# Patient Record
Sex: FEMALE | Race: WHITE | NOT HISPANIC OR LATINO | Employment: FULL TIME | URBAN - METROPOLITAN AREA
[De-identification: names, ages, dates, MRNs, and addresses within clinical notes are randomized per-mention and may not be internally consistent; named-entity substitution may affect disease eponyms.]

---

## 2017-01-23 ENCOUNTER — PRENATAL OFFICE VISIT (OUTPATIENT)
Dept: OBGYN | Facility: CLINIC | Age: 27
End: 2017-01-23
Payer: COMMERCIAL

## 2017-01-23 DIAGNOSIS — Z34.00 PRENATAL CARE, FIRST PREGNANCY: Primary | ICD-10-CM

## 2017-01-23 PROCEDURE — 99207 ZZC NO CHARGE NURSE ONLY: CPT | Performed by: OBSTETRICS & GYNECOLOGY

## 2017-01-23 RX ORDER — PRENATAL VIT/IRON FUM/FOLIC AC 27MG-0.8MG
1 TABLET ORAL DAILY
COMMUNITY
Start: 2014-01-23 | End: 2020-07-29

## 2017-01-24 ENCOUNTER — OFFICE VISIT (OUTPATIENT)
Dept: FAMILY MEDICINE | Facility: CLINIC | Age: 27
End: 2017-01-24
Payer: COMMERCIAL

## 2017-01-24 VITALS
HEART RATE: 77 BPM | TEMPERATURE: 98.7 F | OXYGEN SATURATION: 99 % | DIASTOLIC BLOOD PRESSURE: 76 MMHG | SYSTOLIC BLOOD PRESSURE: 140 MMHG | WEIGHT: 194.2 LBS | BODY MASS INDEX: 30.88 KG/M2

## 2017-01-24 DIAGNOSIS — N76.0 BV (BACTERIAL VAGINOSIS): Primary | ICD-10-CM

## 2017-01-24 DIAGNOSIS — R21 RASH: ICD-10-CM

## 2017-01-24 DIAGNOSIS — R35.0 URINARY FREQUENCY: ICD-10-CM

## 2017-01-24 DIAGNOSIS — B96.89 BV (BACTERIAL VAGINOSIS): Primary | ICD-10-CM

## 2017-01-24 DIAGNOSIS — N89.8 VAGINAL ITCHING: ICD-10-CM

## 2017-01-24 LAB
ALBUMIN UR-MCNC: NEGATIVE MG/DL
APPEARANCE UR: CLEAR
BILIRUB UR QL STRIP: NEGATIVE
COLOR UR AUTO: YELLOW
GLUCOSE UR STRIP-MCNC: NEGATIVE MG/DL
HGB UR QL STRIP: ABNORMAL
KETONES UR STRIP-MCNC: NEGATIVE MG/DL
LEUKOCYTE ESTERASE UR QL STRIP: NEGATIVE
MICRO REPORT STATUS: ABNORMAL
NITRATE UR QL: NEGATIVE
PH UR STRIP: 6.5 PH (ref 5–7)
RBC #/AREA URNS AUTO: NORMAL /HPF (ref 0–2)
SP GR UR STRIP: 1.01 (ref 1–1.03)
SPECIMEN SOURCE: ABNORMAL
URN SPEC COLLECT METH UR: ABNORMAL
UROBILINOGEN UR STRIP-ACNC: 0.2 EU/DL (ref 0.2–1)
WBC #/AREA URNS AUTO: NORMAL /HPF (ref 0–2)
WET PREP SPEC: ABNORMAL

## 2017-01-24 PROCEDURE — 87086 URINE CULTURE/COLONY COUNT: CPT | Performed by: NURSE PRACTITIONER

## 2017-01-24 PROCEDURE — 87210 SMEAR WET MOUNT SALINE/INK: CPT | Performed by: NURSE PRACTITIONER

## 2017-01-24 PROCEDURE — 99213 OFFICE O/P EST LOW 20 MIN: CPT | Performed by: NURSE PRACTITIONER

## 2017-01-24 PROCEDURE — 81001 URINALYSIS AUTO W/SCOPE: CPT | Performed by: NURSE PRACTITIONER

## 2017-01-24 RX ORDER — CLINDAMYCIN HCL 300 MG
300 CAPSULE ORAL 2 TIMES DAILY
Qty: 14 CAPSULE | Refills: 0 | Status: SHIPPED | OUTPATIENT
Start: 2017-01-24 | End: 2017-01-31

## 2017-01-24 NOTE — MR AVS SNAPSHOT
After Visit Summary   1/24/2017    Chhaya Thurman    MRN: [id number]           Patient Information     Date Of Birth          1990        Visit Information        Provider Department      1/24/2017 4:40 PM FARHAT SAME DAY PROVIDER [facility]        Today's Diagnoses     Vaginal itching    -  1     Urinary frequency         Rash         BV (bacterial vaginosis)           Care Instructions    Your provider has referred you to: FMG: [facility] [phone]    Follow-up with your OBGYN regarding your treatment of your bacterial vaginosis   Antibiotics as directed   Urine culture is pending, will contact you if there is a change in treatment therapy.   Drink plenty of fluids. Prevention and treatment of UTI's discussed.   Signs and symptoms of pyelonephritis mentioned.   RTC if any worsening symptoms or if not improving as expected.   Follow-up with your primary care provider next week and as needed.    Indications for emergent return to emergency department discussed with patient, who verbalized good understanding and agreement.  Patient understands the limitations of today's evaluation.         Bacterial Vaginosis    You have a vaginal infection called bacterial vaginosis (BV). Both good and bad bacteria are present in a healthy vagina. BV occurs when these bacteria get out of balance. The number of bad bacteria increase. And the number of good bacteria decrease.  BV may or may not cause symptoms. If symptoms do occur, they can include:    Thin, gray, milky-white, or sometimes green discharge    Unpleasant odor or  fishy  smell    Itching, burning, or pain in or around the vagina  It is not known what causes BV, but certain factors can make the problem more likely. This can include:    Douching    Having sex with a new partner    Having sex with more than one partner  BV will sometimes go away on its own. But treatment is usually recommended. This  is because untreated BV can increase the risk of more serious health problems such as:    Pelvic inflammatory disease (PID)     delivery (giving birth to a baby early if you re pregnant)    HIV and certain other sexually transmitted diseases (STDs)    Infection after surgery on the reproductive organs  Home care  General care    BV is most often treated with medicines called antibiotics. These may be given as pills or as a vaginal cream. If antibiotics are prescribed, be sure to use them exactly as directed. Also, be sure to complete all of the medicine, even if your symptoms go away.    Avoid douching or having sex during treatment.    If you have sex with a female partner, ask your healthcare provider if she should also be treated.  Prevention    Limit or avoid douching.    Avoid having sex. If you do have sex, then take steps to lower your risk:    Use condoms when having sex.    Limit the number of partners you have sex with.  Follow-up care  Follow up with your healthcare provider, or as advised.  When to seek medical advice  Call your healthcare provider right away if:    You have a fever of 100.4 F (38 C) or higher, or as directed by your provider.    Your symptoms worsen, or they don t go away within a few days of starting treatment.    You have new pain in the lower belly or pelvic region.    You have side effects that bother you or a reaction to the pills or cream you re prescribed.    You or any partners you have sex with have new symptoms, such as a rash, joint pain, or sores.    7898-4673 [employer]. [address] [place] [address]. All rights reserved. This information is not intended as a substitute for professional medical care. Always follow your healthcare professional's instructions.              Follow-ups after your visit        Additional Services     DERMATOLOGY REFERRAL       Your provider has referred you to: FMG: [facility] [phone]   [email or web address]    Please be aware that coverage of these services is subject to the terms and limitations of your health insurance plan.  Call member services at your health plan with any benefit or coverage questions.      Please bring the following with you to your appointment:    (1) Any X-Rays, CTs or MRIs which have been performed.  Contact the facility where they were done to arrange for  prior to your scheduled appointment.    (2) List of current medications  (3) This referral request   (4) Any documents/labs given to you for this referral                  Your next 10 appointments already scheduled     Feb 14, 2017  2:00 PM   New Prenatal with Joy Ellison MD, [facility] RM [facility]   [facility]    [address] [place] [address] [phone]              Who to contact     If you have questions or need follow up information about today's clinic visit or your schedule please contact [facility] directly at [phone].  Normal or non-critical lab and imaging results will be communicated to you by MyChart, letter or phone within 4 business days after the clinic has received the results. If you do not hear from us within 7 days, please contact the clinic through [employer] or phone. If you have a critical or abnormal lab result, we will notify you by phone as soon as possible.  Submit refill requests through [employer] or call your pharmacy and they will forward the refill request to us. Please allow 3 business days for your refill to be completed.          Additional Information About Your Visit        [employer] Information     [employer] gives you secure access to your electronic health record. If you see a primary care provider, you can also send messages to your care team and make appointments. If you have questions, please call your primary care clinic.  If you do not have a primary care provider,  please call [phone] and they will assist you.        Care EveryWhere ID     This is your Care EveryWhere ID. This could be used by other organizations to access your [place] medical records  [id number]        Your Vitals Were     Pulse Temperature Pulse Oximetry Last Period          77 98.7  F (37.1  C) (Tympanic) 99% 12/02/2016         Blood Pressure from Last 3 Encounters:   01/24/17 140/76   09/14/16 127/81   09/12/16 154/93    Weight from Last 3 Encounters:   01/24/17 194 lb 3.2 oz (88.089 kg)   02/12/16 180 lb (81.647 kg)   08/20/15 180 lb (81.647 kg)              We Performed the Following     *UA reflex to Microscopic and Culture ([facility] (except [place] and [place])     DERMATOLOGY REFERRAL     Urine Microscopic     Wet prep          Today's Medication Changes          These changes are accurate as of: 1/24/17  4:42 PM.  If you have any questions, ask your nurse or doctor.               Start taking these medicines.        Dose/Directions    clindamycin 300 MG capsule   Commonly known as:  CLEOCIN   Used for:  BV (bacterial vaginosis)        Dose:  300 mg   Take 1 capsule (300 mg) by mouth 2 times daily for 7 days   Quantity:  14 capsule   Refills:  0            Where to get your medicines      These medications were sent to [id number] [address]  [address] [address]    Hours:  Closed 10-16-08 business to [facility] Phone:  [phone]    - clindamycin 300 MG capsule             Primary Care Provider Office Phone # Fax #    Agnes Stanton PA-C [phone]       [facility] [address] [facility] [address]        Thank you!     Thank you for choosing [facility]  for your care. Our goal is always to provide you with excellent care. Hearing back from our patients is one way we can continue to improve our services. Please take a few minutes to complete  the written survey that you may receive in the mail after your visit with us. Thank you!             Your Updated Medication List - Protect others around you: Learn how to safely use, store and throw away your medicines at www.disposemymeds.org.          This list is accurate as of: 1/24/17  4:42 PM.  Always use your most recent med list.                   Brand Name Dispense Instructions for use    clindamycin 300 MG capsule    CLEOCIN    14 capsule    Take 1 capsule (300 mg) by mouth 2 times daily for 7 days       prenatal multivitamin  plus iron 27-0.8 MG Tabs per tablet      Take 1 tablet by mouth daily

## 2017-01-24 NOTE — PROGRESS NOTES
SUBJECTIVE:                                                    Chhaya Thurman is a 26 year old female who presents to clinic today for the following health issues:    Patient is Pregnant   URINARY TRACT SYMPTOMS      Duration: 3 days     Description  frequency, urgency, nocturia x 1 and retention    Intensity:  mild    Accompanying signs and symptoms:  Fever/chills: YES  Flank pain no   Nausea and vomiting: no   Vaginal symptoms: none and itching  Abdominal/Pelvic Pain: no     History  History of frequent UTI's: YES- one a couple months ago   History of kidney stones: no   Sexually Active: YES  Possibility of pregnancy: Yes    Precipitating or alleviating factors: None    Therapies tried and outcome: increase fluid intake        SUBJECTIVE:   Chhaya Thurman is a 26 year old female who  presents today for a possible UTI. Symptoms of dysuria and frequency have been going on for 2day(s).  Hematuria no.  gradual onsetand mild.  There is no history of fever, chills, nausea or vomiting.  This patient does not have a history of urinary tract infections. Patient denies vaginal symptomsor STD concerns. Patient is  Pregnant     Past Medical History   Diagnosis Date     History of recurrent UTI (urinary tract infection)      Chickenpox      Current Outpatient Prescriptions   Medication Sig Dispense Refill     Prenatal Vit-Fe Fumarate-FA (PRENATAL MULTIVITAMIN  PLUS IRON) 27-0.8 MG TABS per tablet Take 1 tablet by mouth daily           ROS:   CONSTITUTIONAL:NEGATIVE for fever, chills, change in weight  INTEGUMENTARY/SKIN: NEGATIVE for worrisome rashes, moles or lesions  EYES: NEGATIVE for vision changes or irritation  ENT/MOUTH: NEGATIVE for ear, mouth and throat problems  RESP:NEGATIVE for significant cough or SOB  CV: NEGATIVE for chest pain, palpitations or peripheral edema  GI: NEGATIVE for nausea, abdominal pain, heartburn, or change in bowel habits  : See above   MUSCULOSKELETAL: NEGATIVE for significant  arthralgias or myalgia  NEURO: NEGATIVE for weakness, dizziness or paresthesias    OBJECTIVE:  /76 mmHg  Pulse 77  Temp(Src) 98.7  F (37.1  C) (Tympanic)  Wt 194 lb 3.2 oz (88.089 kg)  SpO2 99%  LMP 12/02/2016  GENERAL APPEARANCE: healthy, alert and no distress  RESP: lungs clear to auscultation - no rales, rhonchi or wheezes  CV: regular rates and rhythm, normal S1 S2, no murmur noted  ABDOMEN:  soft, nontender, no HSM or masses and bowel sounds normal  BACK: No CVA tenderness  SKIN:  Examination of the rash to the back  Reveals  well-defined, flat, , pigmented patches to the back.       UA:  Results for orders placed or performed in visit on 01/24/17   *UA reflex to Microscopic and Culture ([facility] (except [place] and [place])   Result Value Ref Range    Color Urine Yellow     Appearance Urine Clear     Glucose Urine Negative NEG mg/dL    Bilirubin Urine Negative NEG    Ketones Urine Negative NEG mg/dL    Specific Gravity Urine 1.010 1.003 - 1.035    Blood Urine Trace (A) NEG    pH Urine 6.5 5.0 - 7.0 pH    Protein Albumin Urine Negative NEG mg/dL    Urobilinogen Urine 0.2 0.2 - 1.0 EU/dL    Nitrite Urine Negative NEG    Leukocyte Esterase Urine Negative NEG    Source Midstream Urine    Urine Microscopic   Result Value Ref Range    WBC Urine O - 2 0 - 2 /HPF    RBC Urine O - 2 0 - 2 /HPF   Wet prep   Result Value Ref Range    Specimen Description Vagina     Wet Prep (A)      No Trichomonas seen  Few Clue cells seen  No yeast seen      Micro Report Status FINAL 01/24/2017        ASSESSMENT:     ICD-10-CM    1. BV (bacterial vaginosis) N76.0 clindamycin (CLEOCIN) 300 MG capsule    B96.89    2. Rash R21 DERMATOLOGY REFERRAL   3. Vaginal itching L29.8 Wet prep     Urine Microscopic   4. Urinary frequency R35.0 *UA reflex to Microscopic and Culture ([facility] (except [place] and [place])     Urine Culture Aerobic Bacterial         PLAN:  Will  have her seen by Dermatology for the rash to the back for further evaluations   Patient Instructions   Your provider has referred you to: FMG: [facility] [phone]    Follow-up with your OBGYN regarding your treatment of your bacterial vaginosis   Antibiotics as directed   Urine culture is pending, will contact you if there is a change in treatment therapy.   Drink plenty of fluids. Prevention and treatment of UTI's discussed.   Signs and symptoms of pyelonephritis mentioned.   RTC if any worsening symptoms or if not improving as expected.   Follow-up with your primary care provider next week and as needed.    Indications for emergent return to emergency department discussed with patient, who verbalized good understanding and agreement.  Patient understands the limitations of today's evaluation.         Bacterial Vaginosis    You have a vaginal infection called bacterial vaginosis (BV). Both good and bad bacteria are present in a healthy vagina. BV occurs when these bacteria get out of balance. The number of bad bacteria increase. And the number of good bacteria decrease.  BV may or may not cause symptoms. If symptoms do occur, they can include:    Thin, gray, milky-white, or sometimes green discharge    Unpleasant odor or  fishy  smell    Itching, burning, or pain in or around the vagina  It is not known what causes BV, but certain factors can make the problem more likely. This can include:    Douching    Having sex with a new partner    Having sex with more than one partner  BV will sometimes go away on its own. But treatment is usually recommended. This is because untreated BV can increase the risk of more serious health problems such as:    Pelvic inflammatory disease (PID)     delivery (giving birth to a baby early if you re pregnant)    HIV and certain other sexually transmitted diseases (STDs)    Infection after surgery on the reproductive organs  [facility]  General  care    BV is most often treated with medicines called antibiotics. These may be given as pills or as a vaginal cream. If antibiotics are prescribed, be sure to use them exactly as directed. Also, be sure to complete all of the medicine, even if your symptoms go away.    Avoid douching or having sex during treatment.    If you have sex with a female partner, ask your healthcare provider if she should also be treated.  Prevention    Limit or avoid douching.    Avoid having sex. If you do have sex, then take steps to lower your risk:    Use condoms when having sex.    Limit the number of partners you have sex with.  Follow-up care  Follow up with your healthcare provider, or as advised.  When to seek medical advice  Call your healthcare provider right away if:    You have a fever of 100.4 F (38 C) or higher, or as directed by your provider.    Your symptoms worsen, or they don t go away within a few days of starting treatment.    You have new pain in the lower belly or pelvic region.    You have side effects that bother you or a reaction to the pills or cream you re prescribed.    You or any partners you have sex with have new symptoms, such as a rash, joint pain, or sores.    7525-6500 [employer]. [address] [place] [address], [place] [address]. All rights reserved. This information is not intended as a substitute for professional medical care. Always follow your healthcare professional's instructions.            SUSSY Olivo CNP

## 2017-01-24 NOTE — NURSING NOTE
"Chief Complaint   Patient presents with     UTI     Derm Problem     spots on back      /76 mmHg  Pulse 77  Temp(Src) 98.7  F (37.1  C) (Tympanic)  Wt 194 lb 3.2 oz (88.089 kg)  SpO2 99%  LMP 12/02/2016 Estimated body mass index is 30.88 kg/(m^2) as calculated from the following:    Height as of 2/12/16: 5' 6.5\" (1.689 m).    Weight as of this encounter: 194 lb 3.2 oz (88.089 kg).  bp completed using cuff size: regular      Health Maintenance that is potentially due pending provider review:  NONE        "

## 2017-01-24 NOTE — PATIENT INSTRUCTIONS
Your provider has referred you to: FMG: [facility] [phone]    Follow-up with your OBGYN regarding your treatment of your bacterial vaginosis   Antibiotics as directed   Urine culture is pending, will contact you if there is a change in treatment therapy.   Drink plenty of fluids. Prevention and treatment of UTI's discussed.   Signs and symptoms of pyelonephritis mentioned.   RTC if any worsening symptoms or if not improving as expected.   Follow-up with your primary care provider next week and as needed.    Indications for emergent return to emergency department discussed with patient, who verbalized good understanding and agreement.  Patient understands the limitations of today's evaluation.         Bacterial Vaginosis    You have a vaginal infection called bacterial vaginosis (BV). Both good and bad bacteria are present in a healthy vagina. BV occurs when these bacteria get out of balance. The number of bad bacteria increase. And the number of good bacteria decrease.  BV may or may not cause symptoms. If symptoms do occur, they can include:    Thin, gray, milky-white, or sometimes green discharge    Unpleasant odor or  fishy  smell    Itching, burning, or pain in or around the vagina  It is not known what causes BV, but certain factors can make the problem more likely. This can include:    Douching    Having sex with a new partner    Having sex with more than one partner  BV will sometimes go away on its own. But treatment is usually recommended. This is because untreated BV can increase the risk of more serious health problems such as:    Pelvic inflammatory disease (PID)     delivery (giving birth to a baby early if you re pregnant)    HIV and certain other sexually transmitted diseases (STDs)    Infection after surgery on the reproductive organs  Home care  General care    BV is most often treated with medicines called antibiotics. These may be given as pills or as a  vaginal cream. If antibiotics are prescribed, be sure to use them exactly as directed. Also, be sure to complete all of the medicine, even if your symptoms go away.    Avoid douching or having sex during treatment.    If you have sex with a female partner, ask your healthcare provider if she should also be treated.  Prevention    Limit or avoid douching.    Avoid having sex. If you do have sex, then take steps to lower your risk:    Use condoms when having sex.    Limit the number of partners you have sex with.  Follow-up care  Follow up with your healthcare provider, or as advised.  When to seek medical advice  Call your healthcare provider right away if:    You have a fever of 100.4 F (38 C) or higher, or as directed by your provider.    Your symptoms worsen, or they don t go away within a few days of starting treatment.    You have new pain in the lower belly or pelvic region.    You have side effects that bother you or a reaction to the pills or cream you re prescribed.    You or any partners you have sex with have new symptoms, such as a rash, joint pain, or sores.    9648-9041 [employer]. [address] [place] [address]. All rights reserved. This information is not intended as a substitute for professional medical care. Always follow your healthcare professional's instructions.

## 2017-01-26 ENCOUNTER — TELEPHONE (OUTPATIENT)
Dept: OBGYN | Facility: CLINIC | Age: 27
End: 2017-01-26

## 2017-01-26 LAB
BACTERIA SPEC CULT: NORMAL
MICRO REPORT STATUS: NORMAL
SPECIMEN SOURCE: NORMAL

## 2017-01-26 NOTE — TELEPHONE ENCOUNTER
Returned call to Patient:  S-(situation): spotting    B-(background): EOB currently 7+6 weeks pregnant, 1st OB scheduled 2/14/17.     A-(assessment): Patient reports yesterday after straining with BM had spotting, happened again today but spotting has continued throughout the day. Patient reports a quarter sized stain on underwear from spotting. Patient reports color was dark brown initially is more reddish brown now. Patient reports having a sideache today, was a dull achy pain, denies sharp.     R-(recommendations): Reassurance provided. Advised to return call to clinic if changes to bleeding. Advised if soaking a pad >1hr, passing large clots, feeling lightheaded or dizzy, or if develops sudden onset of sharp abdominal pain on one side to be seen in ER.    Patient reports she is to disclose to provider that she is currently on Clindamycin for vaginosis.  Thank you,  Tiana Aldridge  OB/GYN, [facility] RN

## 2017-01-26 NOTE — TELEPHONE ENCOUNTER
Pt called stating that she is currently 8 weeks EOB (LMP was 12/02/2016) and is experiencing some spotting. Pt states that yesterday after she had a BM was the first time it happened and then again this morning. Pt states that it was dark brown and now it is brown with some red in it. Pt is not wearing a pad. Pt described these as drops and on toilet paper. Please advise.    Vita Martínez  [facility] [place]

## 2017-01-27 ENCOUNTER — APPOINTMENT (OUTPATIENT)
Dept: ULTRASOUND IMAGING | Facility: CLINIC | Age: 27
End: 2017-01-27
Attending: FAMILY MEDICINE
Payer: COMMERCIAL

## 2017-01-27 ENCOUNTER — HOSPITAL ENCOUNTER (EMERGENCY)
Facility: CLINIC | Age: 27
Discharge: HOME | End: 2017-01-27
Attending: FAMILY MEDICINE | Admitting: FAMILY MEDICINE
Payer: COMMERCIAL

## 2017-01-27 VITALS
HEIGHT: 67 IN | SYSTOLIC BLOOD PRESSURE: 127 MMHG | TEMPERATURE: 98.7 F | DIASTOLIC BLOOD PRESSURE: 76 MMHG | WEIGHT: 185 LBS | OXYGEN SATURATION: 99 % | RESPIRATION RATE: 16 BRPM | BODY MASS INDEX: 29.03 KG/M2

## 2017-01-27 DIAGNOSIS — O20.0 THREATENED ABORTION: ICD-10-CM

## 2017-01-27 LAB
ABO + RH BLD: NORMAL
ABO + RH BLD: NORMAL
ALBUMIN UR-MCNC: NEGATIVE MG/DL
ANION GAP SERPL CALCULATED.3IONS-SCNC: 9 MMOL/L (ref 3–14)
APPEARANCE UR: CLEAR
B-HCG SERPL-ACNC: 246 IU/L
BASOPHILS # BLD AUTO: 0.1 10E9/L (ref 0–0.2)
BASOPHILS NFR BLD AUTO: 1.5 %
BILIRUB UR QL STRIP: NEGATIVE
BLD GP AB SCN SERPL QL: NORMAL
BLOOD BANK CMNT PATIENT-IMP: NORMAL
BUN SERPL-MCNC: 10 MG/DL (ref 7–30)
CALCIUM SERPL-MCNC: 8.5 MG/DL (ref 8.5–10.1)
CHLORIDE SERPL-SCNC: 106 MMOL/L (ref 94–109)
CO2 SERPL-SCNC: 24 MMOL/L (ref 20–32)
COLOR UR AUTO: ABNORMAL
CREAT SERPL-MCNC: 0.78 MG/DL (ref 0.52–1.04)
DIFFERENTIAL METHOD BLD: NORMAL
EOSINOPHIL # BLD AUTO: 0.1 10E9/L (ref 0–0.7)
EOSINOPHIL NFR BLD AUTO: 1.8 %
ERYTHROCYTE [DISTWIDTH] IN BLOOD BY AUTOMATED COUNT: 13.6 % (ref 10–15)
GFR SERPL CREATININE-BSD FRML MDRD: 88 ML/MIN/1.7M2
GLUCOSE SERPL-MCNC: 87 MG/DL (ref 70–99)
GLUCOSE UR STRIP-MCNC: NEGATIVE MG/DL
HCT VFR BLD AUTO: 44.1 % (ref 35–47)
HGB BLD-MCNC: 14.6 G/DL (ref 11.7–15.7)
HGB UR QL STRIP: ABNORMAL
IMM GRANULOCYTES # BLD: 0 10E9/L (ref 0–0.4)
IMM GRANULOCYTES NFR BLD: 0 %
KETONES UR STRIP-MCNC: NEGATIVE MG/DL
LEUKOCYTE ESTERASE UR QL STRIP: NEGATIVE
LYMPHOCYTES # BLD AUTO: 2.1 10E9/L (ref 0.8–5.3)
LYMPHOCYTES NFR BLD AUTO: 35.6 %
MCH RBC QN AUTO: 29.3 PG (ref 26.5–33)
MCHC RBC AUTO-ENTMCNC: 33.1 G/DL (ref 31.5–36.5)
MCV RBC AUTO: 89 FL (ref 78–100)
MONOCYTES # BLD AUTO: 0.4 10E9/L (ref 0–1.3)
MONOCYTES NFR BLD AUTO: 7.2 %
MUCOUS THREADS #/AREA URNS LPF: PRESENT /LPF
NEUTROPHILS # BLD AUTO: 3.2 10E9/L (ref 1.6–8.3)
NEUTROPHILS NFR BLD AUTO: 53.9 %
NITRATE UR QL: NEGATIVE
PH UR STRIP: 6.5 PH (ref 5–7)
PLATELET # BLD AUTO: 239 10E9/L (ref 150–450)
POTASSIUM SERPL-SCNC: 3.8 MMOL/L (ref 3.4–5.3)
RBC # BLD AUTO: 4.98 10E12/L (ref 3.8–5.2)
RBC #/AREA URNS AUTO: <1 /HPF (ref 0–2)
SODIUM SERPL-SCNC: 139 MMOL/L (ref 133–144)
SP GR UR STRIP: 1 (ref 1–1.03)
SPECIMEN EXP DATE BLD: NORMAL
SQUAMOUS #/AREA URNS AUTO: 1 /HPF (ref 0–1)
URN SPEC COLLECT METH UR: ABNORMAL
UROBILINOGEN UR STRIP-MCNC: NORMAL MG/DL (ref 0–2)
WBC # BLD AUTO: 6 10E9/L (ref 4–11)
WBC #/AREA URNS AUTO: <1 /HPF (ref 0–2)

## 2017-01-27 PROCEDURE — 96361 HYDRATE IV INFUSION ADD-ON: CPT

## 2017-01-27 PROCEDURE — 85025 COMPLETE CBC W/AUTO DIFF WBC: CPT | Performed by: FAMILY MEDICINE

## 2017-01-27 PROCEDURE — 86900 BLOOD TYPING SEROLOGIC ABO: CPT | Performed by: FAMILY MEDICINE

## 2017-01-27 PROCEDURE — 96360 HYDRATION IV INFUSION INIT: CPT

## 2017-01-27 PROCEDURE — 86850 RBC ANTIBODY SCREEN: CPT | Performed by: FAMILY MEDICINE

## 2017-01-27 PROCEDURE — 25000128 H RX IP 250 OP 636: Performed by: FAMILY MEDICINE

## 2017-01-27 PROCEDURE — 76801 OB US < 14 WKS SINGLE FETUS: CPT

## 2017-01-27 PROCEDURE — 99284 EMERGENCY DEPT VISIT MOD MDM: CPT | Mod: 25

## 2017-01-27 PROCEDURE — 86901 BLOOD TYPING SEROLOGIC RH(D): CPT | Performed by: FAMILY MEDICINE

## 2017-01-27 PROCEDURE — 80048 BASIC METABOLIC PNL TOTAL CA: CPT | Performed by: FAMILY MEDICINE

## 2017-01-27 PROCEDURE — 81001 URINALYSIS AUTO W/SCOPE: CPT | Performed by: FAMILY MEDICINE

## 2017-01-27 PROCEDURE — 99284 EMERGENCY DEPT VISIT MOD MDM: CPT | Performed by: FAMILY MEDICINE

## 2017-01-27 PROCEDURE — 84702 CHORIONIC GONADOTROPIN TEST: CPT | Performed by: FAMILY MEDICINE

## 2017-01-27 RX ORDER — SODIUM CHLORIDE 9 MG/ML
1000 INJECTION, SOLUTION INTRAVENOUS CONTINUOUS
Status: DISCONTINUED | OUTPATIENT
Start: 2017-01-27 | End: 2017-01-27 | Stop reason: HOSPADM

## 2017-01-27 RX ORDER — CHLORAL HYDRATE 500 MG
2 CAPSULE ORAL DAILY
COMMUNITY
End: 2017-06-14

## 2017-01-27 RX ADMIN — SODIUM CHLORIDE 1000 ML: 9 INJECTION, SOLUTION INTRAVENOUS at 08:41

## 2017-01-27 ASSESSMENT — ENCOUNTER SYMPTOMS
MUSCULOSKELETAL NEGATIVE: 1
EYES NEGATIVE: 1
ACTIVITY CHANGE: 1
ALLERGIC/IMMUNOLOGIC NEGATIVE: 1
RESPIRATORY NEGATIVE: 1
HEMATOLOGIC/LYMPHATIC NEGATIVE: 1
APPETITE CHANGE: 1
NEUROLOGICAL NEGATIVE: 1
GASTROINTESTINAL NEGATIVE: 1
PSYCHIATRIC NEGATIVE: 1
ENDOCRINE NEGATIVE: 1
CARDIOVASCULAR NEGATIVE: 1

## 2017-01-27 NOTE — ED AVS SNAPSHOT
[facility] Emergency Department    [address]    [facility] [address]    Phone:  [phone]    Fax:  [phone]                                       Chhaya Thurman   MRN: [id number]    Department:  [facility] Emergency Department   Date of Visit:  2017           Patient Information     Date Of Birth          1990        Your diagnoses for this visit were:     Threatened         You were seen by Leonides Stephenson MD.      Follow-up Information     Schedule an appointment as soon as possible for a visit with Agnes Stanton PA-C.    Specialty:  Physician Assistant    Why:  As needed, If symptoms worsen    Contact information:    [facility]  [address] [place] [address]  [phone]          Discharge Instructions         Understanding Miscarriage: Emotions  Miscarriage is the unplanned end of a pregnancy that occurs before you reach 20 weeks. When a miscarriage happens, you re likely to have a wide range of feelings. Allow yourself to accept how you feel. Only then can you begin to move on.    No one is to blame  Know that you did not cause this to happen. Miscarriage is very common. There is a 15% chance of miscarriage with each pregnancy (after pregnancy has been diagnosed). Miscarriage usually takes place during the first 10 weeks after conception.  Grief takes many forms  Grief may be the first thing you feel, or it may come upon you later. Perhaps you ll grieve because the future you hoped for is lost. Grief is painful and often lonely. But your miscarriage should become easier to deal with over time.  What you feel is OK  No one can tell you how to respond to your miscarriage. If you have been trying to have a child, this loss may feel overwhelming. Perhaps this was an unplanned pregnancy. That doesn t mean you won t feel loss. You know yourself best. It s OK to feel whatever you feel.  A sense of loss  No matter what you thought about  being pregnant, having a miscarriage may cause a sense of loss. You may feel as if something is missing. It s OK if you can t describe how you feel. At first, it may be enough just to look inside yourself and feel your emotions.  Partner s note  Men grieve too. You may be feeling sad, helpless or frustrated. When you re struggling with your own feelings, knowing how to help your partner may be hard. But do your best to provide support. The following tips may also help:    Be kind to yourself and your partner.    Spend time together.    Fix a meal or bring dinner home for her.    Rent a movie.    If you have children, spend extra time with them.     3851-9045 [employer]. [address] [place] [address], [place] [address]. All rights reserved. This information is not intended as a substitute for professional medical care. Always follow your healthcare professional's instructions.          Understanding Miscarriage: Possible Causes  Miscarriage is common, but finding its cause may not be easy. If a cause can be found, it s likely to be a problem with the baby or the structure of the uterus. Other factors cause miscarriage, but they are less common.  Problems with the baby  Any of the following problems with the baby can cause a miscarriage:    There is a problem with the baby s chromosomes (genes that carry the information needed for life).    The placenta or the umbilical cord may be damaged. (These types of miscarriages often occur later in pregnancy.)  Problems with the uterus or cervix  Any of the following problems with the uterus or cervix can cause a miscarriage:    The uterus may be divided (have a septum), or have fibroids or adhesions.    The lining of the uterus may be too thin for the fertilized egg to implant.    The cervix may be too weak to support the weight of a pregnancy.  Other factors  Any of the following problems can cause a miscarriage:    A serious illness, such as mumps or [place]  measles.    A bad injury, perhaps during a car accident.    Exposure to toxins or radiation.  What does not cause miscarriage  Plenty of myths and  old wives  tales  try to explain the cause of miscarriage. But they are fiction -- not fact. None of the following activities causes miscarriage:    Carrying groceries    Lifting a small child    Wearing high heels    Coloring your hair    Having sex    Vacuuming   Working outside the home    Being a vegetarian    Eating spicy foods    Having a Pap smear    Riding a horse or a bicycle    Wishing away or denying a pregnancy         0145-5308 [employer]. [address] [place] [address]. All rights reserved. This information is not intended as a substitute for professional medical care. Always follow your healthcare professional's instructions.          Possible Miscarriage (Threatened )  You may be having a miscarriage.  Common signs of a miscarriage are pain and bleeding. A small amount of bleeding can be normal during the first 3 months of pregnancy. Often the pain and bleeding stop, and you have a normal pregnancy and baby. But heavy bleeding or severe cramping can be an early sign of miscarriage. A  miscarriage  means an unexpected loss of your pregnancy.  At this time, we don t know whether you will have a miscarriage, or if things will clear up and your pregnancy will continue normally. We understand that this is emotionally difficult. There is little we can say to change the way you feel. But understand that miscarriages are common.  About 1 or 2 out of every 10 pregnancies end this way. Some end even before you know you are pregnant. This happens for a number of reasons, and usually we never figure out why. It s important you know that it is not your fault. It didn t happen because you did anything wrong.  Having sex or exercising does not cause a miscarriage. These activities are usually safe unless you have pain or bleeding or  your doctor tells you to stop. Even minor falls won t cause a miscarriage. Miscarriages happen because things were not developing as they were supposed to. No medicine can prevent a miscarriage.  Again, understand that things are uncertain right now. You may still have some bleeding. This may be light spotting or like a period, and you may pass some tissue. You may have some cramping. This is why follow-up care is important.  Home care  To improve the chance of keeping your pregnancy, you should take these steps:    Rest in bed until the pain and bleeding stop.    Don t have sex until your health care provider says it s OK.    Use sanitary napkins instead of tampons.    Don t douche.    Don t take aspirin, ibuprofen, or naproxen.    Don t have alcoholic or caffeinated beverage or smoke.  Follow-up care  Make an appointment with your doctor within the next week, or as directed by our staff.  Note: If you had an ultrasound, a radiologist will review it. You will be told of any new findings that may affect your care.  Call 911  Call 911 if you have:    Severe pain and very heavy bleeding    Severe lightheadedness, passing out, or fainting    Rapid heart rate    Difficulty breathing    Confusion or difficulty waking up  When to seek medical advice  Call your health care provider right away if any of these occur:    Vaginal bleeding or pain that lasts for more than 3 days    Heavy bleeding. This means soaking 1 new pad an hour over 3 hours.    Fever of 100.4 F (38 C) or higher, or as directed by your health care provider    Pain in your lower belly (abdomen) that gets worse    Weakness or dizziness    Passage of anything that resembles tissue. This would be pink or grayish membrane or solid material. Save the tissue in a clean container and bring it to your provider.       2155-9832 [employer]. [address] [place] [address], [place] [address]. All rights reserved. This information is not intended as a substitute  for professional medical care. Always follow your healthcare professional's instructions.      Return to the emergency department if bleeding exceeds 1 pad per hour ×2 hours.  Or if cramping or pain is too severe to tolerate at home.  Recheck serum hCG in 48 hours with resultant routed to your primary care provider.  You should make a follow-up appointment with your primary care provider for ongoing care.    Future Appointments        Provider Department Dept Phone Center    2/14/2017 2:00 PM Joy Ellison MD; PROC RM 1 [facility] [phone] [facility]      24 Hour Appointment Hotline       To make an appointment at any [facility], call [phone] ([phone]). If you don't have a family doctor or clinic, we will help you find one. [facility] are conveniently located to serve the needs of you and your family.          ED Discharge Orders     HCG quantitative pregnancy                    Review of your medicines      Our records show that you are taking the medicines listed below. If these are incorrect, please call your family doctor or clinic.        Dose / Directions Last dose taken    clindamycin 300 MG capsule   Commonly known as:  CLEOCIN   Dose:  300 mg   Quantity:  14 capsule        Take 1 capsule (300 mg) by mouth 2 times daily for 7 days   Refills:  0        fish oil-omega-3 fatty acids 1000 MG capsule   Dose:  2 g        Take 2 g by mouth daily   Refills:  0        prenatal multivitamin  plus iron 27-0.8 MG Tabs per tablet   Dose:  1 tablet        Take 1 tablet by mouth daily   Refills:  0                Procedures and tests performed during your visit     ABO/Rh type and screen    Basic metabolic panel    CBC with platelets differential    HCG quantitative pregnancy    UA reflex to Microscopic and Culture    US OB <14 Weeks w Transvaginal Single      Orders Needing Specimen Collection     None      Pending Results     Date and Time Order Name Status Description     1/27/2017 0746 US OB <14 Weeks w Transvaginal Single Preliminary             Pending Culture Results     No orders found from 1/26/2017 to 1/28/2017.       Test Results from your hospital stay           1/27/2017  8:47 AM - Interface, Flexilab Results      Component Results     Component Value Ref Range & Units Status    WBC 6.0 4.0 - 11.0 10e9/L Final    RBC Count 4.98 3.8 - 5.2 10e12/L Final    Hemoglobin 14.6 11.7 - 15.7 g/dL Final    Hematocrit 44.1 35.0 - 47.0 % Final    MCV 89 78 - 100 fl Final    MCH 29.3 26.5 - 33.0 pg Final    MCHC 33.1 31.5 - 36.5 g/dL Final    RDW 13.6 10.0 - 15.0 % Final    Platelet Count 239 150 - 450 10e9/L Final    Diff Method Automated Method  Final    % Neutrophils 53.9 % Final    % Lymphocytes 35.6 % Final    % Monocytes 7.2 % Final    % Eosinophils 1.8 % Final    % Basophils 1.5 % Final    % Immature Granulocytes 0.0 % Final    Absolute Neutrophil 3.2 1.6 - 8.3 10e9/L Final    Absolute Lymphocytes 2.1 0.8 - 5.3 10e9/L Final    Absolute Monocytes 0.4 0.0 - 1.3 10e9/L Final    Absolute Eosinophils 0.1 0.0 - 0.7 10e9/L Final    Absolute Basophils 0.1 0.0 - 0.2 10e9/L Final    Abs Immature Granulocytes 0.0 0 - 0.4 10e9/L Final         1/27/2017  9:58 AM - Interface, Flexilab Results      Component Results     Component    ABO    O    RH(D)     Pos    Antibody Screen    Neg    Test Valid Only At    [facility]    Specimen Expires    01/30/2017 1/27/2017  9:05 AM - Interface, Flexilab Results      Component Results     Component Value Ref Range & Units Status    Sodium 139 133 - 144 mmol/L Final    Potassium 3.8 3.4 - 5.3 mmol/L Final    Chloride 106 94 - 109 mmol/L Final    Carbon Dioxide 24 20 - 32 mmol/L Final    Anion Gap 9 3 - 14 mmol/L Final    Glucose 87 70 - 99 mg/dL Final    Urea Nitrogen 10 7 - 30 mg/dL Final    Creatinine 0.78 0.52 - 1.04 mg/dL Final    GFR Estimate 88 >60 mL/min/1.7m2 Final    Non  GFR Calc    GFR Estimate If Black  >90   GFR Calc   >60 mL/min/1.7m2 Final    Calcium 8.5 8.5 - 10.1 mg/dL Final         1/27/2017  8:55 AM - Interface, Flexilab Results      Component Results     Component Value Ref Range & Units Status    Color Urine Straw  Final    Appearance Urine Clear  Final    Glucose Urine Negative NEG mg/dL Final    Bilirubin Urine Negative NEG Final    Ketones Urine Negative NEG mg/dL Final    Specific Gravity Urine 1.005 1.003 - 1.035 Final    Blood Urine Moderate (A) NEG Final    pH Urine 6.5 5.0 - 7.0 pH Final    Protein Albumin Urine Negative NEG mg/dL Final    Urobilinogen mg/dL Normal 0.0 - 2.0 mg/dL Final    Nitrite Urine Negative NEG Final    Leukocyte Esterase Urine Negative NEG Final    Source Midstream Urine  Final    RBC Urine <1 0 - 2 /HPF Final    WBC Urine <1 0 - 2 /HPF Final    Squamous Epithelial /HPF Urine 1 0 - 1 /HPF Final    Mucous Urine Present (A) NEG /LPF Final         1/27/2017  9:10 AM - Interface, Flexilab Results      Component Results     Component Value Ref Range & Units Status    HCG Quantitative Serum 246 IU/L Final               1/27/2017  9:34 AM - Interface, Radiant Ib      Narrative     ULTRASOUND OBSTETRIC LESS THAN FOURTEEN WEEKS WITH TRANSVAGINAL  IMAGING SINGLE  1/27/2017 9:21 AM    HISTORY: Approximately 8-week pregnancy. Rule out ectopic, spontaneous  AB.    TECHNIQUE: Transabdominal and transvaginal imaging were performed.   Transvaginal imaging was performed to better evaluate the uterus and  gestational sac.    COMPARISON:  None.    FINDINGS:  The uterus is normal in size measuring 7.9 x 3.5 x 4.2 cm.  There is no intrauterine gestational sac or fetal pole. Endometrial  stripe measures approximately 1.0 cm.    The left ovary is normal. It measures 3.6 x 1.9 x 2.1 cm. The right  ovary contains a 1.5 cm corpus luteum. No free fluid.        Impression     IMPRESSION:  No evidence of intrauterine pregnancy. Differential  diagnosis includes ectopic pregnancy versus  spontaneous .  Recommend clinical correlation and consideration of correlating with  serial serum beta hCG levels.                Thank you for choosing [facility]       Thank you for choosing [facility] for your care. Our goal is always to provide you with excellent care. Hearing back from our patients is one way we can continue to improve our services. Please take a few minutes to complete the written survey that you may receive in the mail after you visit with us. Thank you!        [employer] Information     [employer] gives you secure access to your electronic health record. If you see a primary care provider, you can also send messages to your care team and make appointments. If you have questions, please call your primary care clinic.  If you do not have a primary care provider, please call [phone] and they will assist you.        Care EveryWhere ID     This is your Care EveryWhere ID. This could be used by other organizations to access your [facility] medical records  [id number]        After Visit Summary       This is your record. Keep this with you and show to your community pharmacist(s) and doctor(s) at your next visit.

## 2017-01-27 NOTE — DISCHARGE INSTRUCTIONS
Understanding Miscarriage: Emotions  Miscarriage is the unplanned end of a pregnancy that occurs before you reach 20 weeks. When a miscarriage happens, you re likely to have a wide range of feelings. Allow yourself to accept how you feel. Only then can you begin to move on.    No one is to blame  Know that you did not cause this to happen. Miscarriage is very common. There is a 15% chance of miscarriage with each pregnancy (after pregnancy has been diagnosed). Miscarriage usually takes place during the first 10 weeks after conception.  Grief takes many forms  Grief may be the first thing you feel, or it may come upon you later. Perhaps you ll grieve because the future you hoped for is lost. Grief is painful and often lonely. But your miscarriage should become easier to deal with over time.  What you feel is OK  No one can tell you how to respond to your miscarriage. If you have been trying to have a child, this loss may feel overwhelming. Perhaps this was an unplanned pregnancy. That doesn t mean you won t feel loss. You know yourself best. It s OK to feel whatever you feel.  A sense of loss  No matter what you thought about being pregnant, having a miscarriage may cause a sense of loss. You may feel as if something is missing. It s OK if you can t describe how you feel. At first, it may be enough just to look inside yourself and feel your emotions.  Partner s note  Men grieve too. You may be feeling sad, helpless or frustrated. When you re struggling with your own feelings, knowing how to help your partner may be hard. But do your best to provide support. The following tips may also help:    Be kind to yourself and your partner.    Spend time together.    Fix a meal or bring dinner home for her.    Rent a movie.    If you have children, spend extra time with them.     [id number] [employer]. [address] [place] [address], [place] [address]. All rights reserved. This information is not intended as a  substitute for professional medical care. Always follow your healthcare professional's instructions.          Understanding Miscarriage: Possible Causes  Miscarriage is common, but finding its cause may not be easy. If a cause can be found, it s likely to be a problem with the baby or the structure of the uterus. Other factors cause miscarriage, but they are less common.  Problems with the baby  Any of the following problems with the baby can cause a miscarriage:    There is a problem with the baby s chromosomes (genes that carry the information needed for life).    The placenta or the umbilical cord may be damaged. (These types of miscarriages often occur later in pregnancy.)  Problems with the uterus or cervix  Any of the following problems with the uterus or cervix can cause a miscarriage:    The uterus may be divided (have a septum), or have fibroids or adhesions.    The lining of the uterus may be too thin for the fertilized egg to implant.    The cervix may be too weak to support the weight of a pregnancy.  Other factors  Any of the following problems can cause a miscarriage:    A serious illness, such as mumps or [place] measles.    A bad injury, perhaps during a car accident.    Exposure to toxins or radiation.  What does not cause miscarriage  Plenty of myths and  old wives  tales  try to explain the cause of miscarriage. But they are fiction -- not fact. None of the following activities causes miscarriage:    Carrying groceries    Lifting a small child    Wearing high heels    Coloring your hair    Having sex    Vacuuming   Working outside the home    Being a vegetarian    Eating spicy foods    Having a Pap smear    Riding a horse or a bicycle    Wishing away or denying a pregnancy         [id number] [employer]. [address] [place] [address], [place] [address]. All rights reserved. This information is not intended as a substitute for professional medical care. Always follow your healthcare  professional's instructions.          Possible Miscarriage (Threatened )  You may be having a miscarriage.  Common signs of a miscarriage are pain and bleeding. A small amount of bleeding can be normal during the first 3 months of pregnancy. Often the pain and bleeding stop, and you have a normal pregnancy and baby. But heavy bleeding or severe cramping can be an early sign of miscarriage. A  miscarriage  means an unexpected loss of your pregnancy.  At this time, we don t know whether you will have a miscarriage, or if things will clear up and your pregnancy will continue normally. We understand that this is emotionally difficult. There is little we can say to change the way you feel. But understand that miscarriages are common.  About 1 or 2 out of every 10 pregnancies end this way. Some end even before you know you are pregnant. This happens for a number of reasons, and usually we never figure out why. It s important you know that it is not your fault. It didn t happen because you did anything wrong.  Having sex or exercising does not cause a miscarriage. These activities are usually safe unless you have pain or bleeding or your doctor tells you to stop. Even minor falls won t cause a miscarriage. Miscarriages happen because things were not developing as they were supposed to. No medicine can prevent a miscarriage.  Again, understand that things are uncertain right now. You may still have some bleeding. This may be light spotting or like a period, and you may pass some tissue. You may have some cramping. This is why follow-up care is important.  Home care  To improve the chance of keeping your pregnancy, you should take these steps:    Rest in bed until the pain and bleeding stop.    Don t have sex until your health care provider says it s OK.    Use sanitary napkins instead of tampons.    Don t douche.    Don t take aspirin, ibuprofen, or naproxen.    Don t have alcoholic or caffeinated beverage or  smoke.  Follow-up care  Make an appointment with your doctor within the next week, or as directed by our staff.  Note: If you had an ultrasound, a radiologist will review it. You will be told of any new findings that may affect your care.  Call 911  Call 911 if you have:    Severe pain and very heavy bleeding    Severe lightheadedness, passing out, or fainting    Rapid heart rate    Difficulty breathing    Confusion or difficulty waking up  When to seek medical advice  Call your health care provider right away if any of these occur:    Vaginal bleeding or pain that lasts for more than 3 days    Heavy bleeding. This means soaking 1 new pad an hour over 3 hours.    Fever of 100.4 F (38 C) or higher, or as directed by your health care provider    Pain in your lower belly (abdomen) that gets worse    Weakness or dizziness    Passage of anything that resembles tissue. This would be pink or grayish membrane or solid material. Save the tissue in a clean container and bring it to your provider.       3417-2264 [employer]. [address] [place] [address], [place] [address]. All rights reserved. This information is not intended as a substitute for professional medical care. Always follow your healthcare professional's instructions.      Return to the emergency department if bleeding exceeds 1 pad per hour ×2 hours.  Or if cramping or pain is too severe to tolerate at home.  Recheck serum hCG in 48 hours with resultant routed to your primary care provider.  You should make a follow-up appointment with your primary care provider for ongoing care.

## 2017-01-27 NOTE — ED PROVIDER NOTES
History     Chief Complaint   Patient presents with     Vaginal Bleeding     8 weeks pregnant, vaginal bleeding and cramping     HPI  Chhaya Thurman is a 26 year old female, past medical history unremarkable presents to the emergency department with concerns of vaginal bleeding in excess of what she normally experiences as a period beginning yesterday a.m. at 9:00.  Initial brownish clumping type discharge and more recently through the course of yesterday and this morning right red blood.  Associated with pelvic cramping, nausea but no vomiting.  Pain was slightly more right-sided yesterday and is now slightly more left-sided with respect to the cramping pain.  Anorexia.  LMP is 12/2/2016 placing her approximately 8+ weeks gestation by dates.  She has regular periods.  She has not yet been seen for prenatal care but plans on seeing Dr. Joy Ellison in the near future.  She denies change in bowel habit or urinary pattern recently.  No constipation or diarrhea.  No urinary frequency urgency or dysuria.     Active Ambulatory Problems     Diagnosis Date Noted     Prenatal care, first pregnancy 01/23/2017     Resolved Ambulatory Problems     Diagnosis Date Noted     No Resolved Ambulatory Problems     Past Medical History   Diagnosis Date     History of recurrent UTI (urinary tract infection)      Chickenpox      Past Surgical History   Procedure Laterality Date     Ent surgery  20 years ago     tubes in three times     Tonsillectomy  2/16     Mouth surgery       wisdom teeth -age 16     Social History     Social History     Marital Status: Single     Spouse Name: N/A     Number of Children: N/A     Years of Education: N/A     Occupational History     Not on file.     Social History Main Topics     Smoking status: Never Smoker      Smokeless tobacco: Never Used     Alcohol Use: 0.0 oz/week     0 Standard drinks or equivalent per week      Comment: 2-3 drinks weekly- quit with pregnancy     Drug Use: No     Sexual  Activity:     Partners: Male     Birth Control/ Protection: Pill     Other Topics Concern     Parent/Sibling W/ Cabg, Mi Or Angioplasty Before 65f 55m? No     Social History Narrative    ** Merged History Encounter **          Family History   Problem Relation Age of Onset     Other Cancer Maternal Grandfather      skin     Hyperlipidemia Mother      DIABETES No family hx of      Unknown/Adopted Father      unknown history     Current Facility-Administered Medications   Medication     0.9% sodium chloride infusion     Current Outpatient Prescriptions   Medication     fish oil-omega-3 fatty acids 1000 MG capsule     clindamycin (CLEOCIN) 300 MG capsule     Prenatal Vit-Fe Fumarate-FA (PRENATAL MULTIVITAMIN  PLUS IRON) 27-0.8 MG TABS per tablet        Allergies   Allergen Reactions     Imitrex [Sumatriptan]        I have reviewed the Medications, Allergies, Past Medical and Surgical History, and Social History in the Epic system.    Review of Systems   Constitutional: Positive for activity change and appetite change.   HENT: Negative.    Eyes: Negative.    Respiratory: Negative.    Cardiovascular: Negative.    Gastrointestinal: Negative.    Endocrine: Negative.    Genitourinary: Positive for vaginal bleeding and pelvic pain.   Musculoskeletal: Negative.    Skin: Negative.    Allergic/Immunologic: Negative.    Neurological: Negative.    Hematological: Negative.    Psychiatric/Behavioral: Negative.        Physical Exam      Physical Exam   Constitutional: She is oriented to person, place, and time. She appears well-developed and well-nourished.   HENT:   Head: Normocephalic and atraumatic.   Eyes: Conjunctivae and EOM are normal. Pupils are equal, round, and reactive to light.   Neck: Normal range of motion. Neck supple.   Cardiovascular: Normal rate, regular rhythm, normal heart sounds and intact distal pulses.    Pulmonary/Chest: Effort normal and breath sounds normal.   Abdominal:   Soft, normal bowel sounds, mild  tenderness suprapubic without rebound or guarding.  No CVA tenderness.   Musculoskeletal: Normal range of motion.   Neurological: She is alert and oriented to person, place, and time.   Skin: Skin is warm and dry.   Psychiatric: She has a normal mood and affect. Her behavior is normal.   Nursing note and vitals reviewed.      ED Course   Procedures             Critical Care time:  none         Results for orders placed or performed during the hospital encounter of 17   US OB <14 Weeks w Transvaginal Single    Narrative    ULTRASOUND OBSTETRIC LESS THAN FOURTEEN WEEKS WITH TRANSVAGINAL  IMAGING SINGLE  2017 9:21 AM    HISTORY: Approximately 8-week pregnancy. Rule out ectopic, spontaneous  AB.    TECHNIQUE: Transabdominal and transvaginal imaging were performed.   Transvaginal imaging was performed to better evaluate the uterus and  gestational sac.    COMPARISON:  None.    FINDINGS:  The uterus is normal in size measuring 7.9 x 3.5 x 4.2 cm.  There is no intrauterine gestational sac or fetal pole. Endometrial  stripe measures approximately 1.0 cm.    The left ovary is normal. It measures 3.6 x 1.9 x 2.1 cm. The right  ovary contains a 1.5 cm corpus luteum. No free fluid.      Impression    IMPRESSION:  No evidence of intrauterine pregnancy. Differential  diagnosis includes ectopic pregnancy versus spontaneous .  Recommend clinical correlation and consideration of correlating with  serial serum beta hCG levels.              Labs Ordered and Resulted from Time of ED Arrival Up to the Time of Departure from the ED   UA MACROSCOPIC WITH REFLEX TO MICRO AND CULTURE - Abnormal; Notable for the following:     Blood Urine Moderate (*)     Mucous Urine Present (*)     All other components within normal limits   CBC WITH PLATELETS DIFFERENTIAL   BASIC METABOLIC PANEL   HCG QUANTITATIVE PREGNANCY   ABO/RH TYPE AND SCREEN       Assessments & Plan (with Medical Decision Making)   26-year-old female  at  estimated gestational age of 8+ week based on LMP, presents to the emergency department with concerns of vaginal bleeding and cramping as well as abdominal pain as discussed in the HPI.  Physical exam finds her alert in no acute distress with stable normal adult range vital signs.  Minimal abdominal tenderness on exam.  Evaluation includes lab diagnostics reviewed as above with hCG still positive quantitatively although low for estimated gestational age.  Ultrasound shows no evidence of intrauterine pregnancy, differential diagnosis obviously includes ectopic pregnancy versus spontaneous .  I discussed results with the patient, I suspect that this likely does represent a threatened  rather than ectopic.  She understands the need for follow-up with a repeat hCG in 48 hours and follow up in clinic with her primary care provider.  All questions were answered and the patient is dispositioned home with her significant other present.      Disclaimer: This note consists of symbols derived from keyboarding, dictation and/or voice recognition software. As a result, there may be errors in the script that have gone undetected. Please consider this when interpreting information found in this chart.    I have reviewed the nursing notes.    I have reviewed the findings, diagnosis, plan and need for follow up with the patient.    New Prescriptions    No medications on file       Final diagnoses:   Threatened        2017   [facility] EMERGENCY DEPARTMENT      Leonides Stephenson MD  17 1130

## 2017-01-27 NOTE — ED AVS SNAPSHOT
[facility] Emergency Department    [address] [address]    Phone:  [phone]    Fax:  [phone]                                       Chhaya Thurman   MRN: [id number]    Department:  [facility] Emergency Department   Date of Visit:  1/27/2017           After Visit Summary Signature Page     I have received my discharge instructions, and my questions have been answered. I have discussed any challenges I see with this plan with the nurse or doctor.    ..........................................................................................................................................  Patient/Patient Representative Signature      ..........................................................................................................................................  Patient Representative Print Name and Relationship to Patient    ..................................................               ................................................  Date                                            Time    ..........................................................................................................................................  Reviewed by Signature/Title    ...................................................              ..............................................  Date                                                            Time

## 2017-01-27 NOTE — ED NOTES
8 weeks pregnant, home test. Cramping and bleeding with clots started yesterday. Grav 1 par 0. Nausea, no vomiting.no urinary sx

## 2017-01-29 DIAGNOSIS — O20.0 THREATENED ABORTION: ICD-10-CM

## 2017-01-29 LAB — B-HCG SERPL-ACNC: 85 IU/L

## 2017-01-29 PROCEDURE — 84702 CHORIONIC GONADOTROPIN TEST: CPT | Performed by: FAMILY MEDICINE

## 2017-01-29 PROCEDURE — 36415 COLL VENOUS BLD VENIPUNCTURE: CPT | Performed by: FAMILY MEDICINE

## 2017-02-16 ENCOUNTER — OFFICE VISIT (OUTPATIENT)
Dept: FAMILY MEDICINE | Facility: CLINIC | Age: 27
End: 2017-02-16
Payer: COMMERCIAL

## 2017-02-16 VITALS
TEMPERATURE: 97.4 F | HEIGHT: 67 IN | SYSTOLIC BLOOD PRESSURE: 125 MMHG | WEIGHT: 184 LBS | BODY MASS INDEX: 28.88 KG/M2 | DIASTOLIC BLOOD PRESSURE: 78 MMHG | HEART RATE: 67 BPM

## 2017-02-16 DIAGNOSIS — Z23 NEED FOR PROPHYLACTIC VACCINATION AND INOCULATION AGAINST INFLUENZA: ICD-10-CM

## 2017-02-16 DIAGNOSIS — O03.9 MISCARRIAGE: ICD-10-CM

## 2017-02-16 DIAGNOSIS — B36.0 TINEA VERSICOLOR: Primary | ICD-10-CM

## 2017-02-16 PROCEDURE — 90686 IIV4 VACC NO PRSV 0.5 ML IM: CPT | Performed by: PHYSICIAN ASSISTANT

## 2017-02-16 PROCEDURE — 90471 IMMUNIZATION ADMIN: CPT | Performed by: PHYSICIAN ASSISTANT

## 2017-02-16 PROCEDURE — 99213 OFFICE O/P EST LOW 20 MIN: CPT | Mod: 25 | Performed by: PHYSICIAN ASSISTANT

## 2017-02-16 NOTE — PATIENT INSTRUCTIONS
Ketoconazole shampoo daily 5 minutes x 3 days  Or zinc oxide shampoor (head n shoulders, selsun blue etc) 10 min daily x 7 days  Call if need pills to help clear it up  Is only cosmetic - does not have to be treated      Continue prenatal vitamin   Flu shot

## 2017-02-16 NOTE — MR AVS SNAPSHOT
After Visit Summary   2/16/2017    Chhaya Thurman    MRN: [id number]           Patient Information     Date Of Birth          1990        Visit Information        Provider Department      2/16/2017 8:20 AM Agnes Stanton PA-C [facility]        Today's Diagnoses     Tinea versicolor    -  1    Need for prophylactic vaccination and inoculation against influenza          Care Instructions    Ketoconazole shampoo daily 5 minutes x 3 days  Or zinc oxide shampoor (head n shoulders, selsun blue etc) 10 min daily x 7 days  Call if need pills to help clear it up  Is only cosmetic - does not have to be treated      Continue prenatal vitamin   Flu shot        Follow-ups after your visit        Who to contact     If you have questions or need follow up information about today's clinic visit or your schedule please contact [facility] directly at [phone].  Normal or non-critical lab and imaging results will be communicated to you by MyChart, letter or phone within 4 business days after the clinic has received the results. If you do not hear from us within 7 days, please contact the clinic through [employer] or phone. If you have a critical or abnormal lab result, we will notify you by phone as soon as possible.  Submit refill requests through [email or web address] or call your pharmacy and they will forward the refill request to us. Please allow 3 business days for your refill to be completed.          Additional Information About Your Visit        MyChart Information     [email or web address] gives you secure access to your electronic health record. If you see a primary care provider, you can also send messages to your care team and make appointments. If you have questions, please call your primary care clinic.  If you do not have a primary care provider, please call [phone] and they will assist you.        Care EveryWhere ID     This is your Care EveryWhere ID. This could be used  "by other organizations to access your [place] medical records  [id number]        Your Vitals Were     Pulse Temperature Height Last Period BMI (Body Mass Index)       67 97.4  F (36.3  C) (Tympanic) 5' 7\" (1.702 m) 12/02/2016 28.82 kg/m2        Blood Pressure from Last 3 Encounters:   02/16/17 125/78   01/27/17 127/76   01/24/17 140/76    Weight from Last 3 Encounters:   02/16/17 184 lb (83.5 kg)   01/27/17 185 lb (83.9 kg)   01/24/17 194 lb 3.2 oz (88.1 kg)              We Performed the Following     FLU VAC, SPLIT VIRUS IM > 3 YO (QUADRIVALENT) [21656]     Vaccine Administration, Initial [97530]        Primary Care Provider Office Phone # Fax #    Agnes Stanton PA-C [phone]       [address] [address]        Thank you!     Thank you for choosing [facility]  for your care. Our goal is always to provide you with excellent care. Hearing back from our patients is one way we can continue to improve our services. Please take a few minutes to complete the written survey that you may receive in the mail after your visit with us. Thank you!             Your Updated Medication List - Protect others around you: Learn how to safely use, store and throw away your medicines at www.disposemymeds.org.          This list is accurate as of: 2/16/17  9:20 AM.  Always use your most recent med list.                   Brand Name Dispense Instructions for use    fish oil-omega-3 fatty acids 1000 MG capsule      Take 2 g by mouth daily       prenatal multivitamin  plus iron 27-0.8 MG Tabs per tablet      Take 1 tablet by mouth daily       VITAMIN D PO            "

## 2017-02-16 NOTE — PROGRESS NOTES
Injectable Influenza Immunization Documentation    1.  Is the person to be vaccinated sick today?  No    2. Does the person to be vaccinated have an allergy to eggs or to a component of the vaccine?  No    3. Has the person to be vaccinated today ever had a serious reaction to influenza vaccine in the past?  No    4. Has the person to be vaccinated ever had Guillain-Lagrange syndrome?  No     Form completed by patient

## 2017-02-16 NOTE — PROGRESS NOTES
"  SUBJECTIVE:                                                    Chhaya Thurman is a 26 year old female who presents to clinic today for the following health issues:      ED/UC Followup:    Facility:  [facility]  Date of visit: 2017  Reason for visit: Threatened   Current Status: No longer having any bleeding     * Derm Problem-  Has a skin infection on her back, was going to be treated but was pregnant.    Hcg quant trended down.  Not trying to get pregnant at this time.    Problem list and histories reviewed & adjusted, as indicated.  Additional history: as documented    Problem list, Medication list, Allergies, and Medical/Social/Surgical histories reviewed in [facility] and updated as appropriate.    ROS:  Constitutional, , skin systems are negative, except as otherwise noted.    OBJECTIVE:                                                    /78 (BP Location: Right arm, Patient Position: Chair, Cuff Size: Adult Regular)  Pulse 67  Temp 97.4  F (36.3  C) (Tympanic)  Ht 5' 7\" (1.702 m)  Wt 184 lb (83.5 kg)  LMP 2016  BMI 28.82 kg/m2  Body mass index is 28.82 kg/(m^2).  GENERAL: healthy, alert and no distress  SKIN: no suspicious lesions or rashes, back with scattered hypopigmented lesions       ASSESSMENT/PLAN:                                                        ICD-10-CM    1. Tinea versicolor B36.0 Cholecalciferol (VITAMIN D PO)     ketoconazole (NIZORAL) 2 % shampoo   2. Miscarriage O03.9    3. Need for prophylactic vaccination and inoculation against influenza Z23 FLU VAC, SPLIT VIRUS IM > 3 YO (QUADRIVALENT) [56942]     Vaccine Administration, Initial [69654]     Patient Instructions   Ketoconazole shampoo daily 5 minutes x 3 days  Or zinc oxide shampoor (head n shoulders, selsun blue etc) 10 min daily x 7 days  Call if need pills to help clear it up  Is only cosmetic - does not have to be treated      Continue prenatal vitamin   Flu shot      Agnes Stanton PA-C  [facility]

## 2017-02-17 RX ORDER — KETOCONAZOLE 20 MG/ML
SHAMPOO TOPICAL DAILY PRN
Qty: 120 ML | Refills: 1 | Status: SHIPPED | OUTPATIENT
Start: 2017-02-17 | End: 2017-06-29

## 2017-06-14 ENCOUNTER — PRENATAL OFFICE VISIT (OUTPATIENT)
Dept: OBGYN | Facility: CLINIC | Age: 27
End: 2017-06-14
Payer: COMMERCIAL

## 2017-06-14 DIAGNOSIS — Z34.00 PRENATAL CARE, FIRST PREGNANCY: Primary | ICD-10-CM

## 2017-06-14 PROCEDURE — 99207 ZZC NO CHARGE NURSE ONLY: CPT | Performed by: OBSTETRICS & GYNECOLOGY

## 2017-06-14 NOTE — MR AVS SNAPSHOT
After Visit Summary   6/14/2017    Chhaya Thurman    MRN: [id number]           Patient Information     Date Of Birth          1990        Visit Information        Provider Department      6/14/2017 5:14 PM Marcos Chow MD [facility]        Today's Diagnoses     Prenatal care, first pregnancy    -  1       Follow-ups after your visit        Your next 10 appointments already scheduled     Jun 29, 2017  1:00 PM CDT   New Prenatal with Marcos Chow MD, [facility]   [facility] ([facility])    [address] [address]   [phone]              Who to contact     If you have questions or need follow up information about today's clinic visit or your schedule please contact [facility] directly at [phone].  Normal or non-critical lab and imaging results will be communicated to you by MyChart, letter or phone within 4 business days after the clinic has received the results. If you do not hear from us within 7 days, please contact the clinic through MyChart or phone. If you have a critical or abnormal lab result, we will notify you by phone as soon as possible.  Submit refill requests through [employer] or call your pharmacy and they will forward the refill request to us. Please allow 3 business days for your refill to be completed.          Additional Information About Your Visit        MyChart Information     [employer] gives you secure access to your electronic health record. If you see a primary care provider, you can also send messages to your care team and make appointments. If you have questions, please call your primary care clinic.  If you do not have a primary care provider, please call [phone] and they will assist you.        Care EveryWhere ID     This is your Care EveryWhere ID. This could be used by other organizations to access your [place] medical records  [id number]        Your  Vitals Were     Last Period Breastfeeding?                04/29/2017 Unknown           Blood Pressure from Last 3 Encounters:   02/16/17 125/78   01/27/17 127/76   01/24/17 140/76    Weight from Last 3 Encounters:   02/16/17 83.5 kg (184 lb)   01/27/17 83.9 kg (185 lb)   01/24/17 88.1 kg (194 lb 3.2 oz)              Today, you had the following     No orders found for display       Primary Care Provider Office Phone # Fax #    Agnes Stanton PA-C [phone]       [facility] [address] [facility] [address] [facility] [address]        Thank you!     Thank you for choosing [facility]  for your care. Our goal is always to provide you with excellent care. Hearing back from our patients is one way we can continue to improve our services. Please take a few minutes to complete the written survey that you may receive in the mail after your visit with us. Thank you!             Your Updated Medication List - Protect others around you: Learn how to safely use, store and throw away your medicines at www.disposemymeds.org.          This list is accurate as of: 6/14/17  5:25 PM.  Always use your most recent med list.                   Brand Name Dispense Instructions for use    ketoconazole 2 % shampoo    NIZORAL    120 mL    Apply topically daily as needed Leave it on for 5 minutes daily x 3 days.       prenatal multivitamin  plus iron 27-0.8 MG Tabs per tablet      Take 1 tablet by mouth daily       PROBIOTIC DAILY PO      Take 1 tablet by mouth daily       Zinc 50 MG Caps      Take 1 tablet by mouth daily

## 2017-06-29 ENCOUNTER — PRENATAL OFFICE VISIT (OUTPATIENT)
Dept: OBGYN | Facility: CLINIC | Age: 27
End: 2017-06-29
Payer: COMMERCIAL

## 2017-06-29 VITALS
HEIGHT: 67 IN | BODY MASS INDEX: 29.22 KG/M2 | DIASTOLIC BLOOD PRESSURE: 79 MMHG | SYSTOLIC BLOOD PRESSURE: 140 MMHG | WEIGHT: 186.2 LBS | HEART RATE: 66 BPM

## 2017-06-29 DIAGNOSIS — O26.21 RECURRENT PREGNANCY LOSS IN PREGNANT PATIENT IN FIRST TRIMESTER, ANTEPARTUM: ICD-10-CM

## 2017-06-29 DIAGNOSIS — Z34.01 PRENATAL CARE, FIRST PREGNANCY, FIRST TRIMESTER: Primary | ICD-10-CM

## 2017-06-29 LAB

## 2017-06-29 PROCEDURE — 76817 TRANSVAGINAL US OBSTETRIC: CPT | Performed by: OBSTETRICS & GYNECOLOGY

## 2017-06-29 PROCEDURE — 81001 URINALYSIS AUTO W/SCOPE: CPT | Performed by: OBSTETRICS & GYNECOLOGY

## 2017-06-29 PROCEDURE — 87086 URINE CULTURE/COLONY COUNT: CPT | Performed by: OBSTETRICS & GYNECOLOGY

## 2017-06-29 PROCEDURE — 99214 OFFICE O/P EST MOD 30 MIN: CPT | Mod: 25 | Performed by: OBSTETRICS & GYNECOLOGY

## 2017-06-29 NOTE — PROGRESS NOTES
"Chhaya Thurman is a 26 year old  co-habitating who presents to the clinic for an new ob visit.    Estimated Date of Delivery: Feb 3, 2018 is calculated from Patient's last menstrual period was 2017.   She has had bleeding since her LMP.  The bleeding  is bright red, in small, on today occasions, and was not accompanied by cramping...   She has had mild nausea. Weigh loss has not occurred.   This was a planned pregnancy.   FOB is involved,  Luke     OTHER CONCERNS: Spotting started early this week  Blood type  O+  INFECTION HISTORY  HIV: no  Hepatitis B: no  Hepatitis C: no  Syphilis:  no  Tuberculosis: no   PPD- no  Herpes self: no  Herpes partner:  no  Chlamydia:  no  Gonorrhea:  no  HPV: no  BV:  no  Trichomonis:  no  Chicken Pox:  YES  ====================================================  PERSONAL/SOCIAL HISTORY  Lives with partner.  Employment: Full time.  Her job involves light activity .  Additional items: None  =====================================================   REVIEW OF SYSTEMS  C: NEGATIVE for fever, chills  E: NEGATIVE for vision changes   R: NEGATIVE for significant cough or SOB  CV: NEGATIVE for chest pain, palpitations   GI: NEGATIVE for nausea, abdominal pain, heartburn, or change in bowel habits  : NEGATIVE for frequency, dysuria, or hematuria  M: NEGATIVE for significant arthralgias or myalgia  N: NEGATIVE for weakness, dizziness or paresthesias or headache  ====================================================  PHYSICAL EXAM:  /79 (BP Location: Right arm, Cuff Size: Adult Regular)  Pulse 66  Ht 1.695 m (5' 6.75\")  Wt 84.5 kg (186 lb 3.2 oz)  LMP 2017  BMI 29.38 kg/m2  BMI- Body mass index is 29.38 kg/(m^2).,     RECOMMENDED WEIGHT GAIN: 15-25 lbs.  GENERAL:  Pleasant pregnant female, alert, well groomed.  SKIN:  Warm and dry, without lesions or rashes  HEAD: Symmetrical features.  EYES:  PERRLA,   ABDOMEN: Soft without masses , tenderness or organomegaly.  No " CVA tenderness. No scars noted.. FHT 0  MUSCULOSKELETAL:  Full range of motion  EXTREMITIES:  No edema. No significant varicosities.   GENITALIA:  BUS WNL, no lesions noted   VAGINA:  Pink, normal rugae and discharge brown and moderate,   CERVIX:  smooth, with small brown DC no  CMT and nulliparous os,   firm/ closed 4 cm long.  UTERUS: Anteverted, nontender 7 weeks in size.  ADNEXA: Without masses or tenderness  PELVIS:  Arch; wide . Sacrum; deep. Spines;blunt.  Side walls; straight. Type; gynecoid  PELVIS:   Adequate, Pelvis proven to -pelvis not tested.  RECTAL:  Normal appearance.  Digital exam deferred.  WET PREP:Not done  Gonorrhea  ULTRASOUND performed - IUP -gestational sac fetal pole  5+5 weeks no cardiac activity  =========================================  ASSESSMENT:Missed Miscarriage  5+5 weeks  Recurrent miscarriage   P) referred to Dr Liam Koenig for evaluation of infertility    Marcos Chow

## 2017-06-29 NOTE — MR AVS SNAPSHOT
After Visit Summary   6/29/2017    Chhaya Thurman    MRN: [id number]           Patient Information     Date Of Birth          1990        Visit Information        Provider Department      6/29/2017 1:00 PM Marcos Chow MD; [facility]        Today's Diagnoses     Prenatal care, first pregnancy, first trimester    -  1    Recurrent pregnancy loss in pregnant patient in first trimester, antepartum           Follow-ups after your visit        Follow-up notes from your care team     Return if symptoms worsen or fail to improve.      Your next 10 appointments already scheduled     Jul 20, 2017  1:00 PM CDT   Office Visit with Mable Hanson MD   [facility] ([facility])    [address] [address] [phone]           Bring a current list of meds and any records pertaining to this visit.  For Physicals, please bring immunization records and any forms needing to be filled out.  Please arrive 10 minutes early to complete paperwork.              Who to contact     If you have questions or need follow up information about today's clinic visit or your schedule please contact [facility] directly at [phone].  Normal or non-critical lab and imaging results will be communicated to you by MyChart, letter or phone within 4 business days after the clinic has received the results. If you do not hear from us within 7 days, please contact the clinic through [employer] or phone. If you have a critical or abnormal lab result, we will notify you by phone as soon as possible.  Submit refill requests through [employer] or call your pharmacy and they will forward the refill request to us. Please allow 3 business days for your refill to be completed.          Additional Information About Your Visit        MyChart Information     [employer] gives you secure access to your electronic health record. If you see a  "primary care provider, you can also send messages to your care team and make appointments. If you have questions, please call your primary care clinic.  If you do not have a primary care provider, please call [phone] and they will assist you.        Care EveryWhere ID     This is your Care EveryWhere ID. This could be used by other organizations to access your [place] medical records  [id number]        Your Vitals Were     Pulse Height Last Period BMI (Body Mass Index)          66 1.695 m (5' 6.75\") 04/29/2017 29.38 kg/m2         Blood Pressure from Last 3 Encounters:   06/29/17 140/79   02/16/17 125/78   01/27/17 127/76    Weight from Last 3 Encounters:   06/29/17 84.5 kg (186 lb 3.2 oz)   02/16/17 83.5 kg (184 lb)   01/27/17 83.9 kg (185 lb)              We Performed the Following     *UA reflex to Microscopic     Urine Culture Aerobic Bacterial     Urine Microscopic     US Pelvic Complete w Transvaginal          Today's Medication Changes          These changes are accurate as of: 6/29/17 11:59 PM.  If you have any questions, ask your nurse or doctor.               Stop taking these medicines if you haven't already. Please contact your care team if you have questions.     ketoconazole 2 % shampoo   Commonly known as:  NIZORAL   Stopped by:  Marcos Chow MD                    Primary Care Provider Office Phone # Fax #    Agnes Stanton PA-C [phone]       [place] [id number]        Equal Access to Services     JOHANA WHITE : Hadii aad ku hadasho Soradhaali, waaxda luqadaha, qaybta kaalmada mica duff. So [facility] [phone].    ATENCIÓN: Si habla español, tiene a garcía disposición servicios gratuitos de asistencia lingüística. Llame al [phone].    We comply with applicable federal civil rights laws and [place] laws. We do not discriminate on the basis of race, color, national origin, age, " disability sex, sexual orientation or gender identity.            Thank you!     Thank you for choosing [facility]  for your care. Our goal is always to provide you with excellent care. Hearing back from our patients is one way we can continue to improve our services. Please take a few minutes to complete the written survey that you may receive in the mail after your visit with us. Thank you!             Your Updated Medication List - Protect others around you: Learn how to safely use, store and throw away your medicines at www.disposemymeds.org.          This list is accurate as of: 6/29/17 11:59 PM.  Always use your most recent med list.                   Brand Name Dispense Instructions for use Diagnosis    prenatal multivitamin  plus iron 27-0.8 MG Tabs per tablet      Take 1 tablet by mouth daily        PROBIOTIC DAILY PO      Take 1 tablet by mouth daily        Zinc 50 MG Caps      Take 1 tablet by mouth daily

## 2017-06-29 NOTE — NURSING NOTE
"Initial /79 (BP Location: Right arm, Cuff Size: Adult Regular)  Pulse 66  Ht 5' 6.75\" (1.695 m)  Wt 186 lb 3.2 oz (84.5 kg)  LMP 04/29/2017  BMI 29.38 kg/m2 Estimated body mass index is 29.38 kg/(m^2) as calculated from the following:    Height as of this encounter: 5' 6.75\" (1.695 m).    Weight as of this encounter: 186 lb 3.2 oz (84.5 kg). .    q  "

## 2017-07-01 LAB
BACTERIA SPEC CULT: NORMAL
MICRO REPORT STATUS: NORMAL
SPECIMEN SOURCE: NORMAL

## 2017-07-20 ENCOUNTER — OFFICE VISIT (OUTPATIENT)
Dept: OBGYN | Facility: CLINIC | Age: 27
End: 2017-07-20
Payer: COMMERCIAL

## 2017-07-20 VITALS
WEIGHT: 187 LBS | SYSTOLIC BLOOD PRESSURE: 124 MMHG | HEART RATE: 64 BPM | BODY MASS INDEX: 29.35 KG/M2 | DIASTOLIC BLOOD PRESSURE: 72 MMHG | HEIGHT: 67 IN

## 2017-07-20 DIAGNOSIS — N96 RECURRENT PREGNANCY LOSS WITHOUT CURRENT PREGNANCY: Primary | ICD-10-CM

## 2017-07-20 LAB
HBA1C MFR BLD: 5 % (ref 4.3–6)
TSH SERPL DL<=0.005 MIU/L-ACNC: 1.41 MU/L (ref 0.4–4)

## 2017-07-20 PROCEDURE — 99000 SPECIMEN HANDLING OFFICE-LAB: CPT | Performed by: OBSTETRICS & GYNECOLOGY

## 2017-07-20 PROCEDURE — 00000401 ZZHCL STATISTIC THROMBIN TIME NC: Performed by: OBSTETRICS & GYNECOLOGY

## 2017-07-20 PROCEDURE — 88230 TISSUE CULTURE LYMPHOCYTE: CPT | Performed by: OBSTETRICS & GYNECOLOGY

## 2017-07-20 PROCEDURE — 88289 CHROMOSOME STUDY ADDITIONAL: CPT | Performed by: OBSTETRICS & GYNECOLOGY

## 2017-07-20 PROCEDURE — 99214 OFFICE O/P EST MOD 30 MIN: CPT | Performed by: OBSTETRICS & GYNECOLOGY

## 2017-07-20 PROCEDURE — 36415 COLL VENOUS BLD VENIPUNCTURE: CPT | Performed by: OBSTETRICS & GYNECOLOGY

## 2017-07-20 PROCEDURE — 83520 IMMUNOASSAY QUANT NOS NONAB: CPT | Mod: 90 | Performed by: OBSTETRICS & GYNECOLOGY

## 2017-07-20 PROCEDURE — 86147 CARDIOLIPIN ANTIBODY EA IG: CPT | Performed by: OBSTETRICS & GYNECOLOGY

## 2017-07-20 PROCEDURE — 85613 RUSSELL VIPER VENOM DILUTED: CPT | Performed by: OBSTETRICS & GYNECOLOGY

## 2017-07-20 PROCEDURE — 88264 CHROMOSOME ANALYSIS 20-25: CPT | Performed by: OBSTETRICS & GYNECOLOGY

## 2017-07-20 PROCEDURE — 00000167 ZZHCL STATISTIC INR NC: Performed by: OBSTETRICS & GYNECOLOGY

## 2017-07-20 PROCEDURE — 86147 CARDIOLIPIN ANTIBODY EA IG: CPT | Mod: 59 | Performed by: OBSTETRICS & GYNECOLOGY

## 2017-07-20 PROCEDURE — 85730 THROMBOPLASTIN TIME PARTIAL: CPT | Performed by: OBSTETRICS & GYNECOLOGY

## 2017-07-20 PROCEDURE — 83036 HEMOGLOBIN GLYCOSYLATED A1C: CPT | Performed by: OBSTETRICS & GYNECOLOGY

## 2017-07-20 PROCEDURE — 84443 ASSAY THYROID STIM HORMONE: CPT | Performed by: OBSTETRICS & GYNECOLOGY

## 2017-07-20 NOTE — NURSING NOTE
"Chief Complaint   Patient presents with     Consult       Initial /72 (BP Location: Right arm, Patient Position: Chair)  Pulse 64  Ht 5' 7\" (1.702 m)  Wt 187 lb (84.8 kg)  LMP 04/29/2017  Breastfeeding? No  BMI 29.29 kg/m2 Estimated body mass index is 29.29 kg/(m^2) as calculated from the following:    Height as of this encounter: 5' 7\" (1.702 m).    Weight as of this encounter: 187 lb (84.8 kg).  Medication Reconciliation: complete   Lynn Julien CMA      "

## 2017-07-20 NOTE — MR AVS SNAPSHOT
After Visit Summary   7/20/2017    Chhaya Thurman    MRN: [id number]           Patient Information     Date Of Birth          1990        Visit Information        Provider Department      7/20/2017 1:00 PM Mable Hanson MD [facility]        Today's Diagnoses     Recurrent pregnancy loss without current pregnancy    -  1       Follow-ups after your visit        Future tests that were ordered for you today     Open Future Orders        Priority Expected Expires Ordered    US Hysterosonography Routine  7/20/2018 7/20/2017            Who to contact     If you have questions or need follow up information about today's clinic visit or your schedule please contact [facility] directly at [phone].  Normal or non-critical lab and imaging results will be communicated to you by MyChart, letter or phone within 4 business days after the clinic has received the results. If you do not hear from us within 7 days, please contact the clinic through [employer] or phone. If you have a critical or abnormal lab result, we will notify you by phone as soon as possible.  Submit refill requests through [employer] or call your pharmacy and they will forward the refill request to us. Please allow 3 business days for your refill to be completed.          Additional Information About Your Visit        MyChart Information     [employer] gives you secure access to your electronic health record. If you see a primary care provider, you can also send messages to your care team and make appointments. If you have questions, please call your primary care clinic.  If you do not have a primary care provider, please call [phone] and they will assist you.        Care EveryWhere ID     This is your Care EveryWhere ID. This could be used by other organizations to access your [place] medical records  [id number]        Your Vitals Were     Pulse Height Last Period Breastfeeding? BMI (Body Mass Index)  "      64 5' 7\" (1.702 m) 04/29/2017 No 29.29 kg/m2        Blood Pressure from Last 3 Encounters:   07/20/17 124/72   06/29/17 140/79   02/16/17 125/78    Weight from Last 3 Encounters:   07/20/17 187 lb (84.8 kg)   06/29/17 186 lb 3.2 oz (84.5 kg)   02/16/17 184 lb (83.5 kg)              We Performed the Following     Anti-Mullerian hormone     Cardiolipin Lenka IgG and IgM     CHROMOSOME BLOOD HIGH RESOLUTION With Professional Interpretation     Hemoglobin A1c     Lupus Anticoagulant Panel     TSH with free T4 reflex        Primary Care Provider Office Phone # Fax #    Agnes Stanton PA-C [phone]       [facility] [address] [address]        Equal Access to Services     JOHANA WHITE : Hadii bayron Nair, waaxda luqadaha, qaybta kaalmada sherin, mica deluca. So [facility] [phone].    ATENCIÓN: Si habla español, tiene a garcía disposición servicios gratuitos de asistencia lingüística. Winifred al [phone].    We comply with applicable federal civil rights laws and [place] laws. We do not discriminate on the basis of race, color, national origin, age, disability sex, sexual orientation or gender identity.            Thank you!     Thank you for choosing [facility]  for your care. Our goal is always to provide you with excellent care. Hearing back from our patients is one way we can continue to improve our services. Please take a few minutes to complete the written survey that you may receive in the mail after your visit with us. Thank you!             Your Updated Medication List - Protect others around you: Learn how to safely use, store and throw away your medicines at www.disposemymeds.org.          This list is accurate as of: 7/20/17  2:38 PM.  Always use your most recent med list.                   Brand Name Dispense Instructions for use Diagnosis    prenatal multivitamin  plus iron 27-0.8 MG Tabs per " tablet      Take 1 tablet by mouth daily        PROBIOTIC DAILY PO      Take 1 tablet by mouth daily        Zinc 50 MG Caps      Take 1 tablet by mouth daily

## 2017-07-20 NOTE — PROGRESS NOTES
"  SUBJECTIVE:                                                   CC:  Patient presents with:  Consult      HPI:  Chhaya Thurman is a 26 year old  who presents for consultation on recurrent pregnancy loss.  Had two losses this year and wondering if there is anything to do.  No family history in either patient of RPL, congenital anomaly, Down's syndrome, other trisomies.  No personal history of uterine anomaly, bleeding/clotting disorder, diabetes, thyroid disease.     :    2017 Spontaneous complete miscarriage around 8 wga  G2: 2017 Missed miscarriage with CRL measuring 5+5 and no FHT, two days after diagnosis had spontaneous complete miscarriage at home    Ovulation factor: Periods are regular and monthly.  Ovulates usually around the 17-18th of the month.  No galactorrhea, acne, acanthosis, spontaneous nipple discharge. Patient's last menstrual period was 2017.   Male factor: partner is Nahid Gama ( 87), age 30.  no prior children but was the partner in her last two miscarriages.  Is healthy.  Neither one of them uses tobacco, rare caffeine, rare EtOH.  Tubal factor: denies h/o PID, gc/chlam, prior ectopic, tubal surgery, endometriosis, ruptured appendix.  Never had HSG.  Uterine factor: denies h/o polyps, fibroids, known congenital anomalies.  No h/o abnormal pap smear.    Prenatal: h/o chicken pox, had all her vaccinations as a child, is on PNV    ROS: 10 point ROS negative other than as listed above in HPI.    Gyn History:  Patient's last menstrual period was 2017.     Recent pap smears: last pap NILM 2015 at OSH    PMH, PSH, Soc Hx, Meds, and allergies reviewed in Epic.    OBJECTIVE:     /72 (BP Location: Right arm, Patient Position: Chair)  Pulse 64  Ht 5' 7\" (1.702 m)  Wt 187 lb (84.8 kg)  LMP 2017  Breastfeeding? No  BMI 29.29 kg/m2    Gen: Healthy appearing well nourished female, no acute distress, comfortable.  Accompanied by FOB Nahid.  HENT: " No scleral injection or icterus  CV: Regular rate  Resp: Normal work of breathing, no cough  Skin: No suspicious lesions or rashes  Psychiatric: mentation appears normal and affect bright    ASSESSMENT/PLAN:                                                      1. Recurrent pregnancy loss without current pregnancy  Extensive discussion of factors leading to pregnancy loss.  Discussed technically to meet criteria for RPL is 3+ losses however it is generally thought to be reasonable to begin a work up after 2 if the couple desires.  Discussed the indications for the following tests -- she desires to do all the testing right away including karyotyping for FOB.  - US Hysterosonography; Future  - TSH with free T4 reflex  - Hemoglobin A1c  - CHROMOSOME BLOOD HIGH RESOLUTION With Professional Interpretation   - Anti-Mullerian hormone  - Lupus Anticoagulant Panel  - Cardiolipin Lenka IgG and IgM  - karyotyping for paternal DNA (Nahid Gama  87)    Mable Hanson MD, MPH  Obstetrics and Gynecology     Total time spent was 30 minutes; greater than 50% of time was spent in counseling and/or coordination of care for the above listed diagnoses.

## 2017-07-21 LAB
CARDIOLIPIN ANTIBODY IGG: NORMAL GPL-U/ML (ref 0–19.9)
CARDIOLIPIN ANTIBODY IGM: 0.2 MPL-U/ML (ref 0–19.9)

## 2017-07-23 LAB — MIS SERPL-MCNC: 7.16 NG/ML

## 2017-07-24 LAB — LA PPP-IMP: NORMAL

## 2017-07-24 NOTE — PROGRESS NOTES
Chhaya  Your results are normal.  If you have any other questions or concerns, please followup in the office or contact us on mychart or evisit.    Marylou Mendez

## 2017-08-03 LAB — COPATH REPORT: NORMAL

## 2017-08-04 ENCOUNTER — HOSPITAL ENCOUNTER (OUTPATIENT)
Dept: ULTRASOUND IMAGING | Facility: CLINIC | Age: 27
Discharge: HOME | End: 2017-08-04
Attending: OBSTETRICS & GYNECOLOGY | Admitting: OBSTETRICS & GYNECOLOGY
Payer: COMMERCIAL

## 2017-08-04 DIAGNOSIS — N96 RECURRENT PREGNANCY LOSS WITHOUT CURRENT PREGNANCY: ICD-10-CM

## 2017-08-04 PROCEDURE — 58340 CATHETER FOR HYSTEROGRAPHY: CPT

## 2017-08-07 PROBLEM — N84.0 ENDOMETRIUM, POLYP: Status: ACTIVE | Noted: 2017-08-07

## 2017-08-07 NOTE — PROGRESS NOTES
Please call the patient with the results. Thanks.    When you have polyps inside the uterus it usually helps to achieve pregnancy when we take them out, so the pregnancy doesn't land in these and then end in a miscarriage.  We usually do this in the operating room using a small camera inside the uterus.  It is a same day surgery.  If you feel comfortable, we can schedule the surgery for you over the phone and also discuss it more on the day of the procedure, otherwise you can come back to clinic first and we can discuss it all in person prior to scheduling it.  Either way is fine.  Just let us know your preference.     Mable Hanson MD

## 2017-08-24 ENCOUNTER — OFFICE VISIT (OUTPATIENT)
Dept: FAMILY MEDICINE | Facility: CLINIC | Age: 27
End: 2017-08-24
Payer: COMMERCIAL

## 2017-08-24 VITALS
TEMPERATURE: 98.6 F | BODY MASS INDEX: 29.19 KG/M2 | HEIGHT: 67 IN | DIASTOLIC BLOOD PRESSURE: 86 MMHG | HEART RATE: 66 BPM | SYSTOLIC BLOOD PRESSURE: 135 MMHG | WEIGHT: 186 LBS

## 2017-08-24 DIAGNOSIS — Z01.818 PREOP GENERAL PHYSICAL EXAM: Primary | ICD-10-CM

## 2017-08-24 DIAGNOSIS — N84.0 ENDOMETRIUM, POLYP: ICD-10-CM

## 2017-08-24 PROCEDURE — 99214 OFFICE O/P EST MOD 30 MIN: CPT | Performed by: PHYSICIAN ASSISTANT

## 2017-08-24 NOTE — PATIENT INSTRUCTIONS
Cleared for procedure  Before Your Surgery      Call your surgeon if there is any change in your health. This includes signs of a cold or flu (such as a sore throat, runny nose, cough, rash or fever).    Do not smoke, drink alcohol or take over the counter medicine (unless your surgeon or primary care doctor tells you to) for the 24 hours before and after surgery.    If you take prescribed drugs: Follow your doctor s orders about which medicines to take and which to stop until after surgery.    Eating and drinking prior to surgery: follow the instructions from your surgeon    Take a shower or bath the night before surgery. Use the soap your surgeon gave you to gently clean your skin. If you do not have soap from your surgeon, use your regular soap. Do not shave or scrub the surgery site.  Wear clean pajamas and have clean sheets on your bed.

## 2017-08-24 NOTE — MR AVS SNAPSHOT
After Visit Summary   8/24/2017    Chhaya Thurman    MRN: [id number]           Patient Information     Date Of Birth          1990        Visit Information        Provider Department      8/24/2017 11:20 AM Agnes Stanton PA-C [facility]        Today's Diagnoses     Preop general physical exam    -  1      Care Instructions    Cleared for procedure  Before Your Surgery      Call your surgeon if there is any change in your health. This includes signs of a cold or flu (such as a sore throat, runny nose, cough, rash or fever).    Do not smoke, drink alcohol or take over the counter medicine (unless your surgeon or primary care doctor tells you to) for the 24 hours before and after surgery.    If you take prescribed drugs: Follow your doctor s orders about which medicines to take and which to stop until after surgery.    Eating and drinking prior to surgery: follow the instructions from your surgeon    Take a shower or bath the night before surgery. Use the soap your surgeon gave you to gently clean your skin. If you do not have soap from your surgeon, use your regular soap. Do not shave or scrub the surgery site.  Wear clean pajamas and have clean sheets on your bed.           Follow-ups after your visit        Your next 10 appointments already scheduled     Aug 30, 2017   Procedure with Mable Hanson MD   [facility] (--)    [address] [address] [phone]           The medical center is located at [address]. (between [address] in [place], [place] north of [place]).              Who to contact     If you have questions or need follow up information about today's clinic visit or your schedule please contact [facility] directly at [phone].  Normal or non-critical lab and imaging results will be communicated to you by MyChart, letter or phone within 4 business days after  "the clinic has received the results. If you do not hear from us within 7 days, please contact the clinic through [employer] or phone. If you have a critical or abnormal lab result, we will notify you by phone as soon as possible.  Submit refill requests through [employer] or call your pharmacy and they will forward the refill request to us. Please allow 3 business days for your refill to be completed.          Additional Information About Your Visit        [employer] Information     [employer] gives you secure access to your electronic health record. If you see a primary care provider, you can also send messages to your care team and make appointments. If you have questions, please call your primary care clinic.  If you do not have a primary care provider, please call [phone] and they will assist you.        Care EveryWhere ID     This is your Care EveryWhere ID. This could be used by other organizations to access your [place] medical records  [id number]        Your Vitals Were     Pulse Temperature Height BMI (Body Mass Index)          66 98.6  F (37  C) (Tympanic) 5' 7\" (1.702 m) 29.13 kg/m2         Blood Pressure from Last 3 Encounters:   08/24/17 135/86   07/20/17 124/72   06/29/17 140/79    Weight from Last 3 Encounters:   08/24/17 186 lb (84.4 kg)   07/20/17 187 lb (84.8 kg)   06/29/17 186 lb 3.2 oz (84.5 kg)              Today, you had the following     No orders found for display       Primary Care Provider Office Phone # Fax #    Agnes Stanton PA-C [phone] [address] [place] [address]        Equal Access to Services     [facility]IGN[facility] AH: Hadii bayron Nair, sánchez michel, qamica coyle. So [facility] [phone].    ATENCIÓN: Si habla español, tiene a garcía disposición servicios gratuitos de asistencia lingüística. Winifred al [phone].    We comply with applicable federal civil rights laws and [place] laws. We " do not discriminate on the basis of race, color, national origin, age, disability sex, sexual orientation or gender identity.            Thank you!     Thank you for choosing [facility]  for your care. Our goal is always to provide you with excellent care. Hearing back from our patients is one way we can continue to improve our services. Please take a few minutes to complete the written survey that you may receive in the mail after your visit with us. Thank you!             Your Updated Medication List - Protect others around you: Learn how to safely use, store and throw away your medicines at www.disposemymeds.org.          This list is accurate as of: 8/24/17 11:45 AM.  Always use your most recent med list.                   Brand Name Dispense Instructions for use Diagnosis    prenatal multivitamin plus iron 27-0.8 MG Tabs per tablet      Take 1 tablet by mouth daily        PROBIOTIC DAILY PO      Take 1 tablet by mouth daily        Zinc 50 MG Caps      Take 1 tablet by mouth daily

## 2017-08-24 NOTE — PROGRESS NOTES
[facility]  [address] [place] [address]  [phone]  Dept: [phone]    PRE-OP EVALUATION:  Today's date: 2017    Chhaya Thurman (: 1990) presents for pre-operative evaluation assessment as requested by Dr. Hanson.  She requires evaluation and anesthesia risk assessment prior to undergoing surgery/procedure for treatment of Recurrent pregnancy loss .  Proposed procedure: COMBINED DILATION AND CURETTAGE, OPERATIVE HYSTEROSCOPY    Date of Surgery/ Procedure: 2017  Time of Surgery/ Procedure: 7:30 AM  Hospital/Surgical Facility: [facility]  Primary Physician: Agnes Stanton  Type of Anesthesia Anticipated: Monitor anesthesia care    Patient has a Health Care Directive or Living Will:  NO    Preop Questions 2017   1.  Do you have a history of heart attack, stroke, stent, bypass or surgery on an artery in the head, neck, heart or legs? No   2.  Do you ever have any pain or discomfort in your chest? No   3.  Do you have a history of  Heart Failure? No   4.   Are you troubled by shortness of breath when:  walking on a level surface, or up a slight hill, or at night? No   5.  Do you currently have a cold, bronchitis or other respiratory infection? No   6.  Do you have a cough, shortness of breath, or wheezing? No   7.  Do you sometimes get pains in the calves of your legs when you walk? No   8. Do you or anyone in your family have previous history of blood clots? No   9.  Do you or does anyone in your family have a serious bleeding problem such as prolonged bleeding following surgeries or cuts? No   10. Have you ever had problems with anemia or been told to take iron pills? No   11. Have you had any abnormal blood loss such as black, tarry or bloody stools, or abnormal vaginal bleeding? No   12. Have you ever had a blood transfusion? No   13. Have you or any of your relatives ever had problems with anesthesia? No   14. Do you have sleep apnea, excessive  snoring or daytime drowsiness? No   15. Do you have any prosthetic heart valves? No   16. Do you have prosthetic joints? No   17. Is there any chance that you may be pregnant? No           HPI:                                                      Brief HPI related to upcoming procedure: endometrial polyps and recurrent loss of pregnancy    See problem list for active medical problems.  Problems all longstanding and stable, except as noted/documented.  See ROS for pertinent symptoms related to these conditions.                                                                                                  .    MEDICAL HISTORY:                                                    Patient Active Problem List    Diagnosis Date Noted     Endometrium, polyp 08/07/2017     Priority: Medium     Recurrent pregnancy loss in pregnant patient in first trimester, antepartum 06/29/2017     Priority: Medium      Past Medical History:   Diagnosis Date     Chickenpox      History of recurrent UTI (urinary tract infection)      Past Surgical History:   Procedure Laterality Date     ENT SURGERY  20 years ago    tubes in three times     MOUTH SURGERY      wisdom teeth -age 16     TONSILLECTOMY  2/16     Current Outpatient Prescriptions   Medication Sig Dispense Refill     Zinc 50 MG CAPS Take 1 tablet by mouth daily       Probiotic Product (PROBIOTIC DAILY PO) Take 1 tablet by mouth daily       Prenatal Vit-Fe Fumarate-FA (PRENATAL MULTIVITAMIN  PLUS IRON) 27-0.8 MG TABS per tablet Take 1 tablet by mouth daily       OTC products: None, except as noted above    Allergies   Allergen Reactions     Imitrex [Sumatriptan]       Latex Allergy: NO    Social History   Substance Use Topics     Smoking status: Never Smoker     Smokeless tobacco: Never Used     Alcohol use 0.0 oz/week      Comment: 3-5 every 2 weeks     History   Drug Use No       REVIEW OF SYSTEMS:                                                    Constitutional, neuro, ENT,  "endocrine, pulmonary, cardiac, gastrointestinal, genitourinary, musculoskeletal, integument and psychiatric systems are negative, except as otherwise noted.    EXAM:                                                    /86 (BP Location: Right arm, Patient Position: Chair, Cuff Size: Adult Regular)  Pulse 66  Temp 98.6  F (37  C) (Tympanic)  Ht 5' 7\" (1.702 m)  Wt 186 lb (84.4 kg)  BMI 29.13 kg/m2    GENERAL APPEARANCE: healthy, alert and no distress     EYES: PERRL     HENT: ear canals and TM's normal and nose and mouth without ulcers or lesions     NECK: no adenopathy, no asymmetry, masses, or scars and thyroid normal to palpation     RESP: lungs clear to auscultation - no rales, rhonchi or wheezes     CV: regular rates and rhythm, normal S1 S2, no S3 or S4 and no murmur, click or rub     ABDOMEN:  soft, nontender, no HSM or masses and bowel sounds normal     MS: extremities normal- no gross deformities noted, no evidence of inflammation in joints, FROM in all extremities.     SKIN: no suspicious lesions or rashes     NEURO: Normal strength and tone, sensory exam grossly normal, mentation intact and speech normal     PSYCH: mentation appears normal. and affect normal/bright    DIAGNOSTICS:                                                    No labs or EKG required for low risk surgery (cataract, skin procedure, breast biopsy, etc)    Recent Labs   Lab Test  07/20/17   1401  01/27/17   0835  09/14/16   1745   HGB   --   14.6  12.7   PLT   --   239  320   NA   --   139  139   POTASSIUM   --   3.8  4.1   CR   --   0.78  0.77   A1C  5.0   --    --      IMPRESSION:                                                    Reason for surgery/procedure: endometrial polyps    The proposed surgical procedure is considered LOW risk.    REVISED CARDIAC RISK INDEX  The patient has the following serious cardiovascular risks for perioperative complications such as (MI, PE, VFib and 3  AV Block):  No serious cardiac " risks  INTERPRETATION: 0 risks: Class I (very low risk - 0.4% complication rate)    The patient has the following additional risks for perioperative complications:  No recent contraceptive use      ICD-10-CM    1. Preop general physical exam Z01.818    2. Endometrium, polyp N84.0        RECOMMENDATIONS:                                                      --Consult hospital rounder / IM to assist post-op medical management    --Patient is to take all scheduled medications on the day of surgery EXCEPT for modifications listed below.    - Patient is cleared for procedure, however she is not using contraceptives.  Therefore she will need pregnancy test day of procedure.    Signed Electronically by: Agnes Stanton PA-C    Copy of this evaluation report is provided to requesting physician.    [facility] Preop Guidelines    Patient Instructions   Cleared for procedure  Before Your Surgery      Call your surgeon if there is any change in your health. This includes signs of a cold or flu (such as a sore throat, runny nose, cough, rash or fever).    Do not smoke, drink alcohol or take over the counter medicine (unless your surgeon or primary care doctor tells you to) for the 24 hours before and after surgery.    If you take prescribed drugs: Follow your doctor s orders about which medicines to take and which to stop until after surgery.    Eating and drinking prior to surgery: follow the instructions from your surgeon    Take a shower or bath the night before surgery. Use the soap your surgeon gave you to gently clean your skin. If you do not have soap from your surgeon, use your regular soap. Do not shave or scrub the surgery site.  Wear clean pajamas and have clean sheets on your bed.

## 2017-08-24 NOTE — NURSING NOTE
"Chief Complaint   Patient presents with     Pre-Op Exam       Initial /86 (BP Location: Right arm, Patient Position: Chair, Cuff Size: Adult Regular)  Pulse 66  Temp 98.6  F (37  C) (Tympanic)  Ht 5' 7\" (1.702 m)  Wt 186 lb (84.4 kg)  BMI 29.13 kg/m2 Estimated body mass index is 29.13 kg/(m^2) as calculated from the following:    Height as of this encounter: 5' 7\" (1.702 m).    Weight as of this encounter: 186 lb (84.4 kg).  Medication Reconciliation: complete    Health Maintenance that is potentially due pending provider review:  NONE        Is there anyone who you would like to be able to receive your results? No  If yes have patient fill out ABE      "

## 2017-08-29 ENCOUNTER — ANESTHESIA EVENT (OUTPATIENT)
Dept: SURGERY | Facility: CLINIC | Age: 27
End: 2017-08-29
Payer: COMMERCIAL

## 2017-08-30 ENCOUNTER — HOSPITAL ENCOUNTER (OUTPATIENT)
Facility: CLINIC | Age: 27
Discharge: HOME | End: 2017-08-30
Attending: OBSTETRICS & GYNECOLOGY | Admitting: OBSTETRICS & GYNECOLOGY
Payer: COMMERCIAL

## 2017-08-30 ENCOUNTER — SURGERY (OUTPATIENT)
Age: 27
End: 2017-08-30

## 2017-08-30 ENCOUNTER — ANESTHESIA (OUTPATIENT)
Dept: SURGERY | Facility: CLINIC | Age: 27
End: 2017-08-30
Payer: COMMERCIAL

## 2017-08-30 VITALS
HEIGHT: 66 IN | DIASTOLIC BLOOD PRESSURE: 68 MMHG | HEART RATE: 69 BPM | TEMPERATURE: 98.1 F | BODY MASS INDEX: 29.89 KG/M2 | OXYGEN SATURATION: 100 % | RESPIRATION RATE: 16 BRPM | WEIGHT: 186 LBS | SYSTOLIC BLOOD PRESSURE: 120 MMHG

## 2017-08-30 LAB — HCG UR QL: NEGATIVE

## 2017-08-30 PROCEDURE — 27110028 ZZH OR GENERAL SUPPLY NON-STERILE: Performed by: OBSTETRICS & GYNECOLOGY

## 2017-08-30 PROCEDURE — S0020 INJECTION, BUPIVICAINE HYDRO: HCPCS | Performed by: OBSTETRICS & GYNECOLOGY

## 2017-08-30 PROCEDURE — 71000027 ZZH RECOVERY PHASE 2 EACH 15 MINS: Performed by: OBSTETRICS & GYNECOLOGY

## 2017-08-30 PROCEDURE — 88305 TISSUE EXAM BY PATHOLOGIST: CPT | Mod: 26 | Performed by: OBSTETRICS & GYNECOLOGY

## 2017-08-30 PROCEDURE — 36000058 ZZH SURGERY LEVEL 3 EA 15 ADDTL MIN: Performed by: OBSTETRICS & GYNECOLOGY

## 2017-08-30 PROCEDURE — 81025 URINE PREGNANCY TEST: CPT | Performed by: OBSTETRICS & GYNECOLOGY

## 2017-08-30 PROCEDURE — 25000125 ZZHC RX 250: Performed by: NURSE ANESTHETIST, CERTIFIED REGISTERED

## 2017-08-30 PROCEDURE — 58558 HYSTEROSCOPY BIOPSY: CPT | Performed by: OBSTETRICS & GYNECOLOGY

## 2017-08-30 PROCEDURE — 36000056 ZZH SURGERY LEVEL 3 1ST 30 MIN: Performed by: OBSTETRICS & GYNECOLOGY

## 2017-08-30 PROCEDURE — 25000128 H RX IP 250 OP 636: Performed by: NURSE ANESTHETIST, CERTIFIED REGISTERED

## 2017-08-30 PROCEDURE — 37000009 ZZH ANESTHESIA TECHNICAL FEE, EACH ADDTL 15 MIN: Performed by: OBSTETRICS & GYNECOLOGY

## 2017-08-30 PROCEDURE — 25000125 ZZHC RX 250: Performed by: OBSTETRICS & GYNECOLOGY

## 2017-08-30 PROCEDURE — 27210794 ZZH OR GENERAL SUPPLY STERILE: Performed by: OBSTETRICS & GYNECOLOGY

## 2017-08-30 PROCEDURE — 88305 TISSUE EXAM BY PATHOLOGIST: CPT | Performed by: OBSTETRICS & GYNECOLOGY

## 2017-08-30 PROCEDURE — 37000008 ZZH ANESTHESIA TECHNICAL FEE, 1ST 30 MIN: Performed by: OBSTETRICS & GYNECOLOGY

## 2017-08-30 PROCEDURE — 40000305 ZZH STATISTIC PRE PROC ASSESS I: Performed by: OBSTETRICS & GYNECOLOGY

## 2017-08-30 RX ORDER — LIDOCAINE HYDROCHLORIDE 10 MG/ML
INJECTION, SOLUTION INFILTRATION; PERINEURAL PRN
Status: DISCONTINUED | OUTPATIENT
Start: 2017-08-30 | End: 2017-08-30

## 2017-08-30 RX ORDER — FENTANYL CITRATE 50 UG/ML
25-50 INJECTION, SOLUTION INTRAMUSCULAR; INTRAVENOUS
Status: DISCONTINUED | OUTPATIENT
Start: 2017-08-30 | End: 2017-08-30 | Stop reason: HOSPADM

## 2017-08-30 RX ORDER — NALOXONE HYDROCHLORIDE 0.4 MG/ML
.1-.4 INJECTION, SOLUTION INTRAMUSCULAR; INTRAVENOUS; SUBCUTANEOUS
Status: DISCONTINUED | OUTPATIENT
Start: 2017-08-30 | End: 2017-08-30 | Stop reason: HOSPADM

## 2017-08-30 RX ORDER — HYDROMORPHONE HYDROCHLORIDE 1 MG/ML
.3-.5 INJECTION, SOLUTION INTRAMUSCULAR; INTRAVENOUS; SUBCUTANEOUS EVERY 10 MIN PRN
Status: DISCONTINUED | OUTPATIENT
Start: 2017-08-30 | End: 2017-08-30 | Stop reason: HOSPADM

## 2017-08-30 RX ORDER — IBUPROFEN 600 MG/1
600 TABLET, FILM COATED ORAL
Status: DISCONTINUED | OUTPATIENT
Start: 2017-08-30 | End: 2017-08-30 | Stop reason: HOSPADM

## 2017-08-30 RX ORDER — MEPERIDINE HYDROCHLORIDE 25 MG/ML
12.5 INJECTION INTRAMUSCULAR; INTRAVENOUS; SUBCUTANEOUS
Status: DISCONTINUED | OUTPATIENT
Start: 2017-08-30 | End: 2017-08-30 | Stop reason: HOSPADM

## 2017-08-30 RX ORDER — PROMETHAZINE HYDROCHLORIDE 25 MG/ML
12.5 INJECTION, SOLUTION INTRAMUSCULAR; INTRAVENOUS
Status: DISCONTINUED | OUTPATIENT
Start: 2017-08-30 | End: 2017-08-30 | Stop reason: HOSPADM

## 2017-08-30 RX ORDER — SODIUM CHLORIDE, SODIUM LACTATE, POTASSIUM CHLORIDE, CALCIUM CHLORIDE 600; 310; 30; 20 MG/100ML; MG/100ML; MG/100ML; MG/100ML
INJECTION, SOLUTION INTRAVENOUS CONTINUOUS
Status: DISCONTINUED | OUTPATIENT
Start: 2017-08-30 | End: 2017-08-30 | Stop reason: HOSPADM

## 2017-08-30 RX ORDER — ONDANSETRON 2 MG/ML
4 INJECTION INTRAMUSCULAR; INTRAVENOUS EVERY 30 MIN PRN
Status: DISCONTINUED | OUTPATIENT
Start: 2017-08-30 | End: 2017-08-30 | Stop reason: HOSPADM

## 2017-08-30 RX ORDER — DEXAMETHASONE SODIUM PHOSPHATE 4 MG/ML
INJECTION, SOLUTION INTRA-ARTICULAR; INTRALESIONAL; INTRAMUSCULAR; INTRAVENOUS; SOFT TISSUE PRN
Status: DISCONTINUED | OUTPATIENT
Start: 2017-08-30 | End: 2017-08-30

## 2017-08-30 RX ORDER — ONDANSETRON 2 MG/ML
INJECTION INTRAMUSCULAR; INTRAVENOUS PRN
Status: DISCONTINUED | OUTPATIENT
Start: 2017-08-30 | End: 2017-08-30

## 2017-08-30 RX ORDER — KETOROLAC TROMETHAMINE 30 MG/ML
INJECTION, SOLUTION INTRAMUSCULAR; INTRAVENOUS PRN
Status: DISCONTINUED | OUTPATIENT
Start: 2017-08-30 | End: 2017-08-30

## 2017-08-30 RX ORDER — BUPIVACAINE HYDROCHLORIDE 2.5 MG/ML
INJECTION, SOLUTION INFILTRATION; PERINEURAL PRN
Status: DISCONTINUED | OUTPATIENT
Start: 2017-08-30 | End: 2017-08-30 | Stop reason: HOSPADM

## 2017-08-30 RX ORDER — LIDOCAINE 40 MG/G
CREAM TOPICAL
Status: DISCONTINUED | OUTPATIENT
Start: 2017-08-30 | End: 2017-08-30 | Stop reason: HOSPADM

## 2017-08-30 RX ORDER — HYDROCODONE BITARTRATE AND ACETAMINOPHEN 5; 325 MG/1; MG/1
1-2 TABLET ORAL
Status: DISCONTINUED | OUTPATIENT
Start: 2017-08-30 | End: 2017-08-30 | Stop reason: HOSPADM

## 2017-08-30 RX ORDER — PROPOFOL 10 MG/ML
INJECTION, EMULSION INTRAVENOUS PRN
Status: DISCONTINUED | OUTPATIENT
Start: 2017-08-30 | End: 2017-08-30

## 2017-08-30 RX ORDER — ONDANSETRON 4 MG/1
4 TABLET, ORALLY DISINTEGRATING ORAL EVERY 30 MIN PRN
Status: DISCONTINUED | OUTPATIENT
Start: 2017-08-30 | End: 2017-08-30 | Stop reason: HOSPADM

## 2017-08-30 RX ORDER — FENTANYL CITRATE 50 UG/ML
INJECTION, SOLUTION INTRAMUSCULAR; INTRAVENOUS PRN
Status: DISCONTINUED | OUTPATIENT
Start: 2017-08-30 | End: 2017-08-30

## 2017-08-30 RX ORDER — PROPOFOL 10 MG/ML
INJECTION, EMULSION INTRAVENOUS CONTINUOUS PRN
Status: DISCONTINUED | OUTPATIENT
Start: 2017-08-30 | End: 2017-08-30

## 2017-08-30 RX ADMIN — ONDANSETRON 4 MG: 2 INJECTION INTRAMUSCULAR; INTRAVENOUS at 07:39

## 2017-08-30 RX ADMIN — FENTANYL CITRATE 100 MCG: 50 INJECTION, SOLUTION INTRAMUSCULAR; INTRAVENOUS at 07:39

## 2017-08-30 RX ADMIN — ONDANSETRON 4 MG: 4 TABLET, ORALLY DISINTEGRATING ORAL at 09:22

## 2017-08-30 RX ADMIN — LIDOCAINE HYDROCHLORIDE 50 MG: 10 INJECTION, SOLUTION INFILTRATION; PERINEURAL at 07:39

## 2017-08-30 RX ADMIN — PROPOFOL 40 MG: 10 INJECTION, EMULSION INTRAVENOUS at 07:38

## 2017-08-30 RX ADMIN — MIDAZOLAM HYDROCHLORIDE 3 MG: 1 INJECTION, SOLUTION INTRAMUSCULAR; INTRAVENOUS at 07:33

## 2017-08-30 RX ADMIN — KETOROLAC TROMETHAMINE 30 MG: 30 INJECTION, SOLUTION INTRAMUSCULAR at 08:17

## 2017-08-30 RX ADMIN — LIDOCAINE HYDROCHLORIDE 1 ML: 10 INJECTION, SOLUTION EPIDURAL; INFILTRATION; INTRACAUDAL; PERINEURAL at 06:51

## 2017-08-30 RX ADMIN — MIDAZOLAM HYDROCHLORIDE 2 MG: 1 INJECTION, SOLUTION INTRAMUSCULAR; INTRAVENOUS at 07:39

## 2017-08-30 RX ADMIN — SODIUM CHLORIDE, POTASSIUM CHLORIDE, SODIUM LACTATE AND CALCIUM CHLORIDE: 600; 310; 30; 20 INJECTION, SOLUTION INTRAVENOUS at 06:52

## 2017-08-30 RX ADMIN — BUPIVACAINE HYDROCHLORIDE 20 ML: 2.5 INJECTION, SOLUTION EPIDURAL; INFILTRATION; INTRACAUDAL; PERINEURAL at 08:27

## 2017-08-30 RX ADMIN — DEXAMETHASONE SODIUM PHOSPHATE 8 MG: 4 INJECTION, SOLUTION INTRA-ARTICULAR; INTRALESIONAL; INTRAMUSCULAR; INTRAVENOUS; SOFT TISSUE at 07:39

## 2017-08-30 RX ADMIN — PROPOFOL 150 MCG/KG/MIN: 10 INJECTION, EMULSION INTRAVENOUS at 07:39

## 2017-08-30 NOTE — ANESTHESIA PREPROCEDURE EVALUATION
Anesthesia Evaluation     . Pt has had prior anesthetic. Type: General           ROS/MED HX    ENT/Pulmonary:  - neg pulmonary ROS     Neurologic:     (+)migraines,     Cardiovascular:  - neg cardiovascular ROS   (+) ----. : . . . :. . No previous cardiac testing       METS/Exercise Tolerance:     Hematologic:  - neg hematologic  ROS       Musculoskeletal:  - neg musculoskeletal ROS       GI/Hepatic:  - neg GI/hepatic ROS       Renal/Genitourinary:  - ROS Renal section negative       Endo:  - neg endo ROS       Psychiatric:  - neg psychiatric ROS       Infectious Disease:  - neg infectious disease ROS       Malignancy:      - no malignancy   Other:    - neg other ROS                 Physical Exam  Normal systems: cardiovascular, pulmonary and dental    Airway   Mallampati: I  TM distance: >3 FB  Neck ROM: full    Dental     Cardiovascular       Pulmonary                     Anesthesia Plan      History & Physical Review  History and physical reviewed and following examination; no interval change.    ASA Status:  1 .    NPO Status:  > 8 hours    Plan for MAC with Propofol and Intravenous induction. Reason for MAC:  Deep or markedly invasive procedure (G8)  PONV prophylaxis:  Ondansetron (or other 5HT-3) and Dexamethasone or Solumedrol       Postoperative Care  Postoperative pain management:  IV analgesics, Oral pain medications and Multi-modal analgesia.      Consents  Anesthetic plan, risks, benefits and alternatives discussed with:  Patient..                          .

## 2017-08-30 NOTE — IP AVS SNAPSHOT
[facility] PreOP/Phase II    [address] [address]    Phone:  [phone]    Fax:  [phone]                                       After Visit Summary   8/30/2017    Chhaya Thurman    MRN: [id number]           After Visit Summary Signature Page     I have received my discharge instructions, and my questions have been answered. I have discussed any challenges I see with this plan with the nurse or doctor.    ..........................................................................................................................................  Patient/Patient Representative Signature      ..........................................................................................................................................  Patient Representative Print Name and Relationship to Patient    ..................................................               ................................................  Date                                            Time    ..........................................................................................................................................  Reviewed by Signature/Title    ...................................................              ..............................................  Date                                                            Time

## 2017-08-30 NOTE — OP NOTE
Operative Note    Patient: Chhaya Thurman  : 1990  MRN: [id number]    Date of Service: 2017    Pre-operative diagnosis:  Recurrent pregnancy loss  Endometrial polyp    Post-operative diagnosis:  Same     Procedure:   Hysteroscopy   Dilation and curettage with polypectomy    Surgeon: Mable Hanson MD    Anesthesia: Local with MAC    EBL: 3cc  Urine: 100cc  Fluids: 800cc    Specimens: endometrial curettings  Complications: none apparent    Findings: fluffy endometrium, small area on the posterior lower uterine segment with polypoid appearance, retroflexed uterus    Indications: Chhaya Thurman is a 26 year old female who had a history of recurrent pregnancy loss x2.  She underwent a work up including hysterosonogram which showed a possible endometrial polyp.  She desired removal, and a hysteroscopy with dilation and curettage and polypectomy using Myosure was recommended.  Discussed risks, benefits, and alternatives to the procedure including risk of infection, bleeding, damage to local organs, blood clots, uterine perforation. The patient's questions were answered, understanding confirmed, and the patient signed written informed consent.    Procedure: The patient was taken to the operating room where she underwent anesthesia and was prepped and draped in the usual sterile fashion in dorsal lithotomy position.  A time-out was performed.  A speculum was inserted into the vagina and the cervix visualized. A single-toothed tenaculum was placed at 12 o'clock on the cervix. A paracervical block with 0.25% marcaine plain was performed at 4 and 8 o'clock. The cervix was dilated using sequential dilators up to 7 mm.  The hysteroscope was assembled and inserted through the cervix.  The cavity was visualized with findings as above.  The morcellator was then inserted through the operating port on the hysteroscope and the polyps were morcellated.  The tissue was sent for pathology.  Before and after  photos were taken.  The hysteroscope was removed. The tenaculum was removed from the cervix and good hemostasis was noted.  The speculum was removed from the vagina.  The patient tolerated the procedure well and was taken to the recovery room in stable condition.  Instrument, needle, and sponge counts were correct x2.     Mable Hanson MD, MPH  [facility]

## 2017-08-30 NOTE — DISCHARGE INSTRUCTIONS
Same Day Surgery Discharge Instructions  Special Precautions After Surgery - Adult    1. It is not unusual to feel lightheaded or faint, up to 24 hours after surgery or while taking pain medication.  If you have these symptoms; sit for a few minutes before standing and have someone assist you when getting up.  2. You should rest and relax for the next 24 hours and must have someone stay with you for at least 24 hours after your discharge.  3. DO NOT DRIVE any vehicle or operate mechanical equipment for 24 hours following the end of your surgery.  DO NOT DRIVE while taking narcotic pain medications that have been prescribed by your physician.  If you had a limb operated on, you must be able to use it fully to drive.  4. DO NOT drink alcoholic beverages for 24 hours following surgery or while taking prescription pain medication.  5. Drink clear liquids (apple juice, ginger ale, broth, 7-Up, etc.).  Progress to your regular diet as you feel able.  6. Any questions call your physician and do not make important decisions for 24 hours.    ACTIVITY  ? As tolerated     INCISIONAL CARE  Pelvic rest 3 days     Call for an appointment to return to the clinic in one to two weeks    Medications:  ? Ibuprofen or Advil for pain take when needed     Additional discharge instructions:   __________________________________________________________________________________________________________________________________  IMPORTANT NUMBERS:    [facility] Main Number:  [phone], [phone]  Pharmacy:  [phone]  Same Day Surgery:  [phone], Monday - Friday until 8:30 p.m.  Urgent Care:  [phone]  Emergency Room:  [phone]      [facility]:  [phone]                                                                             [facility]:  [phone] option 1  [facility]:  [phone]     OB Clinic:  [phone]   [facility]:  [phone]    Home Medical Equipment: [phone]  [facility]:  [phone]    Nausea and Vomiting  What are nausea and vomiting?   Nausea is the queasy feeling you usually have before you vomit. Vomiting is the forceful emptying (throwing up) of the stomach's contents through the mouth.   What causes nausea and vomiting?   Nausea and vomiting are symptoms that may occur with many conditions, such as:   Anesthesia medications   side effect of narcotic medicines  exposure to unpleasant odors or sights   stress and anxiety     How is it treated?   At first you should rest your stomach for a few hours by eating nothing solid and sipping only clear liquids. A little later you can eat soft bland foods that are easy to digest.   It is important to drink small amounts (1 to 4 ounces) often so that you do not become dehydrated. Gradually drink larger amounts of the clear fluids. If you vomit, wait an hour, then start over with a smaller amount of fluid.   Eat slowly and avoid foods that are acidic, spicy, fatty, or fibrous (such as meats, coarse grains, and raw vegetables). Also avoid extremely hot or cold food. In addition, avoid dairy products if you have diarrhea. You may start eating your normal diet again in 3 days or so, when all signs of illness have passed.   Rest as much as possible. Sit or lie down with your head propped up. Do not lie flat for at least 2 hours after eating. Nausea and vomiting usually last only a short period of time. If you have cramping or pain in your belly you can try putting a heating pad set at low or a covered hot water bottle on your belly. Never set a heating pad on high because you could get burned.   If you have been vomiting for more than a day or have had diarrhea for over 3 days, you may need to have an exam by your provider, including a check for dehydration. If you are very dehydrated, you may need to be given fluids intravenously (IV). In children and older adults  dehydration can quickly become life threatening.   When should I call my healthcare provider?   Talk with your provider if you are unable to keep fluids down for more than 12 hours or if you have any of the following symptoms with nausea and vomiting:   severe headache or neck ache, or stiff neck   severe abdominal pain   diarrhea and vomiting that last more than 24 hours   blood in the vomited material that may look red, brown, or black, or like coffee grounds   bloody diarrhea   very forceful vomiting   signs of dehydration such as dry mouth, excessive thirst, little or no urination, severe weakness, dizziness, or lightheadedness.   If you have nausea and pain in the jaw, arm, shoulder, chest, or back; sweating; shortness of breath; or lightheadedness; call 911 for emergency care.                       Same Day Surgery Discharge Instructions  Special Precautions After Surgery - Adult    7. It is not unusual to feel lightheaded or faint, up to 24 hours after surgery or while taking pain medication.  If you have these symptoms; sit for a few minutes before standing and have someone assist you when getting up.  8. You should rest and relax for the next 24 hours and must have someone stay with you for at least 24 hours after your discharge.  9. DO NOT DRIVE any vehicle or operate mechanical equipment for 24 hours following the end of your surgery.  DO NOT DRIVE while taking narcotic pain medications that have been prescribed by your physician.  If you had a limb operated on, you must be able to use it fully to drive.  10. DO NOT drink alcoholic beverages for 24 hours following surgery or while taking prescription pain medication.  11. Drink clear liquids (apple juice, ginger ale, broth, 7-Up, etc.).  Progress to your regular diet as you feel able.  12. Any questions call your physician and do not make important decisions for 24 hours.    ACTIVITY  ? As tolerated     INCISIONAL CARE  ? Pelvic rest 3 days     Call for  an appointment to return to the clinic in one to two weeks    Medications:  ? Tylenol or Ibuprofen for pain     Additional discharge instructions:   __________________________________________________________________________________________________________________________________  IMPORTANT NUMBERS:    [facility] [place] Main Number:  [phone], [phone]  Pharmacy:  [phone]  Same Day Surgery:  [phone], Monday - Friday until 8:30 p.m.  Urgent Care:  [phone]  Emergency Room:  [phone]      [facility]:  [phone]                                                                             [facility]:  [phone] option 1  [facility]:  [phone]     OB Clinic:  [phone]   [facility]:  [phone]   Home Medical Equipment: [phone]  [facility]:  [phone]

## 2017-08-30 NOTE — IP AVS SNAPSHOT
MRN:[id number]                      After Visit Summary   8/30/2017    Chhaya Thurman    MRN: [id number]           Thank you!     Thank you for choosing [facility] for your care. Our goal is always to provide you with excellent care. Hearing back from our patients is one way we can continue to improve our services. Please take a few minutes to complete the written survey that you may receive in the mail after you visit with us. Thank you!        Patient Information     Date Of Birth          1990        About your hospital stay     You were admitted on:  August 30, 2017 You last received care in the:  [facility] PreOP/Phase II    You were discharged on:  August 30, 2017       Who to Call     For medical emergencies, please call 911.  For non-urgent questions about your medical care, please call your primary care provider or clinic, [phone]  For questions related to your surgery, please call your surgery clinic        Attending Provider     Provider Mable Ryan MD OB/Gyn       Primary Care Provider Office Phone # Fax #    Agnes Stanton PA-C [phone]      After Care Instructions     Discharge Instructions       Resume pre procedure diet            Discharge Instructions       Pelvic Rest. No tampons, douching or intercourse for 3 days.            No alcohol       NO ALCOHOL for 24 hours post procedure            No driving or operating machinery       No driving or operating machinery until day after procedure                  Further instructions from your care team                           Same Day Surgery Discharge Instructions  Special Precautions After Surgery - Adult    1. It is not unusual to feel lightheaded or faint, up to 24 hours after surgery or while taking pain medication.  If you have these symptoms; sit for a few minutes before standing and have someone assist you when getting up.  2. You should rest and relax for the  next 24 hours and must have someone stay with you for at least 24 hours after your discharge.  3. DO NOT DRIVE any vehicle or operate mechanical equipment for 24 hours following the end of your surgery.  DO NOT DRIVE while taking narcotic pain medications that have been prescribed by your physician.  If you had a limb operated on, you must be able to use it fully to drive.  4. DO NOT drink alcoholic beverages for 24 hours following surgery or while taking prescription pain medication.  5. Drink clear liquids (apple juice, ginger ale, broth, 7-Up, etc.).  Progress to your regular diet as you feel able.  6. Any questions call your physician and do not make important decisions for 24 hours.    ACTIVITY  ? As tolerated     INCISIONAL CARE  Pelvic rest 3 days     Call for an appointment to return to the clinic in one to two weeks    Medications:  ? Ibuprofen or Advil for pain take when needed     Additional discharge instructions:   __________________________________________________________________________________________________________________________________  IMPORTANT NUMBERS:    [facility] Main Number:  [phone], [phone]  Pharmacy:  [phone]  Same Day Surgery:  [phone], Monday - Friday until 8:30 p.m.  Urgent Care:  [phone]  Emergency Room:  [phone]      [facility]:  [phone]                                                                             [facility]:  [phone] option 1  [facility]:  [phone]     OB Clinic:  [phone]   Surgery Specialty Clinic:  [phone]   Home Medical Equipment: [phone]  [facility]:  [phone]    Nausea and Vomiting  What are nausea and vomiting?   Nausea is the queasy feeling you usually have before you vomit. Vomiting is the forceful emptying (throwing up) of the stomach's contents through the mouth.   What causes nausea and vomiting?   Nausea and vomiting are symptoms that may  occur with many conditions, such as:   Anesthesia medications   side effect of narcotic medicines  exposure to unpleasant odors or sights   stress and anxiety     How is it treated?   At first you should rest your stomach for a few hours by eating nothing solid and sipping only clear liquids. A little later you can eat soft bland foods that are easy to digest.   It is important to drink small amounts (1 to 4 ounces) often so that you do not become dehydrated. Gradually drink larger amounts of the clear fluids. If you vomit, wait an hour, then start over with a smaller amount of fluid.   Eat slowly and avoid foods that are acidic, spicy, fatty, or fibrous (such as meats, coarse grains, and raw vegetables). Also avoid extremely hot or cold food. In addition, avoid dairy products if you have diarrhea. You may start eating your normal diet again in 3 days or so, when all signs of illness have passed.   Rest as much as possible. Sit or lie down with your head propped up. Do not lie flat for at least 2 hours after eating. Nausea and vomiting usually last only a short period of time. If you have cramping or pain in your belly you can try putting a heating pad set at low or a covered hot water bottle on your belly. Never set a heating pad on high because you could get burned.   If you have been vomiting for more than a day or have had diarrhea for over 3 days, you may need to have an exam by your provider, including a check for dehydration. If you are very dehydrated, you may need to be given fluids intravenously (IV). In children and older adults dehydration can quickly become life threatening.   When should I call my healthcare provider?   Talk with your provider if you are unable to keep fluids down for more than 12 hours or if you have any of the following symptoms with nausea and vomiting:   severe headache or neck ache, or stiff neck   severe abdominal pain   diarrhea and vomiting that last more than 24 hours   blood  in the vomited material that may look red, brown, or black, or like coffee grounds   bloody diarrhea   very forceful vomiting   signs of dehydration such as dry mouth, excessive thirst, little or no urination, severe weakness, dizziness, or lightheadedness.   If you have nausea and pain in the jaw, arm, shoulder, chest, or back; sweating; shortness of breath; or lightheadedness; call 911 for emergency care.                       Same Day Surgery Discharge Instructions  Special Precautions After Surgery - Adult    7. It is not unusual to feel lightheaded or faint, up to 24 hours after surgery or while taking pain medication.  If you have these symptoms; sit for a few minutes before standing and have someone assist you when getting up.  8. You should rest and relax for the next 24 hours and must have someone stay with you for at least 24 hours after your discharge.  9. DO NOT DRIVE any vehicle or operate mechanical equipment for 24 hours following the end of your surgery.  DO NOT DRIVE while taking narcotic pain medications that have been prescribed by your physician.  If you had a limb operated on, you must be able to use it fully to drive.  10. DO NOT drink alcoholic beverages for 24 hours following surgery or while taking prescription pain medication.  11. Drink clear liquids (apple juice, ginger ale, broth, 7-Up, etc.).  Progress to your regular diet as you feel able.  12. Any questions call your physician and do not make important decisions for 24 hours.    ACTIVITY  ? As tolerated     INCISIONAL CARE  ? Pelvic rest 3 days     Call for an appointment to return to the clinic in one to two weeks    Medications:  ? Tylenol or Ibuprofen for pain     Additional discharge instructions:   __________________________________________________________________________________________________________________________________  IMPORTANT NUMBERS:    [facility] Main Number:  [phone], [phone]  Pharmacy:  [phone]  Same  "Day Surgery:  [phone], Monday - Friday until 8:30 p.m.  Urgent Care:  [phone]  Emergency Room:  [phone]      [facility]:  [phone]                                                                             [facility]:  [phone] option 1  [facility]:  [phone]     OB Clinic:  [phone]   Surgery Specialty Clinic:  [phone]   Home Medical Equipment: [phone]  [facility]:  [phone]        Pending Results     No orders found from 8/28/2017 to 8/31/2017.            Admission Information     Date & Time Provider Department Dept. Phone    8/30/2017 Mable Hanson MD [facility] PreOP/Phase [phone]      Your Vitals Were     Blood Pressure Pulse Temperature Respirations Height Weight    120/68 69 98.1  F (36.7  C) (Oral) 16 1.676 m (5' 6\") 84.4 kg (186 lb)    Last Period Pulse Oximetry BMI (Body Mass Index)             07/24/2017 100% 30.02 kg/m2         [employer] Information     [employer] gives you secure access to your electronic health record. If you see a primary care provider, you can also send messages to your care team and make appointments. If you have questions, please call your primary care clinic.  If you do not have a primary care provider, please call [phone] and they will assist you.        Care EveryWhere ID     This is your Care EveryWhere ID. This could be used by other organizations to access your [facility] medical records  [id number]        Equal Access to Services     JOHANA WHITE AH: Hadgeovany Nair, sánchez michel, mica barry. So [facility] [phone].    ATENCIÓN: Si habla español, tiene a garcía disposición servicios gratuitos de asistencia lingüística. Llame al [phone].    We comply with applicable federal civil rights laws and [place] laws. We do not discriminate on the basis of race, color, national origin, age, " disability sex, sexual orientation or gender identity.               Review of your medicines      CONTINUE these medicines which have NOT CHANGED        Dose / Directions    prenatal multivitamin plus iron 27-0.8 MG Tabs per tablet        Dose:  1 tablet   Take 1 tablet by mouth daily   Refills:  0       PROBIOTIC DAILY PO        Dose:  1 tablet   Take 1 tablet by mouth daily   Refills:  0       Zinc 50 MG Caps        Dose:  1 tablet   Take 1 tablet by mouth daily   Refills:  0                Protect others around you: Learn how to safely use, store and throw away your medicines at www.disposemymeds.org.             Medication List: This is a list of all your medications and when to take them. Check marks below indicate your daily home schedule. Keep this list as a reference.      Medications           Morning Afternoon Evening Bedtime As Needed    prenatal multivitamin plus iron 27-0.8 MG Tabs per tablet   Take 1 tablet by mouth daily                                PROBIOTIC DAILY PO   Take 1 tablet by mouth daily                                Zinc 50 MG Caps   Take 1 tablet by mouth daily

## 2017-08-30 NOTE — H&P (VIEW-ONLY)
[facility]  [address] [place] [id number]  [phone]  Dept: [phone]    PRE-OP EVALUATION:  Today's date: 2017    Chhaya Thurman (: 1990) presents for pre-operative evaluation assessment as requested by Dr. Hanson.  She requires evaluation and anesthesia risk assessment prior to undergoing surgery/procedure for treatment of Recurrent pregnancy loss .  Proposed procedure: COMBINED DILATION AND CURETTAGE, OPERATIVE HYSTEROSCOPY    Date of Surgery/ Procedure: 2017  Time of Surgery/ Procedure: 7:30 AM  Hospital/Surgical Facility: [facility]  Primary Physician: Agnes Stanton  Type of Anesthesia Anticipated: Monitor anesthesia care    Patient has a Health Care Directive or Living Will:  NO    Preop Questions 2017   1.  Do you have a history of heart attack, stroke, stent, bypass or surgery on an artery in the head, neck, heart or legs? No   2.  Do you ever have any pain or discomfort in your chest? No   3.  Do you have a history of  Heart Failure? No   4.   Are you troubled by shortness of breath when:  walking on a level surface, or up a slight hill, or at night? No   5.  Do you currently have a cold, bronchitis or other respiratory infection? No   6.  Do you have a cough, shortness of breath, or wheezing? No   7.  Do you sometimes get pains in the calves of your legs when you walk? No   8. Do you or anyone in your family have previous history of blood clots? No   9.  Do you or does anyone in your family have a serious bleeding problem such as prolonged bleeding following surgeries or cuts? No   10. Have you ever had problems with anemia or been told to take iron pills? No   11. Have you had any abnormal blood loss such as black, tarry or bloody stools, or abnormal vaginal bleeding? No   12. Have you ever had a blood transfusion? No   13. Have you or any of your relatives ever had problems with anesthesia? No   14. Do you have sleep apnea, excessive  snoring or daytime drowsiness? No   15. Do you have any prosthetic heart valves? No   16. Do you have prosthetic joints? No   17. Is there any chance that you may be pregnant? No           HPI:                                                      Brief HPI related to upcoming procedure: endometrial polyps and recurrent loss of pregnancy    See problem list for active medical problems.  Problems all longstanding and stable, except as noted/documented.  See ROS for pertinent symptoms related to these conditions.                                                                                                  .    MEDICAL HISTORY:                                                    Patient Active Problem List    Diagnosis Date Noted     Endometrium, polyp 08/07/2017     Priority: Medium     Recurrent pregnancy loss in pregnant patient in first trimester, antepartum 06/29/2017     Priority: Medium      Past Medical History:   Diagnosis Date     Chickenpox      History of recurrent UTI (urinary tract infection)      Past Surgical History:   Procedure Laterality Date     ENT SURGERY  20 years ago    tubes in three times     MOUTH SURGERY      wisdom teeth -age 16     TONSILLECTOMY  2/16     Current Outpatient Prescriptions   Medication Sig Dispense Refill     Zinc 50 MG CAPS Take 1 tablet by mouth daily       Probiotic Product (PROBIOTIC DAILY PO) Take 1 tablet by mouth daily       Prenatal Vit-Fe Fumarate-FA (PRENATAL MULTIVITAMIN  PLUS IRON) 27-0.8 MG TABS per tablet Take 1 tablet by mouth daily       OTC products: None, except as noted above    Allergies   Allergen Reactions     Imitrex [Sumatriptan]       Latex Allergy: NO    Social History   Substance Use Topics     Smoking status: Never Smoker     Smokeless tobacco: Never Used     Alcohol use 0.0 oz/week      Comment: 3-5 every 2 weeks     History   Drug Use No       REVIEW OF SYSTEMS:                                                    Constitutional, neuro, ENT,  "endocrine, pulmonary, cardiac, gastrointestinal, genitourinary, musculoskeletal, integument and psychiatric systems are negative, except as otherwise noted.    EXAM:                                                    /86 (BP Location: Right arm, Patient Position: Chair, Cuff Size: Adult Regular)  Pulse 66  Temp 98.6  F (37  C) (Tympanic)  Ht 5' 7\" (1.702 m)  Wt 186 lb (84.4 kg)  BMI 29.13 kg/m2    GENERAL APPEARANCE: healthy, alert and no distress     EYES: PERRL     HENT: ear canals and TM's normal and nose and mouth without ulcers or lesions     NECK: no adenopathy, no asymmetry, masses, or scars and thyroid normal to palpation     RESP: lungs clear to auscultation - no rales, rhonchi or wheezes     CV: regular rates and rhythm, normal S1 S2, no S3 or S4 and no murmur, click or rub     ABDOMEN:  soft, nontender, no HSM or masses and bowel sounds normal     MS: extremities normal- no gross deformities noted, no evidence of inflammation in joints, FROM in all extremities.     SKIN: no suspicious lesions or rashes     NEURO: Normal strength and tone, sensory exam grossly normal, mentation intact and speech normal     PSYCH: mentation appears normal. and affect normal/bright    DIAGNOSTICS:                                                    No labs or EKG required for low risk surgery (cataract, skin procedure, breast biopsy, etc)    Recent Labs   Lab Test  07/20/17   1401  01/27/17   0835  09/14/16   1745   HGB   --   14.6  12.7   PLT   --   239  320   NA   --   139  139   POTASSIUM   --   3.8  4.1   CR   --   0.78  0.77   A1C  5.0   --    --      IMPRESSION:                                                    Reason for surgery/procedure: endometrial polyps    The proposed surgical procedure is considered LOW risk.    REVISED CARDIAC RISK INDEX  The patient has the following serious cardiovascular risks for perioperative complications such as (MI, PE, VFib and 3  AV Block):  No serious cardiac " risks  INTERPRETATION: 0 risks: Class I (very low risk - 0.4% complication rate)    The patient has the following additional risks for perioperative complications:  No recent contraceptive use      ICD-10-CM    1. Preop general physical exam Z01.818    2. Endometrium, polyp N84.0        RECOMMENDATIONS:                                                      --Consult hospital rounder / IM to assist post-op medical management    --Patient is to take all scheduled medications on the day of surgery EXCEPT for modifications listed below.    - Patient is cleared for procedure, however she is not using contraceptives.  Therefore she will need pregnancy test day of procedure.    Signed Electronically by: Agnes Stanton PA-C    Copy of this evaluation report is provided to requesting physician.    [place] Preop Guidelines    Patient Instructions   Cleared for procedure  Before Your Surgery      Call your surgeon if there is any change in your health. This includes signs of a cold or flu (such as a sore throat, runny nose, cough, rash or fever).    Do not smoke, drink alcohol or take over the counter medicine (unless your surgeon or primary care doctor tells you to) for the 24 hours before and after surgery.    If you take prescribed drugs: Follow your doctor s orders about which medicines to take and which to stop until after surgery.    Eating and drinking prior to surgery: follow the instructions from your surgeon    Take a shower or bath the night before surgery. Use the soap your surgeon gave you to gently clean your skin. If you do not have soap from your surgeon, use your regular soap. Do not shave or scrub the surgery site.  Wear clean pajamas and have clean sheets on your bed.

## 2017-08-30 NOTE — ANESTHESIA POSTPROCEDURE EVALUATION
Patient: Chhaya Thurman    Procedure(s):  Hysteroscopy with dilation and curettage, polypectomy - Wound Class: II-Clean Contaminated    Diagnosis:endometrial polyps  Diagnosis Additional Information: No value filed.    Anesthesia Type:  MAC    Note:  Anesthesia Post Evaluation    Patient location during evaluation: Phase 2 and Bedside  Patient participation: Able to fully participate in evaluation  Level of consciousness: awake  Pain management: adequate  Airway patency: patent  Cardiovascular status: acceptable and hemodynamically stable  Respiratory status: acceptable and room air  Hydration status: acceptable  PONV: none     Anesthetic complications: None          Last vitals:  Vitals:    08/30/17 0629 08/30/17 0832   BP: 124/80 112/62   Pulse: 69    Resp: 16 16   Temp: 36.7  C (98.1  F)    SpO2: 99% 98%         Electronically Signed By: SUSSY Luong CRNA  August 30, 2017  8:33 AM

## 2017-08-30 NOTE — ANESTHESIA CARE TRANSFER NOTE
Patient: Chhaya Thurman    Procedure(s):  Hysteroscopy with dilation and curettage, polypectomy - Wound Class: II-Clean Contaminated    Diagnosis: endometrial polyps  Diagnosis Additional Information: No value filed.    Anesthesia Type:   MAC     Note:  Airway :Room Air  Patient transferred to:Phase II        Vitals: (Last set prior to Anesthesia Care Transfer)    CRNA VITALS  8/30/2017 0800 - 8/30/2017 0830      8/30/2017             NIBP: 110/61    Pulse: 65    NIBP Mean: 74    SpO2: 98 %                Electronically Signed By: SUSSY Luong CRNA  August 30, 2017  8:30 AM

## 2017-08-31 LAB — COPATH REPORT: NORMAL

## 2017-08-31 NOTE — PROGRESS NOTES
Please call with results and let her know they are normal.  Thanks!    Pathology normal, no evidence of cancer or precancerous cells.  Likely polyps, possibly also fibroid tissue.      Mable Hanson MD, MPH

## 2017-10-16 ENCOUNTER — TELEPHONE (OUTPATIENT)
Dept: OBGYN | Facility: CLINIC | Age: 27
End: 2017-10-16

## 2017-10-16 DIAGNOSIS — Z32.01 PREGNANCY TEST POSITIVE: Primary | ICD-10-CM

## 2017-10-16 DIAGNOSIS — Z32.01 PREGNANCY TEST POSITIVE: ICD-10-CM

## 2017-10-16 PROCEDURE — 84702 CHORIONIC GONADOTROPIN TEST: CPT | Performed by: FAMILY MEDICINE

## 2017-10-16 PROCEDURE — 36415 COLL VENOUS BLD VENIPUNCTURE: CPT | Performed by: FAMILY MEDICINE

## 2017-10-16 NOTE — TELEPHONE ENCOUNTER
Return call to pt.  Unable to reach.  Left message for pt to return call to clinic.    Joellen Mora   Ob/Gyn Clinic  RN

## 2017-10-16 NOTE — TELEPHONE ENCOUNTER
Reason for call:  Patient reporting a symptom    Symptom or request: Ovulated around 9-30-17.  Now pregnant.  H/O two miscarriages - just wondering if she needs to do anything different.  1st ob appt scheduled for 11-10-17    Duration (how long have symptoms been present):     Have you been treated for this before? Yes    Additional comments:     Phone Number patient can be reached at:  Home number on file [phone] (home)    Best Time:  any    Can we leave a detailed message on this number:  YES    Call taken on 10/16/2017 at 1:22 PM by Taylor Pisano

## 2017-10-17 ENCOUNTER — PRENATAL OFFICE VISIT (OUTPATIENT)
Dept: OBGYN | Facility: CLINIC | Age: 27
End: 2017-10-17
Payer: COMMERCIAL

## 2017-10-17 DIAGNOSIS — Z34.00 PRENATAL CARE, FIRST PREGNANCY: Primary | ICD-10-CM

## 2017-10-17 LAB — B-HCG SERPL-ACNC: 256 IU/L (ref 0–5)

## 2017-10-17 PROCEDURE — 99207 ZZC NO CHARGE NURSE ONLY: CPT | Performed by: OBSTETRICS & GYNECOLOGY

## 2017-10-17 NOTE — PROGRESS NOTES
Pt notified of below.  Pt reports understanding.  Pt does not have further questions or concerns.    Joellen Mora   Ob/Gyn Clinic  RN

## 2017-10-17 NOTE — MR AVS SNAPSHOT
After Visit Summary   10/17/2017    Chhaya Thurman    MRN: [id number]           Patient Information     Date Of Birth          1990        Visit Information        Provider Department      10/17/2017 4:04 PM Mable Hanson MD [facility]        Today's Diagnoses     Prenatal care, first pregnancy    -  1       Follow-ups after your visit        Your next 10 appointments already scheduled     Nov 10, 2017  1:00 PM CST   New Prenatal with Mable Hanson MD, [facility] RM 1   [facility] ([facility])    [address] [address]   [phone]              Future tests that were ordered for you today     Open Standing Orders        Priority Remaining Interval Expires Ordered    HCG quantitative pregnancy Routine 7/8  10/16/2018 10/16/2017            Who to contact     If you have questions or need follow up information about today's clinic visit or your schedule please contact [facility] directly at [phone].  Normal or non-critical lab and imaging results will be communicated to you by [email or web address], letter or phone within 4 business days after the clinic has received the results. If you do not hear from us within 7 days, please contact the clinic through RxRevut or phone. If you have a critical or abnormal lab result, we will notify you by phone as soon as possible.  Submit refill requests through Jackrabbit or call your pharmacy and they will forward the refill request to us. Please allow 3 business days for your refill to be completed.          Additional Information About Your Visit        [email or web address] Information     Jackrabbit gives you secure access to your electronic health record. If you see a primary care provider, you can also send messages to your care team and make appointments. If you have questions, please call your primary care clinic.  If you do not have a primary care provider, please call [phone]  and they will assist you.        Care EveryWhere ID     This is your Care EveryWhere ID. This could be used by other organizations to access your [place] medical records  [id number]        Your Vitals Were     Last Period                   (LMP Unknown)            Blood Pressure from Last 3 Encounters:   08/30/17 120/68   08/24/17 135/86   07/20/17 124/72    Weight from Last 3 Encounters:   08/30/17 84.4 kg (186 lb)   08/24/17 84.4 kg (186 lb)   07/20/17 84.8 kg (187 lb)              Today, you had the following     No orders found for display       Primary Care Provider Office Phone # Fax #    Agnes Stanton PA-C [phone] [address] [place] [address]        Equal Access to Services     JOHANA WHITE : Hadgeovany Nair, waaxda luqadaha, qaybta kaalmada sherin, mica jeter . So [facility] [phone].    ATENCIÓN: Si habla español, tiene a garcía disposición servicios gratuitos de asistencia lingüística. Winifred al [phone].    We comply with applicable federal civil rights laws and [place] laws. We do not discriminate on the basis of race, color, national origin, age, disability, sex, sexual orientation, or gender identity.            Thank you!     Thank you for choosing [facility]  for your care. Our goal is always to provide you with excellent care. Hearing back from our patients is one way we can continue to improve our services. Please take a few minutes to complete the written survey that you may receive in the mail after your visit with us. Thank you!             Your Updated Medication List - Protect others around you: Learn how to safely use, store and throw away your medicines at www.disposemymeds.org.          This list is accurate as of: 10/17/17  4:17 PM.  Always use your most recent med list.                   Brand Name Dispense Instructions for use Diagnosis    prenatal multivitamin plus iron 27-0.8 MG Tabs  per tablet      Take 1 tablet by mouth daily

## 2017-10-17 NOTE — PROGRESS NOTES
Please call the patient with the results. Thanks!    The level of pregnancy hormone should double in 24 hours if it is a normal pregnancy.  It's too early to do an ultrasound now because we won't see anything prior to the level being at least 2000.  Fingers crossed!    Mable Hanson MD

## 2017-10-18 DIAGNOSIS — Z32.01 PREGNANCY TEST POSITIVE: ICD-10-CM

## 2017-10-18 LAB — B-HCG SERPL-ACNC: 603 IU/L (ref 0–5)

## 2017-10-18 PROCEDURE — 36415 COLL VENOUS BLD VENIPUNCTURE: CPT | Performed by: FAMILY MEDICINE

## 2017-10-18 PROCEDURE — 84702 CHORIONIC GONADOTROPIN TEST: CPT | Performed by: FAMILY MEDICINE

## 2017-10-20 NOTE — PROGRESS NOTES
The pregnancy hormone doubled which is a good sign of an early normal pregnancy!    Mable Hanson MD, MPH  [facility]

## 2017-10-23 NOTE — TELEPHONE ENCOUNTER
Patient scheduled for early OB on Friday 10/27/17 at 1330 with Dr. Pascual.  Patient reports understanding.    Joellen Mora   Ob/Gyn Clinic  RN

## 2017-10-23 NOTE — TELEPHONE ENCOUNTER
Pt called back asking if she can get a early US done this week due to insurance issues and her history of miscarriage.    Pt will be 6 weeks pregnant this Thursday.    Please call-     Vijaya Scott  [facility]

## 2017-10-27 ENCOUNTER — OFFICE VISIT (OUTPATIENT)
Dept: OBGYN | Facility: CLINIC | Age: 27
End: 2017-10-27
Payer: COMMERCIAL

## 2017-10-27 VITALS
SYSTOLIC BLOOD PRESSURE: 130 MMHG | WEIGHT: 192 LBS | BODY MASS INDEX: 30.99 KG/M2 | DIASTOLIC BLOOD PRESSURE: 80 MMHG | HEART RATE: 69 BPM

## 2017-10-27 DIAGNOSIS — Z34.01 ENCOUNTER FOR PRENATAL CARE IN FIRST TRIMESTER OF FIRST PREGNANCY: Primary | ICD-10-CM

## 2017-10-27 LAB
ABO + RH BLD: NORMAL
ABO + RH BLD: NORMAL
ALBUMIN UR-MCNC: NEGATIVE MG/DL
APPEARANCE UR: CLEAR
BILIRUB UR QL STRIP: NEGATIVE
BLD GP AB SCN SERPL QL: NORMAL
BLOOD BANK CMNT PATIENT-IMP: NORMAL
COLOR UR AUTO: YELLOW
ERYTHROCYTE [DISTWIDTH] IN BLOOD BY AUTOMATED COUNT: 12.3 % (ref 10–15)
GLUCOSE UR STRIP-MCNC: NEGATIVE MG/DL
HCT VFR BLD AUTO: 40.3 % (ref 35–47)
HGB BLD-MCNC: 13.7 G/DL (ref 11.7–15.7)
HGB UR QL STRIP: NEGATIVE
KETONES UR STRIP-MCNC: NEGATIVE MG/DL
LEUKOCYTE ESTERASE UR QL STRIP: NEGATIVE
MCH RBC QN AUTO: 31.3 PG (ref 26.5–33)
MCHC RBC AUTO-ENTMCNC: 34 G/DL (ref 31.5–36.5)
MCV RBC AUTO: 92 FL (ref 78–100)
NITRATE UR QL: NEGATIVE
PH UR STRIP: 6.5 PH (ref 5–7)
PLATELET # BLD AUTO: 250 10E9/L (ref 150–450)
RBC # BLD AUTO: 4.38 10E12/L (ref 3.8–5.2)
SOURCE: NORMAL
SP GR UR STRIP: <=1.005 (ref 1–1.03)
SPECIMEN EXP DATE BLD: NORMAL
UROBILINOGEN UR STRIP-ACNC: 0.2 EU/DL (ref 0.2–1)
WBC # BLD AUTO: 7.3 10E9/L (ref 4–11)

## 2017-10-27 PROCEDURE — 99207 ZZC FIRST OB VISIT: CPT | Performed by: OBSTETRICS & GYNECOLOGY

## 2017-10-27 PROCEDURE — 81003 URINALYSIS AUTO W/O SCOPE: CPT | Performed by: OBSTETRICS & GYNECOLOGY

## 2017-10-27 PROCEDURE — 86850 RBC ANTIBODY SCREEN: CPT | Performed by: OBSTETRICS & GYNECOLOGY

## 2017-10-27 PROCEDURE — 87340 HEPATITIS B SURFACE AG IA: CPT | Performed by: OBSTETRICS & GYNECOLOGY

## 2017-10-27 PROCEDURE — 76817 TRANSVAGINAL US OBSTETRIC: CPT | Performed by: OBSTETRICS & GYNECOLOGY

## 2017-10-27 PROCEDURE — 86780 TREPONEMA PALLIDUM: CPT | Performed by: OBSTETRICS & GYNECOLOGY

## 2017-10-27 PROCEDURE — 85027 COMPLETE CBC AUTOMATED: CPT | Performed by: OBSTETRICS & GYNECOLOGY

## 2017-10-27 PROCEDURE — 87491 CHLMYD TRACH DNA AMP PROBE: CPT | Performed by: OBSTETRICS & GYNECOLOGY

## 2017-10-27 PROCEDURE — 87591 N.GONORRHOEAE DNA AMP PROB: CPT | Performed by: OBSTETRICS & GYNECOLOGY

## 2017-10-27 PROCEDURE — 36415 COLL VENOUS BLD VENIPUNCTURE: CPT | Performed by: OBSTETRICS & GYNECOLOGY

## 2017-10-27 PROCEDURE — 87389 HIV-1 AG W/HIV-1&-2 AB AG IA: CPT | Performed by: OBSTETRICS & GYNECOLOGY

## 2017-10-27 PROCEDURE — 86901 BLOOD TYPING SEROLOGIC RH(D): CPT | Performed by: OBSTETRICS & GYNECOLOGY

## 2017-10-27 PROCEDURE — 86762 RUBELLA ANTIBODY: CPT | Performed by: OBSTETRICS & GYNECOLOGY

## 2017-10-27 PROCEDURE — 86900 BLOOD TYPING SEROLOGIC ABO: CPT | Performed by: OBSTETRICS & GYNECOLOGY

## 2017-10-27 PROCEDURE — 87086 URINE CULTURE/COLONY COUNT: CPT | Performed by: OBSTETRICS & GYNECOLOGY

## 2017-10-27 NOTE — MR AVS SNAPSHOT
After Visit Summary   10/27/2017    Chhaya hTurman    MRN: [id number]           Patient Information     Date Of Birth          1990        Visit Information        Provider Department      10/27/2017 1:30 PM Angela Pascual MD; [facility] RM 2 [facility]        Today's Diagnoses     Encounter for prenatal care in first trimester of first pregnancy    -  1       Follow-ups after your visit        Your next 10 appointments already scheduled     Nov 10, 2017  1:00 PM CST   New Prenatal with Mable Hanson MD, [facility] RM 1   [facility] ([facility])    [address] [facility] [address]   [phone]              Who to contact     If you have questions or need follow up information about today's clinic visit or your schedule please contact [facility] directly at [phone].  Normal or non-critical lab and imaging results will be communicated to you by MyChart, letter or phone within 4 business days after the clinic has received the results. If you do not hear from us within 7 days, please contact the clinic through Watson Pharmaceuticalshart or phone. If you have a critical or abnormal lab result, we will notify you by phone as soon as possible.  Submit refill requests through iSoccer or call your pharmacy and they will forward the refill request to us. Please allow 3 business days for your refill to be completed.          Additional Information About Your Visit        MyChart Information     iSoccer gives you secure access to your electronic health record. If you see a primary care provider, you can also send messages to your care team and make appointments. If you have questions, please call your primary care clinic.  If you do not have a primary care provider, please call [phone] and they will assist you.        Care EveryWhere ID     This is your Care EveryWhere ID. This could be used by other organizations to access  your [place] medical records  [id number]        Your Vitals Were     Pulse Last Period Breastfeeding? BMI (Body Mass Index)          69 09/16/2017 No 30.99 kg/m2         Blood Pressure from Last 3 Encounters:   10/27/17 130/80   08/30/17 120/68   08/24/17 135/86    Weight from Last 3 Encounters:   10/27/17 192 lb (87.1 kg)   08/30/17 186 lb (84.4 kg)   08/24/17 186 lb (84.4 kg)              We Performed the Following     ABO/Rh type and screen     Anti Treponema     CBC with platelets     Chlamydia trachomatis PCR     Hepatitis B surface antigen     HIV Antigen Antibody Combo     Neisseria gonorrhoeae PCR     Rubella Antibody IgG Quantitative     US OB < 14 Weeks w Transvaginal     US OB <14 Weeks w Transvaginal Single        Primary Care Provider Office Phone # Fax #    Agnes Stanton PA-C [phone] [address] [place] [address]        Equal Access to Services     JOHANA WHITE : Hadii aad ku hadasho Soomaali, waaxda luqadaha, qaybta kaalmada adeegyada, waxay idiin hayrosalindan alem jeter . So [facility] [phone].    ATENCIÓN: Si habla español, tiene a garcía disposición servicios gratuitos de asistencia lingüística. Llame al [phone].    We comply with applicable federal civil rights laws and [place] laws. We do not discriminate on the basis of race, color, national origin, age, disability, sex, sexual orientation, or gender identity.            Thank you!     Thank you for choosing [facility]  for your care. Our goal is always to provide you with excellent care. Hearing back from our patients is one way we can continue to improve our services. Please take a few minutes to complete the written survey that you may receive in the mail after your visit with us. Thank you!             Your Updated Medication List - Protect others around you: Learn how to safely use, store and throw away your medicines at www.disposemymeds.org.          This list is accurate as  of: 10/27/17  1:57 PM.  Always use your most recent med list.                   Brand Name Dispense Instructions for use Diagnosis    prenatal multivitamin plus iron 27-0.8 MG Tabs per tablet      Take 1 tablet by mouth daily

## 2017-10-27 NOTE — NURSING NOTE
"Initial /80 (BP Location: Left arm, Patient Position: Chair, Cuff Size: Adult Large)  Pulse 69  Wt 192 lb (87.1 kg)  LMP 09/16/2017  Breastfeeding? No  BMI 30.99 kg/m2 Estimated body mass index is 30.99 kg/(m^2) as calculated from the following:    Height as of 8/30/17: 5' 6\" (1.676 m).    Weight as of this encounter: 192 lb (87.1 kg). .    Alicja Guerin    "

## 2017-10-27 NOTE — PROGRESS NOTES
Early OB visit    Ms.Gabrielle BEE Thurman 26 year old  (SABx2) 5w6d by LMP  presents for early OB visit.     Serial beta HCG quant levels have risen appropriately.   They were 256 on 10/16 and 603 on 10/18.    Hx of SAB x 2; fertility work-up has been unremarkable other than two endometrial polyps seen on an HSG in 2017 which she had removed by Dr. Liam Hanson via hysteroscopy.     Today, she does not have any complaints.   Denies vaginal bleeding. Only mild cramping.   Mild nausea, denies vomiting.   Denies hematuria, dysuria.   Denies constipation, diarrhea, blood in stools.       Past Medical History:   Diagnosis Date     Chickenpox      History of recurrent UTI (urinary tract infection)      Past Surgical History:   Procedure Laterality Date     DILATION AND CURETTAGE, OPERATIVE HYSTEROSCOPY, COMBINED N/A 2017    Procedure: COMBINED DILATION AND CURETTAGE, OPERATIVE HYSTEROSCOPY;  Hysteroscopy with dilation and curettage, polypectomy;  Surgeon: Mable Hanson MD;  Location: [facility] OR     ENT SURGERY  20 years ago    tubes in three times     MOUTH SURGERY      wisdom teeth -age 16     TONSILLECTOMY       Current Outpatient Prescriptions   Medication     Prenatal Vit-Fe Fumarate-FA (PRENATAL MULTIVITAMIN  PLUS IRON) 27-0.8 MG TABS per tablet     No current facility-administered medications for this visit.      Social History   Substance Use Topics     Smoking status: Never Smoker     Smokeless tobacco: Never Used     Alcohol use 0.0 oz/week      Comment: occas-quit with pregnancy     Family History   Problem Relation Age of Onset     Other Cancer Maternal Grandfather      skin     Hyperlipidemia Mother      Unknown/Adopted Father      unknown history     Stomach Cancer Maternal Grandmother      DIABETES No family hx of      ROS: 10 point review of systems negative except for pertinent positives stated in the HPI    Exam:   /80 (BP Location: Left arm, Patient Position: Chair, Cuff Size:  Adult Large)  Pulse 69  Wt 192 lb (87.1 kg)  LMP 2017  Breastfeeding? No  BMI 30.99 kg/m2  General Appearance: Well nourished, well developed female, NAD, AOx3  Gait: Normal  Skin: Normal skin turgor  HEENT: Atraumatic, normocephalic, EOMI  Lungs: Good respiratory effort  Abdomen: Soft, NT, ND, no masses  Pelvic: Normal external female genitalia.  No external lesions, normal hair distribution, no adenopathy. Speculum exam reveals vaginal epithelium well rugated with no abnormal discharge. Cervix appears smooth, pink, with no visible lesions. Bimanual exam reveals normal size uterus, anteverted, non-tender, and mobile. No adnexal masses or tenderness. No cervical motion tenderness.   Extremities: No clubbing, no cyanosis and no edema    A transvaginal US was performed today. A single live intrauterine pregnancy was seen measuring 0.406 cm without a corresponding gestational age. Fetal cardiac activity seen at 106 bpm.     A/P: 26 year old  at 5w6d by LMP with viable pregnancy   -- recommended returning in 2 weeks for repeat TVUS to establish dating  -- prenatal OB labs ordered  -- offered continued serial beta HCG quants; patient declines at this time  -- SAB precautions reviewed; encouraged to continue taking prenatal vitamins    Angela Pascual MD  [facility]

## 2017-10-28 LAB
BACTERIA SPEC CULT: NO GROWTH
Lab: NORMAL
SPECIMEN SOURCE: NORMAL
T PALLIDUM IGG+IGM SER QL: NEGATIVE

## 2017-10-29 LAB
C TRACH DNA SPEC QL NAA+PROBE: NEGATIVE
N GONORRHOEA DNA SPEC QL NAA+PROBE: NEGATIVE
RUBV IGG SERPL IA-ACNC: 24 IU/ML
SPECIMEN SOURCE: NORMAL
SPECIMEN SOURCE: NORMAL

## 2017-10-30 LAB
HBV SURFACE AG SERPL QL IA: NONREACTIVE
HIV 1+2 AB+HIV1 P24 AG SERPL QL IA: NONREACTIVE

## 2017-10-31 NOTE — PROGRESS NOTES
Normal new OB prenatal labs.   Will review at next follow-up visit.     Angela Pascual MD  [facility]

## 2017-11-10 ENCOUNTER — PRENATAL OFFICE VISIT (OUTPATIENT)
Dept: OBGYN | Facility: CLINIC | Age: 27
End: 2017-11-10
Payer: COMMERCIAL

## 2017-11-10 VITALS
WEIGHT: 190 LBS | HEART RATE: 69 BPM | DIASTOLIC BLOOD PRESSURE: 73 MMHG | BODY MASS INDEX: 30.53 KG/M2 | SYSTOLIC BLOOD PRESSURE: 137 MMHG | HEIGHT: 66 IN

## 2017-11-10 DIAGNOSIS — Z34.01 ENCOUNTER FOR PRENATAL CARE IN FIRST TRIMESTER OF FIRST PREGNANCY: Primary | ICD-10-CM

## 2017-11-10 PROCEDURE — 99207 ZZC FIRST OB VISIT: CPT | Performed by: OBSTETRICS & GYNECOLOGY

## 2017-11-10 NOTE — PROGRESS NOTES
Chhaya is a 26 year old  at unknown gestation here for new ob visit.  She has a history of RPL, diagnosed with polyps s/p D&C and polypectomy around the time of her LMP in September.  She had a normally rising hcg and an ultrasound 2 weeks ago at approx 5w6d unable to measure a fetal pole but with FHT of 106 bpm.  Since that time, she has felt nauseated and some mild cramping, but no bleeding, no vomiting.  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.  KATYYOVANI Skeltonginny accompanies her today.     Obstetric History       T0      L0     SAB0   TAB0   Ectopic0   Multiple0   Live Births0       # Outcome Date GA Lbr Gerald/2nd Weight Sex Delivery Anes PTL Lv   3 Current            2 SAB 17 9w1d    AB, MISSED      1 AB 17 8w0d    SAB             ROS: Ten point review of systems was reviewed and negative except the above.    Past Medical History:   Diagnosis Date     Chickenpox      History of recurrent UTI (urinary tract infection)      Past Surgical History:   Procedure Laterality Date     DILATION AND CURETTAGE, OPERATIVE HYSTEROSCOPY, COMBINED N/A 2017    Procedure: COMBINED DILATION AND CURETTAGE, OPERATIVE HYSTEROSCOPY;  Hysteroscopy with dilation and curettage, polypectomy;  Surgeon: Mable Hanson MD;  Location: [facility] OR     ENT SURGERY  20 years ago    tubes in three times     MOUTH SURGERY      wisdom teeth -age 16     TONSILLECTOMY       Patient Active Problem List    Diagnosis Date Noted     Prenatal care, first pregnancy 10/17/2017     Priority: Medium     SANTA- Nahid Gama       Endometrium, polyp 2017     Priority: Medium     Recurrent pregnancy loss in pregnant patient in first trimester, antepartum 2017     Priority: Medium        Allergies   Allergen Reactions     Imitrex [Sumatriptan]        Current Outpatient Prescriptions on File Prior to Visit:  Prenatal Vit-Fe Fumarate-FA (PRENATAL MULTIVITAMIN  PLUS IRON) 27-0.8 MG TABS  "per tablet Take 1 tablet by mouth daily     No current facility-administered medications on file prior to visit.     Physical Exam:   /73 (BP Location: Right arm, Patient Position: Sitting, Cuff Size: Adult Regular)  Pulse 69  Ht 5' 6\" (1.676 m)  Wt 190 lb (86.2 kg)  LMP 2017  Breastfeeding? No  BMI 30.67 kg/m2    Gen:  no acute distress, comfortable, smiling  HENT: No scleral injection or icterus  CV: Regular rate   Resp: Normal work of breathing, no cough  GI: Abdomen soft, non-tender. No masses, organomegaly  Skin: No suspicious lesions or rashes  Psychiatric: mentation appears normal and affect bright    Dating ultrasound 11/10/2017:  Gutierrez intrauterine pregnancy at 8w0d with ALVINO 8w0d   Consistent with measurement 2 weeks ago appearing to be 5w6d  Unknown LMP date due to recent surgery (hysteroscopy D&C and polypectomy)  CRL 1.56cm   bpm    A/P 26 year old  at 8w0d dated by 8w0d US here for NOB visit.  - Discussed physician coverage, tertiary support, diet, exercise, weight gain, schedule of visits, routine and indicated ultrasounds, and childbirth education.   - Options for  testing for chromosomal and birth defects were discussed with the patient including nuchal lucency/blood marker testing in the first trimester and quad screening and/or Level 2 ultrasound in the second trimester. We discussed that these are screening tests and not diagnostic tests and that false positives and negatives are a distinct possibility. We discussed that follow up diagnostic testing would include chorionic villus sampling or amniocentesis depending on gestational age.  - NOB labs already done  - recommend PNV  - discussed palliative measures for nausea/vomiting  - miscarriage precautions    RTC 4 weeks    Mable Hanson MD, MPH  [facility]      "

## 2017-11-10 NOTE — PATIENT INSTRUCTIONS
Nausea and vomiting in pregnancy:  -It can help to take prenatal vitamins for 3 months prior to conception.  -Try to eat small frequent meals, as often as every 1-2 hours.  Try keeping some crackers on your nightstand and eat before getting up in the morning.  -Stick to bland foods like toast, rice, bananas, crackers, and other things that sound good to you.  Avoid spicy and fatty foods, and foods with a strong odor.  -Keep hydrated.  Sometimes carbonated water or ginger ale helps.  -Try ginger chews or ginger hard candy.    If you need medication:  -Initially take one tablet of doxylamine (Unisom) and pyridoxine (vitamin B6) at bedtime.    -The dose may be increased to four tablets per day as needed for more severe nausea (one tablet in the morning, one tablet in the afternoon, two tablets at bedtime).    If you are continuing to vomit, are unable to keep hydrated, or are losing significant weight, call clinic for other medication options.

## 2017-11-10 NOTE — NURSING NOTE
"Chief Complaint   Patient presents with     Prenatal Care       Initial /73 (BP Location: Right arm, Patient Position: Sitting, Cuff Size: Adult Regular)  Pulse 69  Ht 5' 6\" (1.676 m)  Wt 190 lb (86.2 kg)  LMP 09/16/2017  Breastfeeding? No  BMI 30.67 kg/m2 Estimated body mass index is 30.67 kg/(m^2) as calculated from the following:    Height as of this encounter: 5' 6\" (1.676 m).    Weight as of this encounter: 190 lb (86.2 kg).  Medication Reconciliation: complete   Lynn Julien, GABBIE      "

## 2017-11-10 NOTE — MR AVS SNAPSHOT
After Visit Summary   11/10/2017    Chhaya Thurman    MRN: [id number]           Patient Information     Date Of Birth          1990        Visit Information        Provider Department      11/10/2017 1:00 PM Mable Hanson MD; [facility]        Today's Diagnoses     Encounter for prenatal care in first trimester of first pregnancy    -  1      Care Instructions    Nausea and vomiting in pregnancy:  -It can help to take prenatal vitamins for 3 months prior to conception.  -Try to eat small frequent meals, as often as every 1-2 hours.  Try keeping some crackers on your nightstand and eat before getting up in the morning.  -Stick to bland foods like toast, rice, bananas, crackers, and other things that sound good to you.  Avoid spicy and fatty foods, and foods with a strong odor.  -Keep hydrated.  Sometimes carbonated water or ginger ale helps.  -Try ginger chews or ginger hard candy.    If you need medication:  -Initially take one tablet of doxylamine (Unisom) and pyridoxine (vitamin B6) at bedtime.    -The dose may be increased to four tablets per day as needed for more severe nausea (one tablet in the morning, one tablet in the afternoon, two tablets at bedtime).    If you are continuing to vomit, are unable to keep hydrated, or are losing significant weight, call clinic for other medication options.            Follow-ups after your visit        Follow-up notes from your care team     Return in about 4 weeks (around 12/8/2017) for OB Follow Up.      Your next 10 appointments already scheduled     Dec 08, 2017  3:00 PM CST   ESTABLISHED PRENATAL with Mable Hanson MD   [facility] ([facility])    [address] [address]   [phone]              Who to contact     If you have questions or need follow up information about today's clinic visit or your schedule please contact [facility]  "directly at [phone].  Normal or non-critical lab and imaging results will be communicated to you by MyChart, letter or phone within 4 business days after the clinic has received the results. If you do not hear from us within 7 days, please contact the clinic through AcesoBeehart or phone. If you have a critical or abnormal lab result, we will notify you by phone as soon as possible.  Submit refill requests through PeopleAdmin or call your pharmacy and they will forward the refill request to us. Please allow 3 business days for your refill to be completed.          Additional Information About Your Visit        AcesoBeehart Information     PeopleAdmin gives you secure access to your electronic health record. If you see a primary care provider, you can also send messages to your care team and make appointments. If you have questions, please call your primary care clinic.  If you do not have a primary care provider, please call [phone] and they will assist you.        Care EveryWhere ID     This is your Care EveryWhere ID. This could be used by other organizations to access your [place] medical records  [id number]        Your Vitals Were     Pulse Height Last Period Breastfeeding? BMI (Body Mass Index)       69 5' 6\" (1.676 m) 09/16/2017 No 30.67 kg/m2        Blood Pressure from Last 3 Encounters:   11/10/17 137/73   10/27/17 130/80   08/30/17 120/68    Weight from Last 3 Encounters:   11/10/17 190 lb (86.2 kg)   10/27/17 192 lb (87.1 kg)   08/30/17 186 lb (84.4 kg)              Today, you had the following     No orders found for display       Primary Care Provider Office Phone # Fax #    Agnes Stanton PA-C [phone] [address] [place] [address]        Equal Access to Services     JOHANA WHITE : Cam Nair, sánchez michel, qaybta kalizette duff, mica deluca. So [facility] [phone].    ATENCIÓN: Si habla español, tiene a garcía disposición " servicios gratuitos de asistencia lingüística. Winifred richards [phone].    We comply with applicable federal civil rights laws and [place] laws. We do not discriminate on the basis of race, color, national origin, age, disability, sex, sexual orientation, or gender identity.            Thank you!     Thank you for choosing [facility]  for your care. Our goal is always to provide you with excellent care. Hearing back from our patients is one way we can continue to improve our services. Please take a few minutes to complete the written survey that you may receive in the mail after your visit with us. Thank you!             Your Updated Medication List - Protect others around you: Learn how to safely use, store and throw away your medicines at www.disposemymeds.org.          This list is accurate as of: 11/10/17  1:46 PM.  Always use your most recent med list.                   Brand Name Dispense Instructions for use Diagnosis    prenatal multivitamin plus iron 27-0.8 MG Tabs per tablet      Take 1 tablet by mouth daily

## 2017-12-07 ENCOUNTER — PRENATAL OFFICE VISIT (OUTPATIENT)
Dept: OBGYN | Facility: CLINIC | Age: 27
End: 2017-12-07
Payer: COMMERCIAL

## 2017-12-07 VITALS
WEIGHT: 195 LBS | SYSTOLIC BLOOD PRESSURE: 120 MMHG | BODY MASS INDEX: 31.47 KG/M2 | HEART RATE: 70 BPM | DIASTOLIC BLOOD PRESSURE: 75 MMHG

## 2017-12-07 DIAGNOSIS — Z34.01 ENCOUNTER FOR PRENATAL CARE IN FIRST TRIMESTER OF FIRST PREGNANCY: Primary | ICD-10-CM

## 2017-12-07 PROCEDURE — 99207 ZZC PRENATAL VISIT: CPT | Performed by: OBSTETRICS & GYNECOLOGY

## 2017-12-07 NOTE — LETTER
December 7, 2017          Chhaya Thurman  [address] [address]        To Whom It May Concern,      Chhaya is cleared to get deep tissue massages during pregnancy          Sincerely,        Angela Pascual MD

## 2017-12-07 NOTE — MR AVS SNAPSHOT
After Visit Summary   12/7/2017    Chhaya Thurman    MRN: [id number]           Patient Information     Date Of Birth          1990        Visit Information        Provider Department      12/7/2017 3:00 PM Angela Pascual MD [facility]        Today's Diagnoses     Encounter for prenatal care in first trimester of first pregnancy    -  1       Follow-ups after your visit        Your next 10 appointments already scheduled     Jan 04, 2018  4:00 PM CST   ESTABLISHED PRENATAL with Angela Pascual MD   [facility]    [address] [address]   [phone]              Who to contact     If you have questions or need follow up information about today's clinic visit or your schedule please contact [facility] directly at [phone].  Normal or non-critical lab and imaging results will be communicated to you by MyChart, letter or phone within 4 business days after the clinic has received the results. If you do not hear from us within 7 days, please contact the clinic through [employer]hart or phone. If you have a critical or abnormal lab result, we will notify you by phone as soon as possible.  Submit refill requests through [employer] or call your pharmacy and they will forward the refill request to us. Please allow 3 business days for your refill to be completed.          Additional Information About Your Visit        MyChart Information     [employer] gives you secure access to your electronic health record. If you see a primary care provider, you can also send messages to your care team and make appointments. If you have questions, please call your primary care clinic.  If you do not have a primary care provider, please call [phone] and they will assist you.        Care EveryWhere ID     This is your Care EveryWhere ID. This could be used by other organizations to access your [facility]  records  [id number]        Your Vitals Were     Pulse Last Period Breastfeeding? BMI (Body Mass Index)          70 09/16/2017 No 31.47 kg/m2         Blood Pressure from Last 3 Encounters:   12/07/17 120/75   11/10/17 137/73   10/27/17 130/80    Weight from Last 3 Encounters:   12/07/17 195 lb (88.5 kg)   11/10/17 190 lb (86.2 kg)   10/27/17 192 lb (87.1 kg)              Today, you had the following     No orders found for display       Primary Care Provider Office Phone # Fax #    Agnes Stanton PA-C [phone] [address] [place], [place] [id number]        Equal Access to Services     JOHANA WHITE : Cam summerso Sotimmy, waaxda luqadaha, qaybta kaalmada adetungyaelizabeth, mica deluca. So [facility] [phone].    ATENCIÓN: Si habla español, tiene a garcía disposición servicios gratuitos de asistencia lingüística. Llame al [phone].    We comply with applicable federal civil rights laws and [place] laws. We do not discriminate on the basis of race, color, national origin, age, disability, sex, sexual orientation, or gender identity.            Thank you!     Thank you for choosing [facility]  for your care. Our goal is always to provide you with excellent care. Hearing back from our patients is one way we can continue to improve our services. Please take a few minutes to complete the written survey that you may receive in the mail after your visit with us. Thank you!             Your Updated Medication List - Protect others around you: Learn how to safely use, store and throw away your medicines at www.disposemymeds.org.          This list is accurate as of: 12/7/17  3:45 PM.  Always use your most recent med list.                   Brand Name Dispense Instructions for use Diagnosis    prenatal multivitamin plus iron 27-0.8 MG Tabs per tablet      Take 1 tablet by mouth daily

## 2017-12-07 NOTE — NURSING NOTE
"Initial /75 (BP Location: Right arm, Patient Position: Chair, Cuff Size: Adult Large)  Pulse 70  Wt 195 lb (88.5 kg)  LMP 09/16/2017  Breastfeeding? No  BMI 31.47 kg/m2 Estimated body mass index is 31.47 kg/(m^2) as calculated from the following:    Height as of 11/10/17: 5' 6\" (1.676 m).    Weight as of this encounter: 195 lb (88.5 kg). .    Alicja Guerin    "

## 2018-01-02 ENCOUNTER — MYC MEDICAL ADVICE (OUTPATIENT)
Dept: OBGYN | Facility: CLINIC | Age: 28
End: 2018-01-02

## 2018-01-02 NOTE — TELEPHONE ENCOUNTER
Please call her, however, as long as she is hydrating and urinating she should be fine   Marylou Mendez MD (Routing comment)     S-(situation): Pt reports that she is slowly getting over the stomach virus.  She was experiencing vomiting and diarrhea over the weekend but this has now stopped.  She did have a fever but believes this has broke although felt warm a little while ago.    B-(background): , 15w4d    A-(assessment): Pt reports that the fever has broke but she feels so run down.  She is urinating about every couple of hours.  She felt a little warm earlier and took Tylenol and this has helped.  Doesn't have much of an appetite.    R-(recommendations): Advised if appetite is down, try crackers and push the fluids and if she continues to run a fever with decreased fluid intake she will need to be seen in urgent care for fluid hydration.  She has an ob visit on Thur.  Pt agrees with this plan.    Donna Foote  [facility] RN

## 2018-01-04 ENCOUNTER — PRENATAL OFFICE VISIT (OUTPATIENT)
Dept: OBGYN | Facility: CLINIC | Age: 28
End: 2018-01-04
Payer: COMMERCIAL

## 2018-01-04 VITALS
WEIGHT: 197 LBS | DIASTOLIC BLOOD PRESSURE: 78 MMHG | HEIGHT: 66 IN | HEART RATE: 74 BPM | BODY MASS INDEX: 31.66 KG/M2 | SYSTOLIC BLOOD PRESSURE: 132 MMHG

## 2018-01-04 DIAGNOSIS — Z34.02 ENCOUNTER FOR PRENATAL CARE IN SECOND TRIMESTER OF FIRST PREGNANCY: Primary | ICD-10-CM

## 2018-01-04 PROCEDURE — 99207 ZZC PRENATAL VISIT: CPT | Performed by: OBSTETRICS & GYNECOLOGY

## 2018-01-04 NOTE — PROGRESS NOTES
"Doing well.   No complaints. Is getting over cold symptoms and GI upset issues.   Denies VB, cramping.   /78 (BP Location: Right arm, Cuff Size: Adult Large)  Pulse 74  Ht 5' 6\" (1.676 m)  Wt 197 lb (89.4 kg)  LMP 2017  BMI 31.8 kg/m2  General Appearance: NAD  Abdomen: Gravid, NT  Refer to flow sheet above.   A/P: 27 year old  at 15w6d  -- declines MS-AFP testing  -- fetal anatomy ultrasound ordered  -- SAB precautions reviewed  RTC in 4 weeks    Angela Pascual MD  [facility]            "

## 2018-01-04 NOTE — NURSING NOTE
"Initial /78 (BP Location: Right arm, Cuff Size: Adult Large)  Pulse 74  Ht 5' 6\" (1.676 m)  Wt 197 lb (89.4 kg)  LMP 09/16/2017  BMI 31.8 kg/m2 Estimated body mass index is 31.8 kg/(m^2) as calculated from the following:    Height as of this encounter: 5' 6\" (1.676 m).    Weight as of this encounter: 197 lb (89.4 kg). .      "

## 2018-01-04 NOTE — MR AVS SNAPSHOT
After Visit Summary   1/4/2018    Chhaya Thurman    MRN: [id number]           Patient Information     Date Of Birth          1990        Visit Information        Provider Department      1/4/2018 4:00 PM Angela Pascual MD [facility]        Today's Diagnoses     Encounter for prenatal care in second trimester of first pregnancy    -  1       Follow-ups after your visit        Your next 10 appointments already scheduled     Jan 04, 2018  4:00 PM CST   ESTABLISHED PRENATAL with Angela [facility]ascual MD   [facility]    [address] [address]   [phone]            Jan 11, 2018  4:30 PM CST   ESTABLISHED PRENATAL with Angela Pascual MD   [facility]    [address] [address]   [phone]              Future tests that were ordered for you today     Open Future Orders        Priority Expected Expires Ordered    US OB > 14 Weeks Complete Single Routine  1/4/2019 1/4/2018            Who to contact     If you have questions or need follow up information about today's clinic visit or your schedule please contact [facility] directly at [phone].  Normal or non-critical lab and imaging results will be communicated to you by MyChart, letter or phone within 4 business days after the clinic has received the results. If you do not hear from us within 7 days, please contact the clinic through MyChart or phone. If you have a critical or abnormal lab result, we will notify you by phone as soon as possible.  Submit refill requests through [employer] or call your pharmacy and they will forward the refill request to us. Please allow 3 business days for your refill to be completed.          Additional Information About Your Visit        [employer] Information     [employer] gives you secure access to your electronic health record. If  "you see a primary care provider, you can also send messages to your care team and make appointments. If you have questions, please call your primary care clinic.  If you do not have a primary care provider, please call [phone] and they will assist you.        Care EveryWhere ID     This is your Care EveryWhere ID. This could be used by other organizations to access your [place] medical records  [id number]        Your Vitals Were     Pulse Height Last Period BMI (Body Mass Index)          74 5' 6\" (1.676 m) 09/16/2017 31.8 kg/m2         Blood Pressure from Last 3 Encounters:   01/04/18 132/78   12/07/17 120/75   11/10/17 137/73    Weight from Last 3 Encounters:   01/04/18 197 lb (89.4 kg)   12/07/17 195 lb (88.5 kg)   11/10/17 190 lb (86.2 kg)               Primary Care Provider Office Phone # Fax #    Agnes Stanton PA-C [phone] [address] [place] [address]        Equal Access to Services     [facility] : Hadii aad ku hadasho Sotimmy, waaxda luqadaha, qaybta kaalmada sherin, mica jeter . So [facility] [phone].    ATENCIÓN: Si habla español, tiene a garcía disposición servicios gratuitos de asistencia lingüística. Winifred al [phone].    We comply with applicable federal civil rights laws and [place] laws. We do not discriminate on the basis of race, color, national origin, age, disability, sex, sexual orientation, or gender identity.            Thank you!     Thank you for choosing [facility]  for your care. Our goal is always to provide you with excellent care. Hearing back from our patients is one way we can continue to improve our services. Please take a few minutes to complete the written survey that you may receive in the mail after your visit with us. Thank you!             Your Updated Medication List - Protect others around you: Learn how to safely use, store and throw away your medicines at www.disposemymeds.org. "          This list is accurate as of: 1/4/18  2:54 PM.  Always use your most recent med list.                   Brand Name Dispense Instructions for use Diagnosis    prenatal multivitamin plus iron 27-0.8 MG Tabs per tablet      Take 1 tablet by mouth daily

## 2018-02-05 ENCOUNTER — HOSPITAL ENCOUNTER (OUTPATIENT)
Dept: ULTRASOUND IMAGING | Facility: CLINIC | Age: 28
Discharge: HOME | End: 2018-02-05
Attending: OBSTETRICS & GYNECOLOGY | Admitting: OBSTETRICS & GYNECOLOGY
Payer: COMMERCIAL

## 2018-02-05 DIAGNOSIS — Z34.02 ENCOUNTER FOR PRENATAL CARE IN SECOND TRIMESTER OF FIRST PREGNANCY: ICD-10-CM

## 2018-02-05 PROCEDURE — 76805 OB US >/= 14 WKS SNGL FETUS: CPT

## 2018-02-06 NOTE — PROGRESS NOTES
Call to pt to notify of below.  Unable to reach.  Left message for pt to call back   Future order placed.    Joellen Mora   [facility]  RN

## 2018-02-07 ENCOUNTER — PRENATAL OFFICE VISIT (OUTPATIENT)
Dept: OBGYN | Facility: CLINIC | Age: 28
End: 2018-02-07
Payer: COMMERCIAL

## 2018-02-07 VITALS
HEART RATE: 77 BPM | HEIGHT: 66 IN | BODY MASS INDEX: 33.2 KG/M2 | DIASTOLIC BLOOD PRESSURE: 77 MMHG | TEMPERATURE: 98.1 F | WEIGHT: 206.6 LBS | SYSTOLIC BLOOD PRESSURE: 122 MMHG

## 2018-02-07 DIAGNOSIS — O28.3 ABNORMAL FETAL ULTRASOUND: ICD-10-CM

## 2018-02-07 DIAGNOSIS — Z34.02 ENCOUNTER FOR PRENATAL CARE IN SECOND TRIMESTER OF FIRST PREGNANCY: Primary | ICD-10-CM

## 2018-02-07 PROCEDURE — 99207 ZZC PRENATAL VISIT: CPT | Performed by: OBSTETRICS & GYNECOLOGY

## 2018-02-07 NOTE — MR AVS SNAPSHOT
After Visit Summary   2/7/2018    Chhaya Thurman    MRN: [id number]           Patient Information     Date Of Birth          1990        Visit Information        Provider Department      2/7/2018 3:30 PM Marylou Mendez MD [facility]        Today's Diagnoses     Encounter for prenatal care in second trimester of first pregnancy    -  1       Follow-ups after your visit        Future tests that were ordered for you today     Open Future Orders        Priority Expected Expires Ordered    US OB Ltd One Or More Fetus FU/Repeat Routine  2/7/2019 2/7/2018    Glucose tolerance gest screen 1 hour Routine  5/7/2018 2/7/2018    OB hemoglobin Routine  5/7/2018 2/7/2018    Anti Treponema Routine  5/7/2018 2/7/2018            Who to contact     If you have questions or need follow up information about today's clinic visit or your schedule please contact [facility] directly at [phone].  Normal or non-critical lab and imaging results will be communicated to you by [email or web address], letter or phone within 4 business days after the clinic has received the results. If you do not hear from us within 7 days, please contact the clinic through [email or web address] or phone. If you have a critical or abnormal lab result, we will notify you by phone as soon as possible.  Submit refill requests through [employer] or call your pharmacy and they will forward the refill request to us. Please allow 3 business days for your refill to be completed.          Additional Information About Your Visit        MyChart Information     [employer] gives you secure access to your electronic health record. If you see a primary care provider, you can also send messages to your care team and make appointments. If you have questions, please call your primary care clinic.  If you do not have a primary care provider, please call [phone] and they will assist you.        Care EveryWhere ID     This is your Care  "EveryWhere ID. This could be used by other organizations to access your [place] medical records  [id number]        Your Vitals Were     Pulse Temperature Height Last Period BMI (Body Mass Index)       77 98.1  F (36.7  C) (Tympanic) 5' 6\" (1.676 m) 09/16/2017 33.35 kg/m2        Blood Pressure from Last 3 Encounters:   02/07/18 122/77   01/04/18 132/78   12/07/17 120/75    Weight from Last 3 Encounters:   02/07/18 206 lb 9.6 oz (93.7 kg)   01/04/18 197 lb (89.4 kg)   12/07/17 195 lb (88.5 kg)               Primary Care Provider Office Phone # Fax #    Agnes Stanton PA-C [phone] [address] [place] [address]        Equal Access to Services     JOHANA WHITE : Hadii bayron Nair, waaxda luqadaha, qaybta kaalmada adetungyada, mica jeter . So [facility] [phone].    ATENCIÓN: Si habla español, tiene a garcía disposición servicios gratuitos de asistencia lingüística. Llame al [phone].    We comply with applicable federal civil rights laws and [place] laws. We do not discriminate on the basis of race, color, national origin, age, disability, sex, sexual orientation, or gender identity.            Thank you!     Thank you for choosing [facility]  for your care. Our goal is always to provide you with excellent care. Hearing back from our patients is one way we can continue to improve our services. Please take a few minutes to complete the written survey that you may receive in the mail after your visit with us. Thank you!             Your Updated Medication List - Protect others around you: Learn how to safely use, store and throw away your medicines at www.disposemymeds.org.          This list is accurate as of 2/7/18  3:53 PM.  Always use your most recent med list.                   Brand Name Dispense Instructions for use Diagnosis    BENADRYL PO           prenatal multivitamin plus iron 27-0.8 MG Tabs per tablet      Take 1 " tablet by mouth daily        TYLENOL PO

## 2018-02-07 NOTE — PROGRESS NOTES
"CC: prenatal  S:  No problems  /77 (BP Location: Left arm, Patient Position: Chair, Cuff Size: Adult Regular)  Pulse 77  Temp 98.1  F (36.7  C) (Tympanic)  Ht 5' 6\" (1.676 m)  Wt 206 lb 9.6 oz (93.7 kg)  LMP 09/16/2017  BMI 33.35 kg/m2  US reviewed-heart views incomplete otherwise normal  A/P order a repeat US   Labs for next visit  Routine precautions  F/u 4 weeks  Marylou Mendez MD    "

## 2018-02-18 ENCOUNTER — TELEPHONE (OUTPATIENT)
Dept: OBGYN | Facility: CLINIC | Age: 28
End: 2018-02-18

## 2018-02-18 DIAGNOSIS — Z20.828 EXPOSURE TO INFLUENZA: Primary | ICD-10-CM

## 2018-02-18 RX ORDER — OSELTAMIVIR PHOSPHATE 75 MG/1
75 CAPSULE ORAL DAILY
Qty: 10 CAPSULE | Refills: 0 | Status: SHIPPED | OUTPATIENT
Start: 2018-02-18 | End: 2018-03-15

## 2018-02-19 NOTE — PATIENT INSTRUCTIONS
Notified Dr Pascual of pt's request.  He would call it in.  Pt was called back and told to go pick it up.

## 2018-02-19 NOTE — TELEPHONE ENCOUNTER
Pt said Dr Givens told her to call in for  A Tamaflu RX if she was becoming symptomatic.  Pt has been around several people who had symptoms.  Now she has fatigue, Fever(no recorded temperature, cough, and runny nose.  Would like it sent to [facility] in [place].

## 2018-02-23 ENCOUNTER — OFFICE VISIT (OUTPATIENT)
Dept: FAMILY MEDICINE | Facility: CLINIC | Age: 28
End: 2018-02-23
Payer: COMMERCIAL

## 2018-02-23 VITALS
RESPIRATION RATE: 16 BRPM | HEART RATE: 73 BPM | TEMPERATURE: 98.8 F | WEIGHT: 216 LBS | DIASTOLIC BLOOD PRESSURE: 78 MMHG | OXYGEN SATURATION: 97 % | SYSTOLIC BLOOD PRESSURE: 120 MMHG | BODY MASS INDEX: 34.86 KG/M2

## 2018-02-23 DIAGNOSIS — H66.002 ACUTE SUPPURATIVE OTITIS MEDIA OF LEFT EAR WITHOUT SPONTANEOUS RUPTURE OF TYMPANIC MEMBRANE, RECURRENCE NOT SPECIFIED: Primary | ICD-10-CM

## 2018-02-23 PROCEDURE — 99213 OFFICE O/P EST LOW 20 MIN: CPT | Performed by: NURSE PRACTITIONER

## 2018-02-23 RX ORDER — AMOXICILLIN 875 MG
875 TABLET ORAL 2 TIMES DAILY
Qty: 20 TABLET | Refills: 0 | Status: SHIPPED | OUTPATIENT
Start: 2018-02-23 | End: 2018-03-15

## 2018-02-23 NOTE — PATIENT INSTRUCTIONS
Otitis Media (Middle-Ear Infection) in Adults  Otitis media is another name for a middle-ear infection. It means an infection behind your eardrum. This kind of ear infection can happen after any condition that keeps fluid from draining from the middle ear. These conditions include allergies, a cold, a sore throat, or a respiratory infection.  Middle-ear infections are common in children, but they can also happen in adults. An ear infection in an adult may mean a more serious problem than in a child. So you may need additional tests. If you have an ear infection, you should see your health care provider for treatment.  What are the types of middle-ear infections?  Infections can affect the middle ear in several ways. They are:    Acute otitis media. This middle-ear infection occurs suddenly. It causes swelling and redness. Fluid and mucus become trapped inside the ear. You can have a fever and ear pain.    Otitis media with effusion. Fluid (effusion) and mucus build up in the middle ear after the infection goes away. You may feel like your middle ear is full. This can continue for months and may affect your hearing.    Chronic otitis media with effusion. Fluid (effusion) remains in the middle ear for a long time. Or it builds up again and again, even though there is no infection. This type of middle-ear infection may be hard to treat. It may also affect your hearing.  Who is more likely to get a middle-ear infection?  You are more likely to get an ear infection if you:    Smoke or are around someone who smokes    Have seasonal or year-round allergy symptoms    Have a cold or other upper respiratory infection  What causes a middle-ear infection?  The middle ear connects to the throat by a canal called the eustachian tube. This tube helps even out the pressure between the outer ear and the inner ear. A cold or allergy can irritate the tube or cause the area around it to swell. This can keep fluid from draining from  the middle ear. The fluid builds up behind the eardrum. Bacteria and viruses can grow in this fluid. The bacteria and viruses cause the middle-ear infection.  What are the symptoms of a middle-ear infection?  Common symptoms of a middle-ear infection in adults are:    Pain in 1 or both ears    Drainage from the ear    Muffled hearing    Sore throat   You may also have a fever. Rarely, your balance can be affected.  These symptoms may be the same as for other conditions. It s important to talk with your health care provider if you think you have a middle-ear infection. If you have a high fever, severe pain behind your ear, or paralysis in your face, see your provider as soon as you can.  How is a middle-ear infection diagnosed?  Your health care provider will take a medical history and do a physical exam. He or she will look at the outer ear and eardrum with an otoscope. The otoscope is a lighted tool that lets your provider see inside the ear. A pneumatic otoscope blows a puff of air into the ear to check how well your eardrum moves. If you eardrum doesn t move well, it may mean you have fluid behind it.  Your provider may also do a test called tympanometry. This test tells how well the middle ear is working. It can find any changes in pressure in the middle ear. Your provider may test your hearing with a tuning fork.  How is a middle-ear infection treated?  A middle-ear infection may be treated with:    Antibiotics, taken by mouth or as ear drops    Medication for pain    Decongestants, antihistamines, or nasal steroids  Your health care provider may also have you try autoinsufflation. This helps adjust the air pressure in your ear. For this, you pinch your nose and gently exhale. This forces air back through the eustachian tube.  The exact treatment for your ear infection will depend on the type of infection you have. In general, if your symptoms don t get better in 48 to 72 hours, contact your health care  provider.  Middle-ear infections can cause long-term problems if not treated. They can lead to:    Infection in other parts of the head    Permanent hearing loss    Paralysis of a nerve in your face  If you have a middle-ear infection that doesn t get better, you may need to see an ear, nose, and throat specialist (otolaryngologist). You may need a CT scan or MRI to check for head and neck cancer.  Ear tubes  Sometimes fluid stays in the middle ear even after you take antibiotics and the infection goes away. In this case, your health care provider may suggest that a small tube be placed in your ear. The tube is put at the opening of the eardrum. The tube keeps fluid from building up and relieves pressure in the middle ear. It can also help you hear better. This surgery is called myringotomy. It is not often done in adults.  The tubes usually fall out on their own after 6 months to a year.    6610-1839 [employer]. [address] [place] [address], [place] [address]. All rights reserved. This information is not intended as a substitute for professional medical care. Always follow your healthcare professional's instructions.

## 2018-02-23 NOTE — PROGRESS NOTES
SUBJECTIVE:   Chhaya Thurman is a 27 year old female who presents to clinic today for the following health issues:    ENT Symptoms             Symptoms: cc Present Absent Comment   Fever/Chills       Fatigue  x     Muscle Aches       Eye Irritation       Sneezing       Nasal Lacho/Drg  x  Congestion in morning    Sinus Pressure/Pain       Loss of smell       Dental pain       Sore Throat       Swollen Glands       Ear Pain/Fullness x   Left ear pain, unable to hear out of it   Cough x   Sometimes productive    Wheeze  x  In the morning    Chest Pain       Shortness of breath  x  Little    Rash       Other         Symptom duration:  cough- week, ear this morning    Symptom severity:  worse    Treatments tried:  Tylenol and benadryl and cough drops    Contacts:  none          Problem list and histories reviewed & adjusted, as indicated.  Additional history: as documented    Patient Active Problem List   Diagnosis     Recurrent pregnancy loss in pregnant patient in first trimester, antepartum     Endometrium, polyp     Prenatal care, first pregnancy     Past Surgical History:   Procedure Laterality Date     DILATION AND CURETTAGE, OPERATIVE HYSTEROSCOPY, COMBINED N/A 8/30/2017    Procedure: COMBINED DILATION AND CURETTAGE, OPERATIVE HYSTEROSCOPY;  Hysteroscopy with dilation and curettage, polypectomy;  Surgeon: Mable Hanson MD;  Location: [facility]     ENT SURGERY  20 years ago    tubes in three times     MOUTH SURGERY      wisdom teeth -age 16     TONSILLECTOMY  2/16       Social History   Substance Use Topics     Smoking status: Never Smoker     Smokeless tobacco: Never Used     Alcohol use 0.0 oz/week      Comment: occas-quit with pregnancy     Family History   Problem Relation Age of Onset     Other Cancer Maternal Grandfather      skin     Hyperlipidemia Mother      Unknown/Adopted Father      unknown history     Stomach Cancer Maternal Grandmother      DIABETES No family hx of          Current Outpatient  Prescriptions   Medication Sig Dispense Refill     Acetaminophen (TYLENOL PO)        DiphenhydrAMINE HCl (BENADRYL PO)        Prenatal Vit-Fe Fumarate-FA (PRENATAL MULTIVITAMIN  PLUS IRON) 27-0.8 MG TABS per tablet Take 1 tablet by mouth daily       oseltamivir (TAMIFLU) 75 MG capsule Take 1 capsule (75 mg) by mouth daily (Patient not taking: Reported on 2/23/2018) 10 capsule 0     Allergies   Allergen Reactions     Imitrex [Sumatriptan]        Reviewed and updated as needed this visit by clinical staff       Reviewed and updated as needed this visit by Provider         ROS:  Constitutional, HEENT, cardiovascular, pulmonary, gi and gu systems are negative, except as otherwise noted.    OBJECTIVE:     /78 (BP Location: Right arm, Cuff Size: Adult Large)  Pulse 73  Temp 98.8  F (37.1  C) (Tympanic)  Resp 16  Wt 216 lb (98 kg)  LMP 09/16/2017  SpO2 97%  BMI 34.86 kg/m2  Body mass index is 34.86 kg/(m^2).   GENERAL: healthy, alert and no distress  EYES: Eyes grossly normal to inspection, PERRL and conjunctivae and sclerae normal  HENT: ear canals and TM's normal, nose and mouth without ulcers or lesions  HENT: left ear: erythematous  NECK: no adenopathy, no asymmetry, masses, or scars and thyroid normal to palpation  RESP: lungs clear to auscultation - no rales, rhonchi or wheezes  BREAST: normal without masses, tenderness or nipple discharge and no palpable axillary masses or adenopathy  CV: regular rate and rhythm, normal S1 S2, no S3 or S4, no murmur, click or rub, no peripheral edema and peripheral pulses strong  ABDOMEN: soft, nontender, no hepatosplenomegaly, no masses and bowel sounds normal  MS: no gross musculoskeletal defects noted, no edema  SKIN: no suspicious lesions or rashes  NEURO: Normal strength and tone, mentation intact and speech normal  PSYCH: mentation appears normal, affect normal/bright        ASSESSMENT:       ICD-10-CM    1. Acute suppurative otitis media of left ear without  spontaneous rupture of tympanic membrane, recurrence not specified H66.002 amoxicillin (AMOXIL) 875 MG tablet         PLAN:     Patient Instructions     Otitis Media (Middle-Ear Infection) in Adults  Otitis media is another name for a middle-ear infection. It means an infection behind your eardrum. This kind of ear infection can happen after any condition that keeps fluid from draining from the middle ear. These conditions include allergies, a cold, a sore throat, or a respiratory infection.  Middle-ear infections are common in children, but they can also happen in adults. An ear infection in an adult may mean a more serious problem than in a child. So you may need additional tests. If you have an ear infection, you should see your health care provider for treatment.  What are the types of middle-ear infections?  Infections can affect the middle ear in several ways. They are:    Acute otitis media. This middle-ear infection occurs suddenly. It causes swelling and redness. Fluid and mucus become trapped inside the ear. You can have a fever and ear pain.    Otitis media with effusion. Fluid (effusion) and mucus build up in the middle ear after the infection goes away. You may feel like your middle ear is full. This can continue for months and may affect your hearing.    Chronic otitis media with effusion. Fluid (effusion) remains in the middle ear for a long time. Or it builds up again and again, even though there is no infection. This type of middle-ear infection may be hard to treat. It may also affect your hearing.  Who is more likely to get a middle-ear infection?  You are more likely to get an ear infection if you:    Smoke or are around someone who smokes    Have seasonal or year-round allergy symptoms    Have a cold or other upper respiratory infection  What causes a middle-ear infection?  The middle ear connects to the throat by a canal called the eustachian tube. This tube helps even out the pressure between  the outer ear and the inner ear. A cold or allergy can irritate the tube or cause the area around it to swell. This can keep fluid from draining from the middle ear. The fluid builds up behind the eardrum. Bacteria and viruses can grow in this fluid. The bacteria and viruses cause the middle-ear infection.  What are the symptoms of a middle-ear infection?  Common symptoms of a middle-ear infection in adults are:    Pain in 1 or both ears    Drainage from the ear    Muffled hearing    Sore throat   You may also have a fever. Rarely, your balance can be affected.  These symptoms may be the same as for other conditions. It s important to talk with your health care provider if you think you have a middle-ear infection. If you have a high fever, severe pain behind your ear, or paralysis in your face, see your provider as soon as you can.  How is a middle-ear infection diagnosed?  Your health care provider will take a medical history and do a physical exam. He or she will look at the outer ear and eardrum with an otoscope. The otoscope is a lighted tool that lets your provider see inside the ear. A pneumatic otoscope blows a puff of air into the ear to check how well your eardrum moves. If you eardrum doesn t move well, it may mean you have fluid behind it.  Your provider may also do a test called tympanometry. This test tells how well the middle ear is working. It can find any changes in pressure in the middle ear. Your provider may test your hearing with a tuning fork.  How is a middle-ear infection treated?  A middle-ear infection may be treated with:    Antibiotics, taken by mouth or as ear drops    Medication for pain    Decongestants, antihistamines, or nasal steroids  Your health care provider may also have you try autoinsufflation. This helps adjust the air pressure in your ear. For this, you pinch your nose and gently exhale. This forces air back through the eustachian tube.  The exact treatment for your ear  infection will depend on the type of infection you have. In general, if your symptoms don t get better in 48 to 72 hours, contact your health care provider.  Middle-ear infections can cause long-term problems if not treated. They can lead to:    Infection in other parts of the head    Permanent hearing loss    Paralysis of a nerve in your face  If you have a middle-ear infection that doesn t get better, you may need to see an ear, nose, and throat specialist (otolaryngologist). You may need a CT scan or MRI to check for head and neck cancer.  Ear tubes  Sometimes fluid stays in the middle ear even after you take antibiotics and the infection goes away. In this case, your health care provider may suggest that a small tube be placed in your ear. The tube is put at the opening of the eardrum. The tube keeps fluid from building up and relieves pressure in the middle ear. It can also help you hear better. This surgery is called myringotomy. It is not often done in adults.  The tubes usually fall out on their own after 6 months to a year.    4176-7399 [employer]. [address] [place] [address]. All rights reserved. This information is not intended as a substitute for professional medical care. Always follow your healthcare professional's instructions.            SUSSY Olivo [facility]

## 2018-02-23 NOTE — MR AVS SNAPSHOT
After Visit Summary   2/23/2018    Chhaya Thurman    MRN: [id number]           Patient Information     Date Of Birth          1990        Visit Information        Provider Department      2/23/2018 4:20 PM Joellen Tang APRN CNP [facility]        Today's Diagnoses     Acute suppurative otitis media of left ear without spontaneous rupture of tympanic membrane, recurrence not specified    -  1      Care Instructions      Otitis Media (Middle-Ear Infection) in Adults  Otitis media is another name for a middle-ear infection. It means an infection behind your eardrum. This kind of ear infection can happen after any condition that keeps fluid from draining from the middle ear. These conditions include allergies, a cold, a sore throat, or a respiratory infection.  Middle-ear infections are common in children, but they can also happen in adults. An ear infection in an adult may mean a more serious problem than in a child. So you may need additional tests. If you have an ear infection, you should see your health care provider for treatment.  What are the types of middle-ear infections?  Infections can affect the middle ear in several ways. They are:    Acute otitis media. This middle-ear infection occurs suddenly. It causes swelling and redness. Fluid and mucus become trapped inside the ear. You can have a fever and ear pain.    Otitis media with effusion. Fluid (effusion) and mucus build up in the middle ear after the infection goes away. You may feel like your middle ear is full. This can continue for months and may affect your hearing.    Chronic otitis media with effusion. Fluid (effusion) remains in the middle ear for a long time. Or it builds up again and again, even though there is no infection. This type of middle-ear infection may be hard to treat. It may also affect your hearing.  Who is more likely to get a middle-ear infection?  You are more likely to get an ear  infection if you:    Smoke or are around someone who smokes    Have seasonal or year-round allergy symptoms    Have a cold or other upper respiratory infection  What causes a middle-ear infection?  The middle ear connects to the throat by a canal called the eustachian tube. This tube helps even out the pressure between the outer ear and the inner ear. A cold or allergy can irritate the tube or cause the area around it to swell. This can keep fluid from draining from the middle ear. The fluid builds up behind the eardrum. Bacteria and viruses can grow in this fluid. The bacteria and viruses cause the middle-ear infection.  What are the symptoms of a middle-ear infection?  Common symptoms of a middle-ear infection in adults are:    Pain in 1 or both ears    Drainage from the ear    Muffled hearing    Sore throat   You may also have a fever. Rarely, your balance can be affected.  These symptoms may be the same as for other conditions. It s important to talk with your health care provider if you think you have a middle-ear infection. If you have a high fever, severe pain behind your ear, or paralysis in your face, see your provider as soon as you can.  How is a middle-ear infection diagnosed?  Your health care provider will take a medical history and do a physical exam. He or she will look at the outer ear and eardrum with an otoscope. The otoscope is a lighted tool that lets your provider see inside the ear. A pneumatic otoscope blows a puff of air into the ear to check how well your eardrum moves. If you eardrum doesn t move well, it may mean you have fluid behind it.  Your provider may also do a test called tympanometry. This test tells how well the middle ear is working. It can find any changes in pressure in the middle ear. Your provider may test your hearing with a tuning fork.  How is a middle-ear infection treated?  A middle-ear infection may be treated with:    Antibiotics, taken by mouth or as ear  drops    Medication for pain    Decongestants, antihistamines, or nasal steroids  Your health care provider may also have you try autoinsufflation. This helps adjust the air pressure in your ear. For this, you pinch your nose and gently exhale. This forces air back through the eustachian tube.  The exact treatment for your ear infection will depend on the type of infection you have. In general, if your symptoms don t get better in 48 to 72 hours, contact your health care provider.  Middle-ear infections can cause long-term problems if not treated. They can lead to:    Infection in other parts of the head    Permanent hearing loss    Paralysis of a nerve in your face  If you have a middle-ear infection that doesn t get better, you may need to see an ear, nose, and throat specialist (otolaryngologist). You may need a CT scan or MRI to check for head and neck cancer.  Ear tubes  Sometimes fluid stays in the middle ear even after you take antibiotics and the infection goes away. In this case, your health care provider may suggest that a small tube be placed in your ear. The tube is put at the opening of the eardrum. The tube keeps fluid from building up and relieves pressure in the middle ear. It can also help you hear better. This surgery is called myringotomy. It is not often done in adults.  The tubes usually fall out on their own after 6 months to a year.    4784-1239 [employer]. [address] [place] [address]. All rights reserved. This information is not intended as a substitute for professional medical care. Always follow your healthcare professional's instructions.                Follow-ups after your visit        Your next 10 appointments already scheduled     Mar 13, 2018  3:15 PM CDT   (Arrive by 3:00 PM)   US OB SINGLE FOLLOW UP REPEAT with [facility] ([facility])    [address] [facility] [address] [phone]           Please  bring a list of your medicines (including vitamins, minerals and over-the-counter drugs). Also, tell your doctor about any allergies you may have. Wear comfortable clothes and leave your valuables at home.  If you re less than 20 weeks drink four 8-ounce glasses of fluid an hour before your exam. If you need to empty your bladder before your exam, try to release only a little urine. Then, drink another glass of fluid.  You may have up to two family members in the exam room. If you bring a small child, an adult must be there to care for him or her.  Please call the Imaging Department at your exam site with any questions.            Mar 15, 2018  9:00 AM CDT   ESTABLISHED PRENAT[facility] with Angeal Pascual MD   [facility]    [address] [address] [phone]              Who to contact     If you have questions or need follow up information about today's clinic visit or your schedule please contact [facility] directly at [phone].  Normal or non-critical lab and imaging results will be communicated to you by [employer], letter or phone within 4 business days after the clinic has received the results. If you do not hear from us within 7 days, please contact the clinic through [employer]t or phone. If you have a critical or abnormal lab result, we will notify you by phone as soon as possible.  Submit refill requests through SendMe or call your pharmacy and they will forward the refill request to us. Please allow 3 business days for your refill to be completed.          Additional Information About Your Visit        SendMe Information     SendMe gives you secure access to your electronic health record. If you see a primary care provider, you can also send messages to your care team and make appointments. If you have questions, please call your primary care clinic.  If you do not have a primary care provider, please call  [phone] and they will assist you.        Care EveryWhere ID     This is your Care EveryWhere ID. This could be used by other organizations to access your [facility] medical records  [id number]        Your Vitals Were     Pulse Temperature Respirations Last Period Pulse Oximetry BMI (Body Mass Index)    73 98.8  F (37.1  C) (Tympanic) 16 09/16/2017 97% 34.86 kg/m2       Blood Pressure from Last 3 Encounters:   02/23/18 120/78   02/07/18 122/77   01/04/18 132/78    Weight from Last 3 Encounters:   02/23/18 216 lb (98 kg)   02/07/18 206 lb 9.6 oz (93.7 kg)   01/04/18 197 lb (89.4 kg)              Today, you had the following     No orders found for display         Today's Medication Changes          These changes are accurate as of 2/23/18  4:34 PM.  If you have any questions, ask your nurse or doctor.               Start taking these medicines.        Dose/Directions    amoxicillin 875 MG tablet   Commonly known as:  AMOXIL   Used for:  Acute suppurative otitis media of left ear without spontaneous rupture of tympanic membrane, recurrence not specified   Started by:  Joellen Tang APRN CNP        Dose:  875 mg   Take 1 tablet (875 mg) by mouth 2 times daily   Quantity:  20 tablet   Refills:  0            Where to get your medicines      These medications were sent to [facility][id number] [address]  [address] [place] [address]    Hours:  Closed 10-16-08 business to [facility] Phone:  [phone]     amoxicillin 875 MG tablet                Primary Care Provider Office Phone # Fax #    Agnes Stanton PA-C [phone] [address] [facility] [address]        Equal Access to Services     JOHANA WHITE AH: Cam Nair, sánchez michel, malia kamica pierce. Geovanna [facility] [phone].    ATENCIÓN: Si habla español, tiene a garcía disposición servicios gratuitos de asistencia  lingüística. Winifred al [phone].    We comply with applicable federal civil rights laws and [place] laws. We do not discriminate on the basis of race, color, national origin, age, disability, sex, sexual orientation, or gender identity.            Thank you!     Thank you for choosing [facility]  for your care. Our goal is always to provide you with excellent care. Hearing back from our patients is one way we can continue to improve our services. Please take a few minutes to complete the written survey that you may receive in the mail after your visit with us. Thank you!             Your Updated Medication List - Protect others around you: Learn how to safely use, store and throw away your medicines at www.disposemymeds.org.          This list is accurate as of 2/23/18  4:34 PM.  Always use your most recent med list.                   Brand Name Dispense Instructions for use Diagnosis    amoxicillin 875 MG tablet    AMOXIL    20 tablet    Take 1 tablet (875 mg) by mouth 2 times daily    Acute suppurative otitis media of left ear without spontaneous rupture of tympanic membrane, recurrence not specified       BENADRYL PO           oseltamivir 75 MG capsule    TAMIFLU    10 capsule    Take 1 capsule (75 mg) by mouth daily    Exposure to influenza       prenatal multivitamin plus iron 27-0.8 MG Tabs per tablet      Take 1 tablet by mouth daily        TYLENOL PO

## 2018-02-23 NOTE — NURSING NOTE
"Chief Complaint   Patient presents with     Cough     *pregnant     Otalgia       Initial /78 (BP Location: Right arm, Cuff Size: Adult Large)  Pulse 73  Temp 98.8  F (37.1  C) (Tympanic)  Resp 16  Wt 216 lb (98 kg)  LMP 09/16/2017  SpO2 97%  BMI 34.86 kg/m2 Estimated body mass index is 34.86 kg/(m^2) as calculated from the following:    Height as of 2/7/18: 5' 6\" (1.676 m).    Weight as of this encounter: 216 lb (98 kg).      Health Maintenance that is potentially due pending provider review:  NONE    n/a    Is there anyone who you would like to be able to receive your results? Not Applicable  If yes have patient fill out ABE Khan M.A.      "

## 2018-03-12 ENCOUNTER — HOSPITAL ENCOUNTER (OUTPATIENT)
Dept: ULTRASOUND IMAGING | Facility: CLINIC | Age: 28
Discharge: HOME | End: 2018-03-12
Attending: OBSTETRICS & GYNECOLOGY | Admitting: OBSTETRICS & GYNECOLOGY
Payer: COMMERCIAL

## 2018-03-12 DIAGNOSIS — Z34.02 ENCOUNTER FOR PRENATAL CARE IN SECOND TRIMESTER OF FIRST PREGNANCY: ICD-10-CM

## 2018-03-12 PROCEDURE — 76816 OB US FOLLOW-UP PER FETUS: CPT

## 2018-03-15 ENCOUNTER — PRENATAL OFFICE VISIT (OUTPATIENT)
Dept: OBGYN | Facility: CLINIC | Age: 28
End: 2018-03-15
Payer: COMMERCIAL

## 2018-03-15 VITALS
BODY MASS INDEX: 34.38 KG/M2 | HEART RATE: 78 BPM | TEMPERATURE: 97.8 F | SYSTOLIC BLOOD PRESSURE: 118 MMHG | WEIGHT: 213 LBS | DIASTOLIC BLOOD PRESSURE: 68 MMHG

## 2018-03-15 DIAGNOSIS — Z34.02 ENCOUNTER FOR PRENATAL CARE IN SECOND TRIMESTER OF FIRST PREGNANCY: ICD-10-CM

## 2018-03-15 DIAGNOSIS — Z34.02 ENCOUNTER FOR PRENATAL CARE IN SECOND TRIMESTER OF FIRST PREGNANCY: Primary | ICD-10-CM

## 2018-03-15 PROCEDURE — 99207 ZZC PRENATAL VISIT: CPT | Performed by: OBSTETRICS & GYNECOLOGY

## 2018-03-15 NOTE — MR AVS SNAPSHOT
After Visit Summary   3/15/2018    Chhaya Thurman    MRN: [id number]           Patient Information     Date Of Birth          1990        Visit Information        Provider Department      3/15/2018 9:00 AM Angela Pascual MD [facility]        Today's Diagnoses     Encounter for prenatal care in second trimester of first pregnancy    -  1       Follow-ups after your visit        Your next 10 appointments already scheduled     Apr 06, 2018  3:00 PM CDT   ESTABLISHED PRENATAL with Angela Pascual MD   [facility]    [address] [address]   [phone]            Apr 20, 2018  3:00 PM CDT   ESTABLISHED PRENATAL with Angela Pascual MD   [facility]    [address] [address]   [phone]            May 04, 2018  3:00 PM CDT   ESTABLISHED PRENATAL with Marylou Mendez MD   [facility]    [address] [address]   [phone]              Future tests that were ordered for you today     Open Future Orders        Priority Expected Expires Ordered    OB hemoglobin Routine  4/15/2018 3/15/2018    Glucose tolerance gest screen 1 hour Routine  4/15/2018 3/15/2018    Anti Treponema Routine  4/15/2018 3/15/2018            Who to contact     If you have questions or need follow up information about today's clinic visit or your schedule please contact [facility] directly at [phone].  Normal or non-critical lab and imaging results will be communicated to you by MyChart, letter or phone within 4 business days after the clinic has received the results. If you do not hear from us within 7 days, please contact the clinic through MyChart or phone. If you have a critical or abnormal lab result, we will notify you by phone as soon as  possible.  Submit refill requests through [employer] or call your pharmacy and they will forward the refill request to us. Please allow 3 business days for your refill to be completed.          Additional Information About Your Visit        [employer] Information     [employer] gives you secure access to your electronic health record. If you see a primary care provider, you can also send messages to your care team and make appointments. If you have questions, please call your primary care clinic.  If you do not have a primary care provider, please call [phone] and they will assist you.        Care EveryWhere ID     This is your Care EveryWhere ID. This could be used by other organizations to access your [place] medical records  [id number]        Your Vitals Were     Pulse Temperature Last Period Breastfeeding? BMI (Body Mass Index)       78 97.8  F (36.6  C) (Tympanic) 09/16/2017 No 34.38 kg/m2        Blood Pressure from Last 3 Encounters:   03/15/18 118/68   02/23/18 120/78   02/07/18 122/77    Weight from Last 3 Encounters:   03/15/18 213 lb (96.6 kg)   02/23/18 216 lb (98 kg)   02/07/18 206 lb 9.6 oz (93.7 kg)               Primary Care Provider Office Phone # Fax #    Agnes Stanton PA-C [phone] [address] [place], [place] [address]        Equal Access to Services     JOHANA WHITE : Hadii bayron ku hadasho Soradhaali, waaxda luqadaha, qaybta kaalmada sherin, mica deluca. So [facility] [phone].    ATENCIÓN: Si habla español, tiene a garcía disposición servicios gratuitos de asistencia lingüística. Winifred al [phone].    We comply with applicable federal civil rights laws and [place] laws. We do not discriminate on the basis of race, color, national origin, age, disability, sex, sexual orientation, or gender identity.            Thank you!     Thank you for choosing [facility]  for your care. Our goal is always to provide you with excellent  care. Hearing back from our patients is one way we can continue to improve our services. Please take a few minutes to complete the written survey that you may receive in the mail after your visit with us. Thank you!             Your Updated Medication List - Protect others around you: Learn how to safely use, store and throw away your medicines at www.disposemymeds.org.          This list is accurate as of 3/15/18  9:13 AM.  Always use your most recent med list.                   Brand Name Dispense Instructions for use Diagnosis    prenatal multivitamin plus iron 27-0.8 MG Tabs per tablet      Take 1 tablet by mouth daily

## 2018-03-15 NOTE — NURSING NOTE
"Initial /68 (BP Location: Left arm, Patient Position: Chair, Cuff Size: Adult Large)  Pulse 78  Temp 97.8  F (36.6  C) (Tympanic)  Wt 213 lb (96.6 kg)  LMP 09/16/2017  Breastfeeding? No  BMI 34.38 kg/m2 Estimated body mass index is 34.38 kg/(m^2) as calculated from the following:    Height as of 2/7/18: 5' 6\" (1.676 m).    Weight as of this encounter: 213 lb (96.6 kg). .    Alicja Guerin    "

## 2018-03-15 NOTE — PROGRESS NOTES
Doing well.   Had a hard boiled egg before her GCT testing; states she was not told that she had to fasting; she is concerned that she will fail the test and have to do the 3 hour test.   Still working out and wonders if that's okay.   Denies VB, ctx, LOF. +FM  /68 (BP Location: Left arm, Patient Position: Chair, Cuff Size: Adult Large)  Pulse 78  Temp 97.8  F (36.6  C) (Tympanic)  Wt 213 lb (96.6 kg)  LMP 2017  Breastfeeding? No  BMI 34.38 kg/m2  General Appearance: NAD  Abdomen: Gravid, NT  Refer to flow sheet above.   A/P: 27 year old  at 25w6d  -- reviewed normal follow-up anatomy ultrasound, 4 chamber heart views well seen and normal  -- permit patient to defer testing today given that she was not fasting before testing; okay with patient repeat testing on another day along with rest of the blood work OB Hgb, syphilis  -- PTL precautions reviewed  RTC in 4 weeks    Angela Pascual MD  [facility]

## 2018-03-20 DIAGNOSIS — Z34.02 ENCOUNTER FOR PRENATAL CARE IN SECOND TRIMESTER OF FIRST PREGNANCY: ICD-10-CM

## 2018-03-20 LAB
GLUCOSE 1H P 50 G GLC PO SERPL-MCNC: 99 MG/DL (ref 60–129)
HGB BLD-MCNC: 12.3 G/DL (ref 11.7–15.7)
T PALLIDUM IGG+IGM SER QL: NEGATIVE

## 2018-03-20 PROCEDURE — 00000218 ZZHCL STATISTIC OBHBG - HEMOGLOBIN: Performed by: OBSTETRICS & GYNECOLOGY

## 2018-03-20 PROCEDURE — 36415 COLL VENOUS BLD VENIPUNCTURE: CPT | Performed by: OBSTETRICS & GYNECOLOGY

## 2018-03-20 PROCEDURE — 86780 TREPONEMA PALLIDUM: CPT | Performed by: OBSTETRICS & GYNECOLOGY

## 2018-03-20 PROCEDURE — 82950 GLUCOSE TEST: CPT | Performed by: OBSTETRICS & GYNECOLOGY

## 2018-03-20 NOTE — PROGRESS NOTES
Chhaya  Your results are normal.  If you have any other questions or concerns, please followup in the office or contact us on mychart or evisit.    We still need another US to evaluate the heart  Please notify her and order  Thanks    Marylou Mendez

## 2018-03-21 DIAGNOSIS — Z34.02 ENCOUNTER FOR PRENATAL CARE IN SECOND TRIMESTER OF FIRST PREGNANCY: Primary | ICD-10-CM

## 2018-03-21 NOTE — ADDENDUM NOTE
Encounter addended by: Joellen Mora RN on: 3/21/2018  8:22 AM<BR>     Actions taken: Results reviewed in IB

## 2018-03-23 NOTE — PROGRESS NOTES
Normal glucose test.   Normal hemoglobin.   Negative syphilis.   Will review at next follow-up visit.     Angela Pascual MD  [facility]

## 2018-04-03 ENCOUNTER — HOSPITAL ENCOUNTER (OUTPATIENT)
Dept: ULTRASOUND IMAGING | Facility: CLINIC | Age: 28
Discharge: HOME | End: 2018-04-03
Attending: OBSTETRICS & GYNECOLOGY | Admitting: OBSTETRICS & GYNECOLOGY
Payer: COMMERCIAL

## 2018-04-03 DIAGNOSIS — Z34.02 ENCOUNTER FOR PRENATAL CARE IN SECOND TRIMESTER OF FIRST PREGNANCY: ICD-10-CM

## 2018-04-03 PROCEDURE — 76816 OB US FOLLOW-UP PER FETUS: CPT

## 2018-04-04 ENCOUNTER — HOSPITAL ENCOUNTER (OUTPATIENT)
Dept: CARDIOLOGY | Facility: CLINIC | Age: 28
Discharge: HOME | End: 2018-04-04
Attending: OBSTETRICS & GYNECOLOGY | Admitting: OBSTETRICS & GYNECOLOGY
Payer: COMMERCIAL

## 2018-04-04 ENCOUNTER — OFFICE VISIT (OUTPATIENT)
Dept: MATERNAL FETAL MEDICINE | Facility: CLINIC | Age: 28
End: 2018-04-04
Attending: OBSTETRICS & GYNECOLOGY
Payer: COMMERCIAL

## 2018-04-04 ENCOUNTER — PRE VISIT (OUTPATIENT)
Dept: MATERNAL FETAL MEDICINE | Facility: CLINIC | Age: 28
End: 2018-04-04

## 2018-04-04 ENCOUNTER — HOSPITAL ENCOUNTER (OUTPATIENT)
Dept: ULTRASOUND IMAGING | Facility: CLINIC | Age: 28
Discharge: HOME | End: 2018-04-04
Attending: OBSTETRICS & GYNECOLOGY | Admitting: OBSTETRICS & GYNECOLOGY
Payer: COMMERCIAL

## 2018-04-04 ENCOUNTER — TELEPHONE (OUTPATIENT)
Dept: OBGYN | Facility: CLINIC | Age: 28
End: 2018-04-04

## 2018-04-04 DIAGNOSIS — O28.3 ABNORMAL PRENATAL ULTRASOUND: Primary | ICD-10-CM

## 2018-04-04 DIAGNOSIS — O35.BXX0 ANOMALY OF HEART OF FETUS AFFECTING PREGNANCY, ANTEPARTUM, SINGLE OR UNSPECIFIED FETUS: ICD-10-CM

## 2018-04-04 DIAGNOSIS — O35.BXX1 PREGNANCY COMPLICATED BY FETAL CONGENITAL HEART DISEASE, FETUS 1: Primary | ICD-10-CM

## 2018-04-04 DIAGNOSIS — O26.90 PREGNANCY RELATED CONDITION, ANTEPARTUM: ICD-10-CM

## 2018-04-04 PROCEDURE — 76811 OB US DETAILED SNGL FETUS: CPT

## 2018-04-04 PROCEDURE — 40000072 ZZH STATISTIC GENETIC COUNSELING, < 16 MIN: Mod: ZF | Performed by: GENETIC COUNSELOR, MS

## 2018-04-04 PROCEDURE — 93325 DOPPLER ECHO COLOR FLOW MAPG: CPT

## 2018-04-04 PROCEDURE — 36415 COLL VENOUS BLD VENIPUNCTURE: CPT | Performed by: OBSTETRICS & GYNECOLOGY

## 2018-04-04 PROCEDURE — 40000791 ZZHCL STATISTIC VERIFI PRENATAL TRISOMY 21,18,13: Performed by: OBSTETRICS & GYNECOLOGY

## 2018-04-04 NOTE — CONSULTS
Fetal Cardiology Consultation    Patient:  Chhaya Thurman MRN:  [id number]   YOB: 1990 Age:  27 year old   Date of Visit:  2018 PCP:  Agnes Stanton PA-C   ALVINO: 18 EGA: 28+5weeks     Dear Dr. Mendez:    I had the pleasure of seeing Chhaya Thurman at the [facility] in [place] on 2018 in consultation for fetal echocardiography results. She presented today accompanied by her partner. As you know, she is a 27 year old female with suspected fetal cardiac anomaly on obstetrical ultrasound.    The fetal echocardiogram was abnormal: Normal abdominal and thoracic situs. Complete balanced atrioventricular septal defect with large primum ASD and moderate inlet VSD. Trivial common AV valve regurgitation. Normal biventricular systolic function with mild RV dilation; the left ventricle forms the cardiac apex. Unobstructed outflows. No effusion.     I reviewed and interpreted the fetal echocardiogram today. Using pictures I discussed the anatomy, physiology, expected fetal course, and need for post- confirmation. The fetal cardiac anatomy is not expected to be dependent on the ductus arteriosus after birth for pulmonary or systemic blood flow. The expected post- intervention will depend on confirmation of these findings with post-yuli imaging, and is likely to include no intervention in the  period, with likely complete surgical repair at several months of life.     I discussed results of the study and visit with Dr. Tirado. A follow-up fetal echocardiogram is recommended in 5-6 weeks. Given the association between this congenital heart defect and aneuploidy including T21, I agree with consideration of additional investigation on this front during pregnancy, as this will likely inform delivery planning.    Thank you for allowing me to participate in Ms. Thurman's care. Please don't hesitate to  contact me or the Fetal Cardiology team at [facility] with any questions or concerns.     I spent a total of 60 minutes face-to-face with Ms. Thurman during today's office visit. Over 50% of this time was spent counseling the patient and/or coordinating care regarding the fetal echcoardiography results.     Noé Garcia  Pediatric Cardiology  [facility]  Phone [phone]

## 2018-04-04 NOTE — TELEPHONE ENCOUNTER
Patient had a repeat US for complete view of the heart and it still showed incomplete and a smaller heart.  Spoke with the patient regarding the results at length.  She was agreeable to having a comprehensive US and fetal echo to ensure no cardiac anomaly.  Questions answered  Referral sent  Marylou Mendez MD

## 2018-04-04 NOTE — MR AVS SNAPSHOT
After Visit Summary   4/4/2018    Chhaya Thurman    MRN: [id number]           Patient Information     Date Of Birth          1990        Visit Information        Provider Department      4/4/2018 3:00 PM Abbie Rico GC [facility] Maternal Fetal Medicine [facility]        Today's Diagnoses     Abnormal prenatal ultrasound    -  1    Anomaly of heart of fetus affecting pregnancy, antepartum, single or unspecified fetus           Follow-ups after your visit        Your next 10 appointments already scheduled     Apr 06, 2018  3:00 PM CDT   ESTABLISHED PRENATAL with [facility]   [facility]    [address] [place] [address]   [phone]            Apr 20, 2018  3:00 PM CDT   ESTABLISHED PRENATAL with Angela Pascual MD   [facility]    [address] [place] [address]   [phone]            May 04, 2018  3:00 PM CDT   ESTABLISHED PRENATAL with Marylou Mendez MD   [facility]    [address] [place] [address]   [phone]              Future tests that were ordered for you today     Open Future Orders        Priority Expected Expires Ordered    Echo Fetal Complete-Peds Cardiology Routine  4/4/2019 4/4/2018    [facility] US Comprehensive Single Routine  2/4/2019 4/4/2018            Who to contact     If you have questions or need follow up information about today's clinic visit or your schedule please contact [facility] MATERNAL FETAL MEDICINE [facility] directly at [phone].  Normal or non-critical lab and imaging results will be communicated to you by MyChart, letter or phone within 4 business days after the clinic has received the results. If you do not hear from us within 7 days, please contact the clinic through MyChart or phone. If you have a critical or abnormal lab result, we  will notify you by phone as soon as possible.  Submit refill requests through [employer] or call your pharmacy and they will forward the refill request to us. Please allow 3 business days for your refill to be completed.          Additional Information About Your Visit        [employer] Information     [employer] gives you secure access to your electronic health record. If you see a primary care provider, you can also send messages to your care team and make appointments. If you have questions, please call your primary care clinic.  If you do not have a primary care provider, please call [phone] and they will assist you.        Care EveryWhere ID     This is your Care EveryWhere ID. This could be used by other organizations to access your [place] medical records  [id number]        Your Vitals Were     Last Period                   09/16/2017            Blood Pressure from Last 3 Encounters:   03/15/18 118/68   02/23/18 120/78   02/07/18 122/77    Weight from Last 3 Encounters:   03/15/18 96.6 kg (213 lb)   02/23/18 98 kg (216 lb)   02/07/18 93.7 kg (206 lb 9.6 oz)              We Performed the Following     [facility] Genetic [facility]        Primary Care Provider Office Phone # Fax #    Agnes Stanton PA-C [phone] [address] [place], [place] [address]        Equal Access to Services     JOHANA WHITE : Hadii bayron talley hadasho Sotimmy, waaxda luqadaha, qaybta kaalmada adeegyada, mica jeter . So [facility] [phone].    ATENCIÓN: Si habla español, tiene a garcía disposición servicios gratuitos de asistencia lingüística. Llame al [phone].    We comply with applicable federal civil rights laws and [place] laws. We do not discriminate on the basis of race, color, national origin, age, disability, sex, sexual orientation, or gender identity.            Thank you!     Thank you for choosing [facility]  for your care. Our goal is always to  provide you with excellent care. Hearing back from our patients is one way we can continue to improve our services. Please take a few minutes to complete the written survey that you may receive in the mail after your visit with us. Thank you!             Your Updated Medication List - Protect others around you: Learn how to safely use, store and throw away your medicines at www.disposemymeds.org.          This list is accurate as of 4/4/18 11:59 PM.  Always use your most recent med list.                   Brand Name Dispense Instructions for use Diagnosis    prenatal multivitamin plus iron 27-0.8 MG Tabs per tablet      Take 1 tablet by mouth daily

## 2018-04-04 NOTE — MR AVS SNAPSHOT
After Visit Summary   4/4/2018    Chhaya Thurman    MRN: [id number]           Patient Information     Date Of Birth          1990        Visit Information        Provider Department      4/4/2018 2:45 PM Jet Tirado MD [facility] Maternal Fetal Medicine [facility]        Today's Diagnoses     Pregnancy complicated by fetal congenital heart disease, fetus 1    -  1       Follow-ups after your visit        Your next 10 appointments already scheduled     Apr 06, 2018  3:00 PM CDT   ESTABLISHED PRENATAL with Angela Pascual MD   [facility]    [address] [place] [address]   [phone]            Apr 20, 2018  3:00 PM CDT   ESTABLISHED PRENATAL with Angela Pascual MD   [facility]    [address] [place] [address]   [phone]            May 04, 2018  3:00 PM CDT   ESTABLISHED PRENATAL with Marylou Mendez MD   [facility]    [address] [place] [address]   [phone]              Future tests that were ordered for you today     Open Future Orders        Priority Expected Expires Ordered    Echo Fetal Complete-Peds Cardiology Routine  4/4/2019 4/4/2018    [facility] Comprehensive Single Routine  2/4/2019 4/4/2018            Who to contact     If you have questions or need follow up information about today's clinic visit or your schedule please contact [facility] MATERNAL FETAL MEDICINE [facility] directly at [phone].  Normal or non-critical lab and imaging results will be communicated to you by MyChart, letter or phone within 4 business days after the clinic has received the results. If you do not hear from us within 7 days, please contact the clinic through MyChart or phone. If you have a critical or abnormal lab result, we will notify you by phone as soon as possible.  Submit  refill requests through [employer] or call your pharmacy and they will forward the refill request to us. Please allow 3 business days for your refill to be completed.          Additional Information About Your Visit        [employer] Information     [employer] gives you secure access to your electronic health record. If you see a primary care provider, you can also send messages to your care team and make appointments. If you have questions, please call your primary care clinic.  If you do not have a primary care provider, please call [phone] and they will assist you.        Care EveryWhere ID     This is your Care EveryWhere ID. This could be used by other organizations to access your [place] medical records  [id number]        Your Vitals Were     Last Period                   09/16/2017            Blood Pressure from Last 3 Encounters:   03/15/18 118/68   02/23/18 120/78   02/07/18 122/77    Weight from Last 3 Encounters:   03/15/18 96.6 kg (213 lb)   02/23/18 98 kg (216 lb)   02/07/18 93.7 kg (206 lb 9.6 oz)              Today, you had the following     No orders found for display       Primary Care Provider Office Phone # Fax #    Agnes Stanton PA-C [phone] [address] [place] [address]        Equal Access to Services     JOHANA WHITE : Hadii bayron summerso Soradhaali, waaxda luqadaha, qaybta kaalmada adeegyada, mica deluca. So [facility] [phone].    ATENCIÓN: Si habla español, tiene a garcía disposición servicios gratuitos de asistencia lingüística. Llame al [phone].    We comply with applicable federal civil rights laws and [place] laws. We do not discriminate on the basis of race, color, national origin, age, disability, sex, sexual orientation, or gender identity.            Thank you!     Thank you for choosing [facility]  for your care. Our goal is always to provide you with excellent care. Hearing back  from our patients is one way we can continue to improve our services. Please take a few minutes to complete the written survey that you may receive in the mail after your visit with us. Thank you!             Your Updated Medication List - Protect others around you: Learn how to safely use, store and throw away your medicines at www.disposemymeds.org.          This list is accurate as of 4/4/18  4:38 PM.  Always use your most recent med list.                   Brand Name Dispense Instructions for use Diagnosis    prenatal multivitamin plus iron 27-0.8 MG Tabs per tablet      Take 1 tablet by mouth daily

## 2018-04-04 NOTE — PROGRESS NOTES
"Please see \"Imaging\" tab under \"Chart Review\" for details of today's US at the [facility].    Jet Tirado MD  Maternal-Fetal Medicine      "

## 2018-04-05 ENCOUNTER — TELEPHONE (OUTPATIENT)
Dept: MATERNAL FETAL MEDICINE | Facility: CLINIC | Age: 28
End: 2018-04-05

## 2018-04-05 DIAGNOSIS — O35.BXX0 ANOMALY OF HEART OF FETUS AFFECTING PREGNANCY, ANTEPARTUM, SINGLE OR UNSPECIFIED FETUS: Primary | ICD-10-CM

## 2018-04-05 DIAGNOSIS — Z34.92 PRENATAL CARE IN SECOND TRIMESTER: Primary | ICD-10-CM

## 2018-04-05 NOTE — TELEPHONE ENCOUNTER
[facility] calling patient to follow up on POC after [facility] visit on 4/4/18.  Appointments scheduled (see appt desk). Patient would like to deliver at [facility] and transfer prenatal care to to [facility].  Will plan [facility] visit on 4/25 with ultrasound.  Appointment calendar emailed to patient with P[facility] contact information as well.      IDONNA Alves RN

## 2018-04-05 NOTE — PROGRESS NOTES
I met with Chhaya today at the request of Dr. Tirado due to the A-V canal defect seen on her comprehensive ultrasound today. Chhaya also had a fetal echo performed by Pediatric Cardiology today. We discussed non-invasive prenatal screening and amniocentesis. Chhaya currently declines invasive testing at this time and would like to proceed with non-invasive prenatal screening via [facility] with [facility] laboratory. She will be informed of these results in approximately 7-10 days.      Time spent: 10 minutes     Julianne Rico MS, [facility]  Maternal Fetal Medicine  [facility]  Ph: [phone]  [email or web address]

## 2018-04-06 ENCOUNTER — PRENATAL OFFICE VISIT (OUTPATIENT)
Dept: OBGYN | Facility: CLINIC | Age: 28
End: 2018-04-06
Payer: COMMERCIAL

## 2018-04-06 VITALS
DIASTOLIC BLOOD PRESSURE: 79 MMHG | TEMPERATURE: 97.7 F | HEART RATE: 80 BPM | BODY MASS INDEX: 35.51 KG/M2 | WEIGHT: 220 LBS | SYSTOLIC BLOOD PRESSURE: 133 MMHG

## 2018-04-06 DIAGNOSIS — Z34.03 ENCOUNTER FOR PRENATAL CARE IN THIRD TRIMESTER OF FIRST PREGNANCY: Primary | ICD-10-CM

## 2018-04-06 DIAGNOSIS — O35.BXX0 ANOMALY OF HEART OF FETUS AFFECTING PREGNANCY, ANTEPARTUM, SINGLE OR UNSPECIFIED FETUS: ICD-10-CM

## 2018-04-06 PROCEDURE — 90471 IMMUNIZATION ADMIN: CPT | Performed by: OBSTETRICS & GYNECOLOGY

## 2018-04-06 PROCEDURE — 90715 TDAP VACCINE 7 YRS/> IM: CPT | Performed by: OBSTETRICS & GYNECOLOGY

## 2018-04-06 PROCEDURE — 99207 ZZC PRENATAL VISIT: CPT | Performed by: OBSTETRICS & GYNECOLOGY

## 2018-04-06 NOTE — MR AVS SNAPSHOT
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 Also, tell your doctor about any allergies you may have. Wear comfortable clothes and leave your valuables at home.  If you re less than 20 weeks drink four 8-ounce glasses of fluid an hour before your exam. If you need to empty your bladder before your exam, try to release only a little urine. Then, drink another glass of fluid.  You may have up to two family members in the exam room. If you bring a small child, an adult must be there to care for him or her.  Please call the Imaging Department at your exam site with any questions.            Apr 20, 2018  3:00 PM CDT   ESTABLISHED PRENATAL with Angela Pascual MD   [facility] ([facility])    [address] [address]   [phone]            Apr 25, 2018  1:30 PM CDT   (Arrive by 1:15 PM)   MFM US COMPRE SINGLE F/U with URMFMUSR2   [facility] [place] ([facility])    [address]  [facility] [address] [phone]           Wear comfortable  clothes and leave your valuables at home.            Apr 25, 2018  2:15 PM CDT   Ob First Visit with UR EXAM RM 2   [facility] [place] ([facility])    [address]  [facility] [address]   [phone]            May 04, 2018  3:00 PM CDT   ESTABLISHED PRENATAL with Marylou Mendez MD   [facility] ([facility])    [address] [facility] [address]   [phone]            May 04, 2018  3:30 PM CDT   (Arrive by 3:15 PM)   MFM BPP SINGLE with URMFMUSR3   [facility] - [facility])    [address]  [facility] [address]   [phone]            May 04, 2018  4:00 PM CDT   Radiology MD with FARHAT BOBO MD   [facility] [place] ([facility])    [address]  [facility] [address]   [phone]           Please arrive at the time given for your first appointment. This visit is used internally to schedule the physician's time during your ultrasound.            May 11, 2018  3:30 PM CDT   (Arrive by 3:15 PM)   JERAMIE BPP SINGLE with URMFMUSR1   [facility] [place] ([facility])    [address]  [facility] [address]   [phone]            May 11, 2018  4:00 PM CDT   Radiology MD with FARHAT BOBO MD   [facility] [place] ([facility])    [address]  [facility] [address]   [phone]           Please arrive at the time given for your first appointment. This visit is used internally to schedule the physician's time during your ultrasound.            May 16, 2018 10:15 AM CDT   Ech Fetal Follow-Up* with RVPUSR5   [facility] Echo/EKG ([facility])    [address]  [facility]  [address]                 Future tests that were ordered for you today     Open Standing Orders        Priority Remaining Interval Expires Ordered    MFM Office Visit Routine 1/1 Weekly 5/16/2018 4/5/2018          Open Future Orders        Priority Expected Expires Ordered    MFM Office Visit Routine 4/25/2018 4/5/2019 4/5/2018    MFM US Comprehensive Single F/U Routine  4/5/2019 4/5/2018    MFM BPP Single Routine  2/5/2019 4/5/2018    MFM BPP Single Routine  2/5/2019 4/5/2018    MFM Office Visit Routine  4/5/2019 4/5/2018    MFM Office Visit Routine  4/5/2019 4/5/2018    MFM US Comprehensive Single F/U Routine  4/5/2019 4/5/2018    Echo Fetal Complete Follow Up-Peds Cardiology Routine  4/5/2019 4/5/2018    US OB Limited One Or More Fetuses Routine  4/5/2019 4/5/2018            Who to contact     If you have questions or need follow up information about today's clinic visit or your schedule please contact [facility] directly at [phone].  Normal or non-critical lab and imaging results will be communicated to you by Worlizehart, letter or phone within 4 business days after the clinic has received the results. If you do not hear from us within 7 days, please contact the clinic through [employer] or phone. If you have a critical or abnormal lab result, we will notify you by phone as soon as possible.  Submit refill requests through [employer] or call your pharmacy and they will forward the refill request to us. Please allow 3 business days for your refill to be completed.          Additional Information About Your Visit        Worlizehart Information     [employer] gives you secure access to your electronic health record. If you see a primary care provider, you can also send messages to your care team and make appointments. If you have questions, please call your primary care clinic.  If you do not have a primary care provider, please call [phone] and they will assist you.        Care EveryWhere ID     This is  your Care EveryWhere ID. This could be used by other organizations to access your [place] medical records  [id number]        Your Vitals Were     Pulse Temperature Last Period Breastfeeding? BMI (Body Mass Index)       80 97.7  F (36.5  C) (Tympanic) 09/16/2017 No 35.51 kg/m2        Blood Pressure from Last 3 Encounters:   04/06/18 133/79   03/15/18 118/68   02/23/18 120/78    Weight from Last 3 Encounters:   04/06/18 220 lb (99.8 kg)   03/15/18 213 lb (96.6 kg)   02/23/18 216 lb (98 kg)              We Performed the Following     TDAP VACCINE (ADACEL)     VACCINE ADMINISTRATION, INITIAL        Primary Care Provider Office Phone # Fax #    Agnes Stanotn PA-C [phone] [address] [address]        Equal Access to Services     [facility] Hospital[facility] : Hadii bayron Nair, waaxda lumichael, qaybta kaalmada sherin, mica jeter . So [facility] [phone].    ATENCIÓN: Si habla español, tiene a garcía disposición servicios gratuitos de asistencia lingüística. Llame al [phone].    We comply with applicable federal civil rights laws and [place] laws. We do not discriminate on the basis of race, color, national origin, age, disability, sex, sexual orientation, or gender identity.            Thank you!     Thank you for choosing [facility]  for your care. Our goal is always to provide you with excellent care. Hearing back from our patients is one way we can continue to improve our services. Please take a few minutes to complete the written survey that you may receive in the mail after your visit with us. Thank you!             Your Updated Medication List - Protect others around you: Learn how to safely use, store and throw away your medicines at www.disposemymeds.org.          This list is accurate as of 4/6/18  3:18 PM.  Always use your most recent med list.                   Brand Name Dispense Instructions for use Diagnosis     prenatal multivitamin plus iron 27-0.8 MG Tabs per tablet      Take 1 tablet by mouth daily

## 2018-04-06 NOTE — PROGRESS NOTES
Doing well.   Coping well as best she can with recent findings of her baby's heart.   Has no concerns at this time.   Denies VB, ctx, LOF. +FM  /79 (BP Location: Right arm, Patient Position: Chair, Cuff Size: Adult Large)  Pulse 80  Temp 97.7  F (36.5  C) (Tympanic)  Wt 220 lb (99.8 kg)  LMP 2017  Breastfeeding? No  BMI 35.51 kg/m2  General Appearance: NAD  Abdomen: Gravid, NT  Refer to flow sheet above.   A/P: 27 year old  at 29w0d  -- reviewed findings again with patient; has follow-up US scheduled at our Radiology dept for ANITRA assessment as recommended by MFM  -- PTL precautions   RTC in 2 weeks    Angela Pascual MD  [facility]

## 2018-04-06 NOTE — NURSING NOTE
"Initial /79 (BP Location: Right arm, Patient Position: Chair, Cuff Size: Adult Large)  Pulse 80  Temp 97.7  F (36.5  C) (Tympanic)  Wt 220 lb (99.8 kg)  LMP 09/16/2017  Breastfeeding? No  BMI 35.51 kg/m2 Estimated body mass index is 35.51 kg/(m^2) as calculated from the following:    Height as of 2/7/18: 5' 6\" (1.676 m).    Weight as of this encounter: 220 lb (99.8 kg). .    Alicja Guerin    "

## 2018-04-10 ENCOUNTER — TELEPHONE (OUTPATIENT)
Dept: MATERNAL FETAL MEDICINE | Facility: CLINIC | Age: 28
End: 2018-04-10

## 2018-04-10 DIAGNOSIS — O35.BXX0 ANOMALY OF HEART OF FETUS AFFECTING PREGNANCY, ANTEPARTUM, SINGLE OR UNSPECIFIED FETUS: ICD-10-CM

## 2018-04-10 DIAGNOSIS — O28.0 ABNORMAL MATERNAL SERUM SCREENING TEST: Primary | ICD-10-CM

## 2018-04-10 LAB — LAB SCANNED RESULT: ABNORMAL

## 2018-04-10 NOTE — TELEPHONE ENCOUNTER
"4/10/2018    I called Chhaya to discuss her \"Innatal\" cell-free fetal DNA screening results.  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 [facility] reports a positive predictive value of 37.2%, however, this is an underestimate given the ultrasound finding of A-V canal defect. The chance of Down syndrome in a baby with an A-V canal defect is 40-50%. With this ultrasound finding along with this screen result, it is likely that the baby does have Down syndrome. Chhaya reports understanding and knows that this is not a definitive diagnosis.     Per discussion with Dr. Tirado, amniocentesis would be cautioned at this time in pregnancy due to risk of premature labor, especially with the presence of the A-V canal defect. We discussed an immediate assessment at the time of delivery as well as cord blood testing to confirm the diagnosis.     Chhaya was offered resources regarding Down syndrome. She declined having these resources sent to her home, but desires resources when she comes for her next [facility] visit. She is encouraged to contact me in the meantime if any questions or concerns arise.     This screen was negative for chromosome abnormalities in chromosomes 18, 13, as well as the sex chromosomes.      Plan: Transfer of care to [facility] on 4/25/18 with follow-up ultrasound and [facility] OB visit. BPPs scheduled on 5/4 and 5/11, followed by a follow-up ultrasound and follow-up fetal echocardiogram on 5/16. Per Dr. Tirado, this plan does not need to be adjusted based on this positive screen result.    Julianne Rico MS, [facility]  Licensed Genetic Counselor  Phone: [phone]  Pager: [phone]  "

## 2018-04-12 ENCOUNTER — HOSPITAL ENCOUNTER (OUTPATIENT)
Dept: ULTRASOUND IMAGING | Facility: CLINIC | Age: 28
Discharge: HOME | End: 2018-04-12
Attending: OBSTETRICS & GYNECOLOGY | Admitting: OBSTETRICS & GYNECOLOGY
Payer: COMMERCIAL

## 2018-04-12 DIAGNOSIS — Z34.92 PRENATAL CARE IN SECOND TRIMESTER: ICD-10-CM

## 2018-04-12 PROCEDURE — 76815 OB US LIMITED FETUS(S): CPT

## 2018-04-16 ENCOUNTER — MYC MEDICAL ADVICE (OUTPATIENT)
Dept: OBGYN | Facility: CLINIC | Age: 28
End: 2018-04-16

## 2018-04-16 DIAGNOSIS — Z34.03 ENCOUNTER FOR PRENATAL CARE IN THIRD TRIMESTER OF FIRST PREGNANCY: Primary | ICD-10-CM

## 2018-04-16 DIAGNOSIS — O35.BXX0 ANOMALY OF HEART OF FETUS AFFECTING PREGNANCY, ANTEPARTUM, SINGLE OR UNSPECIFIED FETUS: ICD-10-CM

## 2018-04-17 ENCOUNTER — TELEPHONE (OUTPATIENT)
Dept: MATERNAL FETAL MEDICINE | Facility: CLINIC | Age: 28
End: 2018-04-17

## 2018-04-17 DIAGNOSIS — O35.BXX0 ANOMALY OF HEART OF FETUS AFFECTING PREGNANCY, ANTEPARTUM, SINGLE OR UNSPECIFIED FETUS: ICD-10-CM

## 2018-04-17 NOTE — TELEPHONE ENCOUNTER
US for ANITRA ordered per [facility] recommendation.  Marla at [facility] notified  Message left for Chhaya to return call to clinic.  She will need US this week for ANITRA - it has been ordered.  Patient to call [phone] to schedule    Joellen Mora   [facility] Clinic  RN

## 2018-04-17 NOTE — TELEPHONE ENCOUNTER
Pt returns call and she was advised of message below and phone number to call to schedule this visit.  Pt has no further concerns at this time.    Donna Foote  [facility] RN

## 2018-04-17 NOTE — TELEPHONE ENCOUNTER
Reason for Call:  Other call back    Detailed comments: Spring nurse coordinator from [facility] is calling to coordinate this pt to get u/s schedul here at [place].  Pt was in to see [facility] on 4-4-18 - has had low ANIRTA.  (per nurse: baby has heart defect presumed trisomy 21) Pt will be transferring ob care on 4-25-18 to [facility] - currently being seen at [facility].  U/S in [place] on 4-12-18 - ANITRA even lower then before.  Pt contacting [facility] to see what is next step/recommendations   [facility] recommending an u/s with ANITRA check this week in [place].    Phone Number Patient can be reached at: [phone] - [facility]    Best Time: any    Can we leave a detailed message on this number? YES    Call taken on 4/17/2018 at 1:31 PM by Taylor Pisano

## 2018-04-17 NOTE — TELEPHONE ENCOUNTER
"Patient emailed [facility] regarding low ANITRA on ultrasound on 4/12 wondering what needs to be done.  After reviewing chart with Dr. Flowers, [facility] recommends ANITRA check this week at [facility].  If <5 MFM should be notified.  Patient is scheduled for f/u visit with [facility] on 4/25/18. [facility] OB clinic notified of recommendations.  Patient notified via phone of plan.  Patient states she has been drinking \"lots of water\", denies changes in vaginal discharge, or LOF. Patient also has OB visit on Friday 4/20 with [facility].      Spring Alves RN  "

## 2018-04-19 ENCOUNTER — HOSPITAL ENCOUNTER (OUTPATIENT)
Dept: ULTRASOUND IMAGING | Facility: CLINIC | Age: 28
Discharge: HOME | End: 2018-04-19
Attending: OBSTETRICS & GYNECOLOGY | Admitting: OBSTETRICS & GYNECOLOGY
Payer: COMMERCIAL

## 2018-04-19 DIAGNOSIS — O35.BXX0 ANOMALY OF HEART OF FETUS AFFECTING PREGNANCY, ANTEPARTUM, SINGLE OR UNSPECIFIED FETUS: ICD-10-CM

## 2018-04-19 DIAGNOSIS — Z34.03 ENCOUNTER FOR PRENATAL CARE IN THIRD TRIMESTER OF FIRST PREGNANCY: ICD-10-CM

## 2018-04-19 PROCEDURE — 76815 OB US LIMITED FETUS(S): CPT

## 2018-04-19 NOTE — TELEPHONE ENCOUNTER
Patient had appointment for US today.  ANITRA 9.9 up from 8.2.    Joellen Mora   [facility]Gyn Clinic  RN

## 2018-04-20 ENCOUNTER — PRENATAL OFFICE VISIT (OUTPATIENT)
Dept: OBGYN | Facility: CLINIC | Age: 28
End: 2018-04-20
Payer: COMMERCIAL

## 2018-04-20 VITALS
WEIGHT: 225 LBS | BODY MASS INDEX: 36.32 KG/M2 | SYSTOLIC BLOOD PRESSURE: 128 MMHG | HEART RATE: 77 BPM | DIASTOLIC BLOOD PRESSURE: 69 MMHG | TEMPERATURE: 97.6 F

## 2018-04-20 DIAGNOSIS — Z34.03 ENCOUNTER FOR PRENATAL CARE IN THIRD TRIMESTER OF FIRST PREGNANCY: Primary | ICD-10-CM

## 2018-04-20 PROCEDURE — 99207 ZZC PRENATAL VISIT: CPT | Performed by: OBSTETRICS & GYNECOLOGY

## 2018-04-20 PROCEDURE — 59426 ANTEPARTUM CARE ONLY: CPT | Performed by: OBSTETRICS & GYNECOLOGY

## 2018-04-20 NOTE — MR AVS SNAPSHOT
After Visit Summary   4/20/2018    Chhaya Thurman    MRN: [id number]           Patient Information     Date Of Birth          1990        Visit Information        Provider Department      4/20/2018 3:00 PM Angela Pascual MD [facility]        Today's Diagnoses     Encounter for prenatal care in third trimester of first pregnancy    -  1       Follow-ups after your visit        Your next 10 appointments already scheduled     Apr 25, 2018  1:30 PM CDT   (Arrive by 1:15 PM)   MFM US COMPRE SINGLE F/U with [id number]   [facility] [place] ([facility])    [address]  [facility] [address] [phone]           Wear comfortable clothes and leave your valuables at home.            Apr 25, 2018  2:15 PM CDT   Ob First Visit with UR EXAM RM 2   [facility] [place] ([facility])    [address]  [facility] [address]   [phone]            Apr 25, 2018  3:00 PM CDT   Genetic Counseling with UR GEN COUNSELOR 2   [facility] Fetal Medicine - [place] ([facility])    [address]  [facility] [address]   [phone]            May 04, 2018  3:00 PM CDT   ESTABLISHED PRENATAL with Marylou Mendez MD   [facility] ([facility])    [address] [place] [address]   [phone]            May 04, 2018  3:30 PM CDT   (Arrive by 3:15 PM)   MFSHANNA BPP SINGLE with URMFMUSR3   [facility] [place] ([facility])    [address]  [facility] [address] [phone]            May 04, 2018  4:00 PM CDT   Radiology MD with UR JERAMIE LUNDBERG   [facility] [place] ([facility])    [address]  [facility] [address]   [phone]           Please arrive at the time given for your first appointment. This visit is used internally to schedule the physician's time during your ultrasound.            May 11, 2018  3:30 PM CDT   (Arrive by 3:15 PM)   JERAMIE COLLINS SINGLE with URMFMUSR1   [facility] Ultrasound - [place] ([facility])    [address]  [facility] [address] [phone]            May 11, 2018  4:00 PM CDT   Radiology MD with UR JERAMIE LUNDBERG   [facility]etal Medicine - [place] ([facility])    [address]  [facility] [address]   [phone]           Please arrive at the time given for your first appointment. This visit is used internally to schedule the physician's time during your ultrasound.            May 16, 2018 10:15 AM CDT   Ech Fetal Follow-Up* with Urmfmusfederrell, RVPUSR5   [facility] Echo/EKG ([facility])    [address] [place] [address]  [facility] [address]                 Who to contact     If you have questions or need follow up information about today's clinic visit or your schedule please contact [facility] directly at [phone].  Normal or non-critical lab and imaging results will be communicated to you by Lignolhart, letter or phone within 4 business days after the clinic has received the results. If you do not hear from us within 7 days, please contact the clinic through MyChart or phone. If you have a critical or abnormal lab result, we will notify you by phone as soon as possible.  Submit refill requests through [employer] or call your pharmacy and they will forward the refill request to us. Please allow 3 business days for your refill to be completed.          Additional Information About Your Visit        [employer] Information     [employer] gives you secure access to your electronic health record. If you see a primary care provider, you can also  send messages to your care team and make appointments. If you have questions, please call your primary care clinic.  If you do not have a primary care provider, please call [phone] and they will assist you.        Care EveryWhere ID     This is your Care EveryWhere ID. This could be used by other organizations to access your [place] medical records  [id number]        Your Vitals Were     Pulse Temperature Last Period Breastfeeding? BMI (Body Mass Index)       77 97.6  F (36.4  C) (Tympanic) 09/16/2017 No 36.32 kg/m2        Blood Pressure from Last 3 Encounters:   04/20/18 128/69   04/06/18 133/79   03/15/18 118/68    Weight from Last 3 Encounters:   04/20/18 225 lb (102.1 kg)   04/06/18 220 lb (99.8 kg)   03/15/18 213 lb (96.6 kg)              Today, you had the following     No orders found for display       Primary Care Provider Office Phone # Fax #    Agnes Stanton PA-C [phone] [address] [place], [place] [address]        Equal Access to Services     JOHANA WHITE : Hadii bayron ku hadamadao Sotimmy, waaxda luqadaha, qaybta kaalmada adekiarra, mica jeter . So [facility] [phone].    ATENCIÓN: Si habla español, tiene a garcía disposición servicios gratuitos de asistencia lingüística. [facility] [phone].    We comply with applicable federal civil rights laws and [place] laws. We do not discriminate on the basis of race, color, national origin, age, disability, sex, sexual orientation, or gender identity.            Thank you!     Thank you for choosing [facility]  for your care. Our goal is always to provide you with excellent care. Hearing back from our patients is one way we can continue to improve our services. Please take a few minutes to complete the written survey that you may receive in the mail after your visit with us. Thank you!             Your Updated Medication List - Protect others around you: Learn how to safely use, store  and throw away your medicines at www.disposemymeds.org.          This list is accurate as of 4/20/18  3:15 PM.  Always use your most recent med list.                   Brand Name Dispense Instructions for use Diagnosis    prenatal multivitamin plus iron 27-0.8 MG Tabs per tablet      Take 1 tablet by mouth daily

## 2018-04-20 NOTE — NURSING NOTE
"Initial /69 (BP Location: Left arm, Patient Position: Chair, Cuff Size: Adult Regular)  Pulse 77  Temp 97.6  F (36.4  C) (Tympanic)  Wt 225 lb (102.1 kg)  LMP 09/16/2017  Breastfeeding? No  BMI 36.32 kg/m2 Estimated body mass index is 36.32 kg/(m^2) as calculated from the following:    Height as of 2/7/18: 5' 6\" (1.676 m).    Weight as of this encounter: 225 lb (102.1 kg). .    Alicja Guerin    "

## 2018-04-20 NOTE — PROGRESS NOTES
Doing well.   No complaints  Denies VB, ctx, LOF. +FM  /69 (BP Location: Left arm, Patient Position: Chair, Cuff Size: Adult Regular)  Pulse 77  Temp 97.6  F (36.4  C) (Tympanic)  Wt 225 lb (102.1 kg)  LMP 2017  Breastfeeding? No  BMI 36.32 kg/m2  General Appearance: NAD  Abdomen: Gravid, NT  Refer to flow sheet above.   A/P: 27 year old  at 31w0d  -- reviewed follow-up US from yesterday with ANITRA 9.9 from 8.2  -- Down's syndrome fetus: continue care with MFM after this visit; will deliver at [facility]   -- PTL precautions reviewed    Angela Pascual MD  [facility]

## 2018-04-25 ENCOUNTER — OFFICE VISIT (OUTPATIENT)
Dept: MATERNAL FETAL MEDICINE | Facility: CLINIC | Age: 28
End: 2018-04-25
Attending: OBSTETRICS & GYNECOLOGY
Payer: COMMERCIAL

## 2018-04-25 ENCOUNTER — HOSPITAL ENCOUNTER (OUTPATIENT)
Dept: ULTRASOUND IMAGING | Facility: CLINIC | Age: 28
Discharge: HOME | End: 2018-04-25
Attending: OBSTETRICS & GYNECOLOGY | Admitting: OBSTETRICS & GYNECOLOGY
Payer: COMMERCIAL

## 2018-04-25 VITALS
RESPIRATION RATE: 18 BRPM | WEIGHT: 224.1 LBS | DIASTOLIC BLOOD PRESSURE: 78 MMHG | HEART RATE: 82 BPM | BODY MASS INDEX: 36.17 KG/M2 | SYSTOLIC BLOOD PRESSURE: 137 MMHG

## 2018-04-25 DIAGNOSIS — O28.0 ABNORMAL MATERNAL SERUM SCREENING TEST: Primary | ICD-10-CM

## 2018-04-25 DIAGNOSIS — O35.BXX0 ANOMALY OF HEART OF FETUS AFFECTING PREGNANCY, ANTEPARTUM, SINGLE OR UNSPECIFIED FETUS: ICD-10-CM

## 2018-04-25 DIAGNOSIS — O09.93 SUPERVISION OF HIGH RISK PREGNANCY, UNSPECIFIED, THIRD TRIMESTER: ICD-10-CM

## 2018-04-25 DIAGNOSIS — O35.BXX0 ANOMALY OF HEART OF FETUS AFFECTING PREGNANCY, ANTEPARTUM, SINGLE OR UNSPECIFIED FETUS: Primary | ICD-10-CM

## 2018-04-25 PROCEDURE — 76819 FETAL BIOPHYS PROFIL W/O NST: CPT | Performed by: OBSTETRICS & GYNECOLOGY

## 2018-04-25 PROCEDURE — G0463 HOSPITAL OUTPT CLINIC VISIT: HCPCS | Mod: 25,ZF

## 2018-04-25 PROCEDURE — 76816 OB US FOLLOW-UP PER FETUS: CPT

## 2018-04-25 PROCEDURE — 40000072 ZZH STATISTIC GENETIC COUNSELING, < 16 MIN: Mod: ZF | Performed by: GENETIC COUNSELOR, MS

## 2018-04-25 RX ORDER — CALCIUM CARBONATE 500 MG/1
1 TABLET, CHEWABLE ORAL DAILY
COMMUNITY
End: 2018-07-17

## 2018-04-25 NOTE — PROGRESS NOTES
[facility] OB INITIAL VISIT    Patient Information:     Consultation requested by: Dr. Pascual  Reason for consultation: Transfer of care  Source of Information: Patient interview and chart review    Presentation History:     Ms. Thurman is a 27 year old  at 31w5d by ALVINO of 2018, by Ultrasound presenting for her initial appointment with our practice.    Pt is w/o acute complaint today.  She has been feeling well.  No cramping/contractions, no LOF, no VB.  Reports good FM.  Has several very appropriate questions about the diagnosis of Down syndrome.  She discussed exercise and pregnancy and her ability     Problem List:     Patient Active Problem List    Diagnosis Date Noted     Fetal cardiac anomaly affecting pregnancy, antepartum 2018     Priority: Medium     A fetal echo was performed by Pediatric Cardiology today. There is an A-V canal defect seen. The risk for Down Syndrome would be 40-50%. The patient was offered the  option for amniocentesis with procedure related complication of 1:500 or NIPT screening. She opted for NIPT.    We recommend that you assess the ANITRA in 1 week. We will reassess fetal growth in 3 weeks and perform a NICU consult. Weekly BPPs are recommended at 32 weeks.  Transfer of care to [facility] for delivery planning at [facility] will be arranged at a future date. Return to primary provider for continued prenatal care.    Thank-you for the opportunity to participate in the care of this patient. If you have questions regarding today's evaluation or if we can be of further service, please contact the  [facility].       Prenatal care, first pregnancy 10/17/2017     Priority: Medium     FOB- Luke Gama       Endometrium, polyp 2017     Priority: Medium     Recurrent pregnancy loss in pregnant patient in first trimester, antepartum 2017     Priority: Medium       Review of Allergies/Medical History/Medications:     Obstetrical History:    Obstetric History    G3    P0   T0      L0     SAB1   TAB0   Ectopic0   Multiple0   Live Births0       # Outcome Date GA Lbr Gerald/2nd Weight Sex Delivery Anes PTL Lv   3 Current            2 SAB 17 9w1d    AB, MISSED      1 AB 17 8w0d    SAB             Gynecological History:    Menarche: 8 years-old / Frequency: regular, every 28-30 days / Duration:  4-5 days,    Denies history of sexually transmitted infections, dysmenorrhea, cysts, fibroids, endometriosis, or abnormal Pap tests.    Medical History:  Past Medical History:   Diagnosis Date     Chickenpox      Fetal cardiac anomaly affecting pregnancy, antepartum 2018     History of recurrent UTI (urinary tract infection)        Surgical History:  Past Surgical History:   Procedure Laterality Date     DILATION AND CURETTAGE, OPERATIVE HYSTEROSCOPY, COMBINED N/A 2017    Procedure: COMBINED DILATION AND CURETTAGE, OPERATIVE HYSTEROSCOPY;  Hysteroscopy with dilation and curettage, polypectomy;  Surgeon: Mable Hanson MD;  Location: [place] OR     ENT SURGERY  20 years ago    tubes in three times     MOUTH SURGERY      wisdom teeth -age 16     TONSILLECTOMY         Medications:     Current Outpatient Prescriptions:      Prenatal Vit-Fe Fumarate-FA (PRENATAL MULTIVITAMIN  PLUS IRON) 27-0.8 MG TABS per tablet, Take 1 tablet by mouth daily, Disp: , Rfl:      Allergies:   Allergies   Allergen Reactions     Imitrex [Sumatriptan]          Social History:    She  reports that she has never smoked. She has never used smokeless tobacco. She reports that she drinks alcohol. She reports that she does not use illicit drugs.    Education: College    Employment: RN    Family History:  Family History   Problem Relation Age of Onset     Other Cancer Maternal Grandfather      skin     Hyperlipidemia Mother      Unknown/Adopted Father      unknown history     Stomach Cancer Maternal Grandmother      DIABETES No family hx of        Ethnicity:     Denies a family history  "of motor/intellectual impairment, stillbirth, genetic or chromosome abnormalities or congenital anomalies.     Partner History:    Pt has no medical problems    Ethnicity:      He denies a family history of motor/intellectual impairment, stillbirth, genetic or chromosome abnormalities or congenital anomalies.      Has one other child with a prior partner who is 10 years old and has no health issues    Review of Systems:       Constitutional - denies fevers or chills, no recent illness.    Eyes - denies blurry vision, visual changes, eye pain.     ENT - denies sore throat and nasal congestion, no hearing problems.    Cardiovascular - denies shortness of breath, palpitations or chest pain.    Respiratory - denies wheezing, no cough, no difficulty breathing.    Gastrointestinal - normal appetite, denies diarrhea, rectal bleeding.    Genitourinary - denies dysuria, no hematuria.    Musculoskeletal - denies joint pain, no muscle pain.    Dermatologic -  denies rashes, lesions, or dry skin.     Neurologic - denies headache, no dizziness, no seizures.    Psychiatric - denies changes in mood.      Endocrine - denies temperature sensitivity, polydipsia.    Hematologic/Lymphatic - denies excessive bleeding or bruising, no history of DVT.    Physical Exam:     Vitals:  LMP 09/16/2017    GEN: NAD  SKIN: no rash appreciated  ABD: gravid, nontender  EXT: wwp, non-tender, symmetric    TAUS:  Please see \"Imaging\" tab under Chart review for full report.  EFW 1839 g (49%); ANITRA 10.8 cm; balanced AV canal defect again noted      Review of Labs/Diagnostics:   All prenatal labs were reviewed in Epic  -RPR neg (3/20/18)  -GCT 99 (3/20/18)  -Hgb 12.3 g/dL (3/20/18)  -O+; antibody screen neg (10/27/17)  -Rubella immune (10/27/17)  -HIV non-reactive (10/27/17)  -Hep B non-reactive (10/27/17)  -Platelets (250 K) (10/27/17)  -NIPT +trisomy 21 (4/4/18)    Comprehensive ultrasound (4/4/18):  Growth parameters and estimated fetal weight " were consistent with appropriate for gestational age pattern of growth. A complex heart defect is suspected. The right hand was not well seen. The fetal anatomy was otherwise adequately visualized and appeared otherwise normal. None of the other anomalies commonly detected by ultrasound were evident in the structures that were visualized. The ANITRA is at the 5%tile.    Assessment and Plan:     27 year old  at 31w5d here for initial prenatal appointment with our practice.    1.  Balanced AV canal defect with presumed Down syndrome (positive NIPT)  -weekly BPP  -growth ultrasounds every 3 weeks  -delivery at tertiary care center indicated; transfer of care visit today  -NICU/L&D tours to be scheduled  -induction of labor to be planned in 39th week of gestation; final decision on timing to be made as pregnancy progresses  -follow up fetal echocardiogram 18 with pediatric cardiology  -discussed if defect remains balanced,  will likely not need urgent surgery after delivery    2.  Prenatal care  - Tdap and influenza already given  - Weekly OB visits  - US schedule as outlined above  - GBS at 35 weeks  - Induction of labor in 39th week as above  - new patient handouts given & calling guidelines reviewed  - discussed nutrition, exercise, safety, travel  - discussed availability of birth classes, hospital tours  - reviewed expectations for prenatal care, visits, follow up  - patient oriented to practice, role of attendings/fellows/residents/medical students/RNs, teaching hospital  - all questions answered    Return to clinic in 1 weeks for MD visit and BPP.    I served as scribe for Dr. Norma Mendoza.    Irma Flowers MD  [facility] Fellow        Attestation:   The documentation recorded by the scribe accurately reflects the services I personally performed and the decisions made by me.   Norma Mendoza, DO  Maternal Fetal Medicine Specialist           The patient was seen for an established outpatient visit.  I  spent a total of 15 minutes face to face with Chhaya Thurman during today's visit. Over 50% of this time was spent counseling the patient and/or coordinating care ANITA for suspected fetal Trisomy 21 with balanced AV canal defect.

## 2018-04-25 NOTE — NURSING NOTE
Chhaya presents to [facility] with her  Nahid. Pt seen in clinic today for follow up growth ultrasound, BPP, and ANITA prenatal visit at 31w5d gestation due to pregnancy c/b fetal CHD, presumed T21 (NIPT +) (see report/notes). Patient reports coping well with NIPT diagnosis of Down's syndrome. Patient reports + fetal movement. VSS. Pt denies bldg/lof/change in discharge/contractions/headache/vision changes/chest pain/SOB/edema. Reviewed new OB folder, teaching sheet: fetal kick counts, PTL, HTN, medications safe in pregnancy, preregistration form. PCC contact information given. Follow up scheduled.  NICU/SW consult TBD. Dr. Flowers and Dr. Mendoza met with pt and discussed POC. Plan to return to [facility] in 1 week for BPP.  Growth ultrasounds q 3 weeks.  Julianne Rico GC met with family and provided written resources for Down's syndrome.  Pt discharged stable and ambulatory.       Spring Alves RN

## 2018-04-25 NOTE — MR AVS SNAPSHOT
After Visit Summary   4/25/2018    Chhaya Thurman    MRN: [id number]           Patient Information     Date Of Birth          1990        Visit Information        Provider Department      4/25/2018 3:00 PM Abbie Rico GC [facility] Maternal Fetal Medicine - [place]        Today's Diagnoses     Abnormal maternal serum screening test    -  1       Follow-ups after your visit        Your next 10 appointments already scheduled     May 04, 2018  3:00 PM CDT   ESTABLISHED PRENATAL with Marylou Mendez MD   [facility]    [address] [facility] [address]   [phone]            May 04, 2018  3:30 PM CDT   (Arrive by 3:15 PM)   MFSHANNA BPP SINGLE with URMFMUSR3   [facility] - [facility])    [address]  [facility] [address]   [phone]            May 04, 2018  4:00 PM CDT   Radiology MD with FARHAT BOBO MD   [facility]etal [facility] - [facility])    [address]  [facility] [address]   [phone]           Please arrive at the time given for your first appointment. This visit is used internally to schedule the physician's time during your ultrasound.            May 11, 2018  3:30 PM CDT   (Arrive by 3:15 PM)   JERAMIE VILLAGOMEZP SINGLE with URMFMUSR1   [facility]etal Medicine Ultrasound - [place] ([facility])    [address]  [facility] [address]   [phone]            May 11, 2018  4:00 PM CDT   Radiology MD with FARHAT BOBO MD   [facility] - [place] ([facility])    [address]  [facility] [address]   [phone]           Please arrive at the time given for your first appointment. This visit is used internally to schedule the physician's time during your  ultrasound.            May 16, 2018  9:30 AM CDT   (Arrive by 9:15 AM)   MFM US COMPRE SINGLE F/U with URMFMUSR6   [facility] Maternal Fetal Medicine Ultrasound - [place] ([facility])    [address]  [facility] [id number] [phone]           Wear comfortable clothes and leave your valuables at home.            May 16, 2018 10:15 AM CDT   Ech Fetal Follow-Up* with Urmfmusfet, RVPUSR5   [facility] Echo/EKG ([facility])    [address] [place] [address]  [place] [address]               May 16, 2018 11:00 AM CDT   Ob Follow Up with UR EXAM RM 2   [facility] Maternal Fetal Medicine - [place] ([facility])    [address]  [facility] [address] [phone]              Who to contact     If you have questions or need follow up information about today's clinic visit or your schedule please contact [facility] MATERNAL FETAL MEDICINE [facility] directly at [phone].  Normal or non-critical lab and imaging results will be communicated to you by PenBladehart, letter or phone within 4 business days after the clinic has received the results. If you do not hear from us within 7 days, please contact the clinic through Workanat or phone. If you have a critical or abnormal lab result, we will notify you by phone as soon as possible.  Submit refill requests through "[employer]" or call your pharmacy and they will forward the refill request to us. Please allow 3 business days for your refill to be completed.          Additional Information About Your Visit        PenBladehart Information     "[employer]" gives you secure access to your electronic health record. If you see a primary care provider, you can also send messages to your care team and make appointments. If you have questions, please call your primary care clinic.  If you do not have a primary care provider, please call [phone] and they will assist you.        Care EveryWhere  ID     This is your Care EveryWhere ID. This could be used by other organizations to access your [place] medical records  [id number]        Your Vitals Were     Last Period                   09/16/2017            Blood Pressure from Last 3 Encounters:   04/25/18 137/78   04/20/18 128/69   04/06/18 133/79    Weight from Last 3 Encounters:   04/25/18 101.7 kg (224 lb 1.6 oz)   04/20/18 102.1 kg (225 lb)   04/06/18 99.8 kg (220 lb)              We Performed the Following     [facility] Genetic Counseling        Primary Care Provider Office Phone # Fax #    Agnes Stanton PA-C [phone] [address] [place], [place] [address]        Equal Access to Services     JOHANA WHITE : Cam Nair, waaxda luqadaha, qaybta kaalmada adetungyaelizabeth, mica jeter . So [facility] [phone].    ATENCIÓN: Si habla español, tiene a garcía disposición servicios gratuitos de asistencia lingüística. Llame al [phone].    We comply with applicable federal civil rights laws and [place] laws. We do not discriminate on the basis of race, color, national origin, age, disability, sex, sexual orientation, or gender identity.            Thank you!     Thank you for choosing [facility]  for your care. Our goal is always to provide you with excellent care. Hearing back from our patients is one way we can continue to improve our services. Please take a few minutes to complete the written survey that you may receive in the mail after your visit with us. Thank you!             Your Updated Medication List - Protect others around you: Learn how to safely use, store and throw away your medicines at www.disposemymeds.org.          This list is accurate as of 4/25/18 11:59 PM.  Always use your most recent med list.                   Brand Name Dispense Instructions for use Diagnosis    calcium carbonate 500 MG chewable tablet    TUMS     Take 1 chew tab by mouth  daily        prenatal multivitamin plus iron 27-0.8 MG Tabs per tablet      Take 1 tablet by mouth daily

## 2018-04-25 NOTE — PROGRESS NOTES
"[facility]etal [facility] [place]  Genetic Counseling Consult    Patient: Chhaya Thurman YOB: 1990   Date of Service: 4/25/18      Chhaya Thurman was seen at [facility] [place] for genetic consultation as part of her follow-up ultrasound visit.       Impression/Plan:   Chhaya had follow up ultrasound today and [facility] visit.  Comprehensive ultrasound on 4/4 identified an A-V canal defect. NIPT drawn on that date was positive for Down syndrome. She was informed of this result on 4/10 and had requested resources be given to her at her next follow-up visit.     Chhaya reports doing \"okay\" since the positive NIPT result and that her and her partner are still processing all of this information. She had questions with regards to risks for future pregnancies. Confirmatory genetic testing will be completed at the time of delivery and this result will be able to better define recurrence risk. If her daughter has Down syndrome caused by a \"free\" chromosome 21, the chance of a future pregnancy having aneuploidy is ~1%. If her daughter has Down syndrome caused by a translocation, the chance of a future pregnancy having aneuploidy may be significantly increased. It is likely that the A-V canal defect is related to the Down syndrome diagnosis and not an isolated event, however, that can likely never be confirmed completely.  Due to this, future pregnancies will likely be followed more closely and include cardiac imaging.     At her request, she was given a Down Comforter folder from the [employer] of [place] and \"Diagnosis to Delivery: A Pregnant Mother's Guide to Down Syndrome.\" We briefly reviewed these resources and my contact information was given for further questions or concerns.     It was a pleasure to be involved with Chhaya sheth care. Face-to-face time of the meeting was 15 minutes.    Julianne Rico MS, [facility]  Maternal Fetal " [facility]  Ph: [phone]  [email or web address]

## 2018-04-25 NOTE — MR AVS SNAPSHOT
After Visit Summary   4/25/2018    Chhaya Thurman    MRN: [id number]           Patient Information     Date Of Birth          1990        Visit Information        Provider Department      4/25/2018 2:15 PM Norma Mendoza DO [facility]etal [facility] - [place]        Today's Diagnoses     Anomaly of heart of fetus affecting pregnancy, antepartum, single or unspecified fetus    -  1    Supervision of high risk pregnancy, unspecified, third trimester           Follow-ups after your visit        Your next 10 appointments already scheduled     May 04, 2018  3:00 PM CDT   ESTABLISHED PRENATAL with Marylou Mendez MD   [facility]    [address] [facility] [address]   [phone]            May 04, 2018  3:30 PM CDT   (Arrive by 3:15 PM)   JERAMIE COLLINS SINGLE with URMFMUSR3   [facility] - [facility])    [address]  [facility] [address]   [phone]            May 04, 2018  4:00 PM CDT   Radiology MD with FARHAT BOBO MD   [facility]etal Medicine - [facility])    [address]  [facility] [address]   [phone]           Please arrive at the time given for your first appointment. This visit is used internally to schedule the physician's time during your ultrasound.            May 11, 2018  3:30 PM CDT   (Arrive by 3:15 PM)   JERAMIE COLLINS SINGLE with URMFMUSR1   [facility] - [place] ([facility])    [address]  [facility] [address]   [phone]            May 11, 2018  4:00 PM CDT   Radiology MD with FARHAT BOBO MD   [facility] - [place] ([facility])    [address]  [facility] [address]   [phone]           Please arrive  at the time given for your first appointment. This visit is used internally to schedule the physician's time during your ultrasound.            May 16, 2018  9:30 AM CDT   (Arrive by 9:15 AM)   MFM US COMPRE SINGLE F/U with [id number]   [facility] Maternal Fetal Medicine Ultrasound - [place] ([facility])    [address]  [facility] [address] [phone]           Wear comfortable clothes and leave your valuables at home.            May 16, 2018 10:15 AM CDT   Ech Fetal Follow-Up* with Urmfmusfet, RVPUSR5   [facility] Echo/EKG ([facility])    [address] [place] [address]  [facility] [address]               May 16, 2018 11:00 AM CDT   Ob Follow Up with UR EXAM RM 2   [facility] Maternal Fetal Medicine - [place] ([facility])    [address]  [facility] [address] [phone]              Who to contact     If you have questions or need follow up information about today's clinic visit or your schedule please contact [facility] MATERNAL FETAL MEDICINE - [place] directly at [phone].  Normal or non-critical lab and imaging results will be communicated to you by Akitahart, letter or phone within 4 business days after the clinic has received the results. If you do not hear from us within 7 days, please contact the clinic through [employer] or phone. If you have a critical or abnormal lab result, we will notify you by phone as soon as possible.  Submit refill requests through [employer] or call your pharmacy and they will forward the refill request to us. Please allow 3 business days for your refill to be completed.          Additional Information About Your Visit        Akitahart Information     [employer] gives you secure access to your electronic health record. If you see a primary care provider, you can also send messages to your care team and make appointments. If you have questions, please call your primary care clinic.   If you do not have a primary care provider, please call [phone] and they will assist you.        Care EveryWhere ID     This is your Care EveryWhere ID. This could be used by other organizations to access your [place] medical records  [id number]        Your Vitals Were     Pulse Respirations Last Period BMI (Body Mass Index)          82 18 09/16/2017 36.17 kg/m2         Blood Pressure from Last 3 Encounters:   04/25/18 137/78   04/20/18 128/69   04/06/18 133/79    Weight from Last 3 Encounters:   04/25/18 101.7 kg (224 lb 1.6 oz)   04/20/18 102.1 kg (225 lb)   04/06/18 99.8 kg (220 lb)              We Performed the Following     [facility] Office Visit        Primary Care Provider Office Phone # Fax #    Agnes Stanton PA-C [phone] [address] [address]        Equal Access to Services     JOHANA WHITE : Hadii aad ku hadasho Soomaali, waaxda luqadaha, qaybta kaalmada adeegyada, waxay quintinin hayrosalindan alem jeter . So [facility] [phone].    ATENCIÓN: Si habla español, tiene a garcía disposición servicios gratuitos de asistencia lingüística. Llame al [phone].    We comply with applicable federal civil rights laws and [place] laws. We do not discriminate on the basis of race, color, national origin, age, disability, sex, sexual orientation, or gender identity.            Thank you!     Thank you for choosing [facility]  for your care. Our goal is always to provide you with excellent care. Hearing back from our patients is one way we can continue to improve our services. Please take a few minutes to complete the written survey that you may receive in the mail after your visit with us. Thank you!             Your Updated Medication List - Protect others around you: Learn how to safely use, store and throw away your medicines at www.disposemymeds.org.          This list is accurate as of 4/25/18 11:59 PM.  Always use your most recent  med list.                   Brand Name Dispense Instructions for use Diagnosis    calcium carbonate 500 MG chewable tablet    TUMS     Take 1 chew tab by mouth daily        prenatal multivitamin plus iron 27-0.8 MG Tabs per tablet      Take 1 tablet by mouth daily

## 2018-05-04 ENCOUNTER — OFFICE VISIT (OUTPATIENT)
Dept: MATERNAL FETAL MEDICINE | Facility: CLINIC | Age: 28
End: 2018-05-04
Attending: OBSTETRICS & GYNECOLOGY
Payer: COMMERCIAL

## 2018-05-04 ENCOUNTER — HOSPITAL ENCOUNTER (OUTPATIENT)
Dept: ULTRASOUND IMAGING | Facility: CLINIC | Age: 28
Discharge: HOME | End: 2018-05-04
Attending: OBSTETRICS & GYNECOLOGY | Admitting: OBSTETRICS & GYNECOLOGY
Payer: COMMERCIAL

## 2018-05-04 DIAGNOSIS — O35.BXX1 PREGNANCY COMPLICATED BY FETAL CONGENITAL HEART DISEASE, FETUS 1: Primary | ICD-10-CM

## 2018-05-04 DIAGNOSIS — O35.13X0 FETAL TRISOMY 21 AFFECTING CARE OF MOTHER, ANTEPARTUM, SINGLE OR UNSPECIFIED FETUS: ICD-10-CM

## 2018-05-04 DIAGNOSIS — O35.BXX0 ANOMALY OF HEART OF FETUS AFFECTING PREGNANCY, ANTEPARTUM, SINGLE OR UNSPECIFIED FETUS: ICD-10-CM

## 2018-05-04 PROCEDURE — 76819 FETAL BIOPHYS PROFIL W/O NST: CPT

## 2018-05-04 NOTE — MR AVS SNAPSHOT
After Visit Summary   5/4/2018    Chhaya Thurman    MRN: [id number]           Patient Information     Date Of Birth          1990        Visit Information        Provider Department      5/4/2018 4:00 PM Regla Minaya,  [facility] [place]        Today's Diagnoses     Pregnancy complicated by fetal congenital heart disease, fetus 1    -  1    Fetal trisomy 21 affecting care of mother, antepartum, single or unspecified fetus           Follow-ups after your visit        Your next 10 appointments already scheduled     May 11, 2018  3:30 PM CDT   MFM BPP SINGLE with [id number]   [facility]etal [facility] Ultrasound - [facility])    [address]  [facility] [id number] [phone]            May 11, 2018  4:00 PM CDT   Radiology MD with UR JERAMIE LUNDBERG   [facility])    [address]  [facility] [address] [phone]           Please arrive at the time given for your first appointment. This visit is used internally to schedule the physician's time during your ultrasound.            May 16, 2018  9:30 AM CDT   M US COMPRE SINGLE F/U with [id number]   [facility] Ultrasound - [place] ([facility])    [address]  [facility] [id number] [phone]           Wear comfortable clothes and leave your valuables at home.            May 16, 2018 10:15 AM CDT   Ech Fetal Follow-Up* with Gianfranco, RVPUSR5   [facility] Echo[facility]EKG ([facility])    [address] [place] [address]  [facility] [address]               May 16, 2018 11:00 AM CDT   Ob Follow Up with UR EXAM RM 2   [facility] [place] ([facility])    [address]  [facility] [address] [phone]              Who to  contact     If you have questions or need follow up information about today's clinic visit or your schedule please contact [facility] MATERNAL FETAL MEDICINE [facility] directly at [phone].  Normal or non-critical lab and imaging results will be communicated to you by MyChart, letter or phone within 4 business days after the clinic has received the results. If you do not hear from us within 7 days, please contact the clinic through Con[employer]shart or phone. If you have a critical or abnormal lab result, we will notify you by phone as soon as possible.  Submit refill requests through Xecced or call your pharmacy and they will forward the refill request to us. Please allow 3 business days for your refill to be completed.          Additional Information About Your Visit        Xecced Information     [employer] gives you secure access to your electronic health record. If you see a primary care provider, you can also send messages to your care team and make appointments. If you have questions, please call your primary care clinic.  If you do not have a primary care provider, please call [phone] and they will assist you.        Care EveryWhere ID     This is your Care EveryWhere ID. This could be used by other organizations to access your [place] medical records  [id number]        Your Vitals Were     Last Period                   09/16/2017            Blood Pressure from Last 3 Encounters:   04/25/18 137/78   04/20/18 128/69   04/06/18 133/79    Weight from Last 3 Encounters:   04/25/18 101.7 kg (224 lb 1.6 oz)   04/20/18 102.1 kg (225 lb)   04/06/18 99.8 kg (220 lb)              Today, you had the following     No orders found for display       Primary Care Provider Office Phone # Fax #    Agnes Stanton PA-C [phone] [address] [place] [address]        Equal Access to Services     JOHANA WHITE : sánchez Sanchez, mica barry  malick dowtung quirinocarlton heaan ah. So [facility] [phone].    ATENCIÓN: Si habla adithya, tiene a garcía disposición servicios gratuitos de asistencia lingüística. Winifred al [phone].    We comply with applicable federal civil rights laws and [place] laws. We do not discriminate on the basis of race, color, national origin, age, disability, sex, sexual orientation, or gender identity.            Thank you!     Thank you for choosing [facility]  for your care. Our goal is always to provide you with excellent care. Hearing back from our patients is one way we can continue to improve our services. Please take a few minutes to complete the written survey that you may receive in the mail after your visit with us. Thank you!             Your Updated Medication List - Protect others around you: Learn how to safely use, store and throw away your medicines at www.disposemymeds.org.          This list is accurate as of 5/4/18  4:18 PM.  Always use your most recent med list.                   Brand Name Dispense Instructions for use Diagnosis    calcium carbonate 500 MG chewable tablet    TUMS     Take 1 chew tab by mouth daily        prenatal multivitamin plus iron 27-0.8 MG Tabs per tablet      Take 1 tablet by mouth daily

## 2018-05-04 NOTE — PROGRESS NOTES
"Please see \"Imaging\" tab under \"Chart Review\" for details of today's US.      Regla Minaya, DO  Maternal-Fetal Medicine        "

## 2018-05-11 ENCOUNTER — OFFICE VISIT (OUTPATIENT)
Dept: MATERNAL FETAL MEDICINE | Facility: CLINIC | Age: 28
End: 2018-05-11
Attending: OBSTETRICS & GYNECOLOGY
Payer: COMMERCIAL

## 2018-05-11 ENCOUNTER — HOSPITAL ENCOUNTER (OUTPATIENT)
Dept: ULTRASOUND IMAGING | Facility: CLINIC | Age: 28
Discharge: HOME | End: 2018-05-11
Attending: OBSTETRICS & GYNECOLOGY | Admitting: OBSTETRICS & GYNECOLOGY
Payer: COMMERCIAL

## 2018-05-11 DIAGNOSIS — O35.9XX0 FETAL ABNORMALITY AFFECTING MANAGEMENT OF MOTHER, SINGLE OR UNSPECIFIED FETUS: Primary | ICD-10-CM

## 2018-05-11 DIAGNOSIS — O35.BXX0 ANOMALY OF HEART OF FETUS AFFECTING PREGNANCY, ANTEPARTUM, SINGLE OR UNSPECIFIED FETUS: ICD-10-CM

## 2018-05-11 PROCEDURE — 76819 FETAL BIOPHYS PROFIL W/O NST: CPT

## 2018-05-11 NOTE — PROGRESS NOTES
Please see full imaging report from ViewPoint program under imaging tab.        Kacy Roa MD  Maternal Fetal Medicine

## 2018-05-11 NOTE — MR AVS SNAPSHOT
After Visit Summary   5/11/2018    Chhaya Thurman    MRN: [id number]           Patient Information     Date Of Birth          1990        Visit Information        Provider Department      5/11/2018 4:00 PM Kacy Roa MD [facility] Maternal Fetal Medicine [facility]        Today's Diagnoses     Fetal abnormality affecting management of mother, single or unspecified fetus    -  1       Follow-ups after your visit        Your next 10 appointments already scheduled     May 16, 2018  9:30 AM CDT   MFM US COMPRE SINGLE F/U with [id number]   [facility] Maternal Fetal Medicine Ultrasound [facility] ([facility])    [address]  [facility] [id number] [phone]           Wear comfortable clothes and leave your valuables at home.            May 16, 2018 10:15 AM CDT   Ech Fetal Follow-Up* with Urmfmusfet, RVPUSR5   [facility] Echo/EKG ([facility])    [address]  [facility] [address]               May 16, 2018 11:00 AM CDT   Ob Follow Up with UR EXAM RM 2   [facility]etal Medicine [facility] [place] ([facility])    [address]  [facility] [address]   [phone]              Who to contact     If you have questions or need follow up information about today's clinic visit or your schedule please contact [facility] MATERNAL FETAL MEDICINE [facility] directly at [phone].  Normal or non-critical lab and imaging results will be communicated to you by MyChart, letter or phone within 4 business days after the clinic has received the results. If you do not hear from us within 7 days, please contact the clinic through [employer] Pharmahart or phone. If you have a critical or abnormal lab result, we will notify you by phone as soon as possible.  Submit refill requests through Rubikloud or call your pharmacy and they will forward the refill request to us. Please allow 3 business days for your  refill to be completed.          Additional Information About Your Visit        Marketwiredhart Information     [employer] gives you secure access to your electronic health record. If you see a primary care provider, you can also send messages to your care team and make appointments. If you have questions, please call your primary care clinic.  If you do not have a primary care provider, please call [phone] and they will assist you.        Care EveryWhere ID     This is your Care EveryWhere ID. This could be used by other organizations to access your [place] medical records  [id number]        Your Vitals Were     Last Period                   09/16/2017            Blood Pressure from Last 3 Encounters:   04/25/18 137/78   04/20/18 128/69   04/06/18 133/79    Weight from Last 3 Encounters:   04/25/18 101.7 kg (224 lb 1.6 oz)   04/20/18 102.1 kg (225 lb)   04/06/18 99.8 kg (220 lb)              Today, you had the following     No orders found for display       Primary Care Provider Office Phone # Fax #    Agnes Stanton PA-C [phone] [address] [place], [place] [address]        Equal Access to Services     [facility] CHRISTOPHER : Hadii aad ku hadasho Soomaali, waaxda luqadaha, qaybta kaalmada adeegyada, mica camargon alem jeter ah. So [facility] [phone].    ATENCIÓN: Si habla español, tiene a garcía disposición servicios gratuitos de asistencia lingüística. Winifred al [phone].    We comply with applicable federal civil rights laws and [place] laws. We do not discriminate on the basis of race, color, national origin, age, disability, sex, sexual orientation, or gender identity.            Thank you!     Thank you for choosing [facility]  for your care. Our goal is always to provide you with excellent care. Hearing back from our patients is one way we can continue to improve our services. Please take a few minutes to complete the written survey that you may  receive in the mail after your visit with us. Thank you!             Your Updated Medication List - Protect others around you: Learn how to safely use, store and throw away your medicines at www.disposemymeds.org.          This list is accurate as of 5/11/18  4:44 PM.  Always use your most recent med list.                   Brand Name Dispense Instructions for use Diagnosis    calcium carbonate 500 MG chewable tablet    TUMS     Take 1 chew tab by mouth daily        prenatal multivitamin plus iron 27-0.8 MG Tabs per tablet      Take 1 tablet by mouth daily

## 2018-05-16 ENCOUNTER — HOSPITAL ENCOUNTER (OUTPATIENT)
Dept: ULTRASOUND IMAGING | Facility: CLINIC | Age: 28
End: 2018-05-16
Attending: OBSTETRICS & GYNECOLOGY
Payer: COMMERCIAL

## 2018-05-16 ENCOUNTER — HOSPITAL ENCOUNTER (OUTPATIENT)
Dept: CARDIOLOGY | Facility: CLINIC | Age: 28
Discharge: HOME | End: 2018-05-16
Attending: OBSTETRICS & GYNECOLOGY | Admitting: OBSTETRICS & GYNECOLOGY
Payer: COMMERCIAL

## 2018-05-16 ENCOUNTER — OFFICE VISIT (OUTPATIENT)
Dept: PEDIATRIC CARDIOLOGY | Facility: CLINIC | Age: 28
End: 2018-05-16

## 2018-05-16 ENCOUNTER — OFFICE VISIT (OUTPATIENT)
Dept: MATERNAL FETAL MEDICINE | Facility: CLINIC | Age: 28
End: 2018-05-16
Attending: OBSTETRICS & GYNECOLOGY
Payer: COMMERCIAL

## 2018-05-16 VITALS
SYSTOLIC BLOOD PRESSURE: 132 MMHG | WEIGHT: 231.8 LBS | HEART RATE: 91 BPM | DIASTOLIC BLOOD PRESSURE: 81 MMHG | BODY MASS INDEX: 37.41 KG/M2 | RESPIRATION RATE: 20 BRPM

## 2018-05-16 DIAGNOSIS — O35.BXX0 ANOMALY OF HEART OF FETUS AFFECTING PREGNANCY, ANTEPARTUM, SINGLE OR UNSPECIFIED FETUS: ICD-10-CM

## 2018-05-16 DIAGNOSIS — O35.BXX0 ANOMALY OF HEART OF FETUS AFFECTING PREGNANCY, ANTEPARTUM, SINGLE OR UNSPECIFIED FETUS: Primary | ICD-10-CM

## 2018-05-16 PROCEDURE — 76819 FETAL BIOPHYS PROFIL W/O NST: CPT | Performed by: OBSTETRICS & GYNECOLOGY

## 2018-05-16 PROCEDURE — G0463 HOSPITAL OUTPT CLINIC VISIT: HCPCS | Mod: 25,ZF

## 2018-05-16 PROCEDURE — 76816 OB US FOLLOW-UP PER FETUS: CPT

## 2018-05-16 PROCEDURE — 93325 DOPPLER ECHO COLOR FLOW MAPG: CPT

## 2018-05-16 NOTE — NURSING NOTE
Chhaya seen in clinic today for growth/BPP, fetal echo and OBV at 34w5d gestation (see report/notes). VSS. Pt denies bldg/lof/change in discharge/headache/vision changes/chest pain/SOB/edema. Is having occasional contractions, but nothing regular or consistent.  Dr. Roa met with pt and discussed POC. Plan for BPP/NICU/SW consult on Monday 5/21. Weekly BPP and OBV scheduled starting 5/30. She will rotate between Monday's and Wednesday's due to [facility]'s work schedule. A growth US is scheduled in 4 weeks. Plan induction between 39w-39w6d. Patient will begin to look at dates that would work for her, but understands that plan could change at any time. Dr Roa would like GBS collected at next OB visit in 2 weeks. Patient discharged stable and ambulatory.

## 2018-05-16 NOTE — LETTER
2018      RE: Chhaya Thurman  [address] [facility] [address]       Fetal Cardiology Consult    Date of Visit:   2018  Gestational Age: 34 weeks  Due Date:  2018  Delivery:  [facility]      Dear Dr. Roa    I had the opportunity to meet with Chhaya and her partner today for a Fetal Cardiology Consult and Fetal Echocardiography.    Fetal Echo demonstrated Complete balanced atrioventricular septal defect with large primum ASD and moderate inlet VSD. Trivial common AV valve regurgitation. Normal biventricular systolic function ; the left ventricle forms the cardiac apex. Unobstructed outflows. Fetal heart rate was regular. No effusion.    I have reviewed the Fetal Echo findings.  I drew a picture of a normal heart and compared it to that of AV canal.  I discussed the anatomy, physiology and intervention needed after birth.  I also have reviewed the length of stay.    The parents had appropriate questions. I did my best to answer their questions.    Plan:    Deliver at [facility], expecting full term.    Recommend post  echo and cardiology consult.    Thank you for allowing me to participate in Chhaya's care.  Feel free to contact me with questions.    I spend 15 minutes counseling the patient about her fetal echocardiogram findings. All of this time was face to face.    Dr Ciara Bustillo  Pediatric Cardiologist  Director, Fetal Cardiology  [facility]  Phone [phone]

## 2018-05-16 NOTE — PROGRESS NOTES
Fetal Cardiology Consult    Date of Visit:   2018  Gestational Age: 34 weeks  Due Date:  2018  Delivery:  [facility]      Dear Dr. Roa    I had the opportunity to meet with Chhaya and her partner today for a Fetal Cardiology Consult and Fetal Echocardiography.    Fetal Echo demonstrated Complete balanced atrioventricular septal defect with large primum ASD and moderate inlet VSD. Trivial common AV valve regurgitation. Normal biventricular systolic function ; the left ventricle forms the cardiac apex. Unobstructed outflows. Fetal heart rate was regular. No effusion.    I have reviewed the Fetal Echo findings.  I drew a picture of a normal heart and compared it to that of AV canal.  I discussed the anatomy, physiology and intervention needed after birth.  I also have reviewed the length of stay.    The parents had appropriate questions. I did my best to answer their questions.    Plan:    Deliver at [facility], expecting full term.    Recommend post yuli echo and cardiology consult.    Thank you for allowing me to participate in Chhaya's care.  Feel free to contact me with questions.    I spend 15 minutes counseling the patient about her fetal echocardiogram findings. All of this time was face to face.    Dr Ciara Bustillo  Pediatric Cardiologist  Director, Fetal Cardiology  [facility]  Phone [phone]

## 2018-05-16 NOTE — MR AVS SNAPSHOT
After Visit Summary   2018    Chhaya Thurman    MRN: [id number]           Patient Information     Date Of Birth          1990        Visit Information        Provider Department      2018 11:00 AM Kacy Roa MD [facility]aternal [facility]etal [facility] [place]        Today's Diagnoses     Anomaly of heart of fetus affecting pregnancy, antepartum, single or unspecified fetus           Follow-ups after your visit        Additional Services     MFM Office Visit       , ,             MFM Office Visit           [facility] Office Visit                 Your next 10 appointments already scheduled     May 21, 2018  9:00 AM CDT   M BPP SINGLE with URMFMUSR3   [facility]etal [facility] Ultrasound - [place] ([facility])    [address]  [facility] [address]   [phone]            May 21, 2018  9:30 AM CDT   Radiology MD with UR JERAMIE LUNDBERG   [facility]aternal [facility]etal [facility] - [facility])    [address]  [facility] [address]   [phone]           Please arrive at the time given for your first appointment. This visit is used internally to schedule the physician's time during your ultrasound.            May 21, 2018 10:00 AM CDT    Consult with UR  MD   [facility] [place] ([facility])    [address]  [facility] [address]   [phone]            May 21, 2018 10:00 AM CDT    Visit with UR EXAM RM 2   [facility] [place] ([facility])    [address]  [facility] [address]   [phone]            May 30, 2018 11:00 AM CDT   Ob Follow Up with UR EXAM RM 2   [facility] [place] ([facility])    [address]  [facility] [address]   [phone]            May  30, 2018 11:45 AM CDT   MFM BPP SINGLE with [id number]   [facility]ound [facility] [place] ([facility])    [address]  [facility] [address]   [phone]            Jun 04, 2018  1:30 PM CDT   MFM BPP SINGLE with URMFMUSR2   [facility] [place] ([facility])    [address]  [facility] [address]   [phone]            Jun 04, 2018  2:15 PM CDT   Ob Follow Up with UR EXAM RM 2   [facility] [place] ([facility])    [address]  [facility] [address]   [phone]            Jun 13, 2018  1:30 PM CDT   MFM US COMPRE SINGLE F/U with [id number]   [facility] - [place] ([facility])    [address]  [facility] [address]   [phone]           Wear comfortable clothes and leave your valuables at home.            Jun 13, 2018  2:15 PM CDT   Ob Follow Up with UR EXAM RM 2   [facility] [place] ([facility])    [address]  [facility] [address]   [phone]              Future tests that were ordered for you today     Open Standing Orders        Priority Remaining Interval Expires Ordered    MFM Office Visit Routine 1/1 5/16/2019 5/16/2018    MFM Office Visit Routine 1/1 5/16/2019 5/16/2018    MFM Office Visit Routine 3/3  5/16/2019 5/16/2018          Open Future Orders        Priority Expected Expires Ordered    MFM BPP Single Routine 5/21/2018 3/16/2019 5/16/2018    MFM BPP Single Routine 5/30/2018 3/16/2019 5/16/2018    MFM BPP Single Routine 6/4/2018 3/16/2019 5/16/2018    MFM US Comprehensive Single F/U Routine 6/13/2018 5/16/2019 5/16/2018            Who to contact     If you have questions or need follow up information about today's clinic  visit or your schedule please contact [employer] MATERNAL FETAL MEDICINE [facility] directly at [phone].  Normal or non-critical lab and imaging results will be communicated to you by MyChart, letter or phone within 4 business days after the clinic has received the results. If you do not hear from us within 7 days, please contact the clinic through Tristhart or phone. If you have a critical or abnormal lab result, we will notify you by phone as soon as possible.  Submit refill requests through incuBET or call your pharmacy and they will forward the refill request to us. Please allow 3 business days for your refill to be completed.          Additional Information About Your Visit        TristharDCMobility Information     incuBET gives you secure access to your electronic health record. If you see a primary care provider, you can also send messages to your care team and make appointments. If you have questions, please call your primary care clinic.  If you do not have a primary care provider, please call [phone] and they will assist you.        Care EveryWhere ID     This is your Care EveryWhere ID. This could be used by other organizations to access your [place] medical records  [id number]        Your Vitals Were     Pulse Respirations Last Period BMI (Body Mass Index)          91 20 09/16/2017 37.41 kg/m2         Blood Pressure from Last 3 Encounters:   05/16/18 132/81   04/25/18 137/78   04/20/18 128/69    Weight from Last 3 Encounters:   05/16/18 105.1 kg (231 lb 12.8 oz)   04/25/18 101.7 kg (224 lb 1.6 oz)   04/20/18 102.1 kg (225 lb)              We Performed the Following     MFM Office Visit        Primary Care Provider Office Phone # Fax #    Agnes Stanotn PA-C [phone] [address] [place] [address]        Equal Access to Services     JOHANA WHITE : Cam Nair, wavaleri michel, qaybta kaaldemarcus duff, mica deluca. So [facility]  [phone].    ATENCIÓN: Si arleen haji, tiene a garcía disposición servicios gratuitos de asistencia lingüística. Winifred al [phone].    We comply with applicable federal civil rights laws and [place] laws. We do not discriminate on the basis of race, color, national origin, age, disability, sex, sexual orientation, or gender identity.            Thank you!     Thank you for choosing [facility]  for your care. Our goal is always to provide you with excellent care. Hearing back from our patients is one way we can continue to improve our services. Please take a few minutes to complete the written survey that you may receive in the mail after your visit with us. Thank you!             Your Updated Medication List - Protect others around you: Learn how to safely use, store and throw away your medicines at www.disposemymeds.org.          This list is accurate as of 5/16/18 11:39 AM.  Always use your most recent med list.                   Brand Name Dispense Instructions for use Diagnosis    calcium carbonate 500 MG chewable tablet    TUMS     Take 1 chew tab by mouth daily        prenatal multivitamin plus iron 27-0.8 MG Tabs per tablet      Take 1 tablet by mouth daily

## 2018-05-16 NOTE — MR AVS SNAPSHOT
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, antepartum, single or unspecified fetus    -  1       Follow-ups after your visit        Your next 10 appointments already scheduled     May 21, 2018  9:00 AM CDT   JERAMIE COLLINS SINGLE with URMFMUSR3   [facility])    [address]  [facility] [address]   [phone]            May 21, 2018  9:30 AM CDT   Radiology MD with UR JERAMIE LUNDBERG   [facility] [place] ([facility])    [address]  [facility] [address]   [phone]           Please arrive at the time given for your first appointment. This visit is used internally to schedule the physician's time during your ultrasound.            May 21, 2018 10:00 AM CDT    Consult with UR  MD   [facility] [place] ([facility])    [address]  [facility] [address]   [phone]            May 21, 2018 10:00 AM CDT    Visit with UR EXAM RM 2   [facility] [place] ([facility])    [address]  [facility] [address]   [phone]            May 30, 2018 11:00 AM CDT   Ob Follow Up with UR EXAM RM 2   [facility] [place] ([facility])    [address]  [facility] [address]   [phone]            May 30, 2018 11:45 AM CDT   JERAMIE COLLINS SINGLE with URMFMUSR6   [facility] [place] ([facility])    [address]  [facility] [place] [facility] [address]   [phone]            Jun 04, 2018  1:30 PM CDT   MFM BPP SINGLE with URMFMUSR2   [facility] [place] ([facility])    [address]  [facility] [place] [facility] [address]   [phone]            Jun 04, 2018  2:15 PM CDT   Ob Follow Up with UR EXAM RM 2   [facility] [place] ([facility])    [address]  [facility] [address]   [phone]            Jun 13, 2018  1:30 PM CDT   MFM US COMPRE SINGLE F/U with URMFMUSR6   [facility] - [place] ([facility])    [address]  [facility] [place] [facility] [address]   [phone]           Wear comfortable clothes and leave your valuables at home.            Jun 13, 2018  2:15 PM CDT   Ob Follow Up with UR EXAM  2   [facility] [place] ([facility])    [address]  [facility] [address]   [phone]              Future tests that were ordered for you today     Open Standing Orders        Priority Remaining Interval Expires Ordered    MFM Office Visit Routine 1/1 5/16/2019 5/16/2018    MFM Office Visit Routine 1/1 5/16/2019 5/16/2018    MFM Office Visit Routine 3/3  5/16/2019 5/16/2018          Open Future Orders        Priority Expected Expires Ordered    MFM BPP Single Routine 5/21/2018 3/16/2019 5/16/2018    MFM BPP Single Routine 5/30/2018 3/16/2019 5/16/2018    MFM BPP Single Routine 6/4/2018 3/16/2019 5/16/2018    MFM US Comprehensive Single F/U Routine 6/13/2018 5/16/2019 5/16/2018            Who to contact     Please call your clinic at [phone] to:    Ask questions about your health    Make or cancel appointments    Discuss your medicines    Learn about your test results    Speak to your doctor            Additional  Information About Your Visit        [employer] Information     InspireMD gives you secure access to your electronic health record. If you see a primary care provider, you can also send messages to your care team and make appointments. If you have questions, please call your primary care clinic.  If you do not have a primary care provider, please call [phone] and they will assist you.      InspireMD is an electronic gateway that provides easy, online access to your medical records. With InspireMD, you can request a clinic appointment, read your test results, renew a prescription or communicate with your care team.     To access your existing account, please contact your [facility] or call [phone] for assistance.        Care EveryWhere ID     This is your Care EveryWhere ID. This could be used by other organizations to access your [facility] medical records  [id number]        Your Vitals Were     Last Period                   09/16/2017            Blood Pressure from Last 3 Encounters:   05/16/18 132/81   04/25/18 137/78   04/20/18 128/69    Weight from Last 3 Encounters:   05/16/18 231 lb 12.8 oz (105.1 kg)   04/25/18 224 lb 1.6 oz (101.7 kg)   04/20/18 225 lb (102.1 kg)              Today, you had the following     No orders found for display       Primary Care Provider Office Phone # Fax #    Agnes Stanton PA-C [phone] [address] [place], [place] [address]        Equal Access to Services     JOHANA WHITE : Hadii bayron summerso Korey, waaxda luqadaha, qaybta kaalmada alemyada, mica deluca. So [facility] [phone].    ATENCIÓN: Si habla español, tiene a garcía disposición servicios gratuitos de asistencia lingüística. Llame al [phone].    We comply with applicable federal civil rights laws and [place] laws. We do not discriminate on the basis of race, color, national origin, age, disability, sex, sexual orientation, or  gender identity.            Thank you!     Thank you for choosing [facility] CARDIOLOGY  for your care. Our goal is always to provide you with excellent care. Hearing back from our patients is one way we can continue to improve our services. Please take a few minutes to complete the written survey that you may receive in the mail after your visit with us. Thank you!             Your Updated Medication List - Protect others around you: Learn how to safely use, store and throw away your medicines at www.disposemymeds.org.          This list is accurate as of 5/16/18 11:01 AM.  Always use your most recent med list.                   Brand Name Dispense Instructions for use Diagnosis    calcium carbonate 500 MG chewable tablet    TUMS     Take 1 chew tab by mouth daily        prenatal multivitamin plus iron 27-0.8 MG Tabs per tablet      Take 1 tablet by mouth daily

## 2018-05-16 NOTE — PROGRESS NOTES
"Patient seen today for established patient visit at 34w5d, pregnancy complicated by fetal presumed trisomy 21 based on NIPT screen and balanced AV canal cardiac lesion.      No contractions, leaking of fluid. Reports normal fetal movement . See nursing documentation for vitals today. No elevation in BP.     Please see \"Imaging\" tab under \"Chart Review\" for details of today's US.  Gen: Pt AAOx3, NAD  Abd: Gravid, soft, NT  Ext: No edema  GBS will be done at next OB visit     A/P [id number] 34w5d with fetal AV canal and presumed trisomy 21.  Maternal and fetal status no concerns  Follow up Monday for NICU tour and BPP, OB visit and BPP the following week  Plan delivery at [id number]. Will check cervix at 38 weeks to determine timing of admission (am vs. Pm). Will need to schedule induction.      Kacy Roa MD     I spent a total of 10 minutes face-to-face with Chhaya and her  during today's office visit.  Over 50% of this time was spent counseling the patient and/or coordinating care regarding pregnancy complicated by fetal cardiac anomaly and suspected trisomy 21.  See note for details.       "

## 2018-05-21 ENCOUNTER — ALLIED HEALTH/NURSE VISIT (OUTPATIENT)
Dept: MATERNAL FETAL MEDICINE | Facility: CLINIC | Age: 28
End: 2018-05-21
Attending: OBSTETRICS & GYNECOLOGY
Payer: COMMERCIAL

## 2018-05-21 ENCOUNTER — PRENATAL OFFICE VISIT (OUTPATIENT)
Dept: CARE COORDINATION | Facility: CLINIC | Age: 28
End: 2018-05-21

## 2018-05-21 ENCOUNTER — HOSPITAL ENCOUNTER (OUTPATIENT)
Dept: ULTRASOUND IMAGING | Facility: CLINIC | Age: 28
Discharge: HOME | End: 2018-05-21
Attending: OBSTETRICS & GYNECOLOGY | Admitting: OBSTETRICS & GYNECOLOGY
Payer: COMMERCIAL

## 2018-05-21 DIAGNOSIS — O35.BXX0 ANOMALY OF HEART OF FETUS AFFECTING PREGNANCY, ANTEPARTUM, SINGLE OR UNSPECIFIED FETUS: ICD-10-CM

## 2018-05-21 DIAGNOSIS — O09.93 SUPERVISION OF HIGH RISK PREGNANCY, UNSPECIFIED, THIRD TRIMESTER: Primary | ICD-10-CM

## 2018-05-21 DIAGNOSIS — Z71.9 ENCOUNTER FOR COUNSELING: Primary | ICD-10-CM

## 2018-05-21 DIAGNOSIS — O35.BXX1 PREGNANCY COMPLICATED BY FETAL CONGENITAL HEART DISEASE, FETUS 1: ICD-10-CM

## 2018-05-21 DIAGNOSIS — O35.13X0 FETAL TRISOMY 21 AFFECTING CARE OF MOTHER, ANTEPARTUM, SINGLE OR UNSPECIFIED FETUS: ICD-10-CM

## 2018-05-21 PROCEDURE — 76819 FETAL BIOPHYS PROFIL W/O NST: CPT

## 2018-05-21 NOTE — NURSING NOTE
Chhaya presents to [facility] with her .  Pt seen for BPP and NICU/SW consult due to pregnancy c/b CHD, T21 by NIPT.  Patient met with Shyann Brooks, PRISCILA.  Also present for NICU/SW consult Spring Alves RN, PCC and Alley Chinchilla RN, PCC.    Spring Alves RN

## 2018-05-21 NOTE — PROGRESS NOTES
[facility]  MATERNAL CHILD HEALTH   SOCIAL WORK PROGRESS NOTE    DATA:     SW participated in a NICU consult with Dr. Blancas. Pt, Chhaya Thurman ( 1990 / contact [phone]), is a  26 y/o female. Baby girls intended name is Ralph. FOB is Nahid Gama. Family resides in [place], [place]. During NICU consult, parents expressed feeling well supported by the multidisciplinary team.    Per H&P, fetal diagnoses include:    Balanced AV canal defect    NIPT +T21    INTERVENTION:     Introduced self and SW role. Chart review. SW participated in a NICU consult in [facility] clinic today, 2018. SW provided family with SW contact information for ongoing service needs.    ASSESSMENT:     Couple appeared engaged and asked appropriate questions during their NICU consult. Prenatal consults with care providers appears to be a comfort to couple as they prepare for the arrival of their baby girl. Couple were appreciative of and receptive to SW supportive services; family aware of ongoing availability of this SW.    PLAN:     SW plans to continue following pt family throughout their Maternal-Child Health journey to offer ongoing psychosocial support and access to appropriate resources. SW plan to continue collaborating with the multidisciplinary team.    SARITA Alonso, [facility]  Clinical   Maternal Child Health  [facility]  Phone:   [phone]  Pager:    [phone]

## 2018-05-21 NOTE — MR AVS SNAPSHOT
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, antepartum, single or unspecified fetus           Follow-ups after your visit        Your next 10 appointments already scheduled     May 30, 2018 11:00 AM CDT   Ob Follow Up with UR EXAM  2   [facility] [place] ([facility])    [address]  [facility] [address]   [phone]            May 30, 2018 11:45 AM CDT   MFM BPP SINGLE with [id number]   [facility] [place] ([facility])    [address]  [facility] [address]   [phone]            Jun 04, 2018  1:30 PM CDT   MFM BPP SINGLE with URMFMUSR2   [facility] [place] ([facility])    [address]  [facility] [address]   [phone]            Jun 04, 2018  2:15 PM CDT   Ob Follow Up with UR EXAM  2   [facility] [place] ([facility])    [address]  [facility] [address]   [phone]            Jun 13, 2018  1:30 PM CDT   MFM US COMPRE SINGLE F/U with URMFMUSR6   [facility] [place] ([facility])    [address]  [facility] [address]   [phone]           Wear comfortable clothes and leave your valuables at home.            Jun 13, 2018  2:15 PM CDT   Ob Follow Up with UR EXAM  2   [facility] [place] ([facility])    [address]  [place] [address]   [phone]              Who to contact     If you have questions or need follow up information about today's clinic visit or your schedule please contact [facility] MATERNAL FETAL MEDICINE [facility] directly at [phone].  Normal or non-critical lab and imaging results will be communicated to you by MyChart, letter or phone within 4 business days after the clinic has received the results. If you do not hear from us within 7 days, please contact the clinic through Invenihart or phone. If you have a critical or abnormal lab result, we will notify you by phone as soon as possible.  Submit refill requests through [employer] or call your pharmacy and they will forward the refill request to us. Please allow 3 business days for your refill to be completed.          Additional Information About Your Visit        Inven[employer]art Information     [employer] gives you secure access to your electronic health record. If you see a primary care provider, you can also send messages to your care team and make appointments. If you have questions, please call your primary care clinic.  If you do not have a primary care provider, please call [phone] and they will assist you.        Care EveryWhere ID     This is your Care EveryWhere ID. This could be used by other organizations to access your [place] medical records  [id number]        Your Vitals Were     Last Period                   09/16/2017            Blood Pressure from Last 3 Encounters:   05/16/18 132/81   04/25/18 137/78   04/20/18 128/69    Weight from Last 3 Encounters:   05/16/18 105.1 kg (231 lb 12.8 oz)   04/25/18 101.7 kg (224 lb 1.6 oz)   04/20/18 102.1 kg (225 lb)              We Performed the Following     MFM Office Visit        Primary Care Provider Office Phone # Fax #    Agnes Stanton PA-C [phone] [address] [place] [address]        Equal Access to Services     JOHANA WHITE : Cam Nair  sánchez michel, malia alanlizette victor mkiarra, mica quintinin hayaan victor mtung quirinocarlton lamaycolkerri ah. So [facility] [phone].    ATENCIÓN: Si danikala adithya, tiene a garcía disposición servicios gratuitos de asistencia lingüística. Llame al [phone].    We comply with applicable federal civil rights laws and [place] laws. We do not discriminate on the basis of race, color, national origin, age, disability, sex, sexual orientation, or gender identity.            Thank you!     Thank you for choosing [facility]  for your care. Our goal is always to provide you with excellent care. Hearing back from our patients is one way we can continue to improve our services. Please take a few minutes to complete the written survey that you may receive in the mail after your visit with us. Thank you!             Your Updated Medication List - Protect others around you: Learn how to safely use, store and throw away your medicines at www.disposemymeds.org.          This list is accurate as of 5/21/18 11:59 PM.  Always use your most recent med list.                   Brand Name Dispense Instructions for use Diagnosis    calcium carbonate 500 MG chewable tablet    TUMS     Take 1 chew tab by mouth daily        prenatal multivitamin plus iron 27-0.8 MG Tabs per tablet      Take 1 tablet by mouth daily

## 2018-05-21 NOTE — PROGRESS NOTES
"Please see \"Imaging\" tab under \"Chart Review\" for details of today's US.    Norma Mendoza, DO    "

## 2018-05-21 NOTE — MR AVS SNAPSHOT
After Visit Summary   5/21/2018    Chhaya Thurman    MRN: [id number]           Patient Information     Date Of Birth          1990        Visit Information        Provider Department      5/21/2018 9:30 AM Norma Mendoza,  [facility] [place]        Today's Diagnoses     Supervision of high risk pregnancy, unspecified, third trimester    -  1    Pregnancy complicated by fetal congenital heart disease, fetus 1        Fetal trisomy 21 affecting care of mother, antepartum, single or unspecified fetus           Follow-ups after your visit        Your next 10 appointments already scheduled     May 30, 2018 11:00 AM CDT   Ob Follow Up with UR EXAM RM 2   [facility] [place] ([facility])    [address]  [facility] [address]   [phone]            May 30, 2018 11:45 AM CDT   MFM BPP SINGLE with [id number]   [facility] [place] ([facility])    [address]  [facility] [address]   [phone]            Jun 04, 2018  1:30 PM CDT   MFM BPP SINGLE with URMFMUSR2   [facility] [place] ([facility])    [address]  [facility] [address]   [phone]            Jun 04, 2018  2:15 PM CDT   Ob Follow Up with UR EXAM RM 2   [facility] [place] ([facility])    [address]  [facility] [address]   [phone]            Jun 13, 2018  1:30 PM CDT   MFM US COMPRE SINGLE F/U with URMFMUSR6   [facility] [place] ([facility])    [address]  [facility] [address]   [phone]           Wear comfortable clothes and leave your valuables at home.            Jun 13, 2018  2:15 PM CDT   Ob  Follow Up with UR EXAM RM 2   [facility] ([facility])    [address]  [place] [address]   [phone]              Who to contact     If you have questions or need follow up information about today's clinic visit or your schedule please contact [facility] MATERNAL FETAL MEDICINE [facility] directly at [phone].  Normal or non-critical lab and imaging results will be communicated to you by Backplanehart, letter or phone within 4 business days after the clinic has received the results. If you do not hear from us within 7 days, please contact the clinic through Backplanehart or phone. If you have a critical or abnormal lab result, we will notify you by phone as soon as possible.  Submit refill requests through [employer]) or call your pharmacy and they will forward the refill request to us. Please allow 3 business days for your refill to be completed.          Additional Information About Your Visit        [employer]plane[employer] Information     [employer] ([employer]) gives you secure access to your electronic health record. If you see a primary care provider, you can also send messages to your care team and make appointments. If you have questions, please call your primary care clinic.  If you do not have a primary care provider, please call [phone] and they will assist you.        Care EveryWhere ID     This is your Care EveryWhere ID. This could be used by other organizations to access your [place] medical records  [id number]        Your Vitals Were     Last Period                   09/16/2017            Blood Pressure from Last 3 Encounters:   05/16/18 132/81   04/25/18 137/78   04/20/18 128/69    Weight from Last 3 Encounters:   05/16/18 105.1 kg (231 lb 12.8 oz)   04/25/18 101.7 kg (224 lb 1.6 oz)   04/20/18 102.1 kg (225 lb)              Today, you had the following     No orders found for display       Primary Care Provider Office Phone # Fax #    Agnes Stanton,  GABRIELA [phone]       [address] [place] [address]        Equal Access to Services     JOHANA WHITE : Hadii aad ku hadnikkie Nair, sánchez kwasireggieha, malia alanlizette duff, mica deluca. So [facility] [phone].    ATENCIÓN: Si habla español, tiene a garcía disposición servicios gratuitos de asistencia lingüística. Llame al [phone].    We comply with applicable federal civil rights laws and [place] laws. We do not discriminate on the basis of race, color, national origin, age, disability, sex, sexual orientation, or gender identity.            Thank you!     Thank you for choosing [facility]  for your care. Our goal is always to provide you with excellent care. Hearing back from our patients is one way we can continue to improve our services. Please take a few minutes to complete the written survey that you may receive in the mail after your visit with us. Thank you!             Your Updated Medication List - Protect others around you: Learn how to safely use, store and throw away your medicines at www.disposemymeds.org.          This list is accurate as of 5/21/18 10:51 AM.  Always use your most recent med list.                   Brand Name Dispense Instructions for use Diagnosis    calcium carbonate 500 MG chewable tablet    TUMS     Take 1 chew tab by mouth daily        prenatal multivitamin plus iron 27-0.8 MG Tabs per tablet      Take 1 tablet by mouth daily

## 2018-05-21 NOTE — MR AVS SNAPSHOT
After Visit Summary   5/21/2018    Chhaya Thurman    MRN: [id number]           Patient Information     Date Of Birth          1990        Visit Information        Provider Department      5/21/2018 9:55 AM Shyann Lora LICSW [facility]        Today's Diagnoses     Encounter for counseling    -  1       Follow-ups after your visit        Your next 10 appointments already scheduled     May 30, 2018 11:00 AM CDT   Ob Follow Up with UR EXAM RM 2   [facility] [place] ([facility])    [address]  [facility] [address]   [phone]            May 30, 2018 11:45 AM CDT   MFM BPP SINGLE with URMFMUSR6   [facility] - [facility])    [address]  [facility] [address]   [phone]            Jun 04, 2018  1:30 PM CDT   MFM BPP SINGLE with URMFMUSR2   [facility] [place] ([facility])    [address]  [facility] [address] [phone]            Jun 04, 2018  2:15 PM CDT   Ob Follow Up with UR EXAM RM 2   [facility] [place] ([facility])    [address]  [facility] [address]   [phone]            Jun 13, 2018  1:30 PM CDT   MFM US COMPRE SINGLE F/U with [id number]   [facility] [place] ([facility])    [address]  [facility] [address] [phone]           Wear comfortable clothes and leave your valuables at home.            Jun 13, 2018  2:15 PM CDT   Ob Follow Up with UR EXAM RM 2   [facility] [place] ([facility])    [address]  [facility] [address]   [phone]              Who to contact     If you have  questions or need follow up information about today's clinic visit or your schedule please contact [facility] directly at No information on file..  Normal or non-critical lab and imaging results will be communicated to you by Medalliahart, letter or phone within 4 business days after the clinic has received the results. If you do not hear from us within 7 days, please contact the clinic through Medalliahart or phone. If you have a critical or abnormal lab result, we will notify you by phone as soon as possible.  Submit refill requests through Verical or call your pharmacy and they will forward the refill request to us. Please allow 3 business days for your refill to be completed.          Additional Information About Your Visit        Medalliahart Information     Verical gives you secure access to your electronic health record. If you see a primary care provider, you can also send messages to your care team and make appointments. If you have questions, please call your primary care clinic.  If you do not have a primary care provider, please call [phone] and they will assist you.        Care EveryWhere ID     This is your Care EveryWhere ID. This could be used by other organizations to access your [place] medical records  [id number]        Your Vitals Were     Last Period                   09/16/2017            Blood Pressure from Last 3 Encounters:   05/16/18 132/81   04/25/18 137/78   04/20/18 128/69    Weight from Last 3 Encounters:   05/16/18 105.1 kg (231 lb 12.8 oz)   04/25/18 101.7 kg (224 lb 1.6 oz)   04/20/18 102.1 kg (225 lb)              Today, you had the following     No orders found for display       Primary Care Provider Office Phone # Fax #    Agnes Stanton PA-C [phone] [address] [place] [address]        Equal Access to Services     JOHANA WHITE AH: Cam Nair, sánchez michel, malia duff, mica jeter  ah. So [facility] [phone].    ATENCIÓN: Si habla adithya, tiene a garcía disposición servicios gratuitos de asistencia lingüística. Winifred al [phone].    We comply with applicable federal civil rights laws and [place] laws. We do not discriminate on the basis of race, color, national origin, age, disability, sex, sexual orientation, or gender identity.            Thank you!     Thank you for choosing [facility]  for your care. Our goal is always to provide you with excellent care. Hearing back from our patients is one way we can continue to improve our services. Please take a few minutes to complete the written survey that you may receive in the mail after your visit with us. Thank you!             Your Updated Medication List - Protect others around you: Learn how to safely use, store and throw away your medicines at www.disposemymeds.org.          This list is accurate as of 5/21/18 10:11 AM.  Always use your most recent med list.                   Brand Name Dispense Instructions for use Diagnosis    calcium carbonate 500 MG chewable tablet    TUMS     Take 1 chew tab by mouth daily        prenatal multivitamin plus iron 27-0.8 MG Tabs per tablet      Take 1 tablet by mouth daily

## 2018-05-21 NOTE — PROGRESS NOTES
Visit Date:   2018      May 21, 2018      Marylou Mendez MD   [facility]    [address] [place] [address]      Dear Dr. Mendez:      I had the pleasure of seeing your patient, Chhaya Thurman in the [facility] for  consultation at the request of Dr. Norma Mendoza of the [facility].  Also present for the clinic visit was her partner, as well as Spring Alves, patient care coordinator and Shyann Lora, maternal child .        Ms. Thurman is presently at 35 weeks' gestation with a baby girl, Ralph, who has antenatally been diagnosed as having a balanced AV canal with noninvasive pregnancy testing showing trisomy 21.  I reviewed the overall plans for care for baby Ralph.  There will be a  team present at the delivery, and following some time to be with the family, including some skin to skin time on mom's chest if Ralph is clinically stable, Ralph will be admitted to the NICU at the [facility].  At this time, she will be closely monitored from a cardiorespiratory standpoint with plans to get an echocardiogram after birth.        I described overall plans for care will be the initiation of breast feedings, and encouraging breast feedings as able.  I described that commonly babies with congenital heart disease do need supplemental fortified breast milk feedings to provide adequate calories for growth, given the congenital heart disease.  I described the length of stay will likely be several days while this is sorted out, including the transition to all oral feedings.  I described the overall makeup of the medical team and healthcare providers in the NICU.  I described the typical structure of rounds to them, inviting them to be present during rounds, and assuring them that there will always be someone available to answer any questions. I discussed other aspects of the NICU, including our welcoming  policies and a tour of the NICU, the  family care center and the Pediatric Cardiology floor was provided to them following my consultation along with my contact information should any questions arise after this visit.      Thank you very much for the opportunity to meet this delightful couple, and we look forward to caring for Ashy and for all of them at the [facility].       Sincerely,      Josh Blancas MD FAAP   NICU Follow-up Clinic  Medical Director, NICU  Professor of Pediatrics   Total time spent was 30 minutes, 100% of the time spent in direct patient counseling.            D: 2018   T: 2018   MT: VALORIE      Name:     LUIS CABRAL   MRN:      [id number]        Account:      [id number]   :      1990           Visit Date:   2018      Document: [id number]       cc: Copy for Patient        Normaalfred Coombs MD

## 2018-05-30 ENCOUNTER — OFFICE VISIT (OUTPATIENT)
Dept: MATERNAL FETAL MEDICINE | Facility: CLINIC | Age: 28
End: 2018-05-30
Attending: OBSTETRICS & GYNECOLOGY
Payer: COMMERCIAL

## 2018-05-30 ENCOUNTER — HOSPITAL ENCOUNTER (OUTPATIENT)
Dept: ULTRASOUND IMAGING | Facility: CLINIC | Age: 28
Discharge: HOME | End: 2018-05-30
Attending: OBSTETRICS & GYNECOLOGY | Admitting: OBSTETRICS & GYNECOLOGY
Payer: COMMERCIAL

## 2018-05-30 VITALS
HEART RATE: 87 BPM | SYSTOLIC BLOOD PRESSURE: 133 MMHG | BODY MASS INDEX: 38.75 KG/M2 | WEIGHT: 240.1 LBS | DIASTOLIC BLOOD PRESSURE: 76 MMHG

## 2018-05-30 DIAGNOSIS — O35.BXX0 ANOMALY OF HEART OF FETUS AFFECTING PREGNANCY, ANTEPARTUM, SINGLE OR UNSPECIFIED FETUS: ICD-10-CM

## 2018-05-30 PROCEDURE — 87653 STREP B DNA AMP PROBE: CPT | Performed by: OBSTETRICS & GYNECOLOGY

## 2018-05-30 PROCEDURE — 76819 FETAL BIOPHYS PROFIL W/O NST: CPT

## 2018-05-30 PROCEDURE — G0463 HOSPITAL OUTPT CLINIC VISIT: HCPCS | Mod: 25,ZF

## 2018-05-30 NOTE — PROGRESS NOTES
"M OB return visit    S: Patient seen today for established patient visit at 36w5d, pregnancy complicated by fetal presumed trisomy 21 based on NIPT screen and balanced AV canal cardiac lesion.      No contractions, leaking of fluid. Reports normal fetal movement . See nursing documentation for vitals today. Initial BP was 142/85; repeat is 133/76.  Had a mild headache yesterday which resolved on its own.  Denies changes in vision, RUQ pain.  Feeling some pain over the pubic symphysis    Please see \"Imaging\" tab under \"Chart Review\" for details of today's US. BPP 8/8, normal ANITRA 8.5 cm, MVP 5 cm.  Gen: Pt AAOx3, NAD  Abd: Gravid, soft, NT  Ext: No edema  GBS collected today     A/P [id number] 36w5d with fetal AV canal and presumed trisomy 21.  Maternal and fetal status no concerns  1.  Fetal balanced AV canal defect  -weekly BPP  -follow up ultrasound for fetal growth scheduled   -s/p NICU tour and consult  -per pediatric cardiology, no further fetal echocardiogram needed, will need post yuli echocardiogram after delivery    2.  Prenatal care  -delivery planned between 39w0d and 39w6d. SVE at 38 weeks, to determine induction status.  -GBS collected today     I served as scribe for Dr. Kacy Roa.    Irma Flowers MD  [facility] Fellow     This note, as scribed, accurately reflects the examination, my impressions, and the plan as I discussed it with the patient.     Kacy Roa      "

## 2018-05-30 NOTE — MR AVS SNAPSHOT
After Visit Summary   5/30/2018    Chhaya Thurman    MRN: [id number]           Patient Information     Date Of Birth          1990        Visit Information        Provider Department      5/30/2018 11:00 AM Kacy Roa MD [facility] Maternal Fetal Medicine [facility]        Today's Diagnoses     Anomaly of heart of fetus affecting pregnancy, antepartum, single or unspecified fetus           Follow-ups after your visit        Your next 10 appointments already scheduled     Jun 04, 2018  1:30 PM CDT   MFM BPP SINGLE with URMFMUSR2   [facility] Maternal Fetal Medicine Ultrasound - [facility])    [address]  [facility] [address]   [phone]            Jun 04, 2018  2:15 PM CDT   Ob Follow Up with UR EXAM  2   [facility]etal [facility] ([facility])    [address]  [facility] [address]   [phone]            Jun 13, 2018  1:30 PM CDT   MFM US COMPRE SINGLE F/U with URMFMUSR6   [facility] Maternal Fetal Medicine Ultrasound - [place] ([facility])    [address]  [facility] [address] [phone]           Wear comfortable clothes and leave your valuables at home.            Jun 13, 2018  2:15 PM CDT   Ob Follow Up with UR EXAM  2   [facility]aternal [facility]etal Medicine - [place] ([facility])    [address]  [facility] [address]   [phone]              Who to contact     If you have questions or need follow up information about today's clinic visit or your schedule please contact [facility]ERNAL FETAL MEDICINE [facility] directly at [phone].  Normal or non-critical lab and imaging results will be communicated to you by MyChart, letter or phone within 4 business days after the clinic has received the results. If you do not hear from us within 7 days, please  contact the clinic through Concentra or phone. If you have a critical or abnormal lab result, we will notify you by phone as soon as possible.  Submit refill requests through Concentra or call your pharmacy and they will forward the refill request to us. Please allow 3 business days for your refill to be completed.          Additional Information About Your Visit        Jobbrhart Information     Concentra gives you secure access to your electronic health record. If you see a primary care provider, you can also send messages to your care team and make appointments. If you have questions, please call your primary care clinic.  If you do not have a primary care provider, please call [phone] and they will assist you.        Care EveryWhere ID     This is your Care EveryWhere ID. This could be used by other organizations to access your [place] medical records  [id number]        Your Vitals Were     Pulse Last Period BMI (Body Mass Index)             87 09/16/2017 38.75 kg/m2          Blood Pressure from Last 3 Encounters:   05/30/18 133/76   05/16/18 132/81   04/25/18 137/78    Weight from Last 3 Encounters:   05/30/18 108.9 kg (240 lb 1.6 oz)   05/16/18 105.1 kg (231 lb 12.8 oz)   04/25/18 101.7 kg (224 lb 1.6 oz)              We Performed the Following     Group B strep PCR     MFM Office Visit        Primary Care Provider Office Phone # Fax #    Agnes Stanton PA-C [phone] [address] [place], [place] [id number]        Equal Access to Services     JOHANA WHITE : Hadii aad ku hadasho Soomaali, waaxda luqadaha, qaybta kaalmada adeegyada, mica jeter . So [facility] [phone].    ATENCIÓN: Si habla adithya, tiene a garcía disposición servicios gratuitos de asistencia lingüística. Llame al [phone].    We comply with applicable federal civil rights laws and [place] laws. We do not discriminate on the basis of race, color, national origin, age, disability, sex, sexual  orientation, or gender identity.            Thank you!     Thank you for choosing [facility]  for your care. Our goal is always to provide you with excellent care. Hearing back from our patients is one way we can continue to improve our services. Please take a few minutes to complete the written survey that you may receive in the mail after your visit with us. Thank you!             Your Updated Medication List - Protect others around you: Learn how to safely use, store and throw away your medicines at www.disposemymeds.org.          This list is accurate as of 5/30/18 12:19 PM.  Always use your most recent med list.                   Brand Name Dispense Instructions for use Diagnosis    calcium carbonate 500 MG chewable tablet    TUMS     Take 1 chew tab by mouth daily        prenatal multivitamin plus iron 27-0.8 MG Tabs per tablet      Take 1 tablet by mouth daily

## 2018-05-30 NOTE — NURSING NOTE
Chhaya seen in clinic today for BPP and OBV for fetus with CHD and positive NIPT for T21 at 36w5d gestation (see report/notes). Initial BP elevated but recheck WNL. Complains of mid pelvis pain with certain movements and occasional contractions throughout the day. Had a HA yesterday, but denies a HA today. States the HA felt similar to the migraines she usually gets. No edema noted but patient complains of some swelling in hands and feet. Denies LOF or vaginal bleeding. Positive fetal movement. Dr. Flowers and Dr. Roa met with pt and discussed POC. GBS collected and sent to lab. Needs PHQ-9 next visit. ANITRA slightly lower on BPP. Patient encouraged to stay hydrated, especially during these warm days. Plan to follow up next week for BPP and OBV. Pt discharged stable and ambulatory.

## 2018-05-31 LAB
GP B STREP DNA SPEC QL NAA+PROBE: NEGATIVE
SPECIMEN SOURCE: NORMAL

## 2018-06-04 ENCOUNTER — OFFICE VISIT (OUTPATIENT)
Dept: MATERNAL FETAL MEDICINE | Facility: CLINIC | Age: 28
End: 2018-06-04
Attending: OBSTETRICS & GYNECOLOGY
Payer: COMMERCIAL

## 2018-06-04 ENCOUNTER — HOSPITAL ENCOUNTER (INPATIENT)
Facility: CLINIC | Age: 28
LOS: 3 days | Discharge: HOME | End: 2018-06-07
Attending: OBSTETRICS & GYNECOLOGY | Admitting: OBSTETRICS & GYNECOLOGY
Payer: COMMERCIAL

## 2018-06-04 ENCOUNTER — HOSPITAL ENCOUNTER (OUTPATIENT)
Dept: ULTRASOUND IMAGING | Facility: CLINIC | Age: 28
Discharge: HOME | End: 2018-06-04
Attending: OBSTETRICS & GYNECOLOGY | Admitting: OBSTETRICS & GYNECOLOGY
Payer: COMMERCIAL

## 2018-06-04 VITALS
HEART RATE: 84 BPM | DIASTOLIC BLOOD PRESSURE: 82 MMHG | SYSTOLIC BLOOD PRESSURE: 138 MMHG | RESPIRATION RATE: 18 BRPM | BODY MASS INDEX: 38.74 KG/M2 | WEIGHT: 240 LBS

## 2018-06-04 DIAGNOSIS — O35.BXX0 ANOMALY OF HEART OF FETUS AFFECTING PREGNANCY, ANTEPARTUM, SINGLE OR UNSPECIFIED FETUS: ICD-10-CM

## 2018-06-04 DIAGNOSIS — O09.93 SUPERVISION OF HIGH RISK PREGNANCY, UNSPECIFIED, THIRD TRIMESTER: Primary | ICD-10-CM

## 2018-06-04 PROBLEM — O41.00X0 OLIGOHYDRAMNIOS: Status: ACTIVE | Noted: 2018-06-04

## 2018-06-04 LAB
ABO + RH BLD: NORMAL
ABO + RH BLD: NORMAL
ALT SERPL W P-5'-P-CCNC: 27 U/L (ref 0–50)
AST SERPL W P-5'-P-CCNC: 21 U/L (ref 0–45)
BLD GP AB SCN SERPL QL: NORMAL
BLOOD BANK CMNT PATIENT-IMP: NORMAL
CREAT SERPL-MCNC: 0.62 MG/DL (ref 0.52–1.04)
CREAT UR-MCNC: 33 MG/DL
ERYTHROCYTE [DISTWIDTH] IN BLOOD BY AUTOMATED COUNT: 13.4 % (ref 10–15)
GFR SERPL CREATININE-BSD FRML MDRD: >90 ML/MIN/1.7M2
HCT VFR BLD AUTO: 35.6 % (ref 35–47)
HGB BLD-MCNC: 11.7 G/DL (ref 11.7–15.7)
HGB BLD-MCNC: 11.7 G/DL (ref 11.7–15.7)
MCH RBC QN AUTO: 28.9 PG (ref 26.5–33)
MCHC RBC AUTO-ENTMCNC: 32.6 G/DL (ref 31.5–36.5)
MCV RBC AUTO: 89 FL (ref 78–100)
PLATELET # BLD AUTO: 223 10E9/L (ref 150–450)
PROT UR-MCNC: 0.06 G/L
PROT/CREAT 24H UR: 0.18 G/G CR (ref 0–0.2)
RBC # BLD AUTO: 4.02 10E12/L (ref 3.8–5.2)
SPECIMEN EXP DATE BLD: NORMAL
WBC # BLD AUTO: 12 10E9/L (ref 4–11)

## 2018-06-04 PROCEDURE — 85018 HEMOGLOBIN: CPT | Performed by: OBSTETRICS & GYNECOLOGY

## 2018-06-04 PROCEDURE — 3E033VJ INTRODUCTION OF OTHER HORMONE INTO PERIPHERAL VEIN, PERCUTANEOUS APPROACH: ICD-10-PCS | Performed by: OBSTETRICS & GYNECOLOGY

## 2018-06-04 PROCEDURE — 40000068 ZZH STATISTIC EVAL-PTS ADMITTED TO OTHER DEPTS: Mod: ZF

## 2018-06-04 PROCEDURE — 25000132 ZZH RX MED GY IP 250 OP 250 PS 637: Performed by: OBSTETRICS & GYNECOLOGY

## 2018-06-04 PROCEDURE — 3E0P7VZ INTRODUCTION OF HORMONE INTO FEMALE REPRODUCTIVE, VIA NATURAL OR ARTIFICIAL OPENING: ICD-10-PCS | Performed by: OBSTETRICS & GYNECOLOGY

## 2018-06-04 PROCEDURE — 86850 RBC ANTIBODY SCREEN: CPT | Performed by: OBSTETRICS & GYNECOLOGY

## 2018-06-04 PROCEDURE — 36415 COLL VENOUS BLD VENIPUNCTURE: CPT | Performed by: OBSTETRICS & GYNECOLOGY

## 2018-06-04 PROCEDURE — 86900 BLOOD TYPING SEROLOGIC ABO: CPT | Performed by: OBSTETRICS & GYNECOLOGY

## 2018-06-04 PROCEDURE — 86901 BLOOD TYPING SEROLOGIC RH(D): CPT | Performed by: OBSTETRICS & GYNECOLOGY

## 2018-06-04 PROCEDURE — 84460 ALANINE AMINO (ALT) (SGPT): CPT | Performed by: OBSTETRICS & GYNECOLOGY

## 2018-06-04 PROCEDURE — 76819 FETAL BIOPHYS PROFIL W/O NST: CPT

## 2018-06-04 PROCEDURE — 84450 TRANSFERASE (AST) (SGOT): CPT | Performed by: OBSTETRICS & GYNECOLOGY

## 2018-06-04 PROCEDURE — 84156 ASSAY OF PROTEIN URINE: CPT | Performed by: OBSTETRICS & GYNECOLOGY

## 2018-06-04 PROCEDURE — 86780 TREPONEMA PALLIDUM: CPT | Performed by: OBSTETRICS & GYNECOLOGY

## 2018-06-04 PROCEDURE — 12000030 ZZH R&B OB INTERMEDIATE UMMC

## 2018-06-04 PROCEDURE — 76820 UMBILICAL ARTERY ECHO: CPT | Performed by: OBSTETRICS & GYNECOLOGY

## 2018-06-04 PROCEDURE — 85027 COMPLETE CBC AUTOMATED: CPT | Performed by: OBSTETRICS & GYNECOLOGY

## 2018-06-04 PROCEDURE — 82565 ASSAY OF CREATININE: CPT | Performed by: OBSTETRICS & GYNECOLOGY

## 2018-06-04 RX ORDER — OXYCODONE AND ACETAMINOPHEN 5; 325 MG/1; MG/1
1 TABLET ORAL
Status: DISCONTINUED | OUTPATIENT
Start: 2018-06-04 | End: 2018-06-05

## 2018-06-04 RX ORDER — NALOXONE HYDROCHLORIDE 0.4 MG/ML
.1-.4 INJECTION, SOLUTION INTRAMUSCULAR; INTRAVENOUS; SUBCUTANEOUS
Status: DISCONTINUED | OUTPATIENT
Start: 2018-06-04 | End: 2018-06-05

## 2018-06-04 RX ORDER — METHYLERGONOVINE MALEATE 0.2 MG/ML
200 INJECTION INTRAVENOUS
Status: DISCONTINUED | OUTPATIENT
Start: 2018-06-04 | End: 2018-06-05

## 2018-06-04 RX ORDER — MISOPROSTOL 100 UG/1
25 TABLET ORAL
Status: DISCONTINUED | OUTPATIENT
Start: 2018-06-04 | End: 2018-06-05

## 2018-06-04 RX ORDER — IBUPROFEN 800 MG/1
800 TABLET, FILM COATED ORAL
Status: COMPLETED | OUTPATIENT
Start: 2018-06-04 | End: 2018-06-05

## 2018-06-04 RX ORDER — ONDANSETRON 2 MG/ML
4 INJECTION INTRAMUSCULAR; INTRAVENOUS EVERY 6 HOURS PRN
Status: DISCONTINUED | OUTPATIENT
Start: 2018-06-04 | End: 2018-06-05

## 2018-06-04 RX ORDER — SODIUM CHLORIDE, SODIUM LACTATE, POTASSIUM CHLORIDE, CALCIUM CHLORIDE 600; 310; 30; 20 MG/100ML; MG/100ML; MG/100ML; MG/100ML
INJECTION, SOLUTION INTRAVENOUS CONTINUOUS
Status: DISCONTINUED | OUTPATIENT
Start: 2018-06-04 | End: 2018-06-05

## 2018-06-04 RX ORDER — OXYTOCIN 10 [USP'U]/ML
10 INJECTION, SOLUTION INTRAMUSCULAR; INTRAVENOUS
Status: DISCONTINUED | OUTPATIENT
Start: 2018-06-04 | End: 2018-06-05

## 2018-06-04 RX ORDER — OXYTOCIN/0.9 % SODIUM CHLORIDE 30/500 ML
100-340 PLASTIC BAG, INJECTION (ML) INTRAVENOUS CONTINUOUS PRN
Status: COMPLETED | OUTPATIENT
Start: 2018-06-04 | End: 2018-06-05

## 2018-06-04 RX ORDER — ACETAMINOPHEN 325 MG/1
650 TABLET ORAL EVERY 4 HOURS PRN
Status: DISCONTINUED | OUTPATIENT
Start: 2018-06-04 | End: 2018-06-05

## 2018-06-04 RX ORDER — TERBUTALINE SULFATE 1 MG/ML
0.25 INJECTION, SOLUTION SUBCUTANEOUS
Status: DISCONTINUED | OUTPATIENT
Start: 2018-06-04 | End: 2018-06-05

## 2018-06-04 RX ORDER — LIDOCAINE 40 MG/G
CREAM TOPICAL
Status: DISCONTINUED | OUTPATIENT
Start: 2018-06-04 | End: 2018-06-05

## 2018-06-04 RX ORDER — CALCIUM CARBONATE 500 MG/1
500 TABLET, CHEWABLE ORAL DAILY
Status: DISCONTINUED | OUTPATIENT
Start: 2018-06-04 | End: 2018-06-05

## 2018-06-04 RX ORDER — CARBOPROST TROMETHAMINE 250 UG/ML
250 INJECTION, SOLUTION INTRAMUSCULAR
Status: DISCONTINUED | OUTPATIENT
Start: 2018-06-04 | End: 2018-06-05

## 2018-06-04 RX ADMIN — Medication 25 MCG: at 18:29

## 2018-06-04 RX ADMIN — ACETAMINOPHEN 650 MG: 325 TABLET, FILM COATED ORAL at 23:40

## 2018-06-04 RX ADMIN — Medication 25 MCG: at 16:23

## 2018-06-04 RX ADMIN — Medication 25 MCG: at 20:45

## 2018-06-04 RX ADMIN — Medication 25 MCG: at 22:50

## 2018-06-04 RX ADMIN — CALCIUM CARBONATE (ANTACID) CHEW TAB 500 MG 500 MG: 500 CHEW TAB at 20:30

## 2018-06-04 ASSESSMENT — PAIN SCALES - GENERAL: PAINLEVEL: NO PAIN (0)

## 2018-06-04 NOTE — MR AVS SNAPSHOT
After Visit Summary   6/4/2018    Chhaya Thurman    MRN: [id number]           Patient Information     Date Of Birth          1990        Visit Information        Provider Department      6/4/2018 2:15 PM Norma Mendoza,  [facility] Maternal Fetal Medicine [facility]        Today's Diagnoses     Supervision of high risk pregnancy, unspecified, third trimester    -  1    Anomaly of heart of fetus affecting pregnancy, antepartum, single or unspecified fetus           Follow-ups after your visit        Your next 10 appointments already scheduled     Jun 13, 2018  1:30 PM CDT   MFM US COMPRE SINGLE F/U with [id number]   [facility] Maternal Fetal Medicine Ultrasound - [place] ([facility])    [address]  [facility] [address] [phone]           Wear comfortable clothes and leave your valuables at home.            Jun 13, 2018  2:15 PM CDT   Ob Follow Up with UR EXAM RM 2   [facility] Maternal Fetal Medicine - [facility])    [address]  [facility] [address] [phone]              Who to contact     If you have questions or need follow up information about today's clinic visit or your schedule please contact [facility] MATERNAL FETAL MEDICINE [facility] directly at [phone].  Normal or non-critical lab and imaging results will be communicated to you by MyChart, letter or phone within 4 business days after the clinic has received the results. If you do not hear from us within 7 days, please contact the clinic through MyChart or phone. If you have a critical or abnormal lab result, we will notify you by phone as soon as possible.  Submit refill requests through LUMOback or call your pharmacy and they will forward the refill request to us. Please allow 3 business days for your refill to be completed.          Additional Information About Your Visit        MyChart Information      Laiyaoyao gives you secure access to your electronic health record. If you see a primary care provider, you can also send messages to your care team and make appointments. If you have questions, please call your primary care clinic.  If you do not have a primary care provider, please call [phone] and they will assist you.        Care EveryWhere ID     This is your Care EveryWhere ID. This could be used by other organizations to access your [place] medical records  [id number]        Your Vitals Were     Pulse Respirations Last Period BMI (Body Mass Index)          84 18 09/16/2017 38.74 kg/m2         Blood Pressure from Last 3 Encounters:   06/04/18 141/87   06/04/18 138/82   05/30/18 133/76    Weight from Last 3 Encounters:   06/04/18 108.9 kg (240 lb)   06/04/18 108.9 kg (240 lb)   05/30/18 108.9 kg (240 lb 1.6 oz)              We Performed the Following     MF Office Visit        Primary Care Provider Office Phone # Fax #    Agnes Stanton PA-C [phone] [address] [place] [address]        Equal Access to Services     JOHANA WHITE : Hadii aad ku hadasho Soradhaali, waaxda luqadaha, qaybta kaalmada adeegyada, mica deluca. So [facility] [phone].    ATENCIÓN: Si habla español, tiene a garcía disposición servicios gratuitos de asistencia lingüística. Winifred al [phone].    We comply with applicable federal civil rights laws and [place] laws. We do not discriminate on the basis of race, color, national origin, age, disability, sex, sexual orientation, or gender identity.            Thank you!     Thank you for choosing [facility]  for your care. Our goal is always to provide you with excellent care. Hearing back from our patients is one way we can continue to improve our services. Please take a few minutes to complete the written survey that you may receive in the mail after your visit with us. Thank you!              Your Updated Medication List - Protect others around you: Learn how to safely use, store and throw away your medicines at www.disposemymeds.org.          This list is accurate as of 6/4/18  2:29 PM.  Always use your most recent med list.                   Brand Name Dispense Instructions for use Diagnosis    calcium carbonate 500 MG chewable tablet    TUMS     Take 1 chew tab by mouth daily        prenatal multivitamin plus iron 27-0.8 MG Tabs per tablet      Take 1 tablet by mouth daily

## 2018-06-04 NOTE — NURSING NOTE
Chhaya seen in clinic today for BPP at 37.3 weeks gestation due to fetus with presumed Down Syndrome (+NIPT) and balanced AV canal defect. (see report/notes). VSS. Pt denies bldg/lof/change in discharge/contractions/headache/vision changes/chest pain/SOB/edema.  BPP with ANITRA of 3.7cm today, normal UA doppler, and fetus in cephalic presentation. Dr. Mendoza met with pt and discussed POC. Plan to transfer to labor and delivery for IOL. L&D  Charge nurse notified. Pt and significant other escorted to labor and delivery via ambulation. NICU and pediatric cardiologist notified of admission.

## 2018-06-04 NOTE — IP AVS SNAPSHOT
MRN:[id number]                      After Visit Summary   6/4/2018    Chhaya Thurman    MRN: [id number]           Thank you!     Thank you for choosing [facility] for your care. Our goal is always to provide you with excellent care. Hearing back from our patients is one way we can continue to improve our services. Please take a few minutes to complete the written survey that you may receive in the mail after you visit with us. Thank you!        Patient Information     Date Of Birth          1990        Designated Caregiver       Most Recent Value    Caregiver    Will someone help with your care after discharge? no      About your hospital stay     You were admitted on:  June 4, 2018 You last received care in the:  [facility]    You were discharged on:  June 7, 2018        Reason for your hospital stay       Maternity care                  Who to Call     For medical emergencies, please call 911.  For non-urgent questions about your medical care, please call your primary care provider or clinic, [phone]          Attending Provider     Provider Specialty    Alyssa Rodriguez MD OB/Gyn    Aaliyah, Vijaya Wyatt MD OB/Gyn    Jose, Lisa Huffman MD OB/Gyn    Sam, Spring Story MD OB/Gyn       Primary Care Provider Office Phone # Fax #    Agnes Stanton PA-C [phone]      After Care Instructions     Activity       Review discharge instructions- Vaginal delivery: Nothing in the vagina for 6 weeks, no intercourse for 6 weeks. Call your health care provider if you have any of the following: Fever above 100.4 F; soaking a sanitary pad with blood within 1 hour, or you see blood clots larger than a golf ball; malodorous vaginal discharge, pain uncontrolled by your pain medications, nausea and vomiting, severe headaches, changes in vision, calf swelling or pain, shortness of breath, problems coping with sadness, anxiety, or depression. If you have any concerns about hurting  yourself or the baby, call your provider immediately. You are encouraged to call with questions or concerns after you return home.            Diet       Resume previous diet            Discharge Instructions - Hypertensive disorders patients       High Blood pressure patients to follow up in clinic or via home care within one week for a blood pressure check            Discharge Instructions - Postpartum visit       Schedule a routine postpartum visit with your provider in 6 weeks.                  Your next 10 appointments already scheduled     Jun 13, 2018  1:30 PM CDT   MFM US COMPRE SINGLE F/U with [id number]   [facility])    [address]  [facility] [address]   [phone]           Wear comfortable clothes and leave your valuables at home.            Jun 13, 2018  2:15 PM CDT   Ob Follow Up with UR EXAM RM 2   [facility]    [address]  [facility] [address]   [phone]              Further instructions from your care team       Postpartum Vaginal Delivery Instructions    Activity       Ask family and friends for help when you need it.    Do not place anything in your vagina for 6 weeks.    You are not restricted on other activities, but take it easy for a few weeks to allow your body to recover from delivery.  You are able to do any activities you feel up to that point.    No driving until you have stopped taking your pain medications (usually two weeks after delivery).     Call your health care provider if you have any of these symptoms:       Increased pain, swelling, redness, or fluid around your stiches from an episiotomy or perineal tear.    A fever above 100.4 F (38 C) with or without chills when placing a thermometer under your tongue.    You soak a sanitary pad with blood within 1 hour, or you see blood  "clots larger than a golf ball.    Bleeding that lasts more than 6 weeks.    Vaginal discharge that smells bad.    Severe pain, cramping or tenderness in your lower belly area.    A need to urinate more frequently (use the toilet more often), more urgently (use the toilet very quickly), or it burns when you urinate.    Nausea and vomiting.    Redness, swelling or pain around a vein in your leg.    Problems breastfeeding or a red or painful area on your breast.    Chest pain and cough or are gasping for air.    Problems coping with sadness, anxiety, or depression.  If you have any concerns about hurting yourself or the baby, call your provider immediately.     You have questions or concerns after you return home.     Keep your hands clean:  Always wash your hands before touching your perineal area and stitches.  This helps reduce your risk of infection.  If your hands aren't dirty, you may use an alcohol hand-rub to clean your hands. Keep your nails clean and short.        Pending Results     Date and Time Order Name Status Description    6/6/2018 1550 Chromosome skin products of conception In process     6/5/2018 2300 Placenta path order and indications In process             Statement of Approval     Ordered          06/07/18 1057  I have reviewed and agree with all the recommendations and orders detailed in this document.  EFFECTIVE NOW     Approved and electronically signed by:  Jimena Deleon MD             Admission Information     Date & Time Provider Department Dept. Phone    6/4/2018 [facility] [phone]      Your Vitals Were     Blood Pressure Pulse Temperature Respirations Height Weight    124/74 74 97.8  F (36.6  C) (Oral) 16 1.676 m (5' 6\") 108.9 kg (240 lb)    Last Period Pulse Oximetry BMI (Body Mass Index)             09/16/2017 96% 38.74 kg/m2         MyChart Information     [facility] gives you secure access to your electronic health record. If you see a primary care " provider, you can also send messages to your care team and make appointments. If you have questions, please call your primary care clinic.  If you do not have a primary care provider, please call [phone] and they will assist you.        Care EveryWhere ID     This is your Care EveryWhere ID. This could be used by other organizations to access your [facility] medical records  [id number]        Equal Access to Services     JOHANA WHITE : Hadii bayron talley hadasho Soomaali, waaxda luqadaha, qaybta kaalmada adeegyada, mica teresa hayzenobia winmariiacleopatra deluca. So [facility] [phone].    ATENCIÓN: Si habla espdaina, tiene a garcía disposición servicios gratuitos de asistencia lingüística. Winifred al [phone].    We comply with applicable federal civil rights laws and [place] laws. We do not discriminate on the basis of race, color, national origin, age, disability, sex, sexual orientation, or gender identity.               Review of your medicines      START taking        Dose / Directions    ibuprofen 600 MG tablet   Commonly known as:  ADVIL/MOTRIN   Used for:   (normal spontaneous vaginal delivery)        Dose:  600 mg   Take 1 tablet (600 mg) by mouth every 6 hours as needed   Quantity:  30 tablet   Refills:  0       senna-docusate 8.6-50 MG per tablet   Commonly known as:  SENOKOT-S;PERICOLACE   Used for:   (normal spontaneous vaginal delivery)        Dose:  1 tablet   Take 1 tablet by mouth 2 times daily   Quantity:  60 tablet   Refills:  2         CONTINUE these medicines which have NOT CHANGED        Dose / Directions    calcium carbonate 500 MG chewable tablet   Commonly known as:  TUMS        Dose:  1 chew tab   Take 1 chew tab by mouth daily   Refills:  0       prenatal multivitamin plus iron 27-0.8 MG Tabs per tablet        Dose:  1 tablet   Take 1 tablet by mouth daily   Refills:  0            Where to get your medicines      These medications were sent to [facility] [place], [place] -  [address]  [address] [facility] [address] [place] [address]     Phone:  [phone]     ibuprofen 600 MG tablet    senna-docusate 8.6-50 MG per tablet                Protect others around you: Learn how to safely use, store and throw away your medicines at www.disposemymeds.org.             Medication List: This is a list of all your medications and when to take them. Check marks below indicate your daily home schedule. Keep this list as a reference.      Medications           Morning Afternoon Evening Bedtime As Needed    calcium carbonate 500 MG chewable tablet   Commonly known as:  TUMS   Take 1 chew tab by mouth daily   Last time this was given:  500 mg on 6/4/2018  8:30 PM                                ibuprofen 600 MG tablet   Commonly known as:  ADVIL/MOTRIN   Take 1 tablet (600 mg) by mouth every 6 hours as needed   Last time this was given:  800 mg on 6/7/2018  7:59 AM                                prenatal multivitamin plus iron 27-0.8 MG Tabs per tablet   Take 1 tablet by mouth daily                                senna-docusate 8.6-50 MG per tablet   Commonly known as:  SENOKOT-S;PERICOLACE   Take 1 tablet by mouth 2 times daily   Last time this was given:  2 tablets on 6/7/2018  7:59 AM

## 2018-06-04 NOTE — IP AVS SNAPSHOT
[facility]    [address] [facility] [address]    Phone:  [phone]                                       After Visit Summary   6/4/2018    Chhaya Thurman    MRN: [id number]           After Visit Summary Signature Page     I have received my discharge instructions, and my questions have been answered. I have discussed any challenges I see with this plan with the nurse or doctor.    ..........................................................................................................................................  Patient/Patient Representative Signature      ..........................................................................................................................................  Patient Representative Print Name and Relationship to Patient    ..................................................               ................................................  Date                                            Time    ..........................................................................................................................................  Reviewed by Signature/Title    ...................................................              ..............................................  Date                                                            Time

## 2018-06-04 NOTE — H&P
H&P  Leena is a 27yr old female  at 37w3d sent for induction of labor.  Ultrasound earlier today detected oligohydramnios ANITRA 2.  She is currently feeling fine, with no fluid or bleeding vaginally.  Fetal movement is intermittent and strong.  She has been having contractions for about a week but is unsure if they are true contractions, they are low intensity.      Past Medical History:   Diagnosis Date     Chickenpox      Fetal cardiac anomaly affecting pregnancy, antepartum 2018     History of recurrent UTI (urinary tract infection)      Past Surgical History:   Procedure Laterality Date     DILATION AND CURETTAGE, OPERATIVE HYSTEROSCOPY, COMBINED N/A 2017    Procedure: COMBINED DILATION AND CURETTAGE, OPERATIVE HYSTEROSCOPY;  Hysteroscopy with dilation and curettage, polypectomy;  Surgeon: Mable Hanson MD;  Location: [facility] OR     ENT SURGERY  20 years ago    tubes in three times     MOUTH SURGERY      wisdom teeth -age 16     TONSILLECTOMY         Family History   Problem Relation Age of Onset     Other Cancer Maternal Grandfather      skin     Hyperlipidemia Mother      Unknown/Adopted Father      unknown history     Stomach Cancer Maternal Grandmother      DIABETES No family hx of       Meds: PNV, tums  Allergies   Allergen Reactions     Imitrex [Sumatriptan]      Social History     Social History     Marital status: Single     Spouse name: N/A     Number of children: N/A     Years of education: N/A     Occupational History     Not on file.     Social History Main Topics     Smoking status: Never Smoker     Smokeless tobacco: Never Used     Alcohol use No      Comment: occas-quit with pregnancy     Drug use: No     Sexual activity: Yes     Partners: Male     Birth control/ protection: None     Other Topics Concern     Parent/Sibling W/ Cabg, Mi Or Angioplasty Before 65f 55m? No     Social History Narrative    ** Merged History Encounter **          O:  Vitals:    18 1459  18 1523   BP: 141/87 142/75   General: No distress  Lungs: CTAB  Heart: RRR  Abdomen: Gravid, non-tender  Extremities: Non-tender, no edema  Cvx 70/-2.    EFW 3000g.   Vtx by US today    FHT: 130 baseline, moderate variability, accels present, decels absent, cat 1  TOCO: Rare ctx      A/P: 27 year old  @37w3d HRP  Oligohydramnios: Plan for IOL.  We will start with oral misoprostol 25mcg every 2 hours to ripen the cervix. Continue until cervix is favorable.   HTN: On review of chart, had elevated BP in clinic at 36w5d- meets criteria for GHTN. Will get pre-eclampsia labs and monitor closely.   GBS negative.  Anticipate normal .  NICU for delivery due to AV canal defect and Trisomy 21.  Plan discussed with patient and her partner.    Evette Gregory, MS3 acting as a scribe for Dr. Arnett    I, Dr. Arnett, personally performed the services described in this documentation, as scribed by Yessica Gregory in my presence, and it is both accurate and complete.   Alyssa Arnett MD

## 2018-06-05 ENCOUNTER — ANESTHESIA (OUTPATIENT)
Dept: OBGYN | Facility: CLINIC | Age: 28
End: 2018-06-05
Payer: COMMERCIAL

## 2018-06-05 ENCOUNTER — ANESTHESIA EVENT (OUTPATIENT)
Dept: OBGYN | Facility: CLINIC | Age: 28
End: 2018-06-05
Payer: COMMERCIAL

## 2018-06-05 LAB — T PALLIDUM AB SER QL: NONREACTIVE

## 2018-06-05 PROCEDURE — 72200001 ZZH LABOR CARE VAGINAL DELIVERY SINGLE

## 2018-06-05 PROCEDURE — 00000159 ZZHCL STATISTIC H-SEND OUTS PREP: Performed by: STUDENT IN AN ORGANIZED HEALTH CARE EDUCATION/TRAINING PROGRAM

## 2018-06-05 PROCEDURE — 00HU33Z INSERTION OF INFUSION DEVICE INTO SPINAL CANAL, PERCUTANEOUS APPROACH: ICD-10-PCS | Performed by: ANESTHESIOLOGY

## 2018-06-05 PROCEDURE — 25000128 H RX IP 250 OP 636: Performed by: STUDENT IN AN ORGANIZED HEALTH CARE EDUCATION/TRAINING PROGRAM

## 2018-06-05 PROCEDURE — 0HQ9XZZ REPAIR PERINEUM SKIN, EXTERNAL APPROACH: ICD-10-PCS | Performed by: OBSTETRICS & GYNECOLOGY

## 2018-06-05 PROCEDURE — 25000132 ZZH RX MED GY IP 250 OP 250 PS 637: Performed by: STUDENT IN AN ORGANIZED HEALTH CARE EDUCATION/TRAINING PROGRAM

## 2018-06-05 PROCEDURE — 12000030 ZZH R&B OB INTERMEDIATE UMMC

## 2018-06-05 PROCEDURE — 25000125 ZZHC RX 250: Performed by: OBSTETRICS & GYNECOLOGY

## 2018-06-05 PROCEDURE — 88307 TISSUE EXAM BY PATHOLOGIST: CPT | Performed by: STUDENT IN AN ORGANIZED HEALTH CARE EDUCATION/TRAINING PROGRAM

## 2018-06-05 PROCEDURE — 88233 TISSUE CULTURE SKIN/BIOPSY: CPT | Performed by: OBSTETRICS & GYNECOLOGY

## 2018-06-05 PROCEDURE — 25000125 ZZHC RX 250: Performed by: STUDENT IN AN ORGANIZED HEALTH CARE EDUCATION/TRAINING PROGRAM

## 2018-06-05 PROCEDURE — 88307 TISSUE EXAM BY PATHOLOGIST: CPT | Mod: 26 | Performed by: STUDENT IN AN ORGANIZED HEALTH CARE EDUCATION/TRAINING PROGRAM

## 2018-06-05 PROCEDURE — 3E0R3BZ INTRODUCTION OF ANESTHETIC AGENT INTO SPINAL CANAL, PERCUTANEOUS APPROACH: ICD-10-PCS | Performed by: ANESTHESIOLOGY

## 2018-06-05 PROCEDURE — 0UQMXZZ REPAIR VULVA, EXTERNAL APPROACH: ICD-10-PCS | Performed by: OBSTETRICS & GYNECOLOGY

## 2018-06-05 PROCEDURE — 40000977 ZZH STATISTIC ATTENDANCE AT DELIVERY

## 2018-06-05 PROCEDURE — 25000128 H RX IP 250 OP 636: Performed by: OBSTETRICS & GYNECOLOGY

## 2018-06-05 PROCEDURE — 88262 CHROMOSOME ANALYSIS 15-20: CPT | Performed by: OBSTETRICS & GYNECOLOGY

## 2018-06-05 PROCEDURE — 25000132 ZZH RX MED GY IP 250 OP 250 PS 637: Performed by: OBSTETRICS & GYNECOLOGY

## 2018-06-05 RX ORDER — NALBUPHINE HYDROCHLORIDE 10 MG/ML
2.5-5 INJECTION, SOLUTION INTRAMUSCULAR; INTRAVENOUS; SUBCUTANEOUS EVERY 6 HOURS PRN
Status: DISCONTINUED | OUTPATIENT
Start: 2018-06-05 | End: 2018-06-05

## 2018-06-05 RX ORDER — IBUPROFEN 800 MG/1
800 TABLET, FILM COATED ORAL EVERY 6 HOURS PRN
Status: DISCONTINUED | OUTPATIENT
Start: 2018-06-05 | End: 2018-06-07 | Stop reason: HOSPADM

## 2018-06-05 RX ORDER — MORPHINE SULFATE 10 MG/ML
10 INJECTION, SOLUTION INTRAMUSCULAR; INTRAVENOUS
Status: COMPLETED | OUTPATIENT
Start: 2018-06-05 | End: 2018-06-05

## 2018-06-05 RX ORDER — OXYTOCIN 10 [USP'U]/ML
INJECTION, SOLUTION INTRAMUSCULAR; INTRAVENOUS
Status: DISCONTINUED
Start: 2018-06-05 | End: 2018-06-05 | Stop reason: WASHOUT

## 2018-06-05 RX ORDER — OXYTOCIN 10 [USP'U]/ML
10 INJECTION, SOLUTION INTRAMUSCULAR; INTRAVENOUS
Status: DISCONTINUED | OUTPATIENT
Start: 2018-06-05 | End: 2018-06-07 | Stop reason: HOSPADM

## 2018-06-05 RX ORDER — OXYTOCIN/0.9 % SODIUM CHLORIDE 30/500 ML
340 PLASTIC BAG, INJECTION (ML) INTRAVENOUS CONTINUOUS PRN
Status: DISCONTINUED | OUTPATIENT
Start: 2018-06-05 | End: 2018-06-07 | Stop reason: HOSPADM

## 2018-06-05 RX ORDER — OXYTOCIN/0.9 % SODIUM CHLORIDE 30/500 ML
1-24 PLASTIC BAG, INJECTION (ML) INTRAVENOUS CONTINUOUS
Status: DISCONTINUED | OUTPATIENT
Start: 2018-06-05 | End: 2018-06-05

## 2018-06-05 RX ORDER — MISOPROSTOL 200 UG/1
400 TABLET ORAL
Status: DISCONTINUED | OUTPATIENT
Start: 2018-06-05 | End: 2018-06-07 | Stop reason: HOSPADM

## 2018-06-05 RX ORDER — EPHEDRINE SULFATE 50 MG/ML
5 INJECTION, SOLUTION INTRAMUSCULAR; INTRAVENOUS; SUBCUTANEOUS
Status: DISCONTINUED | OUTPATIENT
Start: 2018-06-05 | End: 2018-06-05

## 2018-06-05 RX ORDER — LIDOCAINE HYDROCHLORIDE AND EPINEPHRINE 15; 5 MG/ML; UG/ML
INJECTION, SOLUTION EPIDURAL PRN
Status: DISCONTINUED | OUTPATIENT
Start: 2018-06-05 | End: 2018-07-17

## 2018-06-05 RX ORDER — LIDOCAINE 40 MG/G
CREAM TOPICAL
Status: DISCONTINUED | OUTPATIENT
Start: 2018-06-05 | End: 2018-06-05

## 2018-06-05 RX ORDER — ACETAMINOPHEN 325 MG/1
650 TABLET ORAL EVERY 4 HOURS PRN
Status: DISCONTINUED | OUTPATIENT
Start: 2018-06-05 | End: 2018-06-07 | Stop reason: HOSPADM

## 2018-06-05 RX ORDER — AMOXICILLIN 250 MG
2 CAPSULE ORAL 2 TIMES DAILY
Status: DISCONTINUED | OUTPATIENT
Start: 2018-06-05 | End: 2018-06-07 | Stop reason: HOSPADM

## 2018-06-05 RX ORDER — HYDROCORTISONE 2.5 %
CREAM (GRAM) TOPICAL 3 TIMES DAILY PRN
Status: DISCONTINUED | OUTPATIENT
Start: 2018-06-05 | End: 2018-06-07 | Stop reason: HOSPADM

## 2018-06-05 RX ORDER — NALOXONE HYDROCHLORIDE 0.4 MG/ML
.1-.4 INJECTION, SOLUTION INTRAMUSCULAR; INTRAVENOUS; SUBCUTANEOUS
Status: DISCONTINUED | OUTPATIENT
Start: 2018-06-05 | End: 2018-06-07 | Stop reason: HOSPADM

## 2018-06-05 RX ORDER — NALOXONE HYDROCHLORIDE 0.4 MG/ML
.1-.4 INJECTION, SOLUTION INTRAMUSCULAR; INTRAVENOUS; SUBCUTANEOUS
Status: DISCONTINUED | OUTPATIENT
Start: 2018-06-05 | End: 2018-06-05

## 2018-06-05 RX ORDER — AMOXICILLIN 250 MG
1 CAPSULE ORAL 2 TIMES DAILY
Status: DISCONTINUED | OUTPATIENT
Start: 2018-06-05 | End: 2018-06-07 | Stop reason: HOSPADM

## 2018-06-05 RX ORDER — LANOLIN 100 %
OINTMENT (GRAM) TOPICAL
Status: DISCONTINUED | OUTPATIENT
Start: 2018-06-05 | End: 2018-06-07 | Stop reason: HOSPADM

## 2018-06-05 RX ORDER — OXYTOCIN/0.9 % SODIUM CHLORIDE 30/500 ML
PLASTIC BAG, INJECTION (ML) INTRAVENOUS
Status: DISCONTINUED
Start: 2018-06-05 | End: 2018-06-05 | Stop reason: WASHOUT

## 2018-06-05 RX ORDER — HYDROXYZINE HYDROCHLORIDE 50 MG/1
100 TABLET, FILM COATED ORAL
Status: COMPLETED | OUTPATIENT
Start: 2018-06-05 | End: 2018-06-05

## 2018-06-05 RX ORDER — MISOPROSTOL 200 UG/1
TABLET ORAL
Status: DISCONTINUED
Start: 2018-06-05 | End: 2018-06-05 | Stop reason: WASHOUT

## 2018-06-05 RX ORDER — LIDOCAINE HYDROCHLORIDE 10 MG/ML
INJECTION, SOLUTION INFILTRATION; PERINEURAL
Status: DISCONTINUED
Start: 2018-06-05 | End: 2018-06-06 | Stop reason: HOSPADM

## 2018-06-05 RX ORDER — BISACODYL 10 MG
10 SUPPOSITORY, RECTAL RECTAL DAILY PRN
Status: DISCONTINUED | OUTPATIENT
Start: 2018-06-07 | End: 2018-06-07 | Stop reason: HOSPADM

## 2018-06-05 RX ORDER — OXYTOCIN/0.9 % SODIUM CHLORIDE 30/500 ML
100 PLASTIC BAG, INJECTION (ML) INTRAVENOUS CONTINUOUS
Status: DISCONTINUED | OUTPATIENT
Start: 2018-06-05 | End: 2018-06-07 | Stop reason: HOSPADM

## 2018-06-05 RX ADMIN — LIDOCAINE HYDROCHLORIDE,EPINEPHRINE BITARTRATE 3 ML: 15; .005 INJECTION, SOLUTION EPIDURAL; INFILTRATION; INTRACAUDAL; PERINEURAL at 08:44

## 2018-06-05 RX ADMIN — SODIUM CHLORIDE, POTASSIUM CHLORIDE, SODIUM LACTATE AND CALCIUM CHLORIDE: 600; 310; 30; 20 INJECTION, SOLUTION INTRAVENOUS at 15:25

## 2018-06-05 RX ADMIN — OXYTOCIN-SODIUM CHLORIDE 0.9% IV SOLN 30 UNIT/500ML 1 MILLI-UNITS/MIN: 30-0.9/5 SOLUTION at 20:25

## 2018-06-05 RX ADMIN — Medication 25 MCG: at 03:16

## 2018-06-05 RX ADMIN — LIDOCAINE HYDROCHLORIDE 10 ML: 10 INJECTION, SOLUTION INFILTRATION; PERINEURAL at 21:42

## 2018-06-05 RX ADMIN — SODIUM CHLORIDE, POTASSIUM CHLORIDE, SODIUM LACTATE AND CALCIUM CHLORIDE 1000 ML: 600; 310; 30; 20 INJECTION, SOLUTION INTRAVENOUS at 08:05

## 2018-06-05 RX ADMIN — ONDANSETRON 4 MG: 2 INJECTION INTRAMUSCULAR; INTRAVENOUS at 19:11

## 2018-06-05 RX ADMIN — IBUPROFEN 800 MG: 800 TABLET, FILM COATED ORAL at 22:23

## 2018-06-05 RX ADMIN — Medication 10 ML/HR: at 09:00

## 2018-06-05 RX ADMIN — MORPHINE SULFATE 10 MG: 10 INJECTION INTRAVENOUS at 01:24

## 2018-06-05 RX ADMIN — SODIUM CHLORIDE, POTASSIUM CHLORIDE, SODIUM LACTATE AND CALCIUM CHLORIDE 1000 ML: 600; 310; 30; 20 INJECTION, SOLUTION INTRAVENOUS at 10:03

## 2018-06-05 RX ADMIN — HYDROXYZINE HYDROCHLORIDE 100 MG: 50 TABLET, FILM COATED ORAL at 01:09

## 2018-06-05 RX ADMIN — Medication 10 ML: at 08:53

## 2018-06-05 RX ADMIN — OXYTOCIN-SODIUM CHLORIDE 0.9% IV SOLN 30 UNIT/500ML 340 ML/HR: 30-0.9/5 SOLUTION at 21:35

## 2018-06-05 RX ADMIN — OXYTOCIN-SODIUM CHLORIDE 0.9% IV SOLN 30 UNIT/500ML 1 MILLI-UNITS/MIN: 30-0.9/5 SOLUTION at 10:00

## 2018-06-05 ASSESSMENT — PATIENT HEALTH QUESTIONNAIRE - PHQ9: SUM OF ALL RESPONSES TO PHQ QUESTIONS 1-9: 0

## 2018-06-05 NOTE — PROGRESS NOTES
[facility]  Labor Progress Note    S: Patient comfortable with epidural in place.  Feeling a little more vaginal pressure.    O:   Patient Vitals for the past 4 hrs:   BP SpO2   18 1100 119/66 94 %   18 1030 123/69 94 %   18 1000 133/78 95 %   18 0934 131/67 -   18 0930 132/68 -   18 0924 135/75 -   18 0919 131/72 -   18 0915 130/70 96 %   18 0909 130/76 -   18 0904 132/72 -   18 0858 133/67 98 %   18 0856 142/80 -   18 0854 134/78 -   18 0852 135/77 -   18 0850 123/79 -   18 0848 127/73 100 %   18 0846 122/72 -   18 0844 124/72 -   18 0753 127/73 -     SVE: 5-6/70 with thicker anterior lip/-1    FHT: Baseline 120s, moderate variability, + accelerations, intermittent late decelerations  [place]: 4 contractions in 10 minutes    A/P:  Ms. Chhaya Thurman is a 27 year old  at 37w4d admitted for IOL for oligo.  Pregnancy complications by gHTN, obesity, fetus with Trisomy 21 and balanced AV canal defect.     Labor: - S/p PO misoprostol x4 for cervical ripening and SROM.  On IV pitocin since 1000.  Turned off due to Category 2 FHT, when improved will restart.     Gestational hypertension:    - BPs normotensive to low mild range.    - HELLP labs wnl, UPC neg. No s/s of pre-E.   FWB: - Category 2 FHT, reactive. Intermittent lates improved with turning off pitocin.  Reassured by moderate variability.    - Continuous EFW, [place].    - NIPT with Trisomy 21.  NICU at delivery.   PNC: - Rh positive, Rubella immune, GBS negative, GCT 99, Placenta posterior.    Sarah Larsen MD  Ob/Gyn, PGY-3  2018, 11:24 AM

## 2018-06-05 NOTE — PROGRESS NOTES
FHT review    Pt is feeling some cramping w contractions but still not painful.     FHT: 130, mod variability, + accels, no decels, some loss of contact  [place]: not picking up regularly, about Q5min     A/p:  at 37w3d here for IOL for oligo in the setting of trisomy 21. s/p miso x1. Plan to continue with cervical ripening as able. SVE PRN or when pt uncomfortable to determine next steps.     Johana Song PGY-2

## 2018-06-05 NOTE — CONSULTS
"[facility]  MATERNAL CHILD HEALTH   SOCIAL WORK PROGRESS NOTE    DATA:     SW familiar with pt family from her care in [facility] clinic. Pt is Chhaya Thurman ( 1990 / contact [phone]) she is a  28 y/o female. Baby girls name is Ralph.      Per H&P, fetal diagnoses include:    Balanced AV canal defect    NIPT +T21     FOB is Nahid Gama. Family resides in [place], [place]. SW met with couple at bedside in the NICU. Couple hopeful to utilize a NICU boarding room when pt is discharged from [facility] tomorrow, requested SW referral. Couple expressed feeling well supported by the [facility] team and their social supports, they are aware of this SW ongoing availability.     INTERVENTION:        Chart review.     SW continues to meet with family to assess for needs, offer support, and assess for coping.     SW provided supportive counseling and appropriate resources related to the impact of this hospitalization on pt family system.     Provided \"Meeting Your Basic Needs While Your Child is Hospitalized\" hand out and SW business card.      Reviewed orientation to the NICU, including: parking passes, boarding rooms, rounding, treatment teams, primary nursing, and our welcoming policy of visitation. Family interested in utilizing NICU boarding room when mother discharges from [facility], SW added family to boarding room wait list.     SW shared information about hospital amenities.     SW provided emotional support and active listening.     Reassured family of ongoing SW supportive services available to them at the hospital. Encouraged parents to access this SW for support as needed.    SW provided bedside RN with a copy of Healthcare Directive and Delegation of Parental Authority at parents request.    Completed referral for family to Ana [facility] (e-mail: [email or web address]).     ASSESSMENT:      Couple easily engaged in conversation. They are " appropriately focused on their baby and asked good questions. Pt's mood seemed appropriate. Couple seemed receptive to and appreciative of SW support. Pt continues to recover from her delivery and was able to get some sleep last night. Couple able to verbally express herself and identify needs. They are aware of SW availability and how to access this SW.     PLAN:     SW will continue to assess needs and provide ongoing psychosocial support and access to resources. SW will continue to collaborate with the multidisciplinary team.    SARITA Alonso, [facility]  Clinical   Maternal Child Health  [facility]  Phone:   [phone]  Pager:    [phone]

## 2018-06-05 NOTE — ANESTHESIA PREPROCEDURE EVALUATION
Anesthesia Evaluation       history and physical reviewed .      No history of anesthetic complications          ROS/MED HX    ENT/Pulmonary:  - neg pulmonary ROS     Neurologic:  - neg neurologic ROS     Cardiovascular:  - neg cardiovascular ROS       METS/Exercise Tolerance:     Hematologic:         Musculoskeletal:         GI/Hepatic:  - neg GI/hepatic ROS       Renal/Genitourinary:         Endo:         Psychiatric:         Infectious Disease:         Malignancy:         Other:                     Physical Exam  Normal systems: cardiovascular, pulmonary and dental    Airway   Mallampati: II  TM distance: > 3 FB  Neck ROM: full  Mouth opening: > 3 cm    Dental     Cardiovascular       Pulmonary     Other findings: Fetus likely Tri 21 with AV canal defect but has been stable on monitors             Gestational HTN - HELLP labs normal and currently normotensive            Anesthesia Plan      History & Physical Review      ASA Status:  .  OB Epidural Asa: 2       Plan for     Agree with plan for epidural.      Postoperative Care      Consents  Anesthetic plan, risks, benefits and alternatives discussed with:  Patient..

## 2018-06-05 NOTE — PROGRESS NOTES
L & D Progress Note    SUBJECTIVE  Comfortable with epidural in place    OBJECTIVE  Vitals:    18 0919 18 0924 18 0930 18 0934   BP: 131/72 135/75 132/68 131/67   Resp:       Temp:       TempSrc:       SpO2:       Weight:       Height:         FHT:  Baseline 150; moderate variability; Decelerations: absent; Accelerations: present;           Category: 1  [place]: ctx 2 in 10 mins  SVE: /-2    ASSESSMENT 27 year old  at 37w4d admitted for IOL secondary to oligohydramnios.  Pregnancy complicated by gestational hypertension, obesity, and fetus with Trisomy 21 and balanced AV canal defect. No cervical change from prior exam, so will start pitocin for augmentation of labor.     PLAN    Gestational hypertension  -Blood pressures normotensive  -HELLP labs wnl, UPC  -No signs/symptoms of preeclampsia    Fetal well being  -Category 1  -Continuous fetal monitoring    Labor progress:  -IOL: s/p miso x4. Start pitocin augmentation  -Pain:  Epidural in place  -GBS negative  -Cervical check SHELBIE Serra MD   Resident Physician, PGY2  Obstetrics, Gynecology, and Women's Health

## 2018-06-05 NOTE — PROGRESS NOTES
"Induction progress note  6/5/2018     S: Chhaya has been feeling more uncomfortable, regular strong cramping. She's considering morphine/vistaril    O:   Patient Vitals for the past 12 hrs:   BP Temp Temp src Resp Height Weight   06/04/18 2335 119/67 - - - - -   06/04/18 2125 135/81 - - - - -   06/04/18 2045 140/81 98.4  F (36.9  C) Oral 18 - -   06/04/18 1931 129/80 - - 18 - -   06/04/18 1829 129/75 - - - - -   06/04/18 1724 136/81 - - - - -   06/04/18 1621 132/80 - - - - -   06/04/18 1523 142/75 - - - - -   06/04/18 1454 141/87 98.8  F (37.1  C) Axillary 18 1.676 m (5' 6\") 108.9 kg (240 lb)     120 BPM mod jorge accels present, no decels  [place]: q2-3 min    Cx 1.5/80/-2    #) IOL oligohydramnios  Slight progress with PO Miso x4. Continue this for now. Reassess after 2-3 doses, consider Cook. Morphine/vistartil acceptable    #) Fetal wellbeing  NIPT trisomy 21. Balanced AV canal defect. Category I reactive.     #) gHTN  Mild range BP, no intervention required. Previously nl HELLP labs and UP:Swati Griffin, PGY3  OB/Gyn  6/5/2018, 1:01 AM      "

## 2018-06-05 NOTE — PLAN OF CARE
Problem: Labor (Cervical Ripen, Induct, Augment) (Adult,Obstetrics,Pediatric)  Goal: Signs and Symptoms of Listed Potential Problems Will be Absent, Minimized or Managed (Labor)  Signs and symptoms of listed potential problems will be absent, minimized or managed by discharge/transition of care (reference Labor (Cervical Ripen, Induct, Augment) (Adult,Obstetrics,Pediatric) CPG).   Outcome: Therapy, progress toward functional goals as expected  Pt received epidural around 8am. Pain well controlled. Continues to leak clear fluid with bloody show present. Pitocin started at 1000. Baby with decelerations-variable,early,prolonged. Pitocin stopped at 1330. Cervix now 5-6/70/-1. Continue to closely monitor.

## 2018-06-05 NOTE — PLAN OF CARE
"Problem: Patient Care Overview  Goal: Plan of Care/Patient Progress Review  Outcome: Improving  Data: Patient admitted to room 456  at 1429. Patient is a . Prenatal record reviewed.   Obstetric History       T0      L0     SAB1   TAB0   Ectopic0   Multiple0   Live Births0       # Outcome Date GA Lbr Gerald/2nd Weight Sex Delivery Anes PTL Lv   3 Current            2 SAB 17 9w1d    AB, MISSED      1 AB 17 8w0d    SAB         .  Medical History:   Past Medical History:   Diagnosis Date     Chickenpox      Fetal cardiac anomaly affecting pregnancy, antepartum 2018     History of recurrent UTI (urinary tract infection)    .  Gestational age 37w3d. Vital signs per doc flowsheet. Fetal movement present. Patient reports Induction Of Labor   as reason for admission. Support persons, significant other, Nahid, present.  Action: Verbal consent for EFM, external fetal monitors applied. Admission assessment completed. Patient and support persons educated on induction process. Patient instructed to report change in fetal movement, contractions, vaginal leaking of fluid or bleeding, abdominal pain, or any concerns related to the pregnancy to her nurse/physician. Patient oriented to room, call light in reach.   Response: Neeru Rodriguez and Chava informed of patient arrival from clinic. Plan per provider is induction of labor. Patient verbalized understanding of education and verbalized agreement with plan. Patient coping with labor via position changes. States she is \"open\" to all pain control options.          "

## 2018-06-05 NOTE — PLAN OF CARE
Problem: Patient Care Overview  Goal: Plan of Care/Patient Progress Review  Outcome: Improving  Labor Shift Note  Data: Contraction frequency q 2-4 minutes and irregular, palpate mild - difficult to trace while pt on birthing ball. Fetal assessment AGA, moderate variability, accelerations present, 1x PD that resolved with repositioning. Pt has received 4 doses of PO miso.    Vitals:    18 1829 18 1931 18 2045 18 2125   BP: 129/75 129/80 140/81 135/81   Resp:      Temp:   98.4  F (36.9  C)    TempSrc:   Oral    Weight:       Height:       .  .  Membranes intact.  Signs and symptoms of infection absent.  Blood pressures WNL. Pt has 1+ edema in BLE, reflexes 2+, no clonus present. Pt complained of mild headache - declined pain medication.  Support person Luke present.  Interventions: Continue uterine/fetal assessment and vital signs per order set. Comfort measures include birth ball and frequent ambulation.  Pt undecided on pain control at this time.  Plan: Anticipate . Provide labor/coping assistance as needed by patient and support person.  Observe for and notify care provider of indications of progressing labor, need for pain medications,  or signs of fetal/maternal compromise.

## 2018-06-05 NOTE — PROGRESS NOTES
"OB Staff, labor note    S: Patient is comfortable with epidural.    O:  Patient Vitals for the past 24 hrs:   BP Temp Temp src Heart Rate Resp SpO2 Height Weight   18 0934 131/67 - - - - - - -   18 0930 132/68 - - - - - - -   18 0924 135/75 - - - - - - -   18 0919 131/72 - - - - - - -   18 0915 130/70 - - - - 96 % - -   18 0909 130/76 - - - - - - -   18 0904 132/72 - - - - - - -   18 0858 133/67 - - - - 98 % - -   18 0856 142/80 - - - - - - -   18 0854 134/78 - - - - - - -   18 0852 135/77 - - - - - - -   18 0850 123/79 - - - - - - -   18 0848 127/73 - - - - 100 % - -   18 0846 122/72 - - - - - - -   18 0844 124/72 - - - - - - -   18 0753 127/73 - - - - - - -   18 0606 106/71 - - - - - - -   18 0522 - 98.1  F (36.7  C) Oral - - - - -   18 0436 102/55 - - - 18 - - -   18 0258 106/59 98.3  F (36.8  C) Oral - 18 - - -   18 0124 124/70 98.3  F (36.8  C) Oral 72 18 - - -   18 2335 119/67 - - - - - - -   185 135/81 - - - - - - -   18 2045 140/81 98.4  F (36.9  C) Oral - 18 - - -   18 193 129/80 - - - 18 - - -   18 1829 129/75 - - - - - - -   18 1724 136/81 - - - - - - -   18 1621 132/80 - - - - - - -   18 1523 142/75 - - - - - - -   18 1454 141/87 98.8  F (37.1  C) Axillary - 18 - 1.676 m (5' 6\") 108.9 kg (240 lb)     Gen'l: resting, no distress  Abd: gravid, NT,  EFW 3000g  SVE: 2/-2 per Dr. Serra   with moderate variability, no decels  [place] q 3-5    Assessment/Plan  28 yo  at 37w4d IOL for oligohydramnios  - pitocin to start this am  - fetal status category 1, continuous monitoring  - NIPT trisomy 21, fetal balance AV canal defect, NICU at delivery, cord blood for chromosomes  - BP in normal range this am, continue to follow    Lisa Garcia MD    "

## 2018-06-05 NOTE — ANESTHESIA PROCEDURE NOTES
Epidural Procedure Note    Staff:     Anesthesiologist:  GHAZAL MAZARIEGOS    Resident/CRNA:  LAURA HARRISON    Procedure performed by resident/CRNA in the presence of a teaching physician    Location: OB     Procedure start time:  6/5/2018 8:30 AM     Procedure end time:  6/5/2018 8:55 AM   Pre-procedure checklist:   patient identified, IV checked, site marked, risks and benefits discussed, informed consent, monitors and equipment checked, pre-op evaluation, at physician/surgeon's request and post-op pain management      Correct Patient: Yes      Correct Position: Yes      Correct Site: Yes      Correct Procedure: Yes      Correct Laterality:  Yes    Site Marked:  Yes  Procedure:     Procedure:  Epidural catheter    ASA:  2    Diagnosis:  Labor Pain    Position:  Sitting    Sterile Prep: chloraprep      Insertion site:  L3-4    Local skin infiltration:  1% lidocaine    amount (mL):  3    Approach:  Midline    Needle gauge (G):  17    Needle Length (in):  3.5    Block Needle Type:  Touhy    Injection Technique:  LORT saline    ALEJANDRO at (cm):  5.5    Attempts:  1    Redirects:  0    Catheter gauge (G):  18    Catheter threaded easily: Yes      Threaded to cm at skin:  11    Threaded in epidural space (cm):  5.5    Paresthesias:  No    Aspiration negative for Heme or CSF: Yes      Test dose (mL):  3     Local anesthetic:  Lidocaine 1.5% w/ 1:200,000 epinephrine    Test dose time:  08:44    Test dose negative for signs of intravascular, subdural or intrathecal injection: Yes

## 2018-06-05 NOTE — PLAN OF CARE
Problem: Patient Care Overview  Goal: Plan of Care/Patient Progress Review  Outcome: Improving  Vital signs stable. Patient had a headache earlier in the night but resolved with tylenol. She denies any changes in vision or right upper quadrant pain.  's and moderate variability with occasional decelerations overnight (see flowsheets). Patient has continued to leak clear/pink tinged fluid and feels that contractions are becoming more intense. Last misoprostol given at 0315 and patient is luigi every 2-4 minutes. Patient is planning on an epidural at some point this morning; she would like to eat breakfast and shower before that. Significant other at bedside. NICU updated on patient's progress. Will continue to closely monitor.

## 2018-06-05 NOTE — PROGRESS NOTES
[facility]  Labor Progress Note    S: Patient feeling intermittent increased pressure.    O:   Patient Vitals for the past 4 hrs:   BP Temp Temp src Resp SpO2   18 1730 137/83 - - - 95 %   18 1630 139/76 - - - 97 %   18 1526 139/74 98.1  F (36.7  C) Oral 16 -   18 1500 126/74 - - - 95 %     SVE: C/c/0 to +1    FHT: Baseline 120s, moderate variability, + accelerations, no decelerations  Wallis: Q3min    Membranes: SROM at 0000    A/P:  Ms. Chhaya Thurman is a 27 year old  at 37w4d admitted for IOL for oligo.  Pregnancy complications by gHTN, obesity, fetus with Trisomy 21 and balanced AV canal defect.      Labor:             - S/p PO misoprostol x4 for cervical ripening and SROM.  S/p IV pitocin, turned off due to FHT tracing.  Now luigi regularly on her own and completely dilated. Will labor down one hour and then push.     - Pain: epidural in place     - Anticipate .  Gestational hypertension:                          - BPs normotensive to low mild range.                          - HELLP labs wnl, UPC neg. No s/s of pre-E.   FWB:              - Category 1 FHT, reactive. (earlier with Intermittent variables improved with turning off pitocin.  Reassured by moderate variability. )                         - Continuous EFW, Wallis.                          - NIPT with Trisomy 21.  NICU at delivery.     PNC:               - Rh positive, Rubella immune, GBS negative, GCT 99, Placenta posterior.    Johana Song PGY-2    Staff MD Note    I appreciate the note by Dr. Song.  Any necessary changes have been made by me.  I evaluated the patient with the resident and agree with the assessment and plan.    Spring Vargas MD

## 2018-06-05 NOTE — PROGRESS NOTES
[facility]  Labor Progress Note    S: Patient feeling intermittent increased pressure.    O:   Patient Vitals for the past 4 hrs:   BP Temp Temp src Resp SpO2   18 1630 139/76 - - - 97 %   18 1526 139/74 98.1  F (36.7  C) Oral 16 -   18 1500 126/74 - - - 95 %   18 1430 136/81 - - - 98 %   18 1400 137/81 98.8  F (37.1  C) Oral 18 95 %   18 1330 122/70 - - - 95 %     SVE: Deferred    FHT: Baseline 120s, moderate variability, + accelerations, intermittent variable decelerations  Tobias: 4 contractions in 10 minutes    A/P:  Ms. Chhaya Thurman is a 27 year old  at 37w4d  admitted for IOL for oligo.  Pregnancy complications by gHTN, obesity, fetus with Trisomy 21 and balanced AV canal defect.      Labor:             - S/p PO misoprostol x4 for cervical ripening and SROM.  S/p IV pitocin, turned off due to FHT tracing.  Repeat SVE 4 hours after last, unless patient starts feeling constant pressure.    - Pain: epidural in place     - Anticipate .  Gestational hypertension:                          - BPs normotensive to low mild range.                          - HELLP labs wnl, UPC neg. No s/s of pre-E.   FWB:              - Category 2 FHT, reactive. Intermittent variables improved with turning off pitocin.  Reassured by moderate variability.                          - Continuous EFW, Tobias.                          - NIPT with Trisomy 21.  NICU at delivery.   PNC:               - Rh positive, Rubella immune, GBS negative, GCT 99, Placenta posterior.    Sarah Larsen MD  Ob/Gyn, PGY-3  2018, 5:15 PM

## 2018-06-05 NOTE — PROVIDER NOTIFICATION
06/05/18 0441   Provider Notification   Provider Name/Title Dr. Song   Method of Notification In Department   Notification Reason Decels   Provider notified of deceleration audible at bedside after patient was back to bed from bathroom. FHR down to 70's but resolved with repositioning to side. Will continue to closely monitor.

## 2018-06-05 NOTE — PROVIDER NOTIFICATION
06/05/18 0645   Provider Notification   Provider Name/Title Dr. Fischer   Method of Notification In Department   Notification Reason Other (Comment)   Patient requesting to come off monitors to shower. Strip reviewed and Dr. Aaliyah simons with removing monitors for shower.

## 2018-06-05 NOTE — PROVIDER NOTIFICATION
06/05/18 0048   Provider Notification   Provider Name/Title Dr. Griffin   Method of Notification At Bedside   Notification Reason SVE   Patient requesting medication for pain and to help her sleep. Dr. Griffin at bedside to assess cervix to determine if vistaril & morphine is appropriate at this time.

## 2018-06-05 NOTE — PLAN OF CARE
Problem: Patient Care Overview  Goal: Plan of Care/Patient Progress Review  Outcome: Improving  Maternal VS stable. Denies headache, visual disturbances and epigastric pain. FHR WNL. Difficulty monitoring while patient sitting on birth ball. Oral miso given x2. Patient using mobile telemetry and walking in room. Family present and supportive. Eating and drinking normally. Patient to see  to review healthcare directive and medical power of  tomorrow. Declines NICU consult at this time. Monitoring per protocol.

## 2018-06-06 LAB — HGB BLD-MCNC: 11 G/DL (ref 11.7–15.7)

## 2018-06-06 PROCEDURE — 12000030 ZZH R&B OB INTERMEDIATE UMMC

## 2018-06-06 PROCEDURE — 85018 HEMOGLOBIN: CPT | Performed by: STUDENT IN AN ORGANIZED HEALTH CARE EDUCATION/TRAINING PROGRAM

## 2018-06-06 PROCEDURE — 36415 COLL VENOUS BLD VENIPUNCTURE: CPT | Performed by: STUDENT IN AN ORGANIZED HEALTH CARE EDUCATION/TRAINING PROGRAM

## 2018-06-06 PROCEDURE — 25000132 ZZH RX MED GY IP 250 OP 250 PS 637: Performed by: STUDENT IN AN ORGANIZED HEALTH CARE EDUCATION/TRAINING PROGRAM

## 2018-06-06 RX ORDER — IBUPROFEN 600 MG/1
600 TABLET, FILM COATED ORAL EVERY 6 HOURS PRN
Qty: 30 TABLET | Refills: 0 | Status: SHIPPED | OUTPATIENT
Start: 2018-06-06 | End: 2018-07-17

## 2018-06-06 RX ORDER — AMOXICILLIN 250 MG
1 CAPSULE ORAL 2 TIMES DAILY
Qty: 60 TABLET | Refills: 2 | Status: SHIPPED | OUTPATIENT
Start: 2018-06-06 | End: 2018-07-17

## 2018-06-06 RX ADMIN — IBUPROFEN 800 MG: 200 TABLET, FILM COATED ORAL at 13:19

## 2018-06-06 RX ADMIN — SENNOSIDES AND DOCUSATE SODIUM 1 TABLET: 8.6; 5 TABLET ORAL at 13:19

## 2018-06-06 RX ADMIN — IBUPROFEN 800 MG: 200 TABLET, FILM COATED ORAL at 20:33

## 2018-06-06 RX ADMIN — IBUPROFEN 800 MG: 200 TABLET, FILM COATED ORAL at 06:52

## 2018-06-06 RX ADMIN — SENNOSIDES AND DOCUSATE SODIUM 2 TABLET: 8.6; 5 TABLET ORAL at 20:33

## 2018-06-06 NOTE — PLAN OF CARE
Data: Chhaya Thurman transferred to antepartum room 4126 via wheelchair at 1215. Baby in NICU. Visited infant in NICU prior to transfer  Action: Receiving unit notified of transfer: Yes. Patient and family notified of room change. Report given to Nivia at bedside. Belongings sent to receiving unit. Accompanied by Registered Nurse. Oriented patient to surroundings. Call light within reach. ID bands double-checked with receiving RN.  Response: Patient tolerated transfer and is stable.

## 2018-06-06 NOTE — DISCHARGE SUMMARY
VAGINAL DELIVERY DISCHARGE SUMMARY    Admit date: 2018   Admission staff: Alyssa Rodriguez MD   Discharge date: 2018  Discharge staff: Spring Vargas MD    Admit Dx:   - 27 year old  at 37w4d GA  - oligohydramnios  - known fetal trisomy 21 with AV canal defect  - gestational hypertension     Discharge Dx:  - Same as above, s/p delivery via     Procedures:  - spontaneous vaginal delivery   - Epidural analgesia    Hospital course:  Chhaya Thurman, a 27 year old  presented for IOL in the setting of oligohydramnios (ANITRA 2). The fetus was known to have trisomy 21 and a balance AV canal defect.      The patient received 4 misoprostols for cervical ripening. She had spontaneous rupture of membranes 0010 on . She made slow progress throughout the day. Pitocin was started but had to be turned off due to a category II FHT. THe FHT recovered. The patient was noted to be completely dilated at 649pm on . She labored down about 1 hour then pushed for 1.5 to deliver a viable female infant in LIA position, with apgars pending, weighing 2720g. Delivery was complicated by a nuchal cord. While the patient was pushing the FHT was notable for decelerations to the 90s with pushes. The cord was clamped and cut quickly and the baby was handed to the NICU staff. A segment of cord was collected for gasses (venous pH 7.35/-2.4, 7.23/-3.2). The placenta delivered easily with gentle cord traction and was noted to be in tact and there were 3 vessels.  The fundus was firm with pitocin and uterine massage. She was noted to have bilateral periurethral lacerations that were re-approximated with 4-0 vicryl. There was a small first degree perineal laceration that was closed with 3-0 vicryl. Please see her admission H&P and Delivery Summary for full details regarding her admission and delivery.    Her postpartum course was uncomplicated. On PPD#2, she was meeting all of her postpartum goals and deemed stable for  discharge. She was voiding without difficulty, tolerating a regular diet without nausea and vomiting, her pain was well controlled on oral pain medicines and her lochia was appropriate. Her hemoglobin prior to delivery was 11.7 and after delivery was 11.0. Her Rh status was positive and RHOgam was not indicated. At the time of discharge, she was breast feeding her infant and desired OCPs for contraception.    Discharge/Disposition:  Ms. Chhaya Thurman was discharged to home in stable condition with the following instructions/medications:  1) Call for temperature > 100.4, foul smelling vaginal discharge, bleeding > 1 pad per hour x 2 hrs, pain not controlled by oral pain meds, severe constipation or severe nausea or vomiting.  2) She desired OCPs for contraception.  3) She was instructed to follow-up with her primary OB in 6 weeks for a routine postpartum visit.  4) She was instructed to continue her PNV on discharge if she wished to breast feed her infant.  5) She was discharged home with the following medications:        Review of your medicines      START taking       Dose / Directions    ibuprofen 600 MG tablet   Commonly known as:  ADVIL/MOTRIN   Used for:   (normal spontaneous vaginal delivery)        Dose:  600 mg   Take 1 tablet (600 mg) by mouth every 6 hours as needed   Quantity:  30 tablet   Refills:  0       senna-docusate 8.6-50 MG per tablet   Commonly known as:  SENOKOT-S;PERICOLACE   Used for:   (normal spontaneous vaginal delivery)        Dose:  1 tablet   Take 1 tablet by mouth 2 times daily   Quantity:  60 tablet   Refills:  2         CONTINUE these medicines which have NOT CHANGED       Dose / Directions    calcium carbonate 500 MG chewable tablet   Commonly known as:  TUMS        Dose:  1 chew tab   Take 1 chew tab by mouth daily   Refills:  0       prenatal multivitamin plus iron 27-0.8 MG Tabs per tablet        Dose:  1 tablet   Take 1 tablet by mouth daily   Refills:  0             Where to get your medicines      These medications were sent to [facility] - [place], [place] - [address]  [address], [facility] [address]     Phone:  [phone]      ibuprofen 600 MG tablet     senna-docusate 8.6-50 MG per tablet           # Discharge Pain Plan:    - During her hospitalization, Ms. Thurman experienced pain due to .  The pain plan for discharge was discussed with her and the plan was created in a collaborative fashion.    - Tylenol and Ibuprofen PRN for pain    Weston Nettles DO, MA  PGY-1  [facility]  2018  8:19 AM    The patient was seen and examined by me on the day of discharge.  I have reviewed and agree with the above note.    Jimena Deleon MD, FACOG

## 2018-06-06 NOTE — PLAN OF CARE
Problem: Patient Care Overview  Goal: Plan of Care/Patient Progress Review  Outcome: Improving  Data: Chhaya Thurman transferred to room 7142 at 09842. Baby in NICU.  Action: Receiving unit notified of transfer: Yes. Patient and family notified of room change. Report given to Olive ANDERSON at 1330. Belongings sent to receiving unit. Oriented patient to surroundings. Call light within reach.   Response: Patient tolerated transfer and is stable.

## 2018-06-06 NOTE — LACTATION NOTE
Evangelista Gipson   4/26/2018   Anticoagulation Therapy Visit    Description:  58 year old male   Provider:  Christiano Hawkins   Department:  [facility]           INR as of 4/26/2018     Today's INR 2.50      Anticoagulation Summary as of 4/26/2018     INR goal 2.0-3.0   Today's INR 2.50   Full instructions 1.5 mg on Mon, Wed, Fri; 1 mg all other days   Next INR check 5/10/2018    Indications   LVAD (left ventricular assist device) present (H) [Z95.811]  Long-term (current) use of anticoagulants [Z79.01] [Z79.01]         April 2018 Details    Sun Mon Tue Wed Thu Fri Sat     1               2               3               4               5               6               7                 8               9               10               11               12               13               14                 15               16               17               18               19               20               21                 22               23               24               25               26      1 mg   See details      27      1.5 mg         28      1 mg           29      1 mg         30      1.5 mg               Date Details   04/26 This INR check               How to take your warfarin dose     To take:  1 mg Take 1 of the 1 mg tablets.    To take:  1.5 mg Take 1.5 of the 1 mg tablets.           May 2018 Details    Sun Mon Tue Wed Thu Fri Sat       1      1 mg         2      1.5 mg         3      1 mg         4      1.5 mg         5      1 mg           6      1 mg         7      1.5 mg         8      1 mg         9      1.5 mg         10            11               12                 13               14               15               16               17               18               19                 20               21               22               23               24               25               26                 27               28               29               30               31                "D:  I met with Leena; Ralph is her 1st baby.  She is normally in good health, takes no medications, and has no history of breast/chest surgery or trauma.  She has already started to pump.   I:  I gave her a folder of introductory materials and went over pumping guidelines.  I reviewed physiology of colostrum and milk production, pumping guidelines, and I gave her a log and encouraged her to use it.   I explained how to access the videos \"Hand Expression\" and \"Maximizing Milk Production\"; as well as other helpful books and websites.   We discussed hands-on pumping techniques and usefulness of a hands-free pumping bra.  We discussed skin to skin holding and how to reach your breastfeeding goals.  We talked about birth control and other medications during breastfeeding.  She verbalized understanding via teachback.  I advised her to call her insurance company about pump coverage (already has a Pump in [employer]; would need a [employer] from postpartum if so desired as she is a [employer] employee).  We attempted to work on a feeding; despite stimulation and trying to rouse baby she did not wake nor cue the whole time I was at bedside; we discussed the merits of gavage feeds if she is unable to wake and latch or finger feed.   A:  Mom has information she needs to initiate her supply; non-cueing baby.   P:  Will continue to provide lactation support.  Maria Isabel Roca, RNC, IBCLC         "    Date Details   No additional details    Date of next INR:  5/10/2018         How to take your warfarin dose     To take:  1 mg Take 1 of the 1 mg tablets.    To take:  1.5 mg Take 1.5 of the 1 mg tablets.

## 2018-06-06 NOTE — PLAN OF CARE
Problem: Labor (Cervical Ripen, Induct, Augment) (Adult,Obstetrics,Pediatric)  Goal: Signs and Symptoms of Listed Potential Problems Will be Absent, Minimized or Managed (Labor)  Signs and symptoms of listed potential problems will be absent, minimized or managed by discharge/transition of care (reference Labor (Cervical Ripen, Induct, Augment) (Adult,Obstetrics,Pediatric) CPG).   Outcome: Completed Date Met: 06/06/18  Pt having mildly elevated BPs. Denies HA, vision changes, RUQ pain. Mild edema in feet, ankles, and hands present. Other VSS. Frequent position changes and peanut ball to facilitate labor progression. Completely dilated at 1849. Labored down for one hour and began pushing at 2002. Baby girl born vaginally at 2128. See flowsheets for FHR and toco interpretations. NICU at delivery for known heart defect and decelerations during pushing. Baby admitted to NICU. Postpartum assessments WDL.

## 2018-06-06 NOTE — L&D DELIVERY NOTE
OB Vaginal Delivery Note    Chhaya Thurman MRN# [id number]   Age: 27 year old YOB: 1990       GA: 37w4d  GP:   Labor Complications: Preeclampsia/Hypertension   QBL:  pending  Delivery Type: Vaginal, Spontaneous Delivery   ROM to Delivery Time: 21h 18m   Weight:      1 Minute 5 Minute 10 Minute   Apgar Totals:               VELMA, JOHANA     Delivery Details:  Chhaya Thurman, a 27 year old  presented for IOL in the setting of oligohydramnios (ANITRA 2). The fetus was known to have trisomy 21 and a balance AV canal defect.     The patient received 4 misoprostols for cervical ripening. She had spontaneous rupture of membranes 0010 on . She made slow progress throughout the day. Pitocin was started but had to be turned off due to a category II FHT. THe FHT recovered. The patient was noted to be completely dilated at 649pm on . She labored down about 1 hour then pushed for 1.5 hours to deliver a viable female infant in LIA position, with apgars pending, weighing 2720g. Delivery was complicated by a nuchal cord. While the patient was pushing the FHT was notable for decelerations to the 90s with pushes. The cord was clamped and cut quickly and the baby was handed to the NICU staff. A segment of cord was collected for gasses (venous pH 7.35/-2.4, 7.23/-3.2). The placenta delivered easily with gentle cord traction and was noted to be in tact and there were 3 vessels.  The fundus was firm with pitocin and uterine massage. She was noted to have bilateral periurethral lacerations, the one one the right was re-approximated with 2 interrupted stitches of 4-0 vicryl and the left was hemostatic and not repaired. There was a small first degree perineal laceration that was repaired with a figure of X stitch of 3-0 vicryl.     Johana Song   PGY-2          Labor Event Times    Labor onset date:  18 Onset time:  11:30 AM   Dilation complete date:  18 Complete time:   6:49 PM    Start pushing date/time:  2018            Labor Length    1st Stage (hrs):  7 (min):  19   2nd Stage (hrs):  2 (min):  39   3rd Stage (hrs):  0 (min):  7      Labor Events     labor?:  No   Labor Type:  Induction   Predominate monitoring during 1st stage:  continuous electronic fetal monitoring      Antibiotics received during labor?:  No      Rupture identifier:  Rupture 1   Rupture date/time: 18 0010   Rupture type:  Spontaneous rupture of membranes occuring during spontaneous labor or augmentation   Fluid color:  Clear   Fluid odor:  Normal      Induction:  Misoprostol, Oxytocin   Induction date/time:     Cervical ripening date/time:     Indications for induction:  Fetal Abnormality      1:1 continuous labor support provided by?:  RN Labor partogram used?:  no         Delivery/Placenta Date and Time    Delivery Date:  18 Delivery Time:   9:28 PM   Placenta Date/Time:  2018  9:35 PM   Oxytocin given at the time of delivery:  after delivery of baby      Vaginal Counts    Initial count performed by 2 team members:   Two Team Members   CROW silverman          Needles Suture [place] Sponges Instruments   Initial counts 2  5    Added to count  2     Final counts 2 2 5       Placed during labor Accounted for at the end of labor   Yes Yes   NA NA   NA NA      Final count performed by 2 team members:   Two Team Members   CROW Silverman         Final count correct?:  Yes         Cord    Vessels:  3 Vessels    Cord Blood Disposition:  Lab Gases Sent?:  Yes         [place] Resuscitation       Output in Delivery Room:  Stool      Labor Events and Shoulder Dystocia    Fetal Tracing Prior to Delivery:  Category 2   Fetal Tracing Comments:  Prolonged deceleration prior to delivery   Shoulder dystocia present?:  Neg            Delivery (Maternal) (Provider to Complete) (386270)    Episiotomy:  None   Perineal lacerations:  1st    Periurethral laceration:  bilateral    Vaginal  laceration?:  No    Cervical laceration?:  No          Mother's Information  Mother: Chhaya Thurman #[id number]    Start of Mother's Information     IO Blood Loss  06/05/18 1130 - 06/06/18 0008    Mom's I/O Activity            End of Mother's Information  Mother: Chhaya Thurman #[id number]            Delivery - Provider to Complete (746730)    Delivering clinician:  SPRING VARGAS   Attempted Delivery Types (Choose all that apply):  Spontaneous Vaginal Delivery   Delivery Type (Choose the 1 that will go to the Birth History):  Vaginal, Spontaneous Delivery                     Other personnel:   Provider Role   JOHANA CARREON Resident            Placenta    Immediate Cord Clamping:  Done   Date/Time:  6/5/2018  9:35 PM   Removal:  Spontaneous   Disposition:  Pathology      Anesthesia    Method:  Epidural   Cervical dilation at placement:  0-3         Presentation and Position    Presentation:  Vertex   Position:  Middle Occiput Anterior                    Johana Carreon MD     Staff MD Note  I was present and scrubbed for the entire procedure noted above.  I agree with the description above and any necessary changes have been made by me.  Spring Vargas MD

## 2018-06-06 NOTE — PROGRESS NOTES
Data: Chhaya Thurman transferred to [id number] via wheelchair at 0015. Baby is in the NICU.  Action: This receiving unit notified of transfer: Yes. Report received from Debra Torres RN at 0020. Belongings accompanied patient to this receiving unit. Accompanied by Registered Nurse. Oriented patient to surroundings. Call light within reach.   Response: Patient tolerated transfer and is stable.

## 2018-06-06 NOTE — PLAN OF CARE
Problem: Patient Care Overview  Goal: Plan of Care/Patient Progress Review  Outcome: Improving  Pt admitted to room at 1340. Baby remained in NICU. Oriented to room & unit surroundings. Denies pain at present. Pt happy to have a bigger room.

## 2018-06-06 NOTE — PLAN OF CARE
Problem: Patient Care Overview  Goal: Plan of Care/Patient Progress Review  Outcome: No Change  VSS. LS CTA. Pt denies headache, dizziness or changes to vision. Breasts are soft, nipples intact. Pt is pumping and completing hand expression to provide breast milk for her  in the NICU. BS+, flatus-, BM- since delivery. Patient will begin taking stool softeners in the AM. Pt is voiding independently and with out difficulty. FF@ U/U, midline. Perineum is well-approximated. Pt is using the aleksandr bottle. Small amount of rubra lochia. Pt denies pain in legs, no clonus, +2 edema in bilateral ankles and feet. Some residual numbness/heaviness noted in the patients right leg post-epidural. Pt reports this is improving. Pt denies pain. Patient speaks lovingly about her  in the NICU and is active in pumping for her . Nahid PRATT, is supportive and at bedside.

## 2018-06-06 NOTE — PROGRESS NOTES
Post Partum Progress Note    PPD#1 from .     Subjective: reports overall doing well.  Pain: well controlled on PO meds.  PO: tolerating with no problems  Void: spontaneous  Amb: no problems  Breastfeeding: pumping  Bleeding: like a heavy period    Denies any fever, chills, SOB, chest pain, N/V,  headache, dizziness.   Objective:  Patient Vitals for the past 24 hrs:   BP Temp Temp src Resp SpO2   18 0336 136/70 97.7  F (36.5  C) Oral 16 -   18 0018 - 98.3  F (36.8  C) Oral - -   18 0016 133/68 - - 18 -   18 0015 132/69 - - - -   18 2322 124/66 - - - -   18 2307 136/75 - - - -   18 2252 132/71 - - - -   18 2237 130/74 - - - -   18 2222 132/72 - - - -   18 2207 135/71 - - - -   18 2152 128/70 - - - -   18 2138 - - - - 96 %   18 2137 143/78 - - - -   18 1927 133/84 - - - -   18 1904 - 99.1  F (37.3  C) Oral - -   18 1828 - - - - 96 %   18 1827 119/72 - - - -   18 1730 137/83 - - - 95 %   18 1630 139/76 - - - 97 %   18 1526 139/74 98.1  F (36.7  C) Oral 16 -   18 1500 126/74 - - - 95 %   18 1430 136/81 - - - 98 %   18 1400 137/81 98.8  F (37.1  C) Oral 18 95 %   18 1330 122/70 - - - 95 %   18 1300 135/77 - - - 95 %   18 1230 134/83 - - - 95 %   18 1200 134/73 98.1  F (36.7  C) Oral 18 96 %   18 1130 136/86 - - - 97 %   18 1100 119/66 - - - 94 %   18 1030 123/69 - - - 94 %   18 1000 133/78 98.3  F (36.8  C) Oral 18 95 %   18 0934 131/67 - - - -   18 0930 132/68 - - - 94 %   18 0924 135/75 - - - -   18 0919 131/72 - - - -   18 0915 130/70 - - - 96 %   18 0909 130/76 - - - -   18 0904 132/72 - - - -   18 0858 133/67 - - - 98 %   18 0856 142/80 - - - -   18 0854 134/78 - - - -   18 0852 135/77 - - - -   18 0850 123/79 - - - -   18 0848 127/73 - - - 100 %    18 0846 122/72 - - - -   18 0844 124/72 - - - -   18 0753 127/73 98.1  F (36.7  C) Oral 18 -   18 0606 106/71 - - - -   18 0522 - 98.1  F (36.7  C) Oral - -         General: AAOx3, NAD, appears generally well  CV:  RRR, no murmurs, rubs  Resp:  CTAB, no wheezes, rales  Abd:  Soft, nontender, nondistended, fundus firm at approximately the level of the umbilicus  Ext:  minimal edema in bilateral LE    Hgb 11.7 > EBL  100 > am pending    Assessment and Plan:  27 year old old  PPD#1 s/p .     # PPD1 - recovering appropriately.   - continue PO pain meds   - encourage amb, PO, and IS  - baby in NICU  - genetics sent re: possible T21.    - pt desire abd binder, rx ok.      # gHTN  - HELLP labs NL on admission   - BP has been normal. Occasionally low mild range    # Heme: hgb pending   - no s/s anemia, UOP adequate    Routine pp:    -Rh positive; no rhogam needed  -Rubella immune; no MMR needed  -breast feeding  -thinking OCPs for birth control    Likely ready for discharge PPD#2      Johana Song, PGY2  5:13 AM, 2018    [facility] staff:  Appreciate note by Dr. Song.  I have seen and examined the patient without the resident. I have reviewed, edited, and agree with the note.        Mary Carmen Mancuso MD, FACOG  2018  3:41 PM

## 2018-06-07 VITALS
WEIGHT: 240 LBS | HEART RATE: 74 BPM | BODY MASS INDEX: 38.57 KG/M2 | OXYGEN SATURATION: 96 % | TEMPERATURE: 97.8 F | RESPIRATION RATE: 16 BRPM | DIASTOLIC BLOOD PRESSURE: 74 MMHG | HEIGHT: 66 IN | SYSTOLIC BLOOD PRESSURE: 124 MMHG

## 2018-06-07 PROCEDURE — 25000132 ZZH RX MED GY IP 250 OP 250 PS 637: Performed by: STUDENT IN AN ORGANIZED HEALTH CARE EDUCATION/TRAINING PROGRAM

## 2018-06-07 RX ADMIN — IBUPROFEN 800 MG: 200 TABLET, FILM COATED ORAL at 02:20

## 2018-06-07 RX ADMIN — IBUPROFEN 800 MG: 200 TABLET, FILM COATED ORAL at 07:59

## 2018-06-07 RX ADMIN — SENNOSIDES AND DOCUSATE SODIUM 2 TABLET: 8.6; 5 TABLET ORAL at 07:59

## 2018-06-07 NOTE — PLAN OF CARE
Problem: Patient Care Overview  Goal: Plan of Care/Patient Progress Review  Outcome: Improving  VSS. Postpartum check WDL. Pain managed with Ibuprofen. Up ad hayley. Tolerating regular diet. Voiding without difficulty. Pumping every 2 hours independently for baby in NICU. Encouraged pt to do hand expression after pumping. Offer demonstration of doing hand expression and pt declined. Spending majority of evening in NICU. Continue cares.

## 2018-06-07 NOTE — PLAN OF CARE
Problem: Patient Care Overview  Goal: Plan of Care/Patient Progress Review  Outcome: Adequate for Discharge Date Met: 06/07/18  Pt discharged to a boarding status. Instructions & prescriptions given/reviewed. Hospital grade rental pump given. Pt verbalized understanding her plan & had no further questions. Instructed to F/U at clinic within 2 weeks for Mood check & 6 weeks for PP check.

## 2018-06-07 NOTE — PROGRESS NOTES
"S:  Physically doing well and ready for discharge.  Tearful today, wishes baby was in the room with her.  The NICU has a boarding room ready for her today.    O: Blood pressure 124/74, pulse 74, temperature 97.8  F (36.6  C), temperature source Oral, resp. rate 16, height 1.676 m (5' 6\"), weight 108.9 kg (240 lb), last menstrual period 2017, SpO2 96 %, unknown if currently breastfeeding.    gen-pleasant, NAD  abd-soft, FF, NT  extr-NT, 1+ edema edd    Hemoglobin   Date Value Ref Range Status   2018 11.0 (L) 11.7 - 15.7 g/dL Final     A:  PPD #2 s/p uncomplicated , baby in NICU with trisomy 21 and cardiac defect.      P:  Discharge to a boarding room today.  She can follow up with her primary OB or [facility] for her postpartum visit.  Would encourage a mood check in 2 weeks as well.    Jimena Deleon MD, FACOG    "

## 2018-06-07 NOTE — PLAN OF CARE
Problem: Patient Care Overview  Goal: Plan of Care/Patient Progress Review  Outcome: Improving  VSS. Postpartum assessment WNL. Periumbilical bruising present. +2 bilateral extremity edema. Voiding w/o difficulty. Pain managed with IB. Using electric breast pump independently. Declines interest in hand massage/expression. Mother and father frequently self transport to visit baby in NICU. Sleep/ rest promoted overnight. Birth certificate and ROP notary pending. Continue with plan of care.

## 2018-06-08 LAB
COPATH REPORT: NORMAL
COPATH REPORT: NORMAL

## 2018-06-09 ENCOUNTER — HEALTH MAINTENANCE LETTER (OUTPATIENT)
Age: 28
End: 2018-06-09

## 2018-07-13 ENCOUNTER — LACTATION ENCOUNTER (OUTPATIENT)
Age: 28
End: 2018-07-13

## 2018-07-13 NOTE — LACTATION NOTE
This note was copied from a baby's chart.  D: I met with mom for discharge teaching. She is maintaining a stable, robust supply. She hopes to offer some breastfeeding once home. Ralph is bottle feeding for up to 15 minutes and then getting fed via g-tube.   I: We talked about keeping Ralph breast oriented and offering occasional breastfeeding as tolerated (similar to bottle feeing guidelines), also offering dessert breastfeeding. I gave her a feeding log to use at home and went over the need for 8-12 feedings per day and how many wet diapers and stools she should see each day to show adequate intake. We discussed home storage of breast milk, weaning from the nipple shield and pumping, and transitioning to full breastfeeding at home.  I gave the mother handouts on all of these topics as well as extra nipple shields. We discussed growth spurts, birth control and other medications, paced bottlefeeding, Babyweigh rental scales, and resources for help at home/ when to seek outpatient help.  She verbalized understanding via teach back.   A: Mom has information and equipment she needs to feed her baby at home.   P: I encouraged her to call with any breastfeeding questions she may have in the future.   Marly Garcia, RNC, IBCLC

## 2018-07-17 ENCOUNTER — PRENATAL OFFICE VISIT (OUTPATIENT)
Dept: OBGYN | Facility: CLINIC | Age: 28
End: 2018-07-17
Payer: COMMERCIAL

## 2018-07-17 VITALS
HEART RATE: 69 BPM | SYSTOLIC BLOOD PRESSURE: 112 MMHG | WEIGHT: 209.6 LBS | DIASTOLIC BLOOD PRESSURE: 70 MMHG | HEIGHT: 66 IN | BODY MASS INDEX: 33.68 KG/M2 | TEMPERATURE: 97 F

## 2018-07-17 DIAGNOSIS — Z82.79 FAMILY HISTORY OF DOWN SYNDROME: ICD-10-CM

## 2018-07-17 DIAGNOSIS — Z30.011 BCP (BIRTH CONTROL PILLS) INITIATION: ICD-10-CM

## 2018-07-17 PROBLEM — N84.0 ENDOMETRIUM, POLYP: Status: RESOLVED | Noted: 2017-08-07 | Resolved: 2018-07-17

## 2018-07-17 PROBLEM — Z34.00 PRENATAL CARE, FIRST PREGNANCY: Status: RESOLVED | Noted: 2017-10-17 | Resolved: 2018-07-17

## 2018-07-17 PROBLEM — Z87.59 HISTORY OF MISCARRIAGE: Status: ACTIVE | Noted: 2017-06-29

## 2018-07-17 PROBLEM — O35.BXX0 FETAL CARDIAC ANOMALY AFFECTING PREGNANCY, ANTEPARTUM: Status: RESOLVED | Noted: 2018-04-04 | Resolved: 2018-07-17

## 2018-07-17 PROBLEM — O41.00X0 OLIGOHYDRAMNIOS: Status: RESOLVED | Noted: 2018-06-04 | Resolved: 2018-07-17

## 2018-07-17 PROCEDURE — G0145 SCR C/V CYTO,THINLAYER,RESCR: HCPCS | Performed by: OBSTETRICS & GYNECOLOGY

## 2018-07-17 RX ORDER — ACETAMINOPHEN AND CODEINE PHOSPHATE 120; 12 MG/5ML; MG/5ML
1 SOLUTION ORAL DAILY
Qty: 84 TABLET | Refills: 3 | Status: SHIPPED | OUTPATIENT
Start: 2018-07-17 | End: 2019-02-10 | Stop reason: ALTCHOICE

## 2018-07-17 ASSESSMENT — ANXIETY QUESTIONNAIRES
2. NOT BEING ABLE TO STOP OR CONTROL WORRYING: NOT AT ALL
3. WORRYING TOO MUCH ABOUT DIFFERENT THINGS: NOT AT ALL
5. BEING SO RESTLESS THAT IT IS HARD TO SIT STILL: NOT AT ALL
1. FEELING NERVOUS, ANXIOUS, OR ON EDGE: NOT AT ALL
6. BECOMING EASILY ANNOYED OR IRRITABLE: NOT AT ALL
GAD7 TOTAL SCORE: 1
7. FEELING AFRAID AS IF SOMETHING AWFUL MIGHT HAPPEN: NOT AT ALL

## 2018-07-17 ASSESSMENT — PATIENT HEALTH QUESTIONNAIRE - PHQ9: 5. POOR APPETITE OR OVEREATING: SEVERAL DAYS

## 2018-07-17 NOTE — PROGRESS NOTES
"Chhaya is here for a 6-week postpartum checkup.    She had a  of a liveborn baby girl, weight 6 pounds 0 oz.  The delivery was  complicated by oligohydramnios, gest hypertension, and known fetal tri21.  Since delivery, she has been breast feeding/pumping.  She has not had a normal menses.  She has not had intercourse.  Patient screened for postpartum depression and complaints are NEGATIVE. Screening has also been completed for intimate partner violence. She would like to discuss contraception.    Her last pap was  and was Normal    EXAM: /70 (BP Location: Right arm, Patient Position: Chair, Cuff Size: Adult Large)  Pulse 69  Temp 97  F (36.1  C) (Tympanic)  Ht 5' 6\" (1.676 m)  Wt 209 lb 9.6 oz (95.1 kg)  LMP 2017  Breastfeeding? Yes  BMI 33.83 kg/m2    HEENT: grossly normal.  NECK: no lymphadenopathy or thyromegaly.  ABDOMEN: soft, non tender, good bowel sounds, without masses, rebound, guarding or tenderness.  EXTREMITIES: Warm to touch, no ankle edema or calf tenderness.    PELVIC:    External genitalia: normal without lesion, perineum well healed   Vagina: normal mucosa and rugae, normal discharge.  Cervix: normal without lesion.  Rectal: deferred, external hemorrhoids absent    PHQ-9 ([employer]) 2018   1.  Little interest or pleasure in doing things 0   2.  Feeling down, depressed, or hopeless 0   3.  Trouble falling or staying asleep, or sleeping too much 0   4.  Feeling tired or having little energy 1   5.  Poor appetite or overeating 0   6.  Feeling bad about yourself 0   7.  Trouble concentrating 0   8.  Moving slowly or restless 0   9.  Suicidal or self-harm thoughts 0   PHQ-9 Total Score 1   RAYRAY-7   [employer], 2002; Used with Permission) 2018   1. Feeling nervous, anxious, or on edge 0   2. Not being able to stop or control worrying 0   3. Worrying too much about different things 0   4. Trouble relaxing 1   5. Being so restless that it is hard to sit still 0   6. " Becoming easily annoyed or irritable 0   7. Feeling afraid, as if something awful might happen 0   RAYRAY-7 Total Score 1       A/P  Routine Postpartum    1. Contraception: minipill  2. Annual due 2019    Joy Ellison M.D.

## 2018-07-17 NOTE — MR AVS SNAPSHOT
"              After Visit Summary   7/17/2018    Chhaya Thurman    MRN: [id number]           Patient Information     Date Of Birth          1990        Visit Information        Provider Department      7/17/2018 10:30 AM Joy Ellison MD [facility]        Today's Diagnoses     Routine postpartum follow-up    -  1    Family history of Down syndrome        BCP (birth control pills) initiation           Follow-ups after your visit        Who to contact     If you have questions or need follow up information about today's clinic visit or your schedule please contact [facility] directly at [phone].  Normal or non-critical lab and imaging results will be communicated to you by MyChart, letter or phone within 4 business days after the clinic has received the results. If you do not hear from us within 7 days, please contact the clinic through [employer] or phone. If you have a critical or abnormal lab result, we will notify you by phone as soon as possible.  Submit refill requests through agreement24 [email or web address] or call your pharmacy and they will forward the refill request to us. Please allow 3 business days for your refill to be completed.          Additional Information About Your Visit        MyChart Information     agreement24 [email or web address] gives you secure access to your electronic health record. If you see a primary care provider, you can also send messages to your care team and make appointments. If you have questions, please call your primary care clinic.  If you do not have a primary care provider, please call [phone] and they will assist you.        Care EveryWhere ID     This is your Care EveryWhere ID. This could be used by other organizations to access your [facility] medical records  [id number]        Your Vitals Were     Pulse Temperature Height Last Period Breastfeeding? BMI (Body Mass Index)    69 97  F (36.1  C) (Tympanic) 5' 6\" (1.676 m) 09/16/2017 Yes 33.83 kg/m2       Blood Pressure from " Last 3 Encounters:   07/17/18 112/70   06/07/18 124/74   06/04/18 138/82    Weight from Last 3 Encounters:   07/17/18 209 lb 9.6 oz (95.1 kg)   06/04/18 240 lb (108.9 kg)   06/04/18 240 lb (108.9 kg)              We Performed the Following     Pap imaged thin layer screen reflex to HPV if ASCUS - recommend age 25 - 29          Today's Medication Changes          These changes are accurate as of 7/17/18 10:50 AM.  If you have any questions, ask your nurse or doctor.               Start taking these medicines.        Dose/Directions    norethindrone 0.35 MG per tablet   Commonly known as:  MICRONOR   Used for:  BCP (birth control pills) initiation   Started by:  Joy Ellison MD        Dose:  1 tablet   Take 1 tablet (0.35 mg) by mouth daily   Quantity:  84 tablet   Refills:  3            Where to get your medicines      These medications were sent to [id number] [address]  [address] [place] [id number]    Hours:  Closed 10-16-08 business to [facility] Phone:  [phone]     norethindrone 0.35 MG per tablet                Primary Care Provider Office Phone # Fax #    Agnes Stanton PA-C [phone] [address] [facility] [address]        Equal Access to Services     JOHANA WHITE AH: Hadgeovany Nair, waaxda lumichael, qaybta kaalmica kunz . So [facility] [phone].    ATENCIÓN: Si habla español, tiene a garcía disposición servicios gratuitos de asistencia lingüística. Winifred al [phone].    We comply with applicable federal civil rights laws and [place] laws. We do not discriminate on the basis of race, color, national origin, age, disability, sex, sexual orientation, or gender identity.            Thank you!     Thank you for choosing [facility]  for your care. Our goal is always to provide you with excellent care. Hearing back from our patients is one way we  can continue to improve our services. Please take a few minutes to complete the written survey that you may receive in the mail after your visit with us. Thank you!             Your Updated Medication List - Protect others around you: Learn how to safely use, store and throw away your medicines at www.disposemymeds.org.          This list is accurate as of 7/17/18 10:50 AM.  Always use your most recent med list.                   Brand Name Dispense Instructions for use Diagnosis    norethindrone 0.35 MG per tablet    MICRONOR    84 tablet    Take 1 tablet (0.35 mg) by mouth daily    BCP (birth control pills) initiation       prenatal multivitamin plus iron 27-0.8 MG Tabs per tablet      Take 1 tablet by mouth daily

## 2018-07-17 NOTE — NURSING NOTE
"Initial /70 (BP Location: Right arm, Patient Position: Chair, Cuff Size: Adult Large)  Pulse 69  Temp 97  F (36.1  C) (Tympanic)  Ht 5' 6\" (1.676 m)  Wt 209 lb 9.6 oz (95.1 kg)  LMP 09/16/2017  Breastfeeding? Yes  BMI 33.83 kg/m2 Estimated body mass index is 33.83 kg/(m^2) as calculated from the following:    Height as of this encounter: 5' 6\" (1.676 m).    Weight as of this encounter: 209 lb 9.6 oz (95.1 kg). .    Diane Mcclendon, GABBIE    "

## 2018-07-18 ASSESSMENT — PATIENT HEALTH QUESTIONNAIRE - PHQ9: SUM OF ALL RESPONSES TO PHQ QUESTIONS 1-9: 1

## 2018-07-18 ASSESSMENT — ANXIETY QUESTIONNAIRES: GAD7 TOTAL SCORE: 1

## 2018-07-19 LAB
COPATH REPORT: NORMAL
PAP: NORMAL

## 2018-07-20 LAB — COPATH REPORT: NORMAL

## 2018-09-19 ENCOUNTER — HOSPITAL ENCOUNTER (OUTPATIENT)
Dept: ULTRASOUND IMAGING | Facility: CLINIC | Age: 28
Discharge: HOME | End: 2018-09-19
Attending: SURGERY | Admitting: SURGERY
Payer: COMMERCIAL

## 2018-09-19 DIAGNOSIS — R10.11 RUQ ABDOMINAL PAIN: Primary | ICD-10-CM

## 2018-09-19 PROCEDURE — 76705 ECHO EXAM OF ABDOMEN: CPT

## 2018-12-21 ENCOUNTER — HOSPITAL ENCOUNTER (OUTPATIENT)
Dept: MRI IMAGING | Facility: CLINIC | Age: 28
Discharge: HOME | End: 2018-12-21
Attending: CHIROPRACTOR | Admitting: CHIROPRACTOR
Payer: COMMERCIAL

## 2018-12-21 DIAGNOSIS — M25.552 PAIN OF LEFT HIP JOINT: ICD-10-CM

## 2018-12-21 PROCEDURE — 73721 MRI JNT OF LWR EXTRE W/O DYE: CPT | Mod: LT

## 2018-12-31 ENCOUNTER — VIRTUAL VISIT (OUTPATIENT)
Dept: FAMILY MEDICINE | Facility: OTHER | Age: 28
End: 2018-12-31

## 2018-12-31 NOTE — PROGRESS NOTES
"Date:   Clinician: Brett Peña  Clinician NPI: [id number]  Patient: Chhaya Thurman  Patient : 1990  Patient Address: [address] [place], [place] [id number]  Patient Phone: [phone]  Visit Protocol: UTI  Patient Summary:  Chhaya is a 28 year old ( : 1990 ) female who initiated a Visit for a presumed bladder infection. When asked the question \"Please sign me up to receive news, health information and promotions from [employer].\", Chhaya responded \"No\".   Her symptoms started 1-3 days ago and consist of abdominal pain, urinary frequency, feeling as if the bladder is never empty, urgency, foul-smelling urine, and dysuria. Chhaya also feels feverish.   Symptom details     Urine color: The color of her urine is brown.     Abdominal pain: The pain is mild (1-3 on a 10 point pain scale).     Temperature: Her current temperature is 98.9 degrees Fahrenheit.      Denied symptoms include urinary incontinence, vaginal discharge, vomiting, flank pain, chills, vaginal itching, and nausea.   Chhaya has not used any over-the-counter medications or home remedies to relieve her current symptoms.  Precipitating events  Chhaya denies having a sexually transmitted disease.  Pertinent medical history  Chhaya has had a bladder infection before but has not had any in the past 12 months. Her current symptoms are similar to her previous bladder infection symptoms.   She is not sure what antibiotics have been effective in treating her past bladder infections.   Chhaya does not get yeast infections when she takes antibiotics and has not been prescribed antibiotics to prevent frequent or repeated bladder infections in the past. She has not experienced problems or side effects with any of the common antibiotics used to treat bladder infections.   Chhaya does not have a history of kidney stones. She has not used a catheter or been a patient in a hospital or nursing home in the past " 2 weeks.   Chhaya does not smoke or use smokeless tobacco.   She denies pregnancy and denies breastfeeding. She has menstruated in the past month.   Additional information as reported by the patient (free text): some left side, under rib pain, not in the back, consistent with previous uti   MEDICATIONS: No current medications, ALLERGIES: sumatriptan  Clinician Response:  Dear Chhaya,  Based on the information you have provided, you likely have an acute urinary tract infection, also called a bladder infection. Bladder infections occur when bacteria from the outside of the body enters the urinary tract. Any part of the urinary system can be infected, but the bladder is the most common.  Medication information  I am prescribing:     Nitrofurantoin monohyd/m-cryst (Macrobid) 100 mg oral capsule. Take 1 capsule by mouth every 12 hours for 5 days. Take this medication with food. There are no refills with this prescription.   The medication I prescribed for your bladder infection is an antibiotic. Continue taking the medication until it is gone even if you feel better.   Yeast infections can be a common side effect of antibiotics. The most common symptom of a yeast infection is itchiness in and around the vagina. Other signs and symptoms include burning, redness, or a thick, white vaginal discharge that looks like cottage cheese and does not have a bad smell.  Self care  Urination helps to flush bacteria from the urinary tract. For this reason, drinking water and urinating often helps relieve some symptoms of a bladder infection and can decrease your risk of getting bladder infections in the future.  Other steps you can take to prevent future bladder infections include:     Wipe front to back after using the bathroom    Urinate after sexual intercourse    Avoid using deodorant sprays, douches, or powders in the vaginal area     When to seek care  Please make an appointment to be seen in a clinic or urgent care if any  of the following occur:     You develop new symptoms or your symptoms become worse    You have medication side effects that make it difficult to take them as prescribed    Your symptoms do not improve within 1-2 days of starting treatment    You have symptoms of a bladder infection that return shortly after completing treatment     It is possible to have an allergic reaction to an antibiotic even if you have not had one in the past. If you notice a new rash, significant swelling, or difficulty breathing, stop taking this medication immediately and go to a clinic or urgent care.   Diagnosis: Acute uncomplicated bladder infection  Diagnosis ICD: N39.0  Prescription: nitrofurantoin monohyd/m-cryst (Macrobid) 100 mg oral capsule 10 capsule, 5 days supply. Take 1 capsule by mouth every 12 hours for 5 days. Refills: 0, Refill as needed: no, Allow substitutions: yes  Pharmacy: [id number] - [phone] - [address] [place] [address]

## 2019-01-14 ENCOUNTER — TELEPHONE (OUTPATIENT)
Dept: ORTHOPEDICS | Facility: OTHER | Age: 29
End: 2019-01-14

## 2019-01-14 ENCOUNTER — OFFICE VISIT (OUTPATIENT)
Dept: FAMILY MEDICINE | Facility: CLINIC | Age: 29
End: 2019-01-14
Payer: COMMERCIAL

## 2019-01-14 VITALS
RESPIRATION RATE: 12 BRPM | HEART RATE: 86 BPM | OXYGEN SATURATION: 99 % | WEIGHT: 186.2 LBS | DIASTOLIC BLOOD PRESSURE: 68 MMHG | SYSTOLIC BLOOD PRESSURE: 120 MMHG | BODY MASS INDEX: 30.05 KG/M2 | TEMPERATURE: 98.5 F

## 2019-01-14 DIAGNOSIS — R10.9 FLANK PAIN: Primary | ICD-10-CM

## 2019-01-14 DIAGNOSIS — N30.00 ACUTE CYSTITIS WITHOUT HEMATURIA: Primary | ICD-10-CM

## 2019-01-14 DIAGNOSIS — R10.9 FLANK PAIN: ICD-10-CM

## 2019-01-14 LAB
ALBUMIN UR-MCNC: NEGATIVE MG/DL
APPEARANCE UR: CLEAR
BACTERIA #/AREA URNS HPF: ABNORMAL /HPF
BILIRUB UR QL STRIP: NEGATIVE
COLOR UR AUTO: ABNORMAL
GLUCOSE UR STRIP-MCNC: NEGATIVE MG/DL
HCG UR QL: NEGATIVE
HGB UR QL STRIP: ABNORMAL
KETONES UR STRIP-MCNC: NEGATIVE MG/DL
LEUKOCYTE ESTERASE UR QL STRIP: ABNORMAL
NITRATE UR QL: NEGATIVE
PH UR STRIP: 7 PH (ref 5–7)
RBC #/AREA URNS AUTO: 1 /HPF (ref 0–2)
SOURCE: ABNORMAL
SP GR UR STRIP: 1 (ref 1–1.03)
SQUAMOUS #/AREA URNS AUTO: <1 /HPF (ref 0–1)
UROBILINOGEN UR STRIP-MCNC: 0 MG/DL (ref 0–2)
WBC #/AREA URNS AUTO: 94 /HPF (ref 0–5)

## 2019-01-14 PROCEDURE — 99213 OFFICE O/P EST LOW 20 MIN: CPT | Performed by: FAMILY MEDICINE

## 2019-01-14 PROCEDURE — 87186 SC STD MICRODIL/AGAR DIL: CPT | Performed by: FAMILY MEDICINE

## 2019-01-14 PROCEDURE — 81025 URINE PREGNANCY TEST: CPT | Performed by: ORTHOPAEDIC SURGERY

## 2019-01-14 PROCEDURE — 87088 URINE BACTERIA CULTURE: CPT | Performed by: FAMILY MEDICINE

## 2019-01-14 PROCEDURE — 81001 URINALYSIS AUTO W/SCOPE: CPT | Performed by: ORTHOPAEDIC SURGERY

## 2019-01-14 PROCEDURE — 87086 URINE CULTURE/COLONY COUNT: CPT | Performed by: FAMILY MEDICINE

## 2019-01-14 RX ORDER — SULFAMETHOXAZOLE/TRIMETHOPRIM 800-160 MG
1 TABLET ORAL 2 TIMES DAILY
Qty: 6 TABLET | Refills: 0 | Status: SHIPPED | OUTPATIENT
Start: 2019-01-14 | End: 2019-02-10

## 2019-01-14 NOTE — PROGRESS NOTES
SUBJECTIVE:   Chhaya Thurman is a 28 year old female who presents to clinic today for the following health issues:      URINARY TRACT SYMPTOMS  Onset:     Description:   Painful urination (Dysuria): no  Blood in urine (Hematuria): no   Delay in urine (Hesitency): no     Intensity: mild, moderate    Progression of Symptoms:  worsening    Accompanying Signs & Symptoms:  Fever/chills: no   Flank pain YES  Nausea and vomiting: YES  Any vaginal symptoms: none  Abdominal/Pelvic Pain: no     History:   History of frequent UTI's: YES- recent UTI  History of kidney stones: no   Sexually Active: YES  Possibility of pregnancy: No    Precipitating factors:   none    Therapies Tried and outcome:  Increase fluid intake    Problem list and histories reviewed & adjusted, as indicated.  Additional history: as documented        Reviewed and updated as needed this visit by clinical staff  Tobacco  Allergies  Meds  Problems  Med Hx  Surg Hx  Fam Hx  Soc Hx        Reviewed and updated as needed this visit by Provider  Tobacco  Allergies  Meds  Problems  Med Hx  Surg Hx  Fam Hx         Patient here today to discuss possible urinary tract infection symptoms that she is having.  She had labs drawn prior to her arrival here and they were reviewed today and noted below.  She states that the last time she felt like this, she eventually developed a kidney infection.    ROS:  10 point ROS of systems including Constitutional, Eyes, HENT, Respiratory, Cardiovascular, Gastroenterology, Genitourinary, Integumentary, Muscularskeletal, Psychiatric were all negative except for pertinent positives noted in my HPI.     OBJECTIVE:   /68   Pulse 86   Temp 98.5  F (36.9  C) (Temporal)   Resp 12   Wt 84.5 kg (186 lb 3.2 oz)   LMP 12/14/2018   SpO2 99%   Breastfeeding? No   BMI 30.05 kg/m    Body mass index is 30.05 kg/m .  Physical Exam   Constitutional: She appears well-developed and well-nourished.   Cardiovascular: Normal  rate, regular rhythm, S1 normal, S2 normal and normal heart sounds.   No murmur heard.  Pulmonary/Chest: Effort normal and breath sounds normal. No respiratory distress. She has no wheezes. She has no rhonchi. She has no rales.   Abdominal: Soft. Normal appearance and bowel sounds are normal. There is no hepatosplenomegaly. There is tenderness in the suprapubic area. There is CVA tenderness (Mild left-sided CVA tenderness to percussion). There is no rigidity, no rebound, no guarding, no tenderness at McBurney's point and negative Louis's sign.   Neurological: She is alert.     Results for orders placed or performed in visit on 01/14/19   HCG qualitative urine   Result Value Ref Range    HCG Qual Urine Negative NEG^Negative   UA with Microscopic ([place]; [facility])   Result Value Ref Range    Color Urine Straw     Appearance Urine Clear     Glucose Urine Negative NEG^Negative mg/dL    Bilirubin Urine Negative NEG^Negative    Ketones Urine Negative NEG^Negative mg/dL    Specific Gravity Urine 1.005 1.003 - 1.035    Blood Urine Small (A) NEG^Negative    pH Urine 7.0 5.0 - 7.0 pH    Protein Albumin Urine Negative NEG^Negative mg/dL    Urobilinogen mg/dL 0.0 0.0 - 2.0 mg/dL    Nitrite Urine Negative NEG^Negative    Leukocyte Esterase Urine Large (A) NEG^Negative    Source Unspecified Urine     WBC Urine 94 (H) 0 - 5 /HPF    RBC Urine 1 0 - 2 /HPF    Bacteria Urine Few (A) NEG^Negative /HPF    Squamous Epithelial /HPF Urine <1 0 - 1 /HPF        ASSESSMENT/PLAN:       ICD-10-CM    1. Acute cystitis without hematuria N30.00 Urine Culture Aerobic Bacterial     sulfamethoxazole-trimethoprim (BACTRIM DS) 800-160 MG tablet     PLAN:  1.  Patient has symptoms and lab results consistent with urinary tract infection.  She does have some mild left-sided CVA tenderness to percussion, but she does not appear highly toxic like somebody who I would expect having pyelonephritis.  We will treat her with 3 days of Bactrim and  send a urine culture and await those results.    Follow up with Provider -as needed    Tristin Torres MD   [facility]

## 2019-01-16 LAB
BACTERIA SPEC CULT: ABNORMAL
Lab: ABNORMAL
SPECIMEN SOURCE: ABNORMAL

## 2019-01-16 NOTE — RESULT ENCOUNTER NOTE
Chhaya,  Your results show that you had E. coli grow in your urine which should be covered by the antibiotic that you are given.  Please let me know if you have any questions or if your symptoms are not improving.    Sincerely,  Dr. Torres

## 2019-01-18 NOTE — TELEPHONE ENCOUNTER
Patient called to discuss positive pregnancy test and use of bactrim earlier this week.     Pt states she had symptoms of UTI and was prescribed Bactrim after negative pregnancy test on Monday. Pt now had positive home UPT today.     Nurse researched [employer] and advised patient minimal concern r/t bactrim per resources here with short term use (can restrict folate metabolism-- pt took 3 day course of bactrim and is supplementing folic acid).     Patient also concerned due to 2 prior early 1st trimester miscarriages and baby with trisomy 21 (now ).    Advised if patient wishes to see provider for pregnancy consult she may, otherwise plan can be made at intake appointment.   Advised if pt develops any concerning symptoms such as bleeding or cramping, can call back and we can order serial hCG.  Pt denies bleeding/cramping at this time.    Patient agrees with plan and has no further questions at this time

## 2019-01-28 ENCOUNTER — HOSPITAL ENCOUNTER (EMERGENCY)
Facility: CLINIC | Age: 29
Discharge: HOME | End: 2019-01-28
Attending: PHYSICIAN ASSISTANT | Admitting: PHYSICIAN ASSISTANT
Payer: COMMERCIAL

## 2019-01-28 VITALS
HEIGHT: 66 IN | BODY MASS INDEX: 28.93 KG/M2 | TEMPERATURE: 98.5 F | RESPIRATION RATE: 16 BRPM | DIASTOLIC BLOOD PRESSURE: 97 MMHG | SYSTOLIC BLOOD PRESSURE: 148 MMHG | HEART RATE: 80 BPM | WEIGHT: 180 LBS | OXYGEN SATURATION: 98 %

## 2019-01-28 DIAGNOSIS — N39.0 URINARY TRACT INFECTION: ICD-10-CM

## 2019-01-28 LAB
ALBUMIN UR-MCNC: NEGATIVE MG/DL
APPEARANCE UR: CLEAR
BACTERIA #/AREA URNS HPF: ABNORMAL /HPF
BILIRUB UR QL STRIP: NEGATIVE
COLOR UR AUTO: ABNORMAL
GLUCOSE UR STRIP-MCNC: NEGATIVE MG/DL
HGB UR QL STRIP: NEGATIVE
KETONES UR STRIP-MCNC: NEGATIVE MG/DL
LEUKOCYTE ESTERASE UR QL STRIP: ABNORMAL
MUCOUS THREADS #/AREA URNS LPF: PRESENT /LPF
NITRATE UR QL: NEGATIVE
PH UR STRIP: 7 PH (ref 5–7)
RBC #/AREA URNS AUTO: 2 /HPF (ref 0–2)
SOURCE: ABNORMAL
SP GR UR STRIP: 1 (ref 1–1.03)
SQUAMOUS #/AREA URNS AUTO: 1 /HPF (ref 0–1)
UROBILINOGEN UR STRIP-MCNC: 0 MG/DL (ref 0–2)
WBC #/AREA URNS AUTO: 21 /HPF (ref 0–5)

## 2019-01-28 PROCEDURE — 81001 URINALYSIS AUTO W/SCOPE: CPT | Performed by: NURSE PRACTITIONER

## 2019-01-28 PROCEDURE — 87186 SC STD MICRODIL/AGAR DIL: CPT | Performed by: PHYSICIAN ASSISTANT

## 2019-01-28 PROCEDURE — G0463 HOSPITAL OUTPT CLINIC VISIT: HCPCS | Performed by: PHYSICIAN ASSISTANT

## 2019-01-28 PROCEDURE — 99214 OFFICE O/P EST MOD 30 MIN: CPT | Mod: Z6 | Performed by: PHYSICIAN ASSISTANT

## 2019-01-28 PROCEDURE — 87086 URINE CULTURE/COLONY COUNT: CPT | Performed by: PHYSICIAN ASSISTANT

## 2019-01-28 PROCEDURE — 87088 URINE BACTERIA CULTURE: CPT | Performed by: PHYSICIAN ASSISTANT

## 2019-01-28 RX ORDER — CEPHALEXIN 500 MG/1
500 CAPSULE ORAL 2 TIMES DAILY
Qty: 14 CAPSULE | Refills: 0 | Status: SHIPPED | OUTPATIENT
Start: 2019-01-28 | End: 2019-02-10

## 2019-01-28 ASSESSMENT — ENCOUNTER SYMPTOMS
FREQUENCY: 0
HEMATURIA: 0
DYSURIA: 0
CARDIOVASCULAR NEGATIVE: 1
NAUSEA: 1
RESPIRATORY NEGATIVE: 1
VOMITING: 0
FLANK PAIN: 1
ABDOMINAL PAIN: 0
CHILLS: 1
FEVER: 0

## 2019-01-28 ASSESSMENT — MIFFLIN-ST. JEOR: SCORE: 1553.22

## 2019-01-28 NOTE — ED PROVIDER NOTES
History     Chief Complaint   Patient presents with     Dysuria      - Pt states one month pregnant and c/o foul smelling urine - hx of uti     HPI  Chhaya Thurman is a 28 year old female who presents with complaints of foul-smelling urine since yesterday.  Patient also complains of associated left-sided back pain as well as nausea and chills.  Patient also reports that she is approximately 1 month pregnant.  She states that she was diagnosed with a urinary tract infection on 18 with 3 days of Bactrim which seemed to improve her symptoms at that time.  She states she has since had a recurrence of the symptoms.  Denies fevers, chills, vomiting, abdominal pain, dysuria or increased urinary urgency or frequency.  Patient denies a change in vaginal discharge.      Allergies:  Allergies   Allergen Reactions     Imitrex [Sumatriptan]        Problem List:    Patient Active Problem List    Diagnosis Date Noted     Family history of Down syndrome 2018     Priority: Medium     Daughter Emry       History of miscarriage 2017     Priority: Medium        Past Medical History:    Past Medical History:   Diagnosis Date     Chickenpox      Fetal cardiac anomaly affecting pregnancy, antepartum 2018     History of recurrent UTI (urinary tract infection)        Past Surgical History:    Past Surgical History:   Procedure Laterality Date     DILATION AND CURETTAGE, OPERATIVE HYSTEROSCOPY, COMBINED N/A 2017    Procedure: COMBINED DILATION AND CURETTAGE, OPERATIVE HYSTEROSCOPY;  Hysteroscopy with dilation and curettage, polypectomy;  Surgeon: Mable Hanson MD;  Location: [facility] OR     ENT SURGERY  20 years ago    tubes in three times     MOUTH SURGERY  2008    wisdom teeth -age 16     TONSILLECTOMY         Family History:    Family History   Problem Relation Age of Onset     Other Cancer Maternal Grandfather         skin     Hyperlipidemia Mother      Unknown/Adopted Father         unknown  "history     Stomach Cancer Maternal Grandmother      Down Syndrome Daughter      Heart Defect Daughter      Diabetes No family hx of        Social History:  Marital Status:  Single [1]  Social History     Tobacco Use     Smoking status: Never Smoker     Smokeless tobacco: Never Used   Substance Use Topics     Alcohol use: No     Alcohol/week: 0.0 oz     Comment: occas-quit with pregnancy     Drug use: No        Medications:      cephALEXin (KEFLEX) 500 MG capsule   norethindrone (MICRONOR) 0.35 MG per tablet   Prenatal Vit-Fe Fumarate-FA (PRENATAL MULTIVITAMIN  PLUS IRON) 27-0.8 MG TABS per tablet         Review of Systems   Constitutional: Positive for chills. Negative for fever.   Respiratory: Negative.    Cardiovascular: Negative.    Gastrointestinal: Positive for nausea. Negative for abdominal pain and vomiting.   Genitourinary: Positive for flank pain. Negative for dysuria, frequency, hematuria and urgency.        Foul-smelling urine   Skin: Negative.    All other systems reviewed and are negative.      Physical Exam   BP: (!) 148/97  Pulse: 80  Temp: 98.5  F (36.9  C)  Resp: 16  Height: 167.6 cm (5' 6\")  Weight: 81.6 kg (180 lb)  SpO2: 98 %      Physical Exam   Constitutional: She is oriented to person, place, and time. She appears well-developed and well-nourished.  Non-toxic appearance. No distress.   HENT:   Head: Normocephalic and atraumatic.   Mouth/Throat: Mucous membranes are normal.   Eyes: Conjunctivae and EOM are normal. Pupils are equal, round, and reactive to light.   Neck: Neck supple.   Cardiovascular: Normal rate, regular rhythm and normal heart sounds.   Pulmonary/Chest: Effort normal and breath sounds normal. No respiratory distress. She has no wheezes. She has no rales.   Abdominal: Soft. She exhibits no distension. There is no tenderness. There is no rigidity, no rebound, no guarding and no CVA tenderness.   Musculoskeletal: Normal range of motion.   Neurological: She is alert and oriented " to person, place, and time.   Skin: Skin is warm and dry. No rash noted.       ED Course        Procedures      Results for orders placed or performed during the hospital encounter of 01/28/19 (from the past 24 hour(s))   UA reflex to Microscopic and Culture   Result Value Ref Range    Color Urine Straw     Appearance Urine Clear     Glucose Urine Negative NEG^Negative mg/dL    Bilirubin Urine Negative NEG^Negative    Ketones Urine Negative NEG^Negative mg/dL    Specific Gravity Urine 1.004 1.003 - 1.035    Blood Urine Negative NEG^Negative    pH Urine 7.0 5.0 - 7.0 pH    Protein Albumin Urine Negative NEG^Negative mg/dL    Urobilinogen mg/dL 0.0 0.0 - 2.0 mg/dL    Nitrite Urine Negative NEG^Negative    Leukocyte Esterase Urine Large (A) NEG^Negative    Source Midstream Urine     RBC Urine 2 0 - 2 /HPF    WBC Urine 21 (H) 0 - 5 /HPF    Bacteria Urine Moderate (A) NEG^Negative /HPF    Squamous Epithelial /HPF Urine 1 0 - 1 /HPF    Mucous Urine Present (A) NEG^Negative /LPF       Medications - No data to display    Assessments & Plan (with Medical Decision Making)     Pt is a 28 year old female who presents with complaints of foul-smelling urine since yesterday.  Patient also complains of associated left-sided back pain as well as nausea and chills.  Patient also reports that she is approximately 1 month pregnant.  She states that she was diagnosed with a urinary tract infection on 1/14/18 with 3 days of Bactrim which seemed to improve her symptoms at that time.  She states she has since had a recurrence of the symptoms.  Pt is afebrile on arrival.  Exam as above.  Urinalysis was positive for 21 WBCs and large leuk esterase.  Urine was sent for culture.  Discussed results with patient.  Return precautions were reviewed.  Hand-outs were provided.    Patient was sent with Keflex and was instructed to follow-up with PCP if no improvement in 5-7 days for continued care and management or sooner if new or worsening  symptoms.  She is to return to the ED for persistent and/or worsening symptoms.  Patient expressed understanding of the diagnosis and plan and was discharged home in good condition.    I have reviewed the nursing notes.    I have reviewed the findings, diagnosis, plan and need for follow up with the patient.       Medication List      Started    cephALEXin 500 MG capsule  Commonly known as:  KEFLEX  500 mg, Oral, 2 TIMES DAILY            Final diagnoses:   Urinary tract infection       1/28/2019   [facility] EMERGENCY DEPARTMENT      Disclaimer:  This note consists of symbols derived from keyboarding, dictation and/or voice recognition software.  As a result, there may be errors in the script that have gone undetected.  Please consider this when interpreting information found in this chart.     Ashly Valle PA-C  01/28/19 1528

## 2019-01-28 NOTE — ED AVS SNAPSHOT
[facility] Emergency Department  [address] [address]  Phone:  [phone]  Fax:  [phone]                                    Chhaya Thurman   MRN: [id number]    Department:  [facility] Emergency Department   Date of Visit:  1/28/2019           After Visit Summary Signature Page    I have received my discharge instructions, and my questions have been answered. I have discussed any challenges I see with this plan with the nurse or doctor.    ..........................................................................................................................................  Patient/Patient Representative Signature      ..........................................................................................................................................  Patient Representative Print Name and Relationship to Patient    ..................................................               ................................................  Date                                   Time    ..........................................................................................................................................  Reviewed by Signature/Title    ...................................................              ..............................................  Date                                               Time          22EPIC Rev 08/18

## 2019-01-29 LAB
BACTERIA SPEC CULT: ABNORMAL
Lab: ABNORMAL
SPECIMEN SOURCE: ABNORMAL

## 2019-01-30 ENCOUNTER — TELEPHONE (OUTPATIENT)
Dept: EMERGENCY MEDICINE | Facility: CLINIC | Age: 29
End: 2019-01-30

## 2019-01-30 NOTE — TELEPHONE ENCOUNTER
"[facility] Emergency Department Lab result notification:    Reason for call  Result notification  Lab Result  Final Urine Culture Report on 1/29/19  Emergency Dept/Urgent Care discharge antibiotic prescribed: Cephalexin (Keflex) 500 mg capsule, 1 capsule (500 mg) by mouth 2 times daily for 7 days.  #1. Bacteria, >100,000 colonies/mL Escherichia coli, is SUSCEPTIBLE to Antibiotic.    As per [place] ED Lab Result protocol, no change in antibiotic therapy.  ED visit Date: 1/28/19  Symptoms reported at ED visit Chhaya Thurman is a 28 year old female who presents with complaints of foul-smelling urine since yesterday.  Patient also complains of associated left-sided back pain as well as nausea and chills.  Patient also reports that she is approximately 1 month pregnant.  She states that she was diagnosed with a urinary tract infection on 1/14/18 with 3 days of Bactrim which seemed to improve her symptoms at that time.  She states she has since had a recurrence of the symptoms.  Denies fevers, chills, vomiting, abdominal pain, dysuria or increased urinary urgency or frequency.  Patient denies a change in vaginal discharge.      Miscellaneous information      Current symptoms  Chhaya feeling \"better\", taking abx as prescribed.  No questions, no concerns.  Recommendations/Instructions  To continue with plan of care.  Contact your PCP clinic or return to the Emergency department if your:    Symptoms return.    Symptoms do not resolve after completing antibiotic.    Symptoms worsen or other concerning symptom's.    Ivy Lira RN    [facility] RN  Lung Nodule and ED Lab Results F/U RN  Epic pool (ED late result f/u RN) : P [id number]   # [phone]    Copy of Lab result   Order   Urine Culture [[id number]] (Order [id number])   Exam Information     Exam Date Exam Time Accession # Results    1/28/19  2:44 PM [id number]    Component Results     Specimen Information: Midstream Urine      "   Component Collected Lab   Specimen Description 01/28/2019  2:44 PM 75   Midstream Urine    Special Requests 01/28/2019  2:44 PM 75   Specimen received in preservative    Culture Micro (Abnormal) 01/28/2019  2:44 PM 75   Abnormal   >100,000 colonies/mL   Escherichia coli     Susceptibility     Escherichia coli (1)     Antibiotic Interpretation Sensitivity Method Status   AMPICILLIN Sensitive <=2 ug/mL JIGAR Final   CEFAZOLIN Sensitive <=4 ug/mL JIGAR Final    Cefazolin JIGAR breakpoints are for the treatment of uncomplicated urinary tract   infections.  For the treatment of systemic infections, please contact the   laboratory for additional testing.   CEFOXITIN Sensitive <=4 ug/mL JIGAR Final   CEFTAZIDIME Sensitive <=1 ug/mL JIGAR Final   CEFTRIAXONE Sensitive <=1 ug/mL JIGAR Final   CIPROFLOXACIN Sensitive <=0.25 ug/mL JIGAR Final   GENTAMICIN Sensitive <=1 ug/mL JIGAR Final   LEVOFLOXACIN Sensitive <=0.12 ug/mL JIGAR Final   NITROFURANTOIN Sensitive <=16 ug/mL JIGAR Final   TOBRAMYCIN Sensitive <=1 ug/mL JIGAR Final   Trimethoprim/Sulfa Sensitive <=1/19 ug/mL JIGAR Final   AMPICILLIN/SULBACTAM Sensitive <=2 ug/mL JIGAR Final   Piperacillin/Tazo Sensitive <=4 ug/mL JIGAR Final   CEFEPIME Sensitive <=1 ug/mL JIGAR Final

## 2019-01-30 NOTE — RESULT ENCOUNTER NOTE
Final Urine Culture Report on 1/29/19  Emergency Dept/Urgent Care discharge antibiotic prescribed: Cephalexin (Keflex) 500 mg capsule, 1 capsule (500 mg) by mouth 2 times daily for 7 days.  #1. Bacteria, >100,000 colonies/mL Escherichia coli, is SUSCEPTIBLE to Antibiotic.    As per [place] ED Lab Result protocol, no change in antibiotic therapy.

## 2019-02-10 ENCOUNTER — OFFICE VISIT (OUTPATIENT)
Dept: URGENT CARE | Facility: URGENT CARE | Age: 29
End: 2019-02-10
Payer: COMMERCIAL

## 2019-02-10 VITALS
OXYGEN SATURATION: 100 % | TEMPERATURE: 96.5 F | DIASTOLIC BLOOD PRESSURE: 78 MMHG | HEART RATE: 76 BPM | SYSTOLIC BLOOD PRESSURE: 126 MMHG | BODY MASS INDEX: 30.35 KG/M2 | WEIGHT: 193.4 LBS | HEIGHT: 67 IN

## 2019-02-10 DIAGNOSIS — R82.90 NONSPECIFIC FINDING ON EXAMINATION OF URINE: ICD-10-CM

## 2019-02-10 DIAGNOSIS — R30.0 DYSURIA: ICD-10-CM

## 2019-02-10 DIAGNOSIS — O23.41 UTI (URINARY TRACT INFECTION) DURING PREGNANCY, FIRST TRIMESTER: Primary | ICD-10-CM

## 2019-02-10 LAB
ALBUMIN UR-MCNC: NEGATIVE MG/DL
APPEARANCE UR: ABNORMAL
BACTERIA #/AREA URNS HPF: ABNORMAL /HPF
BILIRUB UR QL STRIP: NEGATIVE
COLOR UR AUTO: YELLOW
GLUCOSE UR STRIP-MCNC: NEGATIVE MG/DL
HGB UR QL STRIP: NEGATIVE
KETONES UR STRIP-MCNC: NEGATIVE MG/DL
LEUKOCYTE ESTERASE UR QL STRIP: ABNORMAL
NITRATE UR QL: NEGATIVE
PH UR STRIP: 7 PH (ref 5–7)
RBC #/AREA URNS AUTO: ABNORMAL /HPF
SOURCE: ABNORMAL
SP GR UR STRIP: 1.01 (ref 1–1.03)
UROBILINOGEN UR STRIP-ACNC: 0.2 EU/DL (ref 0.2–1)
WBC #/AREA URNS AUTO: ABNORMAL /HPF

## 2019-02-10 PROCEDURE — 99214 OFFICE O/P EST MOD 30 MIN: CPT | Performed by: NURSE PRACTITIONER

## 2019-02-10 PROCEDURE — 81001 URINALYSIS AUTO W/SCOPE: CPT | Performed by: NURSE PRACTITIONER

## 2019-02-10 PROCEDURE — 87088 URINE BACTERIA CULTURE: CPT | Performed by: NURSE PRACTITIONER

## 2019-02-10 PROCEDURE — 87186 SC STD MICRODIL/AGAR DIL: CPT | Performed by: NURSE PRACTITIONER

## 2019-02-10 PROCEDURE — 87086 URINE CULTURE/COLONY COUNT: CPT | Performed by: NURSE PRACTITIONER

## 2019-02-10 ASSESSMENT — PAIN SCALES - GENERAL: PAINLEVEL: MODERATE PAIN (4)

## 2019-02-10 ASSESSMENT — MIFFLIN-ST. JEOR: SCORE: 1621.95

## 2019-02-10 NOTE — NURSING NOTE
"Chief Complaint   Patient presents with     Urinary Problem     Left flank pain, nausea, fatigue, Urinary odor, dysuria, frequent urination. She is currently 7 weeks pregnant. Recently finished antibiotics monday for UTI.        Initial /78 (BP Location: Right arm, Patient Position: Chair, Cuff Size: Adult Regular)   Pulse 76   Temp 96.5  F (35.8  C) (Oral)   Ht 1.689 m (5' 6.5\")   Wt 87.7 kg (193 lb 6.4 oz)   LMP 12/14/2018   SpO2 100%   Breastfeeding? No   BMI 30.75 kg/m   Estimated body mass index is 30.75 kg/m  as calculated from the following:    Height as of this encounter: 1.689 m (5' 6.5\").    Weight as of this encounter: 87.7 kg (193 lb 6.4 oz).    Medication Reconciliation: complete  Christine Doll MA    "

## 2019-02-10 NOTE — PROGRESS NOTES
SUBJECTIVE:   Chhaya Thurman is a 28 year old female who presents to clinic today for the following health issues:  Chief Complaint   Patient presents with     Urinary Problem     Left flank pain, nausea, fatigue, Urinary odor, dysuria, frequent urination. She is currently 7 weeks pregnant. Recently finished antibiotics monday for UTI.                  Problem list and histories reviewed & adjusted, as indicated.  Additional history: as documented    Patient Active Problem List   Diagnosis     History of miscarriage     Family history of Down syndrome     Past Surgical History:   Procedure Laterality Date     DILATION AND CURETTAGE, OPERATIVE HYSTEROSCOPY, COMBINED N/A 8/30/2017    Procedure: COMBINED DILATION AND CURETTAGE, OPERATIVE HYSTEROSCOPY;  Hysteroscopy with dilation and curettage, polypectomy;  Surgeon: Mable Hanson MD;  Location: [facility]     ENT SURGERY  20 years ago    tubes in three times     MOUTH SURGERY  2008    wisdom teeth -age 16     TONSILLECTOMY  2/16       Social History     Tobacco Use     Smoking status: Never Smoker     Smokeless tobacco: Never Used   Substance Use Topics     Alcohol use: No     Alcohol/week: 0.0 oz     Comment: occas-quit with pregnancy     Family History   Problem Relation Age of Onset     Other Cancer Maternal Grandfather         skin     Hyperlipidemia Mother      Unknown/Adopted Father         unknown history     Stomach Cancer Maternal Grandmother      Down Syndrome Daughter      Heart Defect Daughter      Diabetes No family hx of          Current Outpatient Medications   Medication Sig Dispense Refill     amoxicillin-clavulanate (AUGMENTIN) 875-125 MG tablet Take 1 tablet by mouth 2 times daily for 7 days 14 tablet 0     Prenatal Vit-Fe Fumarate-FA (PRENATAL MULTIVITAMIN  PLUS IRON) 27-0.8 MG TABS per tablet Take 1 tablet by mouth daily       Allergies   Allergen Reactions     Imitrex [Sumatriptan]      Labs reviewed in EPIC    Reviewed and updated as  "needed this visit by clinical staff  Tobacco  Allergies  Meds  Problems  Med Hx  Surg Hx  Fam Hx  Soc Hx        Reviewed and updated as needed this visit by Provider  Tobacco  Allergies  Meds  Problems  Med Hx  Surg Hx  Fam Hx         ROS:  Constitutional, HEENT, cardiovascular, pulmonary, GI, , musculoskeletal, neuro, skin, endocrine and psych systems are negative, except as otherwise noted.    OBJECTIVE:     /78 (BP Location: Right arm, Patient Position: Chair, Cuff Size: Adult Regular)   Pulse 76   Temp 96.5  F (35.8  C) (Oral)   Ht 1.689 m (5' 6.5\")   Wt 87.7 kg (193 lb 6.4 oz)   LMP 12/14/2018   SpO2 100%   Breastfeeding? No   BMI 30.75 kg/m    Body mass index is 30.75 kg/m .   GENERAL: healthy, alert and no distress  EYES: Eyes grossly normal to inspection, PERRL and conjunctivae and sclerae normal  HENT: ear canals and TM's normal, nose and mouth without ulcers or lesions  NECK: no adenopathy, no asymmetry, masses, or scars and thyroid normal to palpation  RESP: lungs clear to auscultation - no rales, rhonchi or wheezes  CV: regular rate and rhythm, normal S1 S2, no S3 or S4, no murmur, click or rub, no peripheral edema and peripheral pulses strong  ABDOMEN: soft, nontender, no hepatosplenomegaly, no masses and bowel sounds normal  MS: no gross musculoskeletal defects noted, no edema    Diagnostic Test Results:  Results for orders placed or performed in visit on 02/10/19 (from the past 24 hour(s))   *UA reflex to Microscopic and Culture ([facility] (except [place] and [place])   Result Value Ref Range    Color Urine Yellow     Appearance Urine Slightly Cloudy     Glucose Urine Negative NEG^Negative mg/dL    Bilirubin Urine Negative NEG^Negative    Ketones Urine Negative NEG^Negative mg/dL    Specific Gravity Urine 1.010 1.003 - 1.035    Blood Urine Negative NEG^Negative    pH Urine 7.0 5.0 - 7.0 pH    Protein Albumin Urine Negative NEG^Negative mg/dL    " Urobilinogen Urine 0.2 0.2 - 1.0 EU/dL    Nitrite Urine Negative NEG^Negative    Leukocyte Esterase Urine Moderate (A) NEG^Negative    Source Midstream Urine    Urine Microscopic   Result Value Ref Range    WBC Urine 5-10 (A) OTO5^0 - 5 /HPF    RBC Urine O - 2 OTO2^O - 2 /HPF    Bacteria Urine Few (A) NEG^Negative /HPF       ASSESSMENT/PLAN:   Pt sees OB for first visit on 2-18-19. This is her 4th UTI since Jan 1, 2019. She just finished Keflex a few days ago. Checking culture she should have been successfully treated. Will do Augmentin this time. Symptoms of pyelonephritis discussed and when to be seen should this occur. Culture pending on today's UA.  Problem List Items Addressed This Visit     None      Visit Diagnoses     UTI (urinary tract infection) during pregnancy, first trimester    -  Primary    Relevant Medications    amoxicillin-clavulanate (AUGMENTIN) 875-125 MG tablet    Dysuria        Relevant Orders    *UA reflex to Microscopic and Culture ([place] and [facility] (except [place] and [place]) (Completed)    Urine Microscopic (Completed)    Nonspecific finding on examination of urine        Relevant Orders    Urine Culture Aerobic Bacterial (Completed)               Patient Instructions   Antibiotics as directed   Urine culture is pending, will contact you if there is a change in treatment therapy.   Drink plenty of fluids. Prevention and treatment of UTI's discussed.   Signs and symptoms of pyelonephritis mentioned.   RTC if any worsening symptoms or if not improving as expected.   After completion of your antibiotic return for a repeat urinalysis.   You will just need to call the clinic to make a lab only appointment     Patient Education     Bladder Infection, Female (Adult)    Urine is normally doesn't have any bacteria in it. But bacteria can get into the urinary tract from the skin around the rectum. Or they can travel in the blood from elsewhere in the body. Once they are in your urinary  "tract, they can cause infection in the urethra (urethritis), the bladder (cystitis), or the kidneys (pyelonephritis).  The most common place for an infection is in the bladder. This is called a bladder infection. This is one of the most common infections in women. Most bladder infections are easily treated. They are not serious unless the infection spreads to the kidney.  The phrases \"bladder infection,\" \"UTI,\" and \"cystitis\" are often used to describe the same thing. But they are not always the same. Cystitis is an inflammation of the bladder. The most common cause of cystitis is an infection.  Symptoms  The infection causes inflammation in the urethra and bladder. This causes many of the symptoms. The most common symptoms of a bladder infection are:    Pain or burning when urinating    Having to urinate more often than usual    Urgent need to urinate    Only a small amount of urine comes out    Blood in urine    Abdominal discomfort. This is usually in the lower abdomen above the pubic bone.    Cloudy urine    Strong- or bad-smelling urine    Unable to urinate (urinary retention)    Unable to hold urine in (urinary incontinence)    Fever    Loss of appetite    Confusion (in older adults)  Causes  Bladder infections are not contagious. You can't get one from someone else, from a toilet seat, or from sharing a bath.  The most common cause of bladder infections is bacteria from the bowels. The bacteria get onto the skin around the opening of the urethra. From there, they can get into the urine and travel up to the bladder, causing inflammation and infection. This usually happens because of:    Wiping improperly after urinating. Always wipe from front to back.    Bowel incontinence    Pregnancy    Procedures such as having a catheter inserted    Older age    Not emptying your bladder. This can allow bacteria a chance to grow in your urine.    Dehydration    Constipation    Sex    Use of a diaphragm for birth " control   Treatment  Bladder infections are diagnosed by a urine test. They are treated with antibiotics and usually clear up quickly without complications. Treatment helps prevent a more serious kidney infection.  Medicines  Medicines can help in the treatment of a bladder infection:    Take antibiotics until they are used up, even if you feel better. It is important to finish them to make sure the infection has cleared.    You can use acetaminophen or ibuprofen for pain, fever, or discomfort, unless another medicine was prescribed. If you have chronic liver or kidney disease, talk with your healthcare provider before using these medicines. Also talk with your provider if you've ever had a stomach ulcer or gastrointestinal bleeding, or are taking blood-thinner medicines.    If you are given phenazopydridine to reduce burning with urination, it will cause your urine to become a bright orange color. This can stain clothing.  Care and prevention  These self-care steps can help prevent future infections:    Drink plenty of fluids to prevent dehydration and flush out your bladder. Do this unless you must restrict fluids for other health reasons, or your doctor told you not to.    Proper cleaning after going to the bathroom is important. Wipe from front to back after using the toilet to prevent the spread of bacteria.    Urinate more often. Don't try to hold urine in for a long time.    Wear loose-fitting clothes and cotton underwear. Avoid tight-fitting pants.    Improve your diet and prevent constipation. Eat more fresh fruit and vegetables, and fiber, and less junk and fatty foods.    Avoid sex until your symptoms are gone.    Avoid caffeine, alcohol, and spicy foods. These can irritate your bladder.    Urinate right after intercourse to flush out your bladder.    If you use birth control pills and have frequent bladder infections, discuss it with your doctor.  Follow-up care  Call your healthcare provider if all  symptoms are not gone after 3 days of treatment. This is especially important if you have repeat infections.  If a culture was done, you will be told if your treatment needs to be changed. If directed, you can call to find out the results.  If X-rays were done, you will be told if the results will affect your treatment.  Call 911  Call 911 if any of the following occur:    Trouble breathing    Hard to wake up or confusion    Fainting or loss of consciousness    Rapid heart rate  When to seek medical advice  Call your healthcare provider right away if any of these occur:    Fever of 100.4 F (38.0 C) or higher, or as directed by your healthcare provider    Symptoms are not better by the third day of treatment    Back or belly (abdominal) pain that gets worse    Repeated vomiting, or unable to keep medicine down    Weakness or dizziness    Vaginal discharge    Pain, redness, or swelling in the outer vaginal area (labia)  Date Last Reviewed: 10/1/2016    0302-3091 [employer]. [address] [place] [address], [place] [address]. All rights reserved. This information is not intended as a substitute for professional medical care. Always follow your healthcare professional's instructions.               SUSSY Valdivia [facility] URGENT CARE

## 2019-02-10 NOTE — PATIENT INSTRUCTIONS
"Antibiotics as directed   Urine culture is pending, will contact you if there is a change in treatment therapy.   Drink plenty of fluids. Prevention and treatment of UTI's discussed.   Signs and symptoms of pyelonephritis mentioned.   RTC if any worsening symptoms or if not improving as expected.   After completion of your antibiotic return for a repeat urinalysis.   You will just need to call the clinic to make a lab only appointment     Patient Education     Bladder Infection, Female (Adult)    Urine is normally doesn't have any bacteria in it. But bacteria can get into the urinary tract from the skin around the rectum. Or they can travel in the blood from elsewhere in the body. Once they are in your urinary tract, they can cause infection in the urethra (urethritis), the bladder (cystitis), or the kidneys (pyelonephritis).  The most common place for an infection is in the bladder. This is called a bladder infection. This is one of the most common infections in women. Most bladder infections are easily treated. They are not serious unless the infection spreads to the kidney.  The phrases \"bladder infection,\" \"UTI,\" and \"cystitis\" are often used to describe the same thing. But they are not always the same. Cystitis is an inflammation of the bladder. The most common cause of cystitis is an infection.  Symptoms  The infection causes inflammation in the urethra and bladder. This causes many of the symptoms. The most common symptoms of a bladder infection are:    Pain or burning when urinating    Having to urinate more often than usual    Urgent need to urinate    Only a small amount of urine comes out    Blood in urine    Abdominal discomfort. This is usually in the lower abdomen above the pubic bone.    Cloudy urine    Strong- or bad-smelling urine    Unable to urinate (urinary retention)    Unable to hold urine in (urinary incontinence)    Fever    Loss of appetite    Confusion (in older adults)  Causes  Bladder " infections are not contagious. You can't get one from someone else, from a toilet seat, or from sharing a bath.  The most common cause of bladder infections is bacteria from the bowels. The bacteria get onto the skin around the opening of the urethra. From there, they can get into the urine and travel up to the bladder, causing inflammation and infection. This usually happens because of:    Wiping improperly after urinating. Always wipe from front to back.    Bowel incontinence    Pregnancy    Procedures such as having a catheter inserted    Older age    Not emptying your bladder. This can allow bacteria a chance to grow in your urine.    Dehydration    Constipation    Sex    Use of a diaphragm for birth control   Treatment  Bladder infections are diagnosed by a urine test. They are treated with antibiotics and usually clear up quickly without complications. Treatment helps prevent a more serious kidney infection.  Medicines  Medicines can help in the treatment of a bladder infection:    Take antibiotics until they are used up, even if you feel better. It is important to finish them to make sure the infection has cleared.    You can use acetaminophen or ibuprofen for pain, fever, or discomfort, unless another medicine was prescribed. If you have chronic liver or kidney disease, talk with your healthcare provider before using these medicines. Also talk with your provider if you've ever had a stomach ulcer or gastrointestinal bleeding, or are taking blood-thinner medicines.    If you are given phenazopydridine to reduce burning with urination, it will cause your urine to become a bright orange color. This can stain clothing.  Care and prevention  These self-care steps can help prevent future infections:    Drink plenty of fluids to prevent dehydration and flush out your bladder. Do this unless you must restrict fluids for other health reasons, or your doctor told you not to.    Proper cleaning after going to the  bathroom is important. Wipe from front to back after using the toilet to prevent the spread of bacteria.    Urinate more often. Don't try to hold urine in for a long time.    Wear loose-fitting clothes and cotton underwear. Avoid tight-fitting pants.    Improve your diet and prevent constipation. Eat more fresh fruit and vegetables, and fiber, and less junk and fatty foods.    Avoid sex until your symptoms are gone.    Avoid caffeine, alcohol, and spicy foods. These can irritate your bladder.    Urinate right after intercourse to flush out your bladder.    If you use birth control pills and have frequent bladder infections, discuss it with your doctor.  Follow-up care  Call your healthcare provider if all symptoms are not gone after 3 days of treatment. This is especially important if you have repeat infections.  If a culture was done, you will be told if your treatment needs to be changed. If directed, you can call to find out the results.  If X-rays were done, you will be told if the results will affect your treatment.  Call 911  Call 911 if any of the following occur:    Trouble breathing    Hard to wake up or confusion    Fainting or loss of consciousness    Rapid heart rate  When to seek medical advice  Call your healthcare provider right away if any of these occur:    Fever of 100.4 F (38.0 C) or higher, or as directed by your healthcare provider    Symptoms are not better by the third day of treatment    Back or belly (abdominal) pain that gets worse    Repeated vomiting, or unable to keep medicine down    Weakness or dizziness    Vaginal discharge    Pain, redness, or swelling in the outer vaginal area (labia)  Date Last Reviewed: 10/1/2016    5407-6550 [employer]. [address] [place] [address], [place] [address]. All rights reserved. This information is not intended as a substitute for professional medical care. Always follow your healthcare professional's instructions.

## 2019-02-11 LAB
BACTERIA SPEC CULT: ABNORMAL
Lab: ABNORMAL
SPECIMEN SOURCE: ABNORMAL

## 2019-02-11 NOTE — RESULT ENCOUNTER NOTE
Emergency Dept/Urgent Care discharge antibiotic (if prescribed): Amoxicillin-Clavulanate (Augmentin) 875-125 mg PO tablet, 1 tablet by mouth 2 times daily for 7 days  Date of Rx (if applicable):  2/10/19  No changes in treatment per Urine culture protocol.

## 2019-02-12 NOTE — RESULT ENCOUNTER NOTE
Final Urine Culture Report on 2/11/19  Emergency Dept/Urgent Care discharge antibiotic prescribed: Amoxicillin-Clavulanate (Augmentin) 875-125 mg PO tablet, 1 tablet by mouth 2 times daily for 7 days  #1. Bacteria, >100,000 colonies/mL Escherichia coli, is SUSCEPTIBLE to Antibiotic.    As per [place] ED Lab Result protocol, no change in antibiotic therapy.

## 2019-02-17 PROBLEM — Z34.81 ENCOUNTER FOR SUPERVISION OF OTHER NORMAL PREGNANCY IN FIRST TRIMESTER: Status: ACTIVE | Noted: 2019-02-17

## 2019-02-17 NOTE — PROGRESS NOTES
[facility] OB Intake note  Subjective   28 year old woman presents to clinic for initiation of OB care with her  Nahid. They lost their 4 moa daughter who had trisomy 21 this past fall after complications related to her heart surgery. She has experienced two miscarriages prior to her last pregnancy which was induced at 37+ weeks d/t oligohydramnis and fetal condition.  Patient's last menstrual period was 2018.  at 8w4d Estimated Date of Delivery: Sep 26, 2019 based on US confirms.  Reviewed dating ultrasound. Pregnancy is planned, but came sooner than they expected, they feel they are in a good heart and head space for the pregnancy - were trying not to get too excited prior to their dating US as they got pregnant right after her first period PP and have experienced prior mscg.      Symptoms since LMP include nausea.  Patient has tried these relief measures: diet modification, vitamin B-6, small frequent meals, increased rest and increased fluids.    She has experienced 4 UTIs since new years and has taken the following: macrobid, bactrim, cipro, augmentin. Last treatment ended Sat 19. Was unaware she was pregnant when she took the bactrim.     - Genetic/Infection questionnaire completed, risks include: daughter with tri 21  Have you traveled during the pregnancy?No  Have your sexual partner(s) travelled during the pregnancy?No    - Current Medications    Current Outpatient Medications   Medication Sig Dispense Refill     Prenatal Vit-Fe Fumarate-FA (PRENATAL MULTIVITAMIN  PLUS IRON) 27-0.8 MG TABS per tablet Take 1 tablet by mouth daily      - Co-morbids    Past Medical History:   Diagnosis Date     Chickenpox      Fetal cardiac anomaly affecting pregnancy, antepartum 2018     History of recurrent UTI (urinary tract infection)      - Risk for GDM -  does not Personal history of GDM, BMI>30, h/o prediabetes/glucose intolerance, first degree relative with GDM or DM    WILL NOT have an early GCT  and possible Hgb A1C    - The patient does not h/o Pre Eclampsia, Current multi fetal gestation, Pre Gestational Diabetes (Type 1 or Type 2), chronic hypertension, renal disease, Autoimmune disease (systematic lupus erythematosus, antiphospholipid syndrome) so WILL NOT start low dose aspirin (81mg) starting between 12 and 28 weeks to prevent preeclampsia.    - The patient  does not have a history of spontaneous  birth so  WILL NOT consider progesterone starting at 16-20 weeks and/or serial transvaginal cervical length ultrasounds from 16-24 weeks.     PERSONAL/SOCIAL HISTORY   lives with their spouse, Luke and two dogs.  Additional items: Denies past or present domestic violence, sexual and psychological abuse.    Objective  -VS: reviewed and within normal limits   -General appearance: no acute distress, patient is comfortable   NEUROLOGICAL/PSYCHIATRIC   - Orientated x3,   -Mood and affect: : normal     US today: gest age 8w4d EDC of 19 per LMP this was 9w3d EDC of 19. +FHTs    Assessment/Plan  Chhaya was seen today for prenatal care.    Diagnoses and all orders for this visit:    Need for prophylactic vaccination and inoculation against influenza    Encounter for supervision of other normal pregnancy in first trimester  -     ABO/Rh type and screen  -     Hepatitis B surface antigen  -     CBC with platelets  -     HIV Antigen Antibody Combo  -     Rubella Antibody IgG Quantitative  -     Treponema Abs w Reflex to RPR and Titer  -     Hepatitis B Surface Antibody  -     Urine Culture Aerobic Bacterial  -     [facility] REFERRAL-PREGNANCY    28 year old  8w 4d weeks of pregnancy with ALVINO of Sep 26, 2019 by US. Patient's last menstrual period was 2018.  Outpatient Encounter Medications as of 2019   Medication Sig Dispense Refill     Prenatal Vit-Fe Fumarate-FA (PRENATAL MULTIVITAMIN  PLUS IRON) 27-0.8 MG TABS per tablet Take 1 tablet by mouth daily        [DISCONTINUED] amoxicillin-clavulanate (AUGMENTIN) 875-125 MG tablet Take 1 tablet by mouth 2 times daily for 7 days 14 tablet 0     No facility-administered encounter medications on file as of 2/18/2019.       Orders Placed This Encounter   Procedures     Hepatitis B surface antigen     CBC with platelets     HIV Antigen Antibody Combo     Rubella Antibody IgG Quantitative     Treponema Abs w Reflex to RPR and Titer     Hepatitis B Surface Antibody     [facility] REFERRAL-PREGNANCY     ABO/Rh type and screen     - Oriented to Practice, types of care, and how to reach a provider.  Pt prefers MD.  - Patient received 1st trimester new OB education packet complete with aide of [facility] booklet including information on genetic screening test options.  - Patient desires NIPT screening which was ordered.  - Patient was encouraged to start prenatal vitamins as tolerated.    - Patient was sent to lab for routine OB labs.     - Pregnancy concerns to be addressed by provider at new OB exam include: please verify that MFM has connected with her. Needs GC/CT. Ask about employment for Leena and Luke.     Pt to RTO for NOB visit in 2 weeks and prn if questions or concerns     Lynn Bo CNM

## 2019-02-18 ENCOUNTER — OFFICE VISIT (OUTPATIENT)
Dept: OBGYN | Facility: CLINIC | Age: 29
End: 2019-02-18
Attending: ADVANCED PRACTICE MIDWIFE
Payer: COMMERCIAL

## 2019-02-18 ENCOUNTER — ANCILLARY PROCEDURE (OUTPATIENT)
Dept: ULTRASOUND IMAGING | Facility: CLINIC | Age: 29
End: 2019-02-18
Attending: MIDWIFE
Payer: COMMERCIAL

## 2019-02-18 ENCOUNTER — APPOINTMENT (OUTPATIENT)
Dept: LAB | Facility: CLINIC | Age: 29
End: 2019-02-18
Attending: ADVANCED PRACTICE MIDWIFE
Payer: COMMERCIAL

## 2019-02-18 VITALS
BODY MASS INDEX: 30.53 KG/M2 | HEART RATE: 81 BPM | DIASTOLIC BLOOD PRESSURE: 82 MMHG | HEIGHT: 67 IN | SYSTOLIC BLOOD PRESSURE: 130 MMHG | WEIGHT: 194.5 LBS

## 2019-02-18 DIAGNOSIS — Z23 NEED FOR PROPHYLACTIC VACCINATION AND INOCULATION AGAINST INFLUENZA: Primary | ICD-10-CM

## 2019-02-18 DIAGNOSIS — Z34.81 ENCOUNTER FOR SUPERVISION OF OTHER NORMAL PREGNANCY IN FIRST TRIMESTER: Primary | ICD-10-CM

## 2019-02-18 DIAGNOSIS — Z34.81 ENCOUNTER FOR SUPERVISION OF OTHER NORMAL PREGNANCY IN FIRST TRIMESTER: ICD-10-CM

## 2019-02-18 DIAGNOSIS — Z34.00 SUPERVISION OF NORMAL FIRST PREGNANCY, ANTEPARTUM: ICD-10-CM

## 2019-02-18 LAB
ABO + RH BLD: NORMAL
ABO + RH BLD: NORMAL
BLD GP AB SCN SERPL QL: NORMAL
BLOOD BANK CMNT PATIENT-IMP: NORMAL
ERYTHROCYTE [DISTWIDTH] IN BLOOD BY AUTOMATED COUNT: 12.3 % (ref 10–15)
HCT VFR BLD AUTO: 41.5 % (ref 35–47)
HGB BLD-MCNC: 14 G/DL (ref 11.7–15.7)
MCH RBC QN AUTO: 31.5 PG (ref 26.5–33)
MCHC RBC AUTO-ENTMCNC: 33.7 G/DL (ref 31.5–36.5)
MCV RBC AUTO: 94 FL (ref 78–100)
PLATELET # BLD AUTO: 242 10E9/L (ref 150–450)
RBC # BLD AUTO: 4.44 10E12/L (ref 3.8–5.2)
SPECIMEN EXP DATE BLD: NORMAL
WBC # BLD AUTO: 9.9 10E9/L (ref 4–11)

## 2019-02-18 PROCEDURE — 87340 HEPATITIS B SURFACE AG IA: CPT | Performed by: ADVANCED PRACTICE MIDWIFE

## 2019-02-18 PROCEDURE — 86762 RUBELLA ANTIBODY: CPT | Performed by: ADVANCED PRACTICE MIDWIFE

## 2019-02-18 PROCEDURE — 0064U ANTB TP TOTAL&RPR IA QUAL: CPT | Performed by: ADVANCED PRACTICE MIDWIFE

## 2019-02-18 PROCEDURE — 87389 HIV-1 AG W/HIV-1&-2 AB AG IA: CPT | Performed by: ADVANCED PRACTICE MIDWIFE

## 2019-02-18 PROCEDURE — 86901 BLOOD TYPING SEROLOGIC RH(D): CPT | Performed by: ADVANCED PRACTICE MIDWIFE

## 2019-02-18 PROCEDURE — 87086 URINE CULTURE/COLONY COUNT: CPT | Performed by: ADVANCED PRACTICE MIDWIFE

## 2019-02-18 PROCEDURE — 86900 BLOOD TYPING SEROLOGIC ABO: CPT | Performed by: ADVANCED PRACTICE MIDWIFE

## 2019-02-18 PROCEDURE — 86850 RBC ANTIBODY SCREEN: CPT | Performed by: ADVANCED PRACTICE MIDWIFE

## 2019-02-18 PROCEDURE — 85027 COMPLETE CBC AUTOMATED: CPT | Performed by: ADVANCED PRACTICE MIDWIFE

## 2019-02-18 PROCEDURE — 76801 OB US < 14 WKS SINGLE FETUS: CPT

## 2019-02-18 PROCEDURE — 36415 COLL VENOUS BLD VENIPUNCTURE: CPT | Performed by: ADVANCED PRACTICE MIDWIFE

## 2019-02-18 PROCEDURE — G0463 HOSPITAL OUTPT CLINIC VISIT: HCPCS | Mod: 25,ZF

## 2019-02-18 PROCEDURE — 86706 HEP B SURFACE ANTIBODY: CPT | Performed by: ADVANCED PRACTICE MIDWIFE

## 2019-02-18 ASSESSMENT — PAIN SCALES - GENERAL: PAINLEVEL: NO PAIN (0)

## 2019-02-18 ASSESSMENT — MIFFLIN-ST. JEOR: SCORE: 1626.94

## 2019-02-18 NOTE — LETTER
2019       RE: Chhaya Thurman  [address] [facility] [address]     Dear Colleague,    Thank you for referring your patient, Chhaya Thurman, to the [facility] at [facility]. Please see a copy of my visit note below.    [facility] OB Intake note  Subjective   28 year old woman presents to clinic for initiation of OB care with her  Nahid. They lost their 4 moa daughter who had trisomy 21 this past fall after complications related to her heart surgery. She has experienced two miscarriages prior to her last pregnancy which was induced at 37+ weeks d/t oligohydramnis and fetal condition.  Patient's last menstrual period was 2018.  at 8w4d Estimated Date of Delivery: Sep 26, 2019 based on US confirms.  Reviewed dating ultrasound. Pregnancy is planned, but came sooner than they expected, they feel they are in a good heart and head space for the pregnancy - were trying not to get too excited prior to their dating US as they got pregnant right after her first period PP and have experienced prior mscg.      Symptoms since LMP include nausea.  Patient has tried these relief measures: diet modification, vitamin B-6, small frequent meals, increased rest and increased fluids.    She has experienced 4 UTIs since new years and has taken the following: macrobid, bactrim, cipro, augmentin. Last treatment ended Sat 19. Was unaware she was pregnant when she took the bactrim.     - Genetic/Infection questionnaire completed, risks include: daughter with tri 21  Have you traveled during the pregnancy?No  Have your sexual partner(s) travelled during the pregnancy?No    - Current Medications    Current Outpatient Medications   Medication Sig Dispense Refill     Prenatal Vit-Fe Fumarate-FA (PRENATAL MULTIVITAMIN  PLUS IRON) 27-0.8 MG TABS per tablet Take 1 tablet by mouth daily      - Co-morbids    Past Medical History:   Diagnosis Date      Chickenpox      Fetal cardiac anomaly affecting pregnancy, antepartum 2018     History of recurrent UTI (urinary tract infection)      - Risk for GDM -  does not Personal history of GDM, BMI>30, h/o prediabetes/glucose intolerance, first degree relative with GDM or DM    WILL NOT have an early GCT and possible Hgb A1C    - The patient does not h/o Pre Eclampsia, Current multi fetal gestation, Pre Gestational Diabetes (Type 1 or Type 2), chronic hypertension, renal disease, Autoimmune disease (systematic lupus erythematosus, antiphospholipid syndrome) so WILL NOT start low dose aspirin (81mg) starting between 12 and 28 weeks to prevent preeclampsia.    - The patient  does not have a history of spontaneous  birth so  WILL NOT consider progesterone starting at 16-20 weeks and/or serial transvaginal cervical length ultrasounds from 16-24 weeks.     PERSONAL/SOCIAL HISTORY   lives with their spouse, Luke and two dogs.  Additional items: Denies past or present domestic violence, sexual and psychological abuse.    Objective  -VS: reviewed and within normal limits   -General appearance: no acute distress, patient is comfortable   NEUROLOGICAL/PSYCHIATRIC   - Orientated x3,   -Mood and affect: : normal     US today: gest age 8w4d EDC of 19 per LMP this was 9w3d EDC of 19. +FHTs    Assessment/Plan  Chhaya was seen today for prenatal care.    Diagnoses and all orders for this visit:    Need for prophylactic vaccination and inoculation against influenza    Encounter for supervision of other normal pregnancy in first trimester  -     ABO/Rh type and screen  -     Hepatitis B surface antigen  -     CBC with platelets  -     HIV Antigen Antibody Combo  -     Rubella Antibody IgG Quantitative  -     Treponema Abs w Reflex to RPR and Titer  -     Hepatitis B Surface Antibody  -     Urine Culture Aerobic Bacterial  -     [facility] REFERRAL-PREGNANCY    28 year old  8w 4d weeks of  pregnancy with ALVINO of Sep 26, 2019 by US. Patient's last menstrual period was 12/14/2018.  Outpatient Encounter Medications as of 2/18/2019   Medication Sig Dispense Refill     Prenatal Vit-Fe Fumarate-FA (PRENATAL MULTIVITAMIN  PLUS IRON) 27-0.8 MG TABS per tablet Take 1 tablet by mouth daily       [DISCONTINUED] amoxicillin-clavulanate (AUGMENTIN) 875-125 MG tablet Take 1 tablet by mouth 2 times daily for 7 days 14 tablet 0     No facility-administered encounter medications on file as of 2/18/2019.       Orders Placed This Encounter   Procedures     Hepatitis B surface antigen     CBC with platelets     HIV Antigen Antibody Combo     Rubella Antibody IgG Quantitative     Treponema Abs w Reflex to RPR and Titer     Hepatitis B Surface Antibody     [facility] REFERRAL-PREGNANCY     ABO/Rh type and screen     - Oriented to Practice, types of care, and how to reach a provider.  Pt prefers MD.  - Patient received 1st trimester new OB education packet complete with aide of [facility] booklet including information on genetic screening test options.  - Patient desires NIPT screening which was ordered.  - Patient was encouraged to start prenatal vitamins as tolerated.    - Patient was sent to lab for routine OB labs.     - Pregnancy concerns to be addressed by provider at new OB exam include: please verify that MFM has connected with her. Needs GC/CT. Ask about employment for Leena and Luke.     Pt to RTO for NOB visit in 2 weeks and prn if questions or concerns     Lynn Bo CNM

## 2019-02-19 LAB
BACTERIA SPEC CULT: NO GROWTH
HBV SURFACE AB SERPL IA-ACNC: 177.23 M[IU]/ML
HBV SURFACE AG SERPL QL IA: NONREACTIVE
HIV 1+2 AB+HIV1 P24 AG SERPL QL IA: NONREACTIVE
Lab: NORMAL
RUBV IGG SERPL IA-ACNC: 21 IU/ML
SPECIMEN SOURCE: NORMAL
T PALLIDUM AB SER QL: NONREACTIVE

## 2019-02-22 ENCOUNTER — OFFICE VISIT (OUTPATIENT)
Dept: FAMILY MEDICINE | Facility: CLINIC | Age: 29
End: 2019-02-22
Payer: COMMERCIAL

## 2019-02-22 VITALS
BODY MASS INDEX: 30.81 KG/M2 | OXYGEN SATURATION: 99 % | HEART RATE: 74 BPM | WEIGHT: 193.8 LBS | SYSTOLIC BLOOD PRESSURE: 128 MMHG | TEMPERATURE: 98.2 F | DIASTOLIC BLOOD PRESSURE: 72 MMHG | RESPIRATION RATE: 16 BRPM

## 2019-02-22 DIAGNOSIS — N89.8 VAGINAL DISCHARGE: ICD-10-CM

## 2019-02-22 DIAGNOSIS — B96.89 BACTERIAL VAGINOSIS: ICD-10-CM

## 2019-02-22 DIAGNOSIS — R39.9 UTI SYMPTOMS: Primary | ICD-10-CM

## 2019-02-22 DIAGNOSIS — N76.0 BACTERIAL VAGINOSIS: ICD-10-CM

## 2019-02-22 LAB
ALBUMIN UR-MCNC: NEGATIVE MG/DL
APPEARANCE UR: CLEAR
BACTERIA #/AREA URNS HPF: ABNORMAL /HPF
BILIRUB UR QL STRIP: NEGATIVE
COLOR UR AUTO: YELLOW
GLUCOSE UR STRIP-MCNC: NEGATIVE MG/DL
HGB UR QL STRIP: NEGATIVE
KETONES UR STRIP-MCNC: NEGATIVE MG/DL
LEUKOCYTE ESTERASE UR QL STRIP: ABNORMAL
NITRATE UR QL: NEGATIVE
NON-SQ EPI CELLS #/AREA URNS LPF: ABNORMAL /LPF
PH UR STRIP: 7 PH (ref 5–7)
RBC #/AREA URNS AUTO: ABNORMAL /HPF
SOURCE: ABNORMAL
SP GR UR STRIP: <=1.005 (ref 1–1.03)
SPECIMEN SOURCE: ABNORMAL
UROBILINOGEN UR STRIP-ACNC: 0.2 EU/DL (ref 0.2–1)
WBC #/AREA URNS AUTO: ABNORMAL /HPF
WET PREP SPEC: ABNORMAL

## 2019-02-22 PROCEDURE — 87210 SMEAR WET MOUNT SALINE/INK: CPT | Performed by: FAMILY MEDICINE

## 2019-02-22 PROCEDURE — 87088 URINE BACTERIA CULTURE: CPT | Performed by: FAMILY MEDICINE

## 2019-02-22 PROCEDURE — 81001 URINALYSIS AUTO W/SCOPE: CPT | Performed by: FAMILY MEDICINE

## 2019-02-22 PROCEDURE — 87086 URINE CULTURE/COLONY COUNT: CPT | Performed by: FAMILY MEDICINE

## 2019-02-22 PROCEDURE — 99213 OFFICE O/P EST LOW 20 MIN: CPT | Performed by: FAMILY MEDICINE

## 2019-02-22 PROCEDURE — 87186 SC STD MICRODIL/AGAR DIL: CPT | Performed by: FAMILY MEDICINE

## 2019-02-22 RX ORDER — METRONIDAZOLE 500 MG/1
500 TABLET ORAL 2 TIMES DAILY
Qty: 14 TABLET | Refills: 0 | Status: SHIPPED | OUTPATIENT
Start: 2019-02-22 | End: 2019-04-01

## 2019-02-22 NOTE — PATIENT INSTRUCTIONS
No UTI.  We'll do the culture to make sure.  I'll call later this afternoon with the wet prep results.    If over the weekend things get worse please call in to the clinic line or to Urgent Care.

## 2019-02-22 NOTE — PROGRESS NOTES
SUBJECTIVE:   Chhaya Thurman is a 28 year old female who presents to clinic today for the following health issues:    URINARY TRACT SYMPTOMS  Onset: x today    Description:   Painful urination (Dysuria): YES- little bit  Blood in urine (Hematuria): no   Delay in urine (Hesitency): YES    Intensity: moderate    Progression of Symptoms:  same    Accompanying Signs & Symptoms:  Fever/chills: YES- chills  Flank pain YES- Right side. Patient states normally it is the left side.  Nausea and vomiting: YES- also pregnant  Any vaginal symptoms: none  Abdominal/Pelvic Pain: no     History:   History of frequent UTI's: YES- 5th time since December 31st.  History of kidney stones: no   Sexually Active: no   Possibility of pregnancy: Yes- currently 9 weeks.    Precipitating factors:   none    Therapies Tried and outcome: just finished Augmentin 6 days ago started treatment 2/10/19.  Did have a normal negative urine culture 2/18/19.      Problem list and histories reviewed & adjusted, as indicated.  Additional history: as documented    BP Readings from Last 3 Encounters:   02/22/19 128/72   02/18/19 130/82   02/10/19 126/78    Wt Readings from Last 3 Encounters:   02/22/19 87.9 kg (193 lb 12.8 oz)   02/18/19 88.2 kg (194 lb 8 oz)   02/10/19 87.7 kg (193 lb 6.4 oz)                    Reviewed and updated as needed this visit by clinical staff  Tobacco  Allergies  Meds  Med Hx  Surg Hx  Fam Hx  Soc Hx      Reviewed and updated as needed this visit by Provider         ROS:  Constitutional, HEENT, cardiovascular, pulmonary, gi and gu systems are negative, except as otherwise noted.    OBJECTIVE:     /72   Pulse 74   Temp 98.2  F (36.8  C) (Tympanic)   Resp 16   Wt 87.9 kg (193 lb 12.8 oz)   LMP 12/14/2018   SpO2 99%   BMI 30.81 kg/m    Body mass index is 30.81 kg/m .  GENERAL: healthy, alert and no distress  ABDOMEN: soft, nontender, no hepatosplenomegaly, no masses and bowel sounds normal   (female): normal  female external genitalia, normal urethral meatus, vaginal mucosa, wet prep swab done.  BACK: no CVA tenderness, no paralumbar tenderness    Diagnostic Test Results:  Results for orders placed or performed in visit on 02/22/19 (from the past 24 hour(s))   *UA reflex to Microscopic and Culture ([facility] (except [place] and [place])   Result Value Ref Range    Color Urine Yellow     Appearance Urine Clear     Glucose Urine Negative NEG^Negative mg/dL    Bilirubin Urine Negative NEG^Negative    Ketones Urine Negative NEG^Negative mg/dL    Specific Gravity Urine <=1.005 1.003 - 1.035    Blood Urine Negative NEG^Negative    pH Urine 7.0 5.0 - 7.0 pH    Protein Albumin Urine Negative NEG^Negative mg/dL    Urobilinogen Urine 0.2 0.2 - 1.0 EU/dL    Nitrite Urine Negative NEG^Negative    Leukocyte Esterase Urine Small (A) NEG^Negative    Source Midstream Urine    Urine Microscopic   Result Value Ref Range    WBC Urine 0 - 5 OTO5^0 - 5 /HPF    RBC Urine O - 2 OTO2^O - 2 /HPF    Squamous Epithelial /LPF Urine Few FEW^Few /LPF    Bacteria Urine Few (A) NEG^Negative /HPF   Wet prep   Result Value Ref Range    Specimen Description Vagina     Wet Prep No yeast seen     Wet Prep Clue cells seen (A)     Wet Prep No Trichomonas seen        ASSESSMENT/PLAN:       Chhaya was seen today for uti.    Diagnoses and all orders for this visit:    UTI symptoms: urine normal with a few squams and few bacteria, rule out infection with culture and rule out BV with wet prep.  -     *UA reflex to Microscopic and Culture ([facility] (except [place] and [place])  -     Urine Microscopic  -     Urine Culture Aerobic Bacterial    Vaginal discharge: bacterial vaginosis: plan to treat with metronidazole.  -discussed risks, benefits, and side effects of this new medication. Patient understands and is in agreement.    -     Wet prep        There are no Patient Instructions on file for this visit.    [place]  Sonali Ibarra MD  [facility]

## 2019-02-23 ENCOUNTER — NURSE TRIAGE (OUTPATIENT)
Dept: NURSING | Facility: CLINIC | Age: 29
End: 2019-02-23

## 2019-02-23 NOTE — TELEPHONE ENCOUNTER
"Leena calling with questions about UTI treatment. She was seen in the clinic yesterday and a UA was completed. She is waiting for the results of the culture. She is wondering if she could get an ABX over the phone instead of coming back into the UC over the weekend or if she would have to wait until Monday or when the culture comes back.   She states that her \"symptoms haven't changed but I know I have a UTI. This is my 4th one this year.\"    Disposition: Advised that she would have to be seen again or wait for a culture but it is not policy for providers to prescribe antibiotics over the phone.  Leena verbalized understanding.   RN advised to call back with any changes, worsening of symptoms, and questions or concerns.   Belinda Lundy RN/FNA    Additional Information    [1] Follow-up call to recent contact AND [2] information only call, no triage required    Protocols used: INFORMATION ONLY CALL-ADULT-    "

## 2019-02-23 NOTE — TELEPHONE ENCOUNTER
Regarding: patient thinks she might have UTI but wants to speak with nurse before scheduling appointment   ----- Message from Kurt James sent at 2/23/2019  9:51 AM CST -----  Reason for call:  Symptom   Symptom or request: possible UTI-want to speak with nurse before appointment    Duration (how long have symptoms been present): did not specify  Have you been treated for this before? Did not specify    Additional comments:     Phone number to reach patient:  Home number on file [phone] (home)    Best Time:  any    Can we leave a detailed message on this number?  YES

## 2019-02-24 LAB
BACTERIA SPEC CULT: ABNORMAL
BACTERIA SPEC CULT: ABNORMAL
Lab: ABNORMAL
SPECIMEN SOURCE: ABNORMAL

## 2019-02-25 ENCOUNTER — MYC MEDICAL ADVICE (OUTPATIENT)
Dept: FAMILY MEDICINE | Facility: CLINIC | Age: 29
End: 2019-02-25

## 2019-02-25 DIAGNOSIS — N30.00 ACUTE CYSTITIS WITHOUT HEMATURIA: Primary | ICD-10-CM

## 2019-02-25 RX ORDER — AMOXICILLIN 875 MG
875 TABLET ORAL 2 TIMES DAILY
Qty: 14 TABLET | Refills: 0 | Status: SHIPPED | OUTPATIENT
Start: 2019-02-25 | End: 2019-04-01

## 2019-02-25 NOTE — TELEPHONE ENCOUNTER
Covering for provider:  Rx for amoxicillin sent.  Follow-up as needed if not improving in a week or new/worsening symptoms develop.       Thanks,  Leonid Pérez MD

## 2019-02-25 NOTE — TELEPHONE ENCOUNTER
Provider covering Dr Ibarra, please see her mychart note,     She was given:   metroNIDAZOLE (FLAGYL) 500 MG tablet 14 tablet 0 2/22/2019 3/1/2019 --   Sig - Route: Take 1 tablet (500 mg) by mouth 2 times daily for 7 days - Oral     And now has positive urine culture:     Culture Micro (Abnormal) 02/22/2019  1:04    Abnormal   >100,000 colonies/mL   Escherichia coli      Please see sensitivities.     Marni Vasquez RNC

## 2019-03-01 ENCOUNTER — PRE VISIT (OUTPATIENT)
Dept: MATERNAL FETAL MEDICINE | Facility: CLINIC | Age: 29
End: 2019-03-01

## 2019-03-04 ENCOUNTER — OFFICE VISIT (OUTPATIENT)
Dept: MATERNAL FETAL MEDICINE | Facility: CLINIC | Age: 29
End: 2019-03-04
Attending: ADVANCED PRACTICE MIDWIFE
Payer: COMMERCIAL

## 2019-03-04 ENCOUNTER — OFFICE VISIT (OUTPATIENT)
Dept: OBGYN | Facility: CLINIC | Age: 29
End: 2019-03-04
Attending: ADVANCED PRACTICE MIDWIFE
Payer: COMMERCIAL

## 2019-03-04 VITALS
DIASTOLIC BLOOD PRESSURE: 78 MMHG | WEIGHT: 197 LBS | HEIGHT: 67 IN | BODY MASS INDEX: 30.92 KG/M2 | SYSTOLIC BLOOD PRESSURE: 125 MMHG | HEART RATE: 80 BPM

## 2019-03-04 DIAGNOSIS — Z82.79 FAMILY HISTORY OF DOWN SYNDROME: ICD-10-CM

## 2019-03-04 DIAGNOSIS — N39.0 URINARY TRACT INFECTION WITHOUT HEMATURIA, SITE UNSPECIFIED: ICD-10-CM

## 2019-03-04 DIAGNOSIS — O09.291 PREVIOUS CHILD WITH DOWN SYNDROME IN FIRST TRIMESTER, ANTEPARTUM: Primary | ICD-10-CM

## 2019-03-04 DIAGNOSIS — O26.90 PREGNANCY RELATED CONDITION, ANTEPARTUM: ICD-10-CM

## 2019-03-04 DIAGNOSIS — Z34.81 ENCOUNTER FOR SUPERVISION OF OTHER NORMAL PREGNANCY IN FIRST TRIMESTER: Primary | ICD-10-CM

## 2019-03-04 PROCEDURE — 40000791 ZZHCL STATISTIC VERIFI PRENATAL TRISOMY 21,18,13: Performed by: OBSTETRICS & GYNECOLOGY

## 2019-03-04 PROCEDURE — 87591 N.GONORRHOEAE DNA AMP PROB: CPT | Performed by: ADVANCED PRACTICE MIDWIFE

## 2019-03-04 PROCEDURE — 96040 ZZH GENETIC COUNSELING, EACH 30 MINUTES: CPT | Mod: ZF | Performed by: GENETIC COUNSELOR, MS

## 2019-03-04 PROCEDURE — 36415 COLL VENOUS BLD VENIPUNCTURE: CPT | Performed by: OBSTETRICS & GYNECOLOGY

## 2019-03-04 PROCEDURE — 87491 CHLMYD TRACH DNA AMP PROBE: CPT | Performed by: ADVANCED PRACTICE MIDWIFE

## 2019-03-04 ASSESSMENT — MIFFLIN-ST. JEOR: SCORE: 1638.28

## 2019-03-04 NOTE — LETTER
3/4/2019       RE: Chhaya Thurman  [address] [facility] [address] [facility] [address]     Dear Colleague,    Thank you for referring your patient, Chhaya Thurman, to the [facility] at [facility]. Please see a copy of my visit note below.    SUBJECTIVE:   Chhaya is a 28 year old female who presents to clinic for a new OB visit.   at 10w4d with Estimated Date of Delivery: Sep 26, 2019 based on dating ultrasound, not c/w LMP (>5 days difference). Feels well. Has started PNV.     She has not had bleeding since her LMP.   She has had mild nausea at night. Weight loss has not occurred.   This was a planned pregnancy.   FOB is involved,   Nahid.     OTHER CONCERNS:   - Had genetic counseling appt today. NIPT drawn. (hx of baby with trisomy 21 who passed away)  - Frequent UTIs- approx 5 in the last couple of months. Just finished amoxicillin treatment on 3/4. Will repeat UC in 1 week.    ===========================================   ROS: 10 point ROS neg other than the symptoms noted above in the HPI.    PSYCHIATRIC:  Denies mood changes, difficulty concentrating, depression, anxiety, panic attacks or post partum depression    Past History:  Her past medical history   Past Medical History:   Diagnosis Date     Chickenpox      Fetal cardiac anomaly affecting pregnancy, antepartum 2018     History of recurrent UTI (urinary tract infection)    .   Since her last LMP she denies use of alcohol, tobacco and street drugs.  HISTORY:  Family History   Problem Relation Age of Onset     Other Cancer Maternal Grandfather         skin     Hyperlipidemia Mother      Unknown/Adopted Father         unknown history     Stomach Cancer Maternal Grandmother      Other Cancer Maternal Grandmother      Down Syndrome Daughter      Heart Defect Daughter      Diabetes No family hx of      Social History     Socioeconomic History     Marital status:      Spouse  name: Nahid     Number of children: Not on file     Years of education: Not on file     Highest education level: Not on file   Occupational History     Not on file   Social Needs     Financial resource strain: Not on file     Food insecurity:     Worry: Not on file     Inability: Not on file     Transportation needs:     Medical: Not on file     Non-medical: Not on file   Tobacco Use     Smoking status: Never Smoker     Smokeless tobacco: Never Used   Substance and Sexual Activity     Alcohol use: No     Alcohol/week: 0.0 oz     Comment: occas-quit with pregnancy     Drug use: No     Sexual activity: Yes     Partners: Male     Birth control/protection: None   Lifestyle     Physical activity:     Days per week: Not on file     Minutes per session: Not on file     Stress: Not on file   Relationships     Social connections:     Talks on phone: Not on file     Gets together: Not on file     Attends Advent service: Not on file     Active member of club or organization: Not on file     Attends meetings of clubs or organizations: Not on file     Relationship status: Not on file     Intimate partner violence:     Fear of current or ex partner: Not on file     Emotionally abused: Not on file     Physically abused: Not on file     Forced sexual activity: Not on file   Other Topics Concern     Parent/sibling w/ CABG, MI or angioplasty before 65F 55M? No   Social History Narrative    ** Merged History Encounter **          Current Outpatient Medications   Medication Sig     amoxicillin (AMOXIL) 875 MG tablet Take 1 tablet (875 mg) by mouth 2 times daily for 7 days     Prenatal Vit-Fe Fumarate-FA (PRENATAL MULTIVITAMIN  PLUS IRON) 27-0.8 MG TABS per tablet Take 1 tablet by mouth daily     No current facility-administered medications for this visit.      Allergies   Allergen Reactions     Imitrex [Sumatriptan]        ============================================  MEDICAL HISTORY  Past Medical History:   Diagnosis Date      "Chickenpox      Fetal cardiac anomaly affecting pregnancy, antepartum 2018     History of recurrent UTI (urinary tract infection)      Past Surgical History:   Procedure Laterality Date     DILATION AND CURETTAGE, OPERATIVE HYSTEROSCOPY, COMBINED N/A 2017    Procedure: COMBINED DILATION AND CURETTAGE, OPERATIVE HYSTEROSCOPY;  Hysteroscopy with dilation and curettage, polypectomy;  Surgeon: Mable Hanson MD;  Location: [facility] OR     ENT SURGERY  20 years ago    tubes in three times     MOUTH SURGERY  2008    wisdom teeth -age 16     TONSILLECTOMY         Obstetric History       T1      L0     SAB1   TAB0   Ectopic0   Multiple0   Live Births1       # Outcome Date GA Lbr Gerald/2nd Weight Sex Delivery Anes PTL Lv   4 Current            3 Term 18 37w4d 07:19 :39 2.72 kg (5 lb 15.9 oz) F Vag-Spont EPI N DEC      Name: Ralph      Complications: Preeclampsia/Hypertension      Apgar1:  8                Apgar5: 9   2 SAB 17 9w1d    AB, MISSED      1 AB 17 8w0d    SAB               GYN History- Last pap 2018 NIL    Abnormal Pap Smears: none                        Cervical procedures: none                        History of STI: none    I personally reviewed the past social/family/medical and surgical history on the date of service.   I reviewed lab work done at Intake visit with patient.    EXAM:  /78   Pulse 80   Ht 1.689 m (5' 6.5\")   Wt 89.4 kg (197 lb)   LMP 2018   BMI 31.32 kg/m      EXAM:  GENERAL:  Pleasant pregnant female, alert, cooperative and well groomed.  SKIN:  Warm and dry, without lesions or rashes  HEAD: Symmetrical features.  MOUTH:  Buccal mucosa pink, moist without lesions.  Teeth in good repair.    NECK:  Thyroid without enlargement and nodules.  Lymph nodes not palpable.   LUNGS:  Clear to auscultation.  BREAST:    No dominant, fixed or suspicious masses are noted.  No skin or nipple changes or axillary nodes.   Nipples everted.    "   HEART:  RRR without murmur.  ABDOMEN: Soft without masses , tenderness or organomegaly.  No CVA tenderness.  Uterus palpable at size equal to dates.  No scars noted.. Fetal heart tones present via doppler at 170bpm.  MUSCULOSKELETAL:  Full range of motion  EXTREMITIES:  No edema. No significant varicosities.   PELVIC EXAM: deferred  WET PREP:Not done  GC/CHLAMYDIA CULTURE OBTAINED:YES    Lab Results   Component Value Date    PAP NIL 2018          ASSESSMENT:  28 year old , 10w4d weeks of pregnancy with ALVINO of Sep 26, 2019 by dating ultrasound.  Intrauterine pregnancy 10w4d size consistent with dates  Genetic Screening: NIPT, Level 2 Ultrasound     ICD-10-CM    1. Encounter for supervision of other normal pregnancy in first trimester Z34.81 Urine Culture Aerobic Bacterial     Chlamydia PCR (Clinic Collect)     Gonorrhea PCR     25- OH-Vitamin D     Hgb A1c     CANCELED: 25- OH-Vitamin D   2. Urinary tract infection without hematuria, site unspecified N39.0        PLAN:  - Reviewed use of triage nurse line and contacting the on-call provider after hours for an urgent need such as fever, vagina bleeding, bladder or vaginal infection, rupture of membranes,  or term labor.    - Reviewed best evidence for: weight gain for her weight and height for pregnancy:  Based on pre-pregnancy weight of  Body mass index is 31.32 kg/m . RECOMMENDED WEIGHT GAIN:11-20  -If BMI>=30, weight gain/exercise handout given and reviewed. BMI entered on problem list, to include patient's preferred way to reference obesity during prenatal care, with plan for possible referrals and  testing  -If BMI >=30, and has one of other risk for sleep apnea (snoring, observed apnea or hypertension) refer for sleep study.  - Reviewed healthy diet and foods to avoid; exercise and activity during pregnancy; avoiding exposure to toxoplasmosis; and maintenance of a generally healthy lifestyle.   - Discussed the harms, benefits, side  effects and alternative therapies for current prescribed and OTC medications.    - All pt's and partner's questions discussed and answered.  Pt verbalized understanding of and agreement to plan of care.     - OBI labs reviewed.  - Vitamin D not drawn at previous visit. Future order placed.  - BMI noted after appt to be >30. Future order placed for hemoglobin a1c.  Need to discuss recommendation for early 1 hour glucose at next visit.  - GC/CT collected.  - Pap up to date.  - Genetic counseling done today. NIPT in process. Level 2 ultrasound scheduled with [facility].  - Will add pt to HR rounds to discuss any recommendations for frequent UTIs.  - Future order for urine culture placed. Per up to date, repeat UC 1 week after last dose of treatment (on or after 3/11).    - Continue scheduled prenatal care, RTC in 4 weeks and prn if questions or concerns    SUSSY Arreola, SANDRA

## 2019-03-04 NOTE — PROGRESS NOTES
[facility] Maternal Fetal Medicine [place]  Genetic Counseling Consult    Patient: Chhaya Thurman YOB: 1990   Date of Service: 3/04/19      Chhaya Thurman was seen with her , Nahid, at [facility] Maternal Fetal Medicine [place] for genetic consultation to discuss the options for screening and testing for fetal chromosome abnormalities.  The indication for genetic counseling is previous child with trisomy 21.        Impression/Plan:   1.  Chhaya had a blood draw for NIPT (Innatal test through [facility]).  Results are expected within 7-10 days, and will be available in [employer].  We will contact her to discuss the results, and a copy will be forwarded to the office of the referring OB provider.    2.  Maternal serum AFP (single marker screen) is recommended after 15 weeks to screen for open neural tube defects. A quad screen should not be performed.    Pregnancy History:   /Parity:    Age at Delivery: 28 year old  ALVINO: 2019, by Ultrasound  Gestational Age: 10w4d    No significant complications or exposures were reported in the current pregnancy.    Chhaya sheth pregnancy history is significant for:  o SAB at 8 weeks in 2017  o SAB at 9 weeks in 2017  o 37w4d induced vaginal delivery in 2018, complicated by VSD and trisomy 21, passed away at 4 months of age, girl named Ralph    Medical History:   Chhaya sheth reported medical history is not expected to impact pregnancy management or risks to fetal development.       Family History:   A three-generation pedigree was obtained, and is scanned under the  Media  tab.   The following significant findings were reported by Leena and Nahid:    As mentioned above, Leena's daughter, Ralph, was born with trisomy 21 (also called Down syndrome). Most cases of Down syndrome are not inherited. When the condition is caused by a free trisomy 21, the chromosomal abnormality occurs as a random event during the  "formation of reproductive cells in a parent. The abnormality usually occurs in egg cells, but it occasionally occurs in sperm cells. Less commonly, Down syndrome occurs when part of chromosome 21 becomes attached (translocated) to another chromosome during the formation of reproductive cells (eggs and sperm) in a parent or very early in fetal development. Affected people have two normal copies of chromosome 21 plus extra material from chromosome 21 attached to another chromosome, resulting in three copies of genetic material from chromosome 21. Affected individuals with this genetic change are said to have translocation Down syndrome. Ralph had a free trisomy, so the recurrence risk is expected to be around 1-2%.   Nahid reports that his sister has a \"hole in her heart\" and that she had an IEP in school. Congenital heart defects are common, occurring in 5 to 10 per 1000 live births. One type of congenital heart defect commonly referred to as a   hole in the heart  is a patent foramen ovale, however there are many different abnormalities that can be referred to as a \"hole in the heart\". The specific recurrence risk for first degree relatives differs according to the type of congenital heart defect and if there is an associated genetic syndrome present. Without a known subtype of congenital heart defect in Nahid's sister it is difficult to provide a recurrence risk to Nahid and/or this pregnancy. In general, studies show that the overall risk contributed by a positive family history of a congenital heart defect in 1st degree relatives for the same defect is  8.15% whereas the recurrence risk for a different heart defect is 2.68%.   Nahid reports that his mother was diagnosed with breast cancer at around age 50 and passed away two years later. Nahid reports that his sister had \"something removed\" from her breast, which may have been caner or pre-cancer, when she was in her 30s. Additionally, Nahid's mother passed away from an " "unspecified type of cancer in her late 40s or early 50s. Cancer most often occurs by chance, however some families seem to develop cancer more frequently than expected. Everyone has a risk to develop cancer, but individuals may be at an increased risk to develop cancer based on their family history. Due to the number of affected individuals in the family and the young age at diagnosis, there is an elevated concern for a genetic basis for these cancers.  Genetic counseling and testing is available for individuals who would like to proceed.  Leena reports that one of her maternal first cousins had a son who passed away shortly after birth due to \"fluid on his brain.\" She was not aware of any additional details. Family history such as stillbirths, infant deaths, or pregnancy complications can be difficult to assess if details are scarce. Therefore, it is challenging to assess if this family history modifies risks to the current pregnancy. If more information is collected regarding this family history it should be revisited.     Otherwise, the reported family history is negative for multiple miscarriages, stillbirths, birth defects, mental retardation, known genetic conditions, and consanguinity.       Carrier Screening:   The patient reports that she and the father of the pregnancy have  ancestry:      Cystic fibrosis is an autosomal recessive genetic condition that occurs with increased frequency in individuals of  ancestry and carrier screening for this condition is available.  In addition,  screening in the [facility] includes cystic fibrosis.      Expanded carrier screening for mutations in a large panel of genes associated with autosomal recessive conditions including cystic fibrosis, spinal muscular atrophy, and others, is now available.      Carrier screening was beyond the scope of our discussion today.        Risk Assessment for Chromosome Conditions:   We explained that the risk " for fetal chromosome abnormalities increases with maternal age. We discussed specific features of common chromosome abnormalities, including Down syndrome, trisomy 13, trisomy 18, and sex chromosome trisomies.      - At age 28 at midtrimester, the risk to have a baby with Down syndrome is 1 in 690.     - At age 28 at midtrimester, the risk to have a baby with any chromosome abnormality is 1 in 345.     As mentioned above, the risk for Down syndrome in this pregnancy is expected to be around 1-2% due to Leena's previous child having a diagnosis of Down syndrome.        Testing Options:   We discussed the following options:   Non-invasive Prenatal Testing (NIPT)    Maternal plasma cell-free DNA testing; first trimester ultrasound with nuchal translucency and nasal bone assessment is recommended, when appropriate    Screens for fetal trisomy 21, trisomy 13, trisomy 18, and sex chromosome aneuploidy    Cannot screen for open neural tube defects; maternal serum AFP after 15 weeks is recommended       Chorionic villus sampling (CVS)    Invasive procedure typically performed in the first trimester by which placental villi are obtained for the purpose of chromosome analysis and/or other prenatal genetic analysis    Diagnostic results; >99% sensitivity for fetal chromosome abnormalities    Cannot test for open neural tube defects; maternal serum AFP after 15 weeks is recommended       Genetic Amniocentesis    Invasive procedure typically performed in the second trimester by which amniotic fluid is obtained for the purpose of chromosome analysis and/or other prenatal genetic analysis    Diagnostic results; >99% sensitivity for fetal chromosome abnormalities    AFAFP measurement tests for open neural tube defects        We reviewed the benefits and limitations of this testing.  Screening tests provide a risk assessment specific to the pregnancy for certain fetal chromosome abnormalities, but cannot definitively diagnose or  exclude a fetal chromosome abnormality.  Follow-up genetic counseling and consideration of diagnostic testing is recommended with any abnormal screening result.     Diagnostic tests carry inherent risks- including risk of miscarriage- that require careful consideration.  These tests can detect fetal chromosome abnormalities with greater than 99% certainty.  Results can be compromised by maternal cell contamination or mosaicism, and are limited by the resolution of cytogenetic G-banding technology.  There is no screening nor diagnostic test that can detect all forms of birth defects or mental disability.     It was a pleasure to be involved with Chhaya sheth care. Face-to-face time of the meeting was 25 minutes.    Hayley Sanchez MS, [facility]  Maternal Fetal Medicine  [facility]  Ph: [phone]  [email or web address]

## 2019-03-04 NOTE — PROGRESS NOTES
SUBJECTIVE:   Chhaya is a 28 year old female who presents to clinic for a new OB visit.   at 10w4d with Estimated Date of Delivery: Sep 26, 2019 based on dating ultrasound, not c/w LMP (>5 days difference). Feels well. Has started PNV.     She has not had bleeding since her LMP.   She has had mild nausea at night. Weight loss has not occurred.   This was a planned pregnancy.   FOB is involved,   Nahid.     OTHER CONCERNS:   - Had genetic counseling appt today. NIPT drawn. (hx of baby with trisomy 21 who passed away)  - Frequent UTIs- approx 5 in the last couple of months. Just finished amoxicillin treatment on 3/4. Will repeat UC in 1 week.    ===========================================   ROS: 10 point ROS neg other than the symptoms noted above in the HPI.    PSYCHIATRIC:  Denies mood changes, difficulty concentrating, depression, anxiety, panic attacks or post partum depression    Past History:  Her past medical history   Past Medical History:   Diagnosis Date     Chickenpox      Fetal cardiac anomaly affecting pregnancy, antepartum 2018     History of recurrent UTI (urinary tract infection)    .   Since her last LMP she denies use of alcohol, tobacco and street drugs.  HISTORY:  Family History   Problem Relation Age of Onset     Other Cancer Maternal Grandfather         skin     Hyperlipidemia Mother      Unknown/Adopted Father         unknown history     Stomach Cancer Maternal Grandmother      Other Cancer Maternal Grandmother      Down Syndrome Daughter      Heart Defect Daughter      Diabetes No family hx of      Social History     Socioeconomic History     Marital status:      Spouse name: Nahid     Number of children: Not on file     Years of education: Not on file     Highest education level: Not on file   Occupational History     Not on file   Social Needs     Financial resource strain: Not on file     Food insecurity:     Worry: Not on file     Inability: Not on file      Transportation needs:     Medical: Not on file     Non-medical: Not on file   Tobacco Use     Smoking status: Never Smoker     Smokeless tobacco: Never Used   Substance and Sexual Activity     Alcohol use: No     Alcohol/week: 0.0 oz     Comment: occas-quit with pregnancy     Drug use: No     Sexual activity: Yes     Partners: Male     Birth control/protection: None   Lifestyle     Physical activity:     Days per week: Not on file     Minutes per session: Not on file     Stress: Not on file   Relationships     Social connections:     Talks on phone: Not on file     Gets together: Not on file     Attends Muslim service: Not on file     Active member of club or organization: Not on file     Attends meetings of clubs or organizations: Not on file     Relationship status: Not on file     Intimate partner violence:     Fear of current or ex partner: Not on file     Emotionally abused: Not on file     Physically abused: Not on file     Forced sexual activity: Not on file   Other Topics Concern     Parent/sibling w/ CABG, MI or angioplasty before 65F 55M? No   Social History Narrative    ** Merged History Encounter **          Current Outpatient Medications   Medication Sig     amoxicillin (AMOXIL) 875 MG tablet Take 1 tablet (875 mg) by mouth 2 times daily for 7 days     Prenatal Vit-Fe Fumarate-FA (PRENATAL MULTIVITAMIN  PLUS IRON) 27-0.8 MG TABS per tablet Take 1 tablet by mouth daily     No current facility-administered medications for this visit.      Allergies   Allergen Reactions     Imitrex [Sumatriptan]        ============================================  MEDICAL HISTORY  Past Medical History:   Diagnosis Date     Chickenpox      Fetal cardiac anomaly affecting pregnancy, antepartum 4/4/2018     History of recurrent UTI (urinary tract infection)      Past Surgical History:   Procedure Laterality Date     DILATION AND CURETTAGE, OPERATIVE HYSTEROSCOPY, COMBINED N/A 8/30/2017    Procedure: COMBINED DILATION AND  "CURETTAGE, OPERATIVE HYSTEROSCOPY;  Hysteroscopy with dilation and curettage, polypectomy;  Surgeon: Mable Hanson MD;  Location: [facility] OR     ENT SURGERY  20 years ago    tubes in three times     MOUTH SURGERY  2008    wisdom teeth -age 16     TONSILLECTOMY         Obstetric History       T1      L0     SAB1   TAB0   Ectopic0   Multiple0   Live Births1       # Outcome Date GA Lbr Gerald/2nd Weight Sex Delivery Anes PTL Lv   4 Current            3 Term 18 37w4d 07:19 :39 2.72 kg (5 lb 15.9 oz) F Vag-Spont EPI N DEC      Name: Ralph      Complications: Preeclampsia/Hypertension      Apgar1:  8                Apgar5: 9   2 SAB 17 9w1d    AB, MISSED      1 AB 17 8w0d    SAB               GYN History- Last pap 2018 NIL    Abnormal Pap Smears: none                        Cervical procedures: none                        History of STI: none    I personally reviewed the past social/family/medical and surgical history on the date of service.   I reviewed lab work done at Intake visit with patient.    EXAM:  /78   Pulse 80   Ht 1.689 m (5' 6.5\")   Wt 89.4 kg (197 lb)   LMP 2018   BMI 31.32 kg/m     EXAM:  GENERAL:  Pleasant pregnant female, alert, cooperative and well groomed.  SKIN:  Warm and dry, without lesions or rashes  HEAD: Symmetrical features.  MOUTH:  Buccal mucosa pink, moist without lesions.  Teeth in good repair.    NECK:  Thyroid without enlargement and nodules.  Lymph nodes not palpable.   LUNGS:  Clear to auscultation.  BREAST:    No dominant, fixed or suspicious masses are noted.  No skin or nipple changes or axillary nodes.   Nipples everted.      HEART:  RRR without murmur.  ABDOMEN: Soft without masses , tenderness or organomegaly.  No CVA tenderness.  Uterus palpable at size equal to dates.  No scars noted.. Fetal heart tones present via doppler at 170bpm.  MUSCULOSKELETAL:  Full range of motion  EXTREMITIES:  No edema. No significant " varicosities.   PELVIC EXAM: deferred  WET PREP:Not done  GC/CHLAMYDIA CULTURE OBTAINED:YES    Lab Results   Component Value Date    PAP NIL 2018          ASSESSMENT:  28 year old , 10w4d weeks of pregnancy with ALVINO of Sep 26, 2019 by dating ultrasound.  Intrauterine pregnancy 10w4d size consistent with dates  Genetic Screening: NIPT, Level 2 Ultrasound     ICD-10-CM    1. Encounter for supervision of other normal pregnancy in first trimester Z34.81 Urine Culture Aerobic Bacterial     Chlamydia PCR (Clinic Collect)     Gonorrhea PCR     25- OH-Vitamin D     Hgb A1c     CANCELED: 25- OH-Vitamin D   2. Urinary tract infection without hematuria, site unspecified N39.0        PLAN:  - Reviewed use of triage nurse line and contacting the on-call provider after hours for an urgent need such as fever, vagina bleeding, bladder or vaginal infection, rupture of membranes,  or term labor.    - Reviewed best evidence for: weight gain for her weight and height for pregnancy:  Based on pre-pregnancy weight of  Body mass index is 31.32 kg/m . RECOMMENDED WEIGHT GAIN:11-20  -If BMI>=30, weight gain/exercise handout given and reviewed. BMI entered on problem list, to include patient's preferred way to reference obesity during prenatal care, with plan for possible referrals and  testing  -If BMI >=30, and has one of other risk for sleep apnea (snoring, observed apnea or hypertension) refer for sleep study.  - Reviewed healthy diet and foods to avoid; exercise and activity during pregnancy; avoiding exposure to toxoplasmosis; and maintenance of a generally healthy lifestyle.   - Discussed the harms, benefits, side effects and alternative therapies for current prescribed and OTC medications.    - All pt's and partner's questions discussed and answered.  Pt verbalized understanding of and agreement to plan of care.     - OBI labs reviewed.  - Vitamin D not drawn at previous visit. Future order placed.  - BMI  noted after appt to be >30. Future order placed for hemoglobin a1c.  Need to discuss recommendation for early 1 hour glucose at next visit.  - GC/CT collected.  - Pap up to date.  - Genetic counseling done today. NIPT in process. Level 2 ultrasound scheduled with M.  - Will add pt to HR rounds to discuss any recommendations for frequent UTIs.  - Future order for urine culture placed. Per up to date, repeat UC 1 week after last dose of treatment (on or after 3/11).    - Continue scheduled prenatal care, RTC in 4 weeks and prn if questions or concerns    SUSSY Arreola, SANDRA

## 2019-03-05 LAB
C TRACH DNA SPEC QL NAA+PROBE: NEGATIVE
N GONORRHOEA DNA SPEC QL NAA+PROBE: NEGATIVE
SPECIMEN SOURCE: NORMAL
SPECIMEN SOURCE: NORMAL

## 2019-03-11 ENCOUNTER — TELEPHONE (OUTPATIENT)
Dept: MATERNAL FETAL MEDICINE | Facility: CLINIC | Age: 29
End: 2019-03-11

## 2019-03-11 DIAGNOSIS — Z34.81 ENCOUNTER FOR SUPERVISION OF OTHER NORMAL PREGNANCY IN FIRST TRIMESTER: ICD-10-CM

## 2019-03-11 LAB
DEPRECATED CALCIDIOL+CALCIFEROL SERPL-MC: 28 UG/L (ref 20–75)
HBA1C MFR BLD: 5 % (ref 0–5.6)
LAB SCANNED RESULT: NORMAL

## 2019-03-11 PROCEDURE — 82306 VITAMIN D 25 HYDROXY: CPT | Performed by: ADVANCED PRACTICE MIDWIFE

## 2019-03-11 PROCEDURE — 87086 URINE CULTURE/COLONY COUNT: CPT | Performed by: ADVANCED PRACTICE MIDWIFE

## 2019-03-11 PROCEDURE — 36415 COLL VENOUS BLD VENIPUNCTURE: CPT | Performed by: ADVANCED PRACTICE MIDWIFE

## 2019-03-11 PROCEDURE — 83036 HEMOGLOBIN GLYCOSYLATED A1C: CPT | Mod: QW | Performed by: ADVANCED PRACTICE MIDWIFE

## 2019-03-11 NOTE — TELEPHONE ENCOUNTER
"3/11/2019    I called Chhaya to discuss her normal \"Innatal\" cell-free fetal DNA screening results.  Results came back negative for chromosome abnormalities in chromosomes 21, 18, & 13, as well as the sex chromosomes.  These normal results suggest the likelihood of fetal Down syndrome, trisomy 13, or trisomy 18 is very low. 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 Sex chromosome information was relayed to the patient.    Discussed high detection rates for fetal Down syndrome, trisomies 13 and 18, and fetal sex chromosome abnormalities; however, not 100%. While this test is a highly accurate screen, it does not replace the diagnostic capabilities of standard prenatal diagnostic tests such as amniocentesis and CVS. Chhaya indicated her understanding and currently declines prenatal diagnosis.     Plan:  MSAFP is the appropriate second trimester screening test for open neural tube defects; the maternal quad screen is not indicated.    Hayley Sanchez MS, [facility]  Maternal Fetal Medicine  [facility]  Ph: [phone]  [email or web address]    "

## 2019-03-12 LAB
BACTERIA SPEC CULT: NO GROWTH
Lab: NORMAL
SPECIMEN SOURCE: NORMAL

## 2019-03-13 ENCOUNTER — TELEPHONE (OUTPATIENT)
Dept: OBGYN | Facility: CLINIC | Age: 29
End: 2019-03-13

## 2019-03-13 DIAGNOSIS — N39.0 URINARY TRACT INFECTION WITHOUT HEMATURIA, SITE UNSPECIFIED: Primary | ICD-10-CM

## 2019-03-13 DIAGNOSIS — N39.0 URINARY TRACT INFECTION WITHOUT HEMATURIA, SITE UNSPECIFIED: ICD-10-CM

## 2019-03-13 RX ORDER — CEPHALEXIN 500 MG/1
500 CAPSULE ORAL AT BEDTIME
Qty: 90 CAPSULE | Refills: 3 | Status: ON HOLD | OUTPATIENT
Start: 2019-03-13 | End: 2019-09-21

## 2019-03-13 RX ORDER — CEPHALEXIN 500 MG/1
500 CAPSULE ORAL AT BEDTIME
Qty: 90 CAPSULE | Refills: 3 | Status: SHIPPED | OUTPATIENT
Start: 2019-03-13 | End: 2019-03-13

## 2019-03-27 ENCOUNTER — TELEPHONE (OUTPATIENT)
Dept: OBGYN | Facility: CLINIC | Age: 29
End: 2019-03-27

## 2019-03-27 ENCOUNTER — E-VISIT (OUTPATIENT)
Dept: FAMILY MEDICINE | Facility: CLINIC | Age: 29
End: 2019-03-27
Payer: COMMERCIAL

## 2019-03-27 DIAGNOSIS — N76.0 BACTERIAL VAGINOSIS: Primary | ICD-10-CM

## 2019-03-27 DIAGNOSIS — B96.89 BACTERIAL VAGINOSIS: Primary | ICD-10-CM

## 2019-03-27 PROCEDURE — 99444 ZZC PHYSICIAN ONLINE EVALUATION & MANAGEMENT SERVICE: CPT | Performed by: FAMILY MEDICINE

## 2019-03-27 RX ORDER — METRONIDAZOLE 500 MG/1
500 TABLET ORAL 2 TIMES DAILY
Qty: 14 TABLET | Refills: 0 | Status: SHIPPED | OUTPATIENT
Start: 2019-03-27 | End: 2019-04-01

## 2019-03-27 RX ORDER — METRONIDAZOLE 500 MG/1
500 TABLET ORAL 2 TIMES DAILY
Qty: 14 TABLET | Refills: 0 | Status: SHIPPED | OUTPATIENT
Start: 2019-03-27 | End: 2019-03-27

## 2019-03-27 NOTE — TELEPHONE ENCOUNTER
Spoke to Leena who is 13 wks GA,who sent [email or web address] message thinking she has BV again.    Unable to get seen at [facility] prior to her appt next Monday 4/1/19. She saw another provider closer to her home for original DX from wet prep.    She will contact that provider to see if will get seen and or prescribe more flagyl.  If unsuccessful at the clinic by her house,gave her nurse triage umber to call so we can discuss with one of our providers.Pt indicated understanding and agreed with plan.

## 2019-03-27 NOTE — PATIENT INSTRUCTIONS
Thank you for choosing us for your care. I have placed an order for a prescription so that you can start treatment. View your full visit summary for details by clicking on the link below. Your pharmacist will able to address any questions you may have about the medication.     If you re not feeling better within 2-3 days, please schedule an appointment.  You can schedule an appointment right here in [place], or call [phone]  If the visit is for the same symptoms as your e-visit, we ll refund the cost of your e-visit if seen within seven days.      Bacterial Vaginosis    You have a vaginal infection called bacterial vaginosis (BV). Both good and bad bacteria are present in a healthy vagina. BV occurs when these bacteria get out of balance. The number of bad bacteria increase. And the number of good bacteria decrease. Although BV is associated with sexual activity, it is not a sexually transmitted disease.  BV may or may not cause symptoms. If symptoms do occur, they can include:    Thin, gray, milky-white, or sometimes green discharge    Unpleasant odor or  fishy  smell    Itching, burning, or pain in or around the vagina  It is not known what causes BV, but certain factors can make the problem more likely. This can include:    Douching    Having sex with a new partner    Having sex with more than one partner  BV will sometimes go away on its own. But treatment is usually recommended. This is because untreated BV can increase the risk of more serious health problems such as:    Pelvic inflammatory disease (PID)     delivery (giving birth to a baby early if you re pregnant)    HIV and certain other sexually transmitted diseases (STDs)    Infection after surgery on the reproductive organs  Home care  General care    BV is most often treated with medicines called antibiotics. These may be given as pills or as a vaginal cream. If antibiotics are prescribed, be sure to use them exactly as directed. Also, be  sure to complete all of the medicine, even if your symptoms go away.    Don't douche or having sex during treatment.    If you have sex with a female partner, ask your healthcare provider if she should also be treated.  Prevention    Don't douche.    Don't have sex. If you do have sex, then take steps to lower your risk:  ? Use condoms when having sex.  ? Limit the number of sexual partners you have.  Follow-up care  Follow up with your healthcare provider, or as advised.  When to seek medical advice  Call your healthcare provider right away if:    You have a fever of 100.4 F (38 C) or higher, or as directed by your provider.    Your symptoms worsen, or they don t go away within a few days of starting treatment.    You have new pain in the lower belly or pelvic region.    You have side effects that bother you or a reaction to the pills or cream you re prescribed.    You or any partners you have sex with have new symptoms, such as a rash, joint pain, or sores.  Date Last Reviewed: 10/1/2017    9371-9350 [employer]. [address] [place] [address], [place] [address]. All rights reserved. This information is not intended as a substitute for professional medical care. Always follow your healthcare professional's instructions.

## 2019-04-01 ENCOUNTER — OFFICE VISIT (OUTPATIENT)
Dept: OBGYN | Facility: CLINIC | Age: 29
End: 2019-04-01
Attending: ADVANCED PRACTICE MIDWIFE
Payer: COMMERCIAL

## 2019-04-01 VITALS
HEART RATE: 75 BPM | SYSTOLIC BLOOD PRESSURE: 131 MMHG | BODY MASS INDEX: 32.47 KG/M2 | HEIGHT: 66 IN | DIASTOLIC BLOOD PRESSURE: 74 MMHG | WEIGHT: 202 LBS

## 2019-04-01 DIAGNOSIS — Z34.81 ENCOUNTER FOR SUPERVISION OF OTHER NORMAL PREGNANCY IN FIRST TRIMESTER: Primary | ICD-10-CM

## 2019-04-01 PROCEDURE — G0463 HOSPITAL OUTPT CLINIC VISIT: HCPCS | Mod: ZF

## 2019-04-01 ASSESSMENT — PAIN SCALES - GENERAL: PAINLEVEL: NO PAIN (0)

## 2019-04-01 ASSESSMENT — MIFFLIN-ST. JEOR: SCORE: 1660.89

## 2019-04-01 NOTE — PROGRESS NOTES
"Subjective:      28 year old  at 14w4d presents for a routine prenatal appointment with her supportive .       No vaginal bleeding or leakage of fluid.  No contractions or cramping.    Occ fetal movement.       No HA, visual changes, RUQ or epigastric pain.   The patient presents with the following concerns: Was hoping to travel to [place], wondering if this is safe in pregnancy.  Trying to exercise (walks on treadmill) but occasionally has mild cramps, so she stops. Eating a mostly healthy diet. Craving fruit. A co worker came to work last week and then was dx with flu a few days later (Friday). No s/sx of flu, but feeling concerned about exposure.  Level II US  ordered.   Offered AFP - declines     Objective:  Vitals:    19 1554   BP: 131/74   Pulse: 75   Weight: 91.6 kg (202 lb)   Height: 1.689 m (5' 6.5\")     See OB flowsheet    Assessment/Plan     Encounter Diagnosis   Name Primary?     Encounter for supervision of other normal pregnancy in first trimester Yes     Orders Placed This Encounter   Procedures     [facility] REFERRAL-PREGNANCY     - Discussed option for prophylactic tamiflu for flu exposure. Leena prefers to defer this but will call with any flu like symptoms.   - Reviewed total weight gain, encouraged continued healthy diet and exercise.      - Reviewed why/how to contact provider.   - Patient education/orders or handouts today: Offered AFP - declines. Discussed normal fetal movement. Reviewed that [place] is a country with r/f zika so do not recommend travel there during prengnacy. Discussed safe alternative destinations.    Return to clinic in 4-6 weeks and prn if questions or concerns.     Belinda Murphy, SUSSY FLORES                "

## 2019-04-01 NOTE — LETTER
"2019       RE: Chhaya Thurman  [address] [address]     Dear Colleague,    Thank you for referring your patient, Chhaya Thurman, to the [facility] at [facility]. Please see a copy of my visit note below.    Subjective:      28 year old  at 14w4d presents for a routine prenatal appointment with her supportive .       No vaginal bleeding or leakage of fluid.  No contractions or cramping.    Occ fetal movement.       No HA, visual changes, RUQ or epigastric pain.   The patient presents with the following concerns: Was hoping to travel to [place], wondering if this is safe in pregnancy.  Trying to exercise (walks on treadmill) but occasionally has mild cramps, so she stops. Eating a mostly healthy diet. Craving fruit. A co worker came to work last week and then was dx with flu a few days later (Friday). No s/sx of flu, but feeling concerned about exposure.  Level II US  ordered.   Offered AFP - declines     Objective:  Vitals:    19 1554   BP: 131/74   Pulse: 75   Weight: 91.6 kg (202 lb)   Height: 1.689 m (5' 6.5\")     See OB flowsheet    Assessment/Plan     Encounter Diagnosis   Name Primary?     Encounter for supervision of other normal pregnancy in first trimester Yes     Orders Placed This Encounter   Procedures     [facility] REFERRAL-PREGNANCY     - Discussed option for prophylactic tamiflu for flu exposure. Leena prefers to defer this but will call with any flu like symptoms.   - Reviewed total weight gain, encouraged continued healthy diet and exercise.      - Reviewed why/how to contact provider.   - Patient education/orders or handouts today: Offered AFP - declines. Discussed normal fetal movement. Reviewed that [place] is a country with r/f zika so do not recommend travel there during prengnacy. Discussed safe alternative destinations.    Return to clinic in 4-6 weeks and prn if questions or " concerns.     SUSSY HuiM

## 2019-04-02 ENCOUNTER — TRANSCRIBE ORDERS (OUTPATIENT)
Dept: MATERNAL FETAL MEDICINE | Facility: CLINIC | Age: 29
End: 2019-04-02

## 2019-04-02 DIAGNOSIS — O26.90 PREGNANCY RELATED CONDITION, ANTEPARTUM: Primary | ICD-10-CM

## 2019-04-25 ENCOUNTER — HOSPITAL ENCOUNTER (OUTPATIENT)
Dept: ULTRASOUND IMAGING | Facility: CLINIC | Age: 29
Discharge: HOME | End: 2019-04-25
Attending: ADVANCED PRACTICE MIDWIFE | Admitting: ADVANCED PRACTICE MIDWIFE
Payer: COMMERCIAL

## 2019-04-25 ENCOUNTER — OFFICE VISIT (OUTPATIENT)
Dept: MATERNAL FETAL MEDICINE | Facility: CLINIC | Age: 29
End: 2019-04-25
Attending: ADVANCED PRACTICE MIDWIFE
Payer: COMMERCIAL

## 2019-04-25 ENCOUNTER — OFFICE VISIT (OUTPATIENT)
Dept: OBGYN | Facility: CLINIC | Age: 29
End: 2019-04-25
Attending: ADVANCED PRACTICE MIDWIFE
Payer: COMMERCIAL

## 2019-04-25 VITALS
DIASTOLIC BLOOD PRESSURE: 78 MMHG | HEIGHT: 67 IN | HEART RATE: 80 BPM | SYSTOLIC BLOOD PRESSURE: 119 MMHG | WEIGHT: 206 LBS | BODY MASS INDEX: 32.33 KG/M2

## 2019-04-25 DIAGNOSIS — O09.299 PREVIOUS PREGNANCY WITH CONGENITAL HEART DEFECT, CURRENTLY PREGNANT: Primary | ICD-10-CM

## 2019-04-25 DIAGNOSIS — Z87.440 PERSONAL HISTORY OF URINARY TRACT INFECTION: ICD-10-CM

## 2019-04-25 DIAGNOSIS — O26.90 PREGNANCY RELATED CONDITION, ANTEPARTUM: ICD-10-CM

## 2019-04-25 DIAGNOSIS — Z82.79 FAMILY HISTORY OF DOWN SYNDROME: ICD-10-CM

## 2019-04-25 DIAGNOSIS — Z82.79 FAMILY HISTORY OF CHROMOSOMAL ABNORMALITY: ICD-10-CM

## 2019-04-25 DIAGNOSIS — Z34.81 ENCOUNTER FOR SUPERVISION OF OTHER NORMAL PREGNANCY IN FIRST TRIMESTER: Primary | ICD-10-CM

## 2019-04-25 PROCEDURE — 36415 COLL VENOUS BLD VENIPUNCTURE: CPT | Performed by: ADVANCED PRACTICE MIDWIFE

## 2019-04-25 PROCEDURE — 76811 OB US DETAILED SNGL FETUS: CPT

## 2019-04-25 PROCEDURE — G0463 HOSPITAL OUTPT CLINIC VISIT: HCPCS | Mod: ZF,25

## 2019-04-25 PROCEDURE — 82105 ALPHA-FETOPROTEIN SERUM: CPT | Performed by: ADVANCED PRACTICE MIDWIFE

## 2019-04-25 ASSESSMENT — MIFFLIN-ST. JEOR: SCORE: 1679.1

## 2019-04-25 NOTE — LETTER
"  RE: Chhaya Thurman  [address] [address]     Dear Colleague,    Thank you for referring your patient, Chhaya Thurman, to the [facility] at [facility]. Please see a copy of my visit note below.    Subjective:      28 year old  at 18w0d presents for a routine prenatal appointment.       no vaginal bleeding or leakage of fluid.  no contractions or cramping.    pos fetal movement.       No HA, visual changes, RUQ or epigastric pain.   The patient presents with the following concerns: some increase in vaginal discharge but no odor, itching, irritation   Level II US  done today, per patient no concerns, fetal echo scheduled.   Offered AFP-ordered today     Objective:  Vitals:    19 1606   BP: 119/78   BP Location: Left arm   Patient Position: Chair   Pulse: 80   Weight: 93.4 kg (206 lb)   Height: 1.689 m (5' 6.5\")     See OB flowsheet    Assessment/Plan     Encounter Diagnoses   Name Primary?     Encounter for supervision of other normal pregnancy in first trimester Yes     Family history of Down syndrome      Personal history of urinary tract infection      Orders Placed This Encounter   Procedures     AFP - Alpha Fetoprotein Maternal Screen     Orders Placed This Encounter   Medications     Cholecalciferol (VITAMIN D3 GUMMIES ADULT PO)       - Reviewed total weight gain, encouraged continued healthy diet and exercise.      - Reviewed why/how to contact provider.    Patient education/orders or handouts today:  PTL signs/symptoms, AFP only and discussed CNM care, pt will stay w CNM care unless needs change   Discussed s/s vaginitis, when to call. Increased risk of yeast infection with antibiotics, taking daily   Return to clinic in 4 weeks and prn if questions or concerns.       Again, thank you for allowing me to participate in the care of your patient.      Sincerely,    SUSSY Orantes CNM      "

## 2019-04-25 NOTE — PROGRESS NOTES
"Subjective:      28 year old  at 18w0d presents for a routine prenatal appointment.       no vaginal bleeding or leakage of fluid.  no contractions or cramping.    pos fetal movement.       No HA, visual changes, RUQ or epigastric pain.   The patient presents with the following concerns: some increase in vaginal discharge but no odor, itching, irritation   Level II US  done today, per patient no concerns, fetal echo scheduled.   Offered AFP-ordered today     Objective:  Vitals:    19 1606   BP: 119/78   BP Location: Left arm   Patient Position: Chair   Pulse: 80   Weight: 93.4 kg (206 lb)   Height: 1.689 m (5' 6.5\")     See OB flowsheet    Assessment/Plan     Encounter Diagnoses   Name Primary?     Encounter for supervision of other normal pregnancy in first trimester Yes     Family history of Down syndrome      Personal history of urinary tract infection      Orders Placed This Encounter   Procedures     AFP - Alpha Fetoprotein Maternal Screen     Orders Placed This Encounter   Medications     Cholecalciferol (VITAMIN D3 GUMMIES ADULT PO)       - Reviewed total weight gain, encouraged continued healthy diet and exercise.      - Reviewed why/how to contact provider.    Patient education/orders or handouts today:  PTL signs/symptoms, AFP only and discussed CNM care, pt will stay w CNM care unless needs change   Discussed s/s vaginitis, when to call. Increased risk of yeast infection with antibiotics, taking daily   Return to clinic in 4 weeks and prn if questions or concerns.   Lindsay Lackey, SUSSY CNM                "

## 2019-04-25 NOTE — PROGRESS NOTES
Please refer to ultrasound report under 'Imaging' Studies of 'Chart Review' tabs.    Zenon Stapleton M.D.

## 2019-04-29 LAB
# FETUSES US: NORMAL
# FETUSES: NORMAL
AFP ADJ MOM AMN: 0.61
AFP SERPL-MCNC: 21 NG/ML
AGE - REPORTED: 30.8 YR
CURRENT SMOKER: NO
CURRENT SMOKER: NO
DIABETES STATUS PATIENT: NO
FAMILY MEMBER DISEASES HX: NO
FAMILY MEMBER DISEASES HX: NO
GA METHOD: NORMAL
GA METHOD: NORMAL
GA: NORMAL WK
IDDM PATIENT QL: NO
INTEGRATED SCN PATIENT-IMP: NORMAL
LMP START DATE: NORMAL
MONOCHORIONIC TWINS: NORMAL
SERVICE CMNT-IMP: NO
SPECIMEN DRAWN SERPL: NORMAL
VALPROIC/CARBAMAZEPINE STATUS: NO
WEIGHT UNITS: NORMAL

## 2019-05-20 ENCOUNTER — APPOINTMENT (OUTPATIENT)
Dept: LAB | Facility: CLINIC | Age: 29
End: 2019-05-20
Payer: COMMERCIAL

## 2019-05-30 ENCOUNTER — HOSPITAL ENCOUNTER (OUTPATIENT)
Dept: ULTRASOUND IMAGING | Facility: CLINIC | Age: 29
Discharge: HOME | End: 2019-05-30
Attending: OBSTETRICS & GYNECOLOGY | Admitting: OBSTETRICS & GYNECOLOGY
Payer: COMMERCIAL

## 2019-05-30 ENCOUNTER — OFFICE VISIT (OUTPATIENT)
Dept: OBGYN | Facility: CLINIC | Age: 29
End: 2019-05-30
Attending: ADVANCED PRACTICE MIDWIFE
Payer: COMMERCIAL

## 2019-05-30 ENCOUNTER — OFFICE VISIT (OUTPATIENT)
Dept: MATERNAL FETAL MEDICINE | Facility: CLINIC | Age: 29
End: 2019-05-30
Attending: OBSTETRICS & GYNECOLOGY
Payer: COMMERCIAL

## 2019-05-30 VITALS
HEART RATE: 78 BPM | SYSTOLIC BLOOD PRESSURE: 126 MMHG | BODY MASS INDEX: 34.29 KG/M2 | DIASTOLIC BLOOD PRESSURE: 77 MMHG | WEIGHT: 218.5 LBS | HEIGHT: 67 IN

## 2019-05-30 DIAGNOSIS — O09.299 PREVIOUS PREGNANCY WITH CONGENITAL HEART DEFECT, CURRENTLY PREGNANT: ICD-10-CM

## 2019-05-30 DIAGNOSIS — O09.299 PREVIOUS PREGNANCY WITH CONGENITAL HEART DEFECT, CURRENTLY PREGNANT: Primary | ICD-10-CM

## 2019-05-30 DIAGNOSIS — Z34.82 ENCOUNTER FOR SUPERVISION OF OTHER NORMAL PREGNANCY IN SECOND TRIMESTER: Primary | ICD-10-CM

## 2019-05-30 PROCEDURE — 76825 ECHO EXAM OF FETAL HEART: CPT

## 2019-05-30 PROCEDURE — G0463 HOSPITAL OUTPT CLINIC VISIT: HCPCS | Mod: ZF

## 2019-05-30 ASSESSMENT — MIFFLIN-ST. JEOR: SCORE: 1735.8

## 2019-05-30 NOTE — PROGRESS NOTES
"Subjective:      28 year old  at 23w0d presents for a routine prenatal appointment.         Denies cramping/contractions, vaginal bleeding, discharge or leakage of fluid. Reports +fetal movement.  No HA, vision changes, ruq/epigastric pain.       Patient concerns: Feeling well overall. Had MFM ultrasound and fetal echo today. Per pt all WNL. Has been taking keflex daily for UTI suppression- not having any symptoms.     Objective:  Vitals:    19 1545   BP: 126/77   BP Location: Right arm   Patient Position: Chair   Pulse: 78   Weight: 99.1 kg (218 lb 8 oz)   Height: 1.689 m (5' 6.5\")       See OB flowsheet    Assessment/Plan     Encounter Diagnosis   Name Primary?     Encounter for supervision of other normal pregnancy in second trimester Yes     Orders Placed This Encounter   Procedures     Glucose 1 Hour     25- OH-Vitamin D     Treponema Abs w Reflex to RPR and Titer     CBC with Platelets       - Reviewed total weight gain, encouraged continued healthy diet and exercise.      - Reviewed why/how to contact provider.      Patient education/orders or handouts today:  PTL signs/symptoms, fetal movement and Plan for EOB visit w labs    MFM ultrasound and fetal echo today. No further unit(s)/s recommended.   Pt desires to complete EOB labs at her work- future orders placed for 1 hour glucose, cbc with plts, anti-trep and vitamin D. Reviewed timing of labs between 24-29 weeks.   Plan EOB eduction visit in approx 4 weeks.      Continue scheduled prenatal care, RTC in 4 weeks and prn if questions or concerns.      SUSSY Arreola, CNM   "

## 2019-05-30 NOTE — LETTER
"2019       RE: Chhaya Thurman  [address] [address]     Dear Colleague,    Thank you for referring your patient, Chhaya Thurman, to the [facility] at [facility]. Please see a copy of my visit note below.    Subjective:      28 year old  at 23w0d presents for a routine prenatal appointment.         Denies cramping/contractions, vaginal bleeding, discharge or leakage of fluid. Reports +fetal movement.  No HA, vision changes, ruq/epigastric pain.       Patient concerns: Feeling well overall. Had MFM ultrasound and fetal echo today. Per pt all WNL. Has been taking keflex daily for UTI suppression- not having any symptoms.     Objective:  Vitals:    19 1545   BP: 126/77   BP Location: Right arm   Patient Position: Chair   Pulse: 78   Weight: 99.1 kg (218 lb 8 oz)   Height: 1.689 m (5' 6.5\")       See OB flowsheet    Assessment/Plan     Encounter Diagnosis   Name Primary?     Encounter for supervision of other normal pregnancy in second trimester Yes     Orders Placed This Encounter   Procedures     Glucose 1 Hour     25- OH-Vitamin D     Treponema Abs w Reflex to RPR and Titer     CBC with Platelets       - Reviewed total weight gain, encouraged continued healthy diet and exercise.      - Reviewed why/how to contact provider.      Patient education/orders or handouts today:  PTL signs/symptoms, fetal movement and Plan for EOB visit w labs    MFM ultrasound and fetal echo today. No further unit(s)/s recommended.   Pt desires to complete EOB labs at her work- future orders placed for 1 hour glucose, cbc with plts, anti-trep and vitamin D. Reviewed timing of labs between 24-29 weeks.   Plan EOB eduction visit in approx 4 weeks.      Continue scheduled prenatal care, RTC in 4 weeks and prn if questions or concerns.      SUSSY Arreola, SANDRA     "

## 2019-06-18 DIAGNOSIS — Z34.82 ENCOUNTER FOR SUPERVISION OF OTHER NORMAL PREGNANCY IN SECOND TRIMESTER: ICD-10-CM

## 2019-06-18 LAB
DEPRECATED CALCIDIOL+CALCIFEROL SERPL-MC: 47 UG/L (ref 20–75)
ERYTHROCYTE [DISTWIDTH] IN BLOOD BY AUTOMATED COUNT: 12 % (ref 10–15)
GLUCOSE 1H P 50 G GLC PO SERPL-MCNC: 94 MG/DL (ref 60–129)
HCT VFR BLD AUTO: 36.6 % (ref 35–47)
HGB BLD-MCNC: 12.4 G/DL (ref 11.7–15.7)
MCH RBC QN AUTO: 32 PG (ref 26.5–33)
MCHC RBC AUTO-ENTMCNC: 33.9 G/DL (ref 31.5–36.5)
MCV RBC AUTO: 94 FL (ref 78–100)
PLATELET # BLD AUTO: 204 10E9/L (ref 150–450)
RBC # BLD AUTO: 3.88 10E12/L (ref 3.8–5.2)
WBC # BLD AUTO: 10.5 10E9/L (ref 4–11)

## 2019-06-18 PROCEDURE — 82950 GLUCOSE TEST: CPT | Performed by: ADVANCED PRACTICE MIDWIFE

## 2019-06-18 PROCEDURE — 82306 VITAMIN D 25 HYDROXY: CPT | Performed by: ADVANCED PRACTICE MIDWIFE

## 2019-06-18 PROCEDURE — 85027 COMPLETE CBC AUTOMATED: CPT | Performed by: ADVANCED PRACTICE MIDWIFE

## 2019-06-18 PROCEDURE — 36415 COLL VENOUS BLD VENIPUNCTURE: CPT | Performed by: ADVANCED PRACTICE MIDWIFE

## 2019-06-18 PROCEDURE — 86780 TREPONEMA PALLIDUM: CPT | Performed by: ADVANCED PRACTICE MIDWIFE

## 2019-06-19 LAB — T PALLIDUM AB SER QL: NONREACTIVE

## 2019-06-24 ENCOUNTER — OFFICE VISIT (OUTPATIENT)
Dept: OBGYN | Facility: CLINIC | Age: 29
End: 2019-06-24
Attending: MIDWIFE
Payer: COMMERCIAL

## 2019-06-24 VITALS
HEIGHT: 67 IN | WEIGHT: 227 LBS | BODY MASS INDEX: 35.63 KG/M2 | HEART RATE: 83 BPM | DIASTOLIC BLOOD PRESSURE: 76 MMHG | SYSTOLIC BLOOD PRESSURE: 127 MMHG

## 2019-06-24 DIAGNOSIS — Z34.82 ENCOUNTER FOR SUPERVISION OF OTHER NORMAL PREGNANCY IN SECOND TRIMESTER: Primary | ICD-10-CM

## 2019-06-24 PROCEDURE — G0463 HOSPITAL OUTPT CLINIC VISIT: HCPCS | Mod: ZF

## 2019-06-24 ASSESSMENT — MIFFLIN-ST. JEOR: SCORE: 1774.36

## 2019-06-24 ASSESSMENT — PAIN SCALES - GENERAL: PAINLEVEL: NO PAIN (0)

## 2019-06-24 NOTE — LETTER
2019       RE: Chhaya Thurman  [address] [address]     Dear Colleague,    Thank you for referring your patient, Chhaya Thurman, to the [facility] at [facility]. Please see a copy of my visit note below.     28 year old, , 26w5d,   The patient presents with the following concerns: here for EOB   PHQ-9 SCORE 2018   PHQ-9 Total Score 0 1     Education completed today includes breast feeding, [facility] hand out , contraception, counting movements, signs of pre-term labor, post partum depression, GBS, getting enough iron and labor induction.  Birth preferences reviewed: Medicated  Labor support:     [place] Feeding plans :    Contraception planned:  None undecided   Blood type:   ABO   Date Value Ref Range Status   2019 O  Final     RH(D)   Date Value Ref Range Status   2019 Pos  Final     Antibody Screen   Date Value Ref Range Status   2019 Neg  Final   , Rhogam  was notgiven.  TDAP  was notgiven.  A/P:Supervision of other high risk pregnancy   Continue scheduled prenatal care  SUSSY Daly CNM

## 2019-06-25 NOTE — PROGRESS NOTES
28 year old, , 26w5d,   The patient presents with the following concerns: here for EOB   PHQ-9 SCORE 2018   PHQ-9 Total Score 0 1     Education completed today includes breast feeding, [facility] hand out , contraception, counting movements, signs of pre-term labor, post partum depression, GBS, getting enough iron and labor induction.  Birth preferences reviewed: Medicated  Labor support:     [place] Feeding plans :    Contraception planned:  None undecided   Blood type:   ABO   Date Value Ref Range Status   2019 O  Final     RH(D)   Date Value Ref Range Status   2019 Pos  Final     Antibody Screen   Date Value Ref Range Status   2019 Neg  Final   , Rhogam  was notgiven.  TDAP  was notgiven.  A/P:Supervision of other high risk pregnancy   Continue scheduled prenatal care  SUSSY Daly CNM

## 2019-07-09 ENCOUNTER — TELEPHONE (OUTPATIENT)
Dept: OBGYN | Facility: CLINIC | Age: 29
End: 2019-07-09

## 2019-07-09 DIAGNOSIS — R39.89 POSSIBLE URINARY TRACT INFECTION: Primary | ICD-10-CM

## 2019-07-09 DIAGNOSIS — R39.89 POSSIBLE URINARY TRACT INFECTION: ICD-10-CM

## 2019-07-09 LAB
ALBUMIN UR-MCNC: NEGATIVE MG/DL
APPEARANCE UR: CLEAR
BACTERIA #/AREA URNS HPF: ABNORMAL /HPF
BILIRUB UR QL STRIP: NEGATIVE
COLOR UR AUTO: YELLOW
GLUCOSE UR STRIP-MCNC: NEGATIVE MG/DL
HGB UR QL STRIP: NEGATIVE
KETONES UR STRIP-MCNC: 5 MG/DL
LEUKOCYTE ESTERASE UR QL STRIP: NEGATIVE
MUCOUS THREADS #/AREA URNS LPF: PRESENT /LPF
NITRATE UR QL: NEGATIVE
PH UR STRIP: 6 PH (ref 5–7)
RBC #/AREA URNS AUTO: 2 /HPF (ref 0–2)
SOURCE: ABNORMAL
SP GR UR STRIP: 1.01 (ref 1–1.03)
SQUAMOUS #/AREA URNS AUTO: 1 /HPF (ref 0–1)
UROBILINOGEN UR STRIP-MCNC: 0 MG/DL (ref 0–2)
WBC #/AREA URNS AUTO: 1 /HPF (ref 0–5)

## 2019-07-09 PROCEDURE — 81001 URINALYSIS AUTO W/SCOPE: CPT | Performed by: ADVANCED PRACTICE MIDWIFE

## 2019-07-09 PROCEDURE — 87086 URINE CULTURE/COLONY COUNT: CPT | Performed by: ADVANCED PRACTICE MIDWIFE

## 2019-07-09 NOTE — TELEPHONE ENCOUNTER
Leena  at 28 5/7 wks GA calling with concerns of feeling like she has an UTI. She is on and  taking keflex 500 mg daily to prevent UTI's. She had 5 uti's prior to getting pregnant. Last week she felt nauseated a couple days, which resolved, now feeling exhausted more than normal, chills,night sweats, and urethra vaginal itching.    Discussed with Davina Chand CNM and will have Leena get ua/uc done today. Leena works at [employer]. And have her come to clinic tomorrow.    Called Leena back with plan, Pt indicated understanding and agreed with plan.

## 2019-07-10 ENCOUNTER — OFFICE VISIT (OUTPATIENT)
Dept: OBGYN | Facility: CLINIC | Age: 29
End: 2019-07-10
Attending: ADVANCED PRACTICE MIDWIFE
Payer: COMMERCIAL

## 2019-07-10 VITALS
HEART RATE: 83 BPM | BODY MASS INDEX: 36.38 KG/M2 | WEIGHT: 231.8 LBS | HEIGHT: 67 IN | SYSTOLIC BLOOD PRESSURE: 123 MMHG | DIASTOLIC BLOOD PRESSURE: 76 MMHG

## 2019-07-10 DIAGNOSIS — O26.03 EXCESSIVE WEIGHT GAIN DURING PREGNANCY IN THIRD TRIMESTER: ICD-10-CM

## 2019-07-10 DIAGNOSIS — Z34.81 ENCOUNTER FOR SUPERVISION OF OTHER NORMAL PREGNANCY IN FIRST TRIMESTER: Primary | ICD-10-CM

## 2019-07-10 LAB
BACTERIA SPEC CULT: NO GROWTH
Lab: NORMAL
SPECIMEN SOURCE: NORMAL

## 2019-07-10 PROCEDURE — 90472 IMMUNIZATION ADMIN EACH ADD: CPT | Mod: ZF

## 2019-07-10 PROCEDURE — G0463 HOSPITAL OUTPT CLINIC VISIT: HCPCS | Mod: ZF

## 2019-07-10 PROCEDURE — 90715 TDAP VACCINE 7 YRS/> IM: CPT | Mod: ZF

## 2019-07-10 PROCEDURE — 90471 IMMUNIZATION ADMIN: CPT

## 2019-07-10 PROCEDURE — 25000128 H RX IP 250 OP 636: Mod: ZF

## 2019-07-10 ASSESSMENT — ANXIETY QUESTIONNAIRES
1. FEELING NERVOUS, ANXIOUS, OR ON EDGE: SEVERAL DAYS
3. WORRYING TOO MUCH ABOUT DIFFERENT THINGS: NOT AT ALL
7. FEELING AFRAID AS IF SOMETHING AWFUL MIGHT HAPPEN: SEVERAL DAYS
5. BEING SO RESTLESS THAT IT IS HARD TO SIT STILL: NOT AT ALL
GAD7 TOTAL SCORE: 2
6. BECOMING EASILY ANNOYED OR IRRITABLE: NOT AT ALL
2. NOT BEING ABLE TO STOP OR CONTROL WORRYING: NOT AT ALL

## 2019-07-10 ASSESSMENT — PATIENT HEALTH QUESTIONNAIRE - PHQ9: 5. POOR APPETITE OR OVEREATING: NOT AT ALL

## 2019-07-10 ASSESSMENT — MIFFLIN-ST. JEOR: SCORE: 1796.13

## 2019-07-10 NOTE — PROGRESS NOTES
"Subjective:      28 year old  at 28w5d presentst for a routine prenatal appointment.    *** vaginal bleeding or leakage of fluid.  *** contractions. *** fetal movement.       No HA, visual changes, RUQ or epigastric pain.   Patient concerns: ***   Feeling well overall.  Reviewed EOB labs with patient.  Reviewed TDAP {[facility] TDAP:[id number]}     Objective:  There were no vitals filed for this visit., see ob flowsheet  Assessment/Plan   No diagnosis found.  No orders of the defined types were placed in this encounter.    No orders of the defined types were placed in this encounter.    ABO   Date Value Ref Range Status   2019 O  Final     RH(D)   Date Value Ref Range Status   2019 Pos  Final     Antibody Screen   Date Value Ref Range Status   2019 Neg  Final   , Rhogam  {WAS/WAS NOT:9033::\"was\"}given.    - Reviewed total weight gain, encouraged continued healthy diet and exercise.  .  Reviewed importance of daily fetal kick count and why/how to contact provider.    - Reviewed why/how to contact provider if headache/visual changes/RUQ or epigastric pain, decreased fetal movement, vaginal bleeding, leakage of fluid or more than 4 contractions in an hour.     Patient education/orders or handouts today:  {PtEd/Orders/Handouts 29-34 Wks:[id number]}  Reviewed GBS screening at 35-36 wks.    Return to clinic in *** weeks and prn if questions or concerns.     SUSSY Hui CNM      "

## 2019-07-10 NOTE — LETTER
"7/10/2019       RE: Chhaya Thurman  [address] [address]     Dear Colleague,    Thank you for referring your patient, Chhaya Thurman, to the [facility] at [facility]. Please see a copy of my visit note below.    Subjective:      28 year old  at 28w6d presents for a routine prenatal appointment.   Denies vaginal bleeding or leakage of fluid.  Occ BH contractions. + fetal movement.       No HA, visual changes, RUQ or epigastric pain.   Patient concerns: feeling well overall.   - Occasional feelings of nausea, hot flashes- similar to symptoms last time she had a UTI.  Denies abnormal vaginal discharge, irritation, dysuria or increased urinary frequency/urgency.  Reports intermittent, mild vaginal itching. Reviewed preliminary urine culture results. Currently on suppressive antibiotics for UTIs.   Reviewed EOB labs with patient.  Reviewed TDAP Consents today     Objective:  Vitals:    07/10/19 1455   BP: 123/76   BP Location: Left arm   Patient Position: Chair   Pulse: 83   Weight: 105.1 kg (231 lb 12.8 oz)   Height: 1.689 m (5' 6.5\")   See ob flowsheet    Assessment/Plan:   Encounter Diagnosis   Name Primary?     Encounter for supervision of other normal pregnancy in first trimester Yes     - Reviewed total weight gain of 37 lbs, discussed mindful choices when eating and increased physical activity.   - Reviewed importance of daily fetal kick count and why/how to contact provider.  - Reviewed why/how to contact provider if headache/visual changes/RUQ or epigastric pain, decreased fetal movement, vaginal bleeding, leakage of fluid or more than 4 contractions in an hour.  - Patient education/orders or handouts today: PTL signs/symptoms and TDAP  - Tdap given today  - Return to clinic in 2-3 weeks and prn if questions or concerns.     Again, thank you for allowing me to participate in the care of your patient.  "     Sincerely,    SUSSY LezamaM

## 2019-07-10 NOTE — PROGRESS NOTES
"Subjective:      28 year old  at 28w6d presents for a routine prenatal appointment.   Denies vaginal bleeding or leakage of fluid.  Occ BH contractions. + fetal movement.       No HA, visual changes, RUQ or epigastric pain.   Patient concerns: feeling well overall.   - Occasional feelings of nausea, hot flashes- similar to symptoms last time she had a UTI.  Denies abnormal vaginal discharge, irritation, dysuria or increased urinary frequency/urgency.  Reports intermittent, mild vaginal itching. Reviewed preliminary urine culture results. Currently on suppressive antibiotics for UTIs.   Reviewed EOB labs with patient.  Reviewed TDAP Consents today     Objective:  Vitals:    07/10/19 1455   BP: 123/76   BP Location: Left arm   Patient Position: Chair   Pulse: 83   Weight: 105.1 kg (231 lb 12.8 oz)   Height: 1.689 m (5' 6.5\")   See ob flowsheet    Assessment/Plan:   Encounter Diagnosis   Name Primary?     Encounter for supervision of other normal pregnancy in first trimester Yes     - Reviewed total weight gain of 37 lbs, discussed mindful choices when eating and increased physical activity.   - Reviewed importance of daily fetal kick count and why/how to contact provider.  - Reviewed why/how to contact provider if headache/visual changes/RUQ or epigastric pain, decreased fetal movement, vaginal bleeding, leakage of fluid or more than 4 contractions in an hour.  - Patient education/orders or handouts today: PTL signs/symptoms and TDAP  - Tdap given today  - Return to clinic in 2-3 weeks and prn if questions or concerns.   "

## 2019-07-11 ASSESSMENT — ANXIETY QUESTIONNAIRES: GAD7 TOTAL SCORE: 2

## 2019-07-24 ENCOUNTER — OFFICE VISIT (OUTPATIENT)
Dept: OBGYN | Facility: CLINIC | Age: 29
End: 2019-07-24
Attending: ADVANCED PRACTICE MIDWIFE
Payer: COMMERCIAL

## 2019-07-24 VITALS
HEIGHT: 66 IN | WEIGHT: 235 LBS | HEART RATE: 81 BPM | DIASTOLIC BLOOD PRESSURE: 69 MMHG | SYSTOLIC BLOOD PRESSURE: 109 MMHG | BODY MASS INDEX: 37.77 KG/M2

## 2019-07-24 DIAGNOSIS — Z34.83 ENCOUNTER FOR SUPERVISION OF OTHER NORMAL PREGNANCY IN THIRD TRIMESTER: Primary | ICD-10-CM

## 2019-07-24 ASSESSMENT — PAIN SCALES - GENERAL: PAINLEVEL: NO PAIN (0)

## 2019-07-24 ASSESSMENT — MIFFLIN-ST. JEOR: SCORE: 1810.57

## 2019-07-24 NOTE — LETTER
"2019       RE: Chhaya Thurman  [address] [address]     Dear Colleague,    Thank you for referring your patient, Chhaya Thurman, to the [facility] at [facility]. Please see a copy of my visit note below.    Subjective:      28 year old  at 30w6d presentst for a routine prenatal appointment.     Denies cramping/contractions, vaginal bleeding, discharge or leakage of fluid. Reports +fetal movement.  No HA, vision changes, ruq/epigastric pain.      Patient concerns: Feeling well overall. Having more pubic symphysis pain- mostly when changing position in bed at night. Had this with her first pregnancy- notes there wasn't really anything that helped. Reviewed options of maternity belt prescription and/or physical therapy referral. Pt declined.     Had noticed a varicose vein on her right upper thigh. Not painful. No isolated warm, red, tender spot.     Still taking UTI suppressive medication. Has not had any symptoms. Last UC negative on 19.     Reviewed fetal echocardiogram from 2019- recommendation for post- echo.     Objective:  Vitals:    19 1035   BP: 109/69   Pulse: 81   Weight: 106.6 kg (235 lb)   Height: 1.689 m (5' 6.5\")     See ob flowsheet    Assessment/Plan     Encounter Diagnosis   Name Primary?     Encounter for supervision of other normal pregnancy in third trimester Yes     - Reviewed total weight gain, encouraged continued healthy diet and exercise.  .  Reviewed importance of daily fetal kick count and why/how to contact provider.    - Reviewed why/how to contact provider if headache/visual changes/RUQ or epigastric pain, decreased fetal movement, vaginal bleeding, leakage of fluid or more than 4 contractions in an hour.     Patient education/orders or handouts today:  PTL signs/symptoms    - Reviewed EOB labs.  Decrease vitamin D supplementation from 5000 international unit(s)/day to " 2000 international unit(s)/day.  - Reviewed relief measures for pubic symphysis pain. Declines maternity belt prescription or PT referral at this time.  - Reviewed relief measures for varicose veins, including warm packs, elevation and compression. Reviewed s/s to notify provider.  - Continue UTI suppressive medication. Last UC 7/9. Repeat at future visit.     Continue scheduled prenatal care, RTC in 2-3 weeks and prn if questions or concerns.      Oneyda Radford, SUSSY, CNM

## 2019-07-24 NOTE — PROGRESS NOTES
"Subjective:      28 year old  at 30w6d presentst for a routine prenatal appointment.     Denies cramping/contractions, vaginal bleeding, discharge or leakage of fluid. Reports +fetal movement.  No HA, vision changes, ruq/epigastric pain.      Patient concerns: Feeling well overall. Having more pubic symphysis pain- mostly when changing position in bed at night. Had this with her first pregnancy- notes there wasn't really anything that helped. Reviewed options of maternity belt prescription and/or physical therapy referral. Pt declined.     Had noticed a varicose vein on her right upper thigh. Not painful. No isolated warm, red, tender spot.     Still taking UTI suppressive medication. Has not had any symptoms. Last UC negative on 19.     Reviewed fetal echocardiogram from 2019- recommendation for post- echo.     Objective:  Vitals:    19 1035   BP: 109/69   Pulse: 81   Weight: 106.6 kg (235 lb)   Height: 1.689 m (5' 6.5\")     See ob flowsheet    Assessment/Plan     Encounter Diagnosis   Name Primary?     Encounter for supervision of other normal pregnancy in third trimester Yes     - Reviewed total weight gain, encouraged continued healthy diet and exercise.  .  Reviewed importance of daily fetal kick count and why/how to contact provider.    - Reviewed why/how to contact provider if headache/visual changes/RUQ or epigastric pain, decreased fetal movement, vaginal bleeding, leakage of fluid or more than 4 contractions in an hour.     Patient education/orders or handouts today:  PTL signs/symptoms    - Reviewed EOB labs.  Decrease vitamin D supplementation from 5000 international unit(s)/day to 2000 international unit(s)/day.  - Reviewed relief measures for pubic symphysis pain. Declines maternity belt prescription or PT referral at this time.  - Reviewed relief measures for varicose veins, including warm packs, elevation and compression. Reviewed s/s to notify provider.  - Continue UTI " suppressive medication. Last UC 7/9. Repeat at future visit.     Continue scheduled prenatal care, RTC in 2-3 weeks and prn if questions or concerns.      SUSSY Arreola, LEONIDESM

## 2019-08-14 ENCOUNTER — ANCILLARY PROCEDURE (OUTPATIENT)
Dept: ULTRASOUND IMAGING | Facility: CLINIC | Age: 29
End: 2019-08-14
Attending: ADVANCED PRACTICE MIDWIFE
Payer: COMMERCIAL

## 2019-08-14 ENCOUNTER — OFFICE VISIT (OUTPATIENT)
Dept: OBGYN | Facility: CLINIC | Age: 29
End: 2019-08-14
Attending: ADVANCED PRACTICE MIDWIFE
Payer: COMMERCIAL

## 2019-08-14 VITALS
DIASTOLIC BLOOD PRESSURE: 79 MMHG | SYSTOLIC BLOOD PRESSURE: 122 MMHG | HEART RATE: 82 BPM | HEIGHT: 66 IN | BODY MASS INDEX: 38.68 KG/M2 | WEIGHT: 240.7 LBS

## 2019-08-14 DIAGNOSIS — O36.63X1 LGA (LARGE FOR GESTATIONAL AGE) FETUS AFFECTING MANAGEMENT OF MOTHER, THIRD TRIMESTER, FETUS 1: ICD-10-CM

## 2019-08-14 DIAGNOSIS — Z34.81 ENCOUNTER FOR SUPERVISION OF OTHER NORMAL PREGNANCY IN FIRST TRIMESTER: Primary | ICD-10-CM

## 2019-08-14 DIAGNOSIS — N39.0 URINARY TRACT INFECTION WITHOUT HEMATURIA, SITE UNSPECIFIED: ICD-10-CM

## 2019-08-14 DIAGNOSIS — Z82.79 FAMILY HISTORY OF DOWN SYNDROME: ICD-10-CM

## 2019-08-14 DIAGNOSIS — O26.03 EXCESSIVE WEIGHT GAIN DURING PREGNANCY IN THIRD TRIMESTER: ICD-10-CM

## 2019-08-14 LAB
ALBUMIN UR-MCNC: NEGATIVE MG/DL
ALBUMIN UR-MCNC: NEGATIVE MG/DL
APPEARANCE UR: CLEAR
APPEARANCE UR: CLEAR
BILIRUB UR QL STRIP: NEGATIVE
BILIRUB UR QL STRIP: NEGATIVE
COLOR UR AUTO: ABNORMAL
COLOR UR AUTO: YELLOW
GLUCOSE UR STRIP-MCNC: NEGATIVE MG/DL
GLUCOSE UR STRIP-MCNC: NEGATIVE MG/DL
HGB UR QL STRIP: NEGATIVE
HGB UR QL STRIP: NEGATIVE
KETONES UR STRIP-MCNC: NEGATIVE MG/DL
KETONES UR STRIP-MCNC: NEGATIVE MG/DL
LEUKOCYTE ESTERASE UR QL STRIP: NEGATIVE
LEUKOCYTE ESTERASE UR QL STRIP: NEGATIVE
MUCOUS THREADS #/AREA URNS LPF: PRESENT /LPF
NITRATE UR QL: NEGATIVE
NITRATE UR QL: NEGATIVE
PH UR STRIP: 8 PH (ref 5–7)
PH UR STRIP: 8.5 PH (ref 5–7)
RBC #/AREA URNS AUTO: <1 /HPF (ref 0–2)
SOURCE: ABNORMAL
SP GR UR STRIP: 1.01 (ref 1–1.03)
SP GR UR STRIP: 1.01 (ref 1–1.03)
SQUAMOUS #/AREA URNS AUTO: 1 /HPF (ref 0–1)
UROBILINOGEN UR STRIP-ACNC: 0.2 EU/DL (ref 0.2–1)
UROBILINOGEN UR STRIP-MCNC: NORMAL MG/DL (ref 0–2)
WBC #/AREA URNS AUTO: <1 /HPF (ref 0–5)

## 2019-08-14 PROCEDURE — 81001 URINALYSIS AUTO W/SCOPE: CPT | Performed by: ADVANCED PRACTICE MIDWIFE

## 2019-08-14 PROCEDURE — 87086 URINE CULTURE/COLONY COUNT: CPT | Performed by: ADVANCED PRACTICE MIDWIFE

## 2019-08-14 PROCEDURE — G0463 HOSPITAL OUTPT CLINIC VISIT: HCPCS | Mod: ZF

## 2019-08-14 PROCEDURE — 76816 OB US FOLLOW-UP PER FETUS: CPT

## 2019-08-14 PROCEDURE — 81003 URINALYSIS AUTO W/O SCOPE: CPT

## 2019-08-14 ASSESSMENT — PAIN SCALES - GENERAL: PAINLEVEL: NO PAIN (0)

## 2019-08-14 ASSESSMENT — MIFFLIN-ST. JEOR: SCORE: 1836.43

## 2019-08-14 NOTE — PROGRESS NOTES
"Subjective:      28 year old  at 33w6d presentst for a routine prenatal appointment.    no vaginal bleeding or leakage of fluid.  Some mild BH contractions, only one today, sometimes more on the treadmill. pos fetal movement.       No HA, visual changes, RUQ or epigastric pain.   Patient concerns:    Feeling well overall. Here w mom  Reviewed EOB labs with patient.  Reviewed TDAP Previously given  Objective:  Vitals:    19 1105   BP: 122/79   Pulse: 82   Weight: 109.2 kg (240 lb 11.2 oz)   Height: 1.689 m (5' 6.5\")   , see ob flowsheet  Assessment/Plan     Encounter Diagnoses   Name Primary?     Encounter for supervision of other normal pregnancy in first trimester Yes     Family history of Down syndrome      Urinary tract infection without hematuria, site unspecified      Excessive weight gain during pregnancy in third trimester      LGA (large for gestational age) fetus affecting management of mother, third trimester, fetus 1      Orders Placed This Encounter   Procedures     US OB >14 Weeks Follow Up     Urinalysis - No culture (Collected in Clinic)     No orders of the defined types were placed in this encounter.    ABO   Date Value Ref Range Status   2019 O  Final     RH(D)   Date Value Ref Range Status   2019 Pos  Final     Antibody Screen   Date Value Ref Range Status   2019 Neg  Final   , Rhogam  was notgiven.    - Reviewed total weight gain, encouraged continued healthy diet and exercise.  .  Reviewed importance of daily fetal kick count and why/how to contact provider.    - Reviewed why/how to contact provider if headache/visual changes/RUQ or epigastric pain, decreased fetal movement, vaginal bleeding, leakage of fluid or more than 4 contractions in an hour.     Patient education/orders or handouts today:  PTL signs/symptoms  Reviewed GBS screening at 35-36 wks.    Return to clinic in 1 weeks and prn if questions or concerns. Plan repeat US in 4 wks    Lindsay Lackey, APRN " CNM

## 2019-08-14 NOTE — LETTER
"2019       RE: Chhaya Thurman  [address] [facility] [address]     Dear Colleague,    Thank you for referring your patient, Chhaya Thurman, to the [facility] at [facility]. Please see a copy of my visit note below.    Subjective:      28 year old  at 33w6d presentst for a routine prenatal appointment.   no vaginal bleeding or leakage of fluid.  Some mild BH contractions, only one today, sometimes more on the treadmill. pos fetal movement.      No HA, visual changes, RUQ or epigastric pain.   Patient concerns:    Feeling well overall. Here w mom  Reviewed EOB labs with patient.  Reviewed TDAP Previously given  Objective:  Vitals:    19 1105   BP: 122/79   Pulse: 82   Weight: 109.2 kg (240 lb 11.2 oz)   Height: 1.689 m (5' 6.5\")   see ob flowsheet  Assessment/Plan     Encounter Diagnoses   Name Primary?     Encounter for supervision of other normal pregnancy in first trimester Yes     Family history of Down syndrome      Urinary tract infection without hematuria, site unspecified      Excessive weight gain during pregnancy in third trimester      LGA (large for gestational age) fetus affecting management of mother, third trimester, fetus 1      Orders Placed This Encounter   Procedures     US OB >14 Weeks Follow Up     Urinalysis - No culture (Collected in Clinic)     No orders of the defined types were placed in this encounter.    ABO   Date Value Ref Range Status   2019 O  Final     RH(D)   Date Value Ref Range Status   2019 Pos  Final     Antibody Screen   Date Value Ref Range Status   2019 Neg  Final   Rhogam  was notgiven.    - Reviewed total weight gain, encouraged continued healthy diet and exercise.  .  Reviewed importance of daily fetal kick count and why/how to contact provider.    - Reviewed why/how to contact provider if headache/visual changes/RUQ or epigastric pain, decreased fetal movement, " vaginal bleeding, leakage of fluid or more than 4 contractions in an hour.     Patient education/orders or handouts today:  PTL signs/symptoms  Reviewed GBS screening at 35-36 wks.    Return to clinic in 1 weeks and prn if questions or concerns. Plan repeat US in 4 wks    Lindsay Lackey, APRN SANDRA

## 2019-08-15 LAB
BACTERIA SPEC CULT: NORMAL
Lab: NORMAL
SPECIMEN SOURCE: NORMAL

## 2019-08-21 NOTE — PROGRESS NOTES
Subjective:      28 year old  at 35w0d presents for a routine prenatal appointment.    Denies cramping/contractions, vaginal bleeding, discharge or leakage of fluid. Reports +fetal movement.***  No HA, vision changes, ruq/epigastric pain.      Patient concerns: Feeling well overall. Had growth ultrasound on - LGA with EFW= 2772 grams, 91% for 33 weeks. AC  = 98%. An repeat growth ultrasound was recommended for 4 weeks later.    Objective:  There were no vitals filed for this visit.     See OB flowsheet    PHQ-9 SCORE 2018   PHQ-9 Total Score 0 1       Assessment/Plan     Encounter Diagnosis   Name Primary?     Encounter for supervision of other normal pregnancy in third trimester Yes     No orders of the defined types were placed in this encounter.    No orders of the defined types were placed in this encounter.      Labor signs discussed. Reinforced daily fetal movement counts.  Reviewed why/how to contact provider if headache/visual changes/RUQ or epigastric pain, decreased fetal movement, vaginal bleeding, leakage of fluid.    Reviewed growth ultrasound from 19. Reviewed no indication for change in delivery timing d/t LGA. Discussed  section for EFW >5000gm.   Plan repeat growth ultrasound for week of 19 (4 weeks) per MD recommendations.   Plan GBS and CBC with plts at 36 weeks.    Continue scheduled prenatal care, RTC in 1 week and prn if questions or concerns.      SUSSY Arreola, SANDRA

## 2019-08-22 ENCOUNTER — OFFICE VISIT (OUTPATIENT)
Dept: OBGYN | Facility: CLINIC | Age: 29
End: 2019-08-22
Attending: ADVANCED PRACTICE MIDWIFE
Payer: COMMERCIAL

## 2019-08-22 VITALS — SYSTOLIC BLOOD PRESSURE: 135 MMHG | DIASTOLIC BLOOD PRESSURE: 79 MMHG | BODY MASS INDEX: 38.15 KG/M2 | WEIGHT: 239.9 LBS

## 2019-08-22 DIAGNOSIS — Z34.83 ENCOUNTER FOR SUPERVISION OF OTHER NORMAL PREGNANCY IN THIRD TRIMESTER: Primary | ICD-10-CM

## 2019-08-22 NOTE — LETTER
2019       RE: Chhaya Thurman  [address] [facility] [address]     Dear Colleague,    Thank you for referring your patient, Chhaya Thurman, to the [facility] at [facility]. Please see a copy of my visit note below.       Subjective:      28 year old  at 35w0d presents for a routine prenatal appointment.    Denies cramping/contractions, vaginal bleeding, discharge or leakage of fluid. Reports +fetal movement.  No HA, vision changes, ruq/epigastric pain.      Patient concerns: Feeling well overall. Experiencing more heartburn. A little more pelvic pressure as baby is lower.     Had growth ultrasound on 19. EFW 91%, AC 98%tile= LGA. ANITRA WNL. Cephalic. Recommendation for q 4week growth ultrasounds.     Objective:  Vitals:    19 1235   BP: 135/79   Weight: 108.8 kg (239 lb 14.4 oz)   See OB flowsheet    Assessment/Plan  Encounter Diagnosis   Name Primary?     Encounter for supervision of other normal pregnancy in third trimester Yes     Orders Placed This Encounter   Procedures     US OB >14 Weeks Follow Up     Labor signs discussed. Reinforced daily fetal movement counts.  Reviewed why/how to contact provider if headache/visual changes/RUQ or epigastric pain, decreased fetal movement, vaginal bleeding, leakage of fluid.    Reviewed growth ultrasound from 19- LGA. Discussed no recommendation for early delivery timing d/t EFW. Discussed potential recommendation for  section if EFW >5000gm.   Repeat growth ultrasound week of -  approx four weeks from previous ultrasound.   Plan GBS and CBC with plts at 36 weeks.    Continue scheduled prenatal care, RTC in 1 week and prn if questions or concerns.      Oneyda Radford, APRN, CNM

## 2019-08-22 NOTE — PROGRESS NOTES
Subjective:      28 year old  at 35w0d presents for a routine prenatal appointment.    Denies cramping/contractions, vaginal bleeding, discharge or leakage of fluid. Reports +fetal movement.  No HA, vision changes, ruq/epigastric pain.      Patient concerns: Feeling well overall. Experiencing more heartburn. A little more pelvic pressure as baby is lower.     Had growth ultrasound on 19. EFW 91%, AC 98%tile= LGA. ANITRA WNL. Cephalic. Recommendation for q 4week growth ultrasounds.     Objective:  Vitals:    19 1235   BP: 135/79   Weight: 108.8 kg (239 lb 14.4 oz)      See OB flowsheet    Assessment/Plan     Encounter Diagnosis   Name Primary?     Encounter for supervision of other normal pregnancy in third trimester Yes     Orders Placed This Encounter   Procedures     US OB >14 Weeks Follow Up     Labor signs discussed. Reinforced daily fetal movement counts.  Reviewed why/how to contact provider if headache/visual changes/RUQ or epigastric pain, decreased fetal movement, vaginal bleeding, leakage of fluid.    Reviewed growth ultrasound from 19- LGA. Discussed no recommendation for early delivery timing d/t EFW. Discussed potential recommendation for  section if EFW >5000gm.   Repeat growth ultrasound week of -  approx four weeks from previous ultrasound.   Plan GBS and CBC with plts at 36 weeks.    Continue scheduled prenatal care, RTC in 1 week and prn if questions or concerns.      SUSSY Arreola, SANDRA

## 2019-08-28 ENCOUNTER — OFFICE VISIT (OUTPATIENT)
Dept: OBGYN | Facility: CLINIC | Age: 29
End: 2019-08-28
Attending: ADVANCED PRACTICE MIDWIFE
Payer: COMMERCIAL

## 2019-08-28 VITALS
HEART RATE: 86 BPM | SYSTOLIC BLOOD PRESSURE: 135 MMHG | BODY MASS INDEX: 38.14 KG/M2 | DIASTOLIC BLOOD PRESSURE: 83 MMHG | WEIGHT: 243 LBS | HEIGHT: 67 IN

## 2019-08-28 DIAGNOSIS — O92.70 LACTATION DISORDER: ICD-10-CM

## 2019-08-28 DIAGNOSIS — Z34.83 ENCOUNTER FOR SUPERVISION OF OTHER NORMAL PREGNANCY IN THIRD TRIMESTER: Primary | ICD-10-CM

## 2019-08-28 PROCEDURE — 87653 STREP B DNA AMP PROBE: CPT | Performed by: ADVANCED PRACTICE MIDWIFE

## 2019-08-28 RX ORDER — BREAST PUMP
1 EACH MISCELLANEOUS PRN
Qty: 1 EACH | Refills: 0 | Status: SHIPPED | OUTPATIENT
Start: 2019-08-28 | End: 2020-07-29

## 2019-08-28 RX ORDER — AMOXICILLIN 250 MG
1 CAPSULE ORAL DAILY
Qty: 100 TABLET | Refills: 0 | Status: SHIPPED | OUTPATIENT
Start: 2019-08-28 | End: 2020-07-29

## 2019-08-28 RX ORDER — IBUPROFEN 600 MG/1
600 TABLET, FILM COATED ORAL EVERY 6 HOURS PRN
Qty: 60 TABLET | Refills: 0 | Status: SHIPPED | OUTPATIENT
Start: 2019-08-28 | End: 2020-07-29

## 2019-08-28 RX ORDER — ACETAMINOPHEN 325 MG/1
650 TABLET ORAL EVERY 6 HOURS PRN
Qty: 100 TABLET | Refills: 0 | Status: SHIPPED | OUTPATIENT
Start: 2019-08-28 | End: 2020-07-29

## 2019-08-28 ASSESSMENT — ANXIETY QUESTIONNAIRES
3. WORRYING TOO MUCH ABOUT DIFFERENT THINGS: SEVERAL DAYS
5. BEING SO RESTLESS THAT IT IS HARD TO SIT STILL: SEVERAL DAYS
2. NOT BEING ABLE TO STOP OR CONTROL WORRYING: SEVERAL DAYS
GAD7 TOTAL SCORE: 7
6. BECOMING EASILY ANNOYED OR IRRITABLE: NOT AT ALL
1. FEELING NERVOUS, ANXIOUS, OR ON EDGE: MORE THAN HALF THE DAYS
7. FEELING AFRAID AS IF SOMETHING AWFUL MIGHT HAPPEN: SEVERAL DAYS

## 2019-08-28 ASSESSMENT — PATIENT HEALTH QUESTIONNAIRE - PHQ9
SUM OF ALL RESPONSES TO PHQ QUESTIONS 1-9: 8
5. POOR APPETITE OR OVEREATING: SEVERAL DAYS

## 2019-08-28 ASSESSMENT — PAIN SCALES - GENERAL: PAINLEVEL: NO PAIN (0)

## 2019-08-28 ASSESSMENT — MIFFLIN-ST. JEOR: SCORE: 1846.93

## 2019-08-28 NOTE — PROGRESS NOTES
"Subjective:      28 year old  at 35w6d presents for a routine prenatal appointment.    Denies cramping/contractions, vaginal bleeding, discharge or leakage of fluid. Reports +fetal movement.  No HA, vision changes, ruq/epigastric pain.      Patient concerns: Feeling well overall. Desires breast pump prescription. Had growth ultrasound on 19- EFW 91%tile, AC 98%tile. Recommendation for q4 week growth us.     Objective:  Vitals:    19 1605   BP: 135/83   Pulse: 86   Weight: 110.2 kg (243 lb)   Height: 1.689 m (5' 6.5\")        See OB flowsheet    Assessment/Plan     Encounter Diagnosis   Name Primary?     Encounter for supervision of other normal pregnancy in third trimester Yes     Orders Placed This Encounter   Procedures     US OB >14 Weeks Follow Up     CBC with platelets     Orders Placed This Encounter   Medications     acetaminophen (TYLENOL) 325 MG tablet     Sig: Take 2 tablets (650 mg) by mouth every 6 hours as needed for mild pain Start after Delivery.     Dispense:  100 tablet     Refill:  0     ibuprofen (ADVIL/MOTRIN) 600 MG tablet     Sig: Take 1 tablet (600 mg) by mouth every 6 hours as needed for moderate pain Start after delivery     Dispense:  60 tablet     Refill:  0     senna-docusate (SENOKOT-S/PERICOLACE) 8.6-50 MG tablet     Sig: Take 1 tablet by mouth daily Start after delivery.     Dispense:  100 tablet     Refill:  0     Misc. Devices (BREAST PUMP) MISC     Si each as needed (as needed)     Dispense:  1 each     Refill:  0     Labor signs discussed. Reinforced daily fetal movement counts.  Reviewed why/how to contact provider if headache/visual changes/RUQ or epigastric pain, decreased fetal movement, vaginal bleeding, leakage of fluid.    GBS obtained.  CBC with plts ordered.  Reviewed recommended postpartum OTC medication. Pt desires prescription. Prescription for tylenol, motrin and senna-docusate sent to pt's preferred pharmacy. Reviewed no use of motrin during " pregnancy.    Next growth ultrasound around 9/11/19.     Continue scheduled prenatal care, RTC in 1 week for CARLYLE and prn if questions or concerns.      Oneyda Radford, APRN, CNM

## 2019-08-28 NOTE — LETTER
"2019       RE: Chhaya Thurman  [address] [facility] [address]     Dear Colleague,    Thank you for referring your patient, Chhaya Thurman, [facility] Please see a copy of my visit note below.    Subjective:      28 year old  at 35w6d presents for a routine prenatal appointment.    Denies cramping/contractions, vaginal bleeding, discharge or leakage of fluid. Reports +fetal movement.  No HA, vision changes, ruq/epigastric pain.      Patient concerns: Feeling well overall. Desires breast pump prescription. Had growth ultrasound on 19- EFW 91%tile, AC 98%tile. Recommendation for q4 week growth us.     Objective:  Vitals:    19 1605   BP: 135/83   Pulse: 86   Weight: 110.2 kg (243 lb)   Height: 1.689 m (5' 6.5\")     See OB flowsheet    Assessment/Plan  Encounter Diagnosis   Name Primary?     Encounter for supervision of other normal pregnancy in third trimester Yes     Orders Placed This Encounter   Procedures     US OB >14 Weeks Follow Up     CBC with platelets     Orders Placed This Encounter   Medications     acetaminophen (TYLENOL) 325 MG tablet     Sig: Take 2 tablets (650 mg) by mouth every 6 hours as needed for mild pain Start after Delivery.     Dispense:  100 tablet     Refill:  0     ibuprofen (ADVIL/MOTRIN) 600 MG tablet     Sig: Take 1 tablet (600 mg) by mouth every 6 hours as needed for moderate pain Start after delivery     Dispense:  60 tablet     Refill:  0     senna-docusate (SENOKOT-S/PERICOLACE) 8.6-50 MG tablet     Sig: Take 1 tablet by mouth daily Start after delivery.     Dispense:  100 tablet     Refill:  0     Misc. Devices (BREAST PUMP) MISC     Si each as needed (as needed)     Dispense:  1 each     Refill:  0     Labor signs discussed. Reinforced daily fetal movement counts.  Reviewed why/how to contact provider if headache/visual changes/RUQ or epigastric pain, " decreased fetal movement, vaginal bleeding, leakage of fluid.    GBS obtained.  CBC with plts ordered.  Reviewed recommended postpartum OTC medication. Pt desires prescription. Prescription for tylenol, motrin and senna-docusate sent to pt's preferred pharmacy. Reviewed no use of motrin during pregnancy.    Next growth ultrasound around 9/11/19.     Continue scheduled prenatal care, RTC in 1 week for CARLYLE and prn if questions or concerns.      Oneyda Radford, SUSSY, CNM

## 2019-08-29 LAB
GP B STREP DNA SPEC QL NAA+PROBE: NEGATIVE
SPECIMEN SOURCE: NORMAL

## 2019-08-29 ASSESSMENT — ANXIETY QUESTIONNAIRES: GAD7 TOTAL SCORE: 7

## 2019-08-31 ENCOUNTER — HOSPITAL ENCOUNTER (OUTPATIENT)
Facility: CLINIC | Age: 29
Setting detail: OBSERVATION
Discharge: HOME | End: 2019-09-01
Attending: ADVANCED PRACTICE MIDWIFE | Admitting: ADVANCED PRACTICE MIDWIFE
Payer: COMMERCIAL

## 2019-08-31 PROBLEM — O47.00 PRETERM CONTRACTIONS: Status: ACTIVE | Noted: 2019-08-31

## 2019-08-31 PROBLEM — Z36.89 ENCOUNTER FOR TRIAGE IN PREGNANT PATIENT: Status: ACTIVE | Noted: 2019-08-31

## 2019-08-31 LAB
ALBUMIN UR-MCNC: NEGATIVE MG/DL
AMPHETAMINES UR QL SCN: NEGATIVE
APPEARANCE UR: CLEAR
BACTERIA #/AREA URNS HPF: ABNORMAL /HPF
BILIRUB UR QL STRIP: NEGATIVE
CANNABINOIDS UR QL: NEGATIVE
COCAINE UR QL: NEGATIVE
COLOR UR AUTO: ABNORMAL
GLUCOSE UR STRIP-MCNC: NEGATIVE MG/DL
HGB UR QL STRIP: NEGATIVE
KETONES UR STRIP-MCNC: NEGATIVE MG/DL
LEUKOCYTE ESTERASE UR QL STRIP: NEGATIVE
MUCOUS THREADS #/AREA URNS LPF: PRESENT /LPF
NITRATE UR QL: NEGATIVE
OPIATES UR QL SCN: NEGATIVE
PCP UR QL SCN: NEGATIVE
PH UR STRIP: 6.5 PH (ref 5–7)
RBC #/AREA URNS AUTO: <1 /HPF (ref 0–2)
SOURCE: ABNORMAL
SP GR UR STRIP: 1 (ref 1–1.03)
SQUAMOUS #/AREA URNS AUTO: 1 /HPF (ref 0–1)
UROBILINOGEN UR STRIP-MCNC: NORMAL MG/DL (ref 0–2)
WBC #/AREA URNS AUTO: 1 /HPF (ref 0–5)

## 2019-08-31 PROCEDURE — 80307 DRUG TEST PRSMV CHEM ANLYZR: CPT | Performed by: ADVANCED PRACTICE MIDWIFE

## 2019-08-31 PROCEDURE — 25800030 ZZH RX IP 258 OP 636

## 2019-08-31 PROCEDURE — 81001 URINALYSIS AUTO W/SCOPE: CPT | Mod: XU | Performed by: ADVANCED PRACTICE MIDWIFE

## 2019-08-31 PROCEDURE — 87086 URINE CULTURE/COLONY COUNT: CPT | Performed by: ADVANCED PRACTICE MIDWIFE

## 2019-08-31 RX ORDER — HYDROXYZINE HYDROCHLORIDE 50 MG/1
50-100 TABLET, FILM COATED ORAL EVERY 6 HOURS PRN
Status: DISCONTINUED | OUTPATIENT
Start: 2019-08-31 | End: 2019-09-01 | Stop reason: HOSPADM

## 2019-08-31 RX ORDER — SODIUM CHLORIDE, SODIUM LACTATE, POTASSIUM CHLORIDE, CALCIUM CHLORIDE 600; 310; 30; 20 MG/100ML; MG/100ML; MG/100ML; MG/100ML
INJECTION, SOLUTION INTRAVENOUS CONTINUOUS
Status: DISCONTINUED | OUTPATIENT
Start: 2019-08-31 | End: 2019-09-01 | Stop reason: HOSPADM

## 2019-08-31 RX ORDER — ONDANSETRON 2 MG/ML
4 INJECTION INTRAMUSCULAR; INTRAVENOUS EVERY 6 HOURS PRN
Status: DISCONTINUED | OUTPATIENT
Start: 2019-08-31 | End: 2019-09-01

## 2019-08-31 RX ORDER — ACETAMINOPHEN 325 MG/1
650-975 TABLET ORAL EVERY 4 HOURS PRN
Status: DISCONTINUED | OUTPATIENT
Start: 2019-08-31 | End: 2019-09-01 | Stop reason: ALTCHOICE

## 2019-08-31 RX ORDER — SODIUM CHLORIDE, SODIUM LACTATE, POTASSIUM CHLORIDE, CALCIUM CHLORIDE 600; 310; 30; 20 MG/100ML; MG/100ML; MG/100ML; MG/100ML
INJECTION, SOLUTION INTRAVENOUS
Status: COMPLETED
Start: 2019-08-31 | End: 2019-08-31

## 2019-08-31 RX ADMIN — SODIUM CHLORIDE, POTASSIUM CHLORIDE, SODIUM LACTATE AND CALCIUM CHLORIDE 1000 ML: 600; 310; 30; 20 INJECTION, SOLUTION INTRAVENOUS at 22:39

## 2019-09-01 VITALS — RESPIRATION RATE: 18 BRPM | TEMPERATURE: 97.9 F | DIASTOLIC BLOOD PRESSURE: 69 MMHG | SYSTOLIC BLOOD PRESSURE: 114 MMHG

## 2019-09-01 PROBLEM — O60.00 PRETERM LABOR: Status: ACTIVE | Noted: 2019-09-01

## 2019-09-01 PROCEDURE — G0463 HOSPITAL OUTPT CLINIC VISIT: HCPCS

## 2019-09-01 PROCEDURE — 96360 HYDRATION IV INFUSION INIT: CPT

## 2019-09-01 PROCEDURE — G0378 HOSPITAL OBSERVATION PER HR: HCPCS

## 2019-09-01 PROCEDURE — G0463 HOSPITAL OUTPT CLINIC VISIT: HCPCS | Mod: 25

## 2019-09-01 PROCEDURE — 96372 THER/PROPH/DIAG INJ SC/IM: CPT | Mod: XS

## 2019-09-01 PROCEDURE — 25000128 H RX IP 250 OP 636: Performed by: ADVANCED PRACTICE MIDWIFE

## 2019-09-01 RX ORDER — ACETAMINOPHEN 325 MG/1
650 TABLET ORAL EVERY 4 HOURS PRN
Status: DISCONTINUED | OUTPATIENT
Start: 2019-09-01 | End: 2019-09-01 | Stop reason: HOSPADM

## 2019-09-01 RX ORDER — HYDROXYZINE HYDROCHLORIDE 50 MG/1
50-100 TABLET, FILM COATED ORAL
Status: DISCONTINUED | OUTPATIENT
Start: 2019-09-01 | End: 2019-09-01 | Stop reason: HOSPADM

## 2019-09-01 RX ORDER — BETAMETHASONE SODIUM PHOSPHATE AND BETAMETHASONE ACETATE 3; 3 MG/ML; MG/ML
12 INJECTION, SUSPENSION INTRA-ARTICULAR; INTRALESIONAL; INTRAMUSCULAR; SOFT TISSUE ONCE
Status: DISCONTINUED | OUTPATIENT
Start: 2019-09-01 | End: 2019-09-01 | Stop reason: CLARIF

## 2019-09-01 RX ORDER — BETAMETHASONE SODIUM PHOSPHATE AND BETAMETHASONE ACETATE 3; 3 MG/ML; MG/ML
12 INJECTION, SUSPENSION INTRA-ARTICULAR; INTRALESIONAL; INTRAMUSCULAR; SOFT TISSUE EVERY 24 HOURS
Status: DISCONTINUED | OUTPATIENT
Start: 2019-09-01 | End: 2019-09-01 | Stop reason: HOSPADM

## 2019-09-01 RX ORDER — BETAMETHASONE SODIUM PHOSPHATE AND BETAMETHASONE ACETATE 3; 3 MG/ML; MG/ML
12 INJECTION, SUSPENSION INTRA-ARTICULAR; INTRALESIONAL; INTRAMUSCULAR; SOFT TISSUE ONCE
Status: CANCELLED | OUTPATIENT
Start: 2019-09-01

## 2019-09-01 RX ORDER — ONDANSETRON 2 MG/ML
4 INJECTION INTRAMUSCULAR; INTRAVENOUS EVERY 6 HOURS PRN
Status: DISCONTINUED | OUTPATIENT
Start: 2019-09-01 | End: 2019-09-01 | Stop reason: HOSPADM

## 2019-09-01 RX ADMIN — BETAMETHASONE SODIUM PHOSPHATE AND BETAMETHASONE ACETATE 12 MG: 3; 3 INJECTION, SUSPENSION INTRA-ARTICULAR; INTRALESIONAL; INTRAMUSCULAR at 01:16

## 2019-09-01 NOTE — DISCHARGE INSTRUCTIONS
Discharge Instruction for Undelivered Patients      You were seen for:  contractions  You had (Test or Medicine):Fetal Monitoring, IV fluids, betamethasone steroid shot (1 of 2)     Diet:   Drink 8 to 12 glasses of liquids (milk, juice, water) every day.  You may eat meals and snacks.     Activity:  Call your doctor or nurse midwife if your baby is moving less than usual.     Call your provider if you notice:  Swelling in your face or increased swelling in your hands or legs.  Headaches that are not relieved by Tylenol (acetaminophen).  Changes in your vision (blurring: seeing spots or stars.)  Nausea (sick to your stomach) and vomiting (throwing up).   Weight gain of 5 pounds or more per week.  Heartburn that doesn't go away.  Signs of bladder infection: pain when you urinate (use the toilet), need to go more often and more urgently.  The bag of killian (rupture of membranes) breaks, or you notice leaking in your underwear.  Bright red blood in your underwear.  Abdominal (lower belly) or stomach pain.  Second (plus) baby: Contractions (tightening) less than 10 minutes apart and getting stronger.  Increase or change in vaginal discharge (note the color and amount)    Follow-up:  Go to labor and delivery at [facility] at 0100 for second betamethasone shot. Then follow-up as scheduled in clinic.

## 2019-09-01 NOTE — DISCHARGE SUMMARY
[facility] Discharge Summary    Chhaya Thurman MRN# [id number]   Age: 28 year old YOB: 1990     Date of Admission:  2019  Date of Discharge::  2019  Admitting Physician:  SUSSY Donnelly CNM  Discharge Physician:  SUSSY Hui CNM, CNM, MS      Home clinic: [facility]          Admission Diagnoses:   Maternity*ALVINO 2019/Contractions  Encounter for triage in pregnant patient   contractions          Discharge Diagnosis:   Intrauterine pregnancy at 36+3 weeks gestation  Evaluated for  labor - not laboring          Procedures:   Procedure(s): No additional procedures performed       No procedures performed during this admission           Medications Prior to Admission:     Medications Prior to Admission   Medication Sig Dispense Refill Last Dose     acetaminophen (TYLENOL) 325 MG tablet Take 2 tablets (650 mg) by mouth every 6 hours as needed for mild pain Start after Delivery. 100 tablet 0 Past Month at Unknown time     cephALEXin (KEFLEX) 500 MG capsule Take 1 capsule (500 mg) by mouth At Bedtime 90 capsule 3 2019 at Unknown time     Prenatal Vit-Fe Fumarate-FA (PRENATAL MULTIVITAMIN  PLUS IRON) 27-0.8 MG TABS per tablet Take 1 tablet by mouth daily   2019 at Unknown time     Cholecalciferol (VITAMIN D3 GUMMIES ADULT PO)    Taking     ibuprofen (ADVIL/MOTRIN) 600 MG tablet Take 1 tablet (600 mg) by mouth every 6 hours as needed for moderate pain Start after delivery 60 tablet 0 More than a month at Unknown time     Misc. Devices (BREAST PUMP) MISC 1 each as needed (as needed) 1 each 0      senna-docusate (SENOKOT-S/PERICOLACE) 8.6-50 MG tablet Take 1 tablet by mouth daily Start after delivery. 100 tablet 0 More than a month at Unknown time             Discharge Medications:     Current Discharge Medication List      CONTINUE these medications which have NOT CHANGED    Details   acetaminophen (TYLENOL)  "325 MG tablet Take 2 tablets (650 mg) by mouth every 6 hours as needed for mild pain Start after Delivery.  Qty: 100 tablet, Refills: 0    Associated Diagnoses: Encounter for supervision of other normal pregnancy in third trimester      cephALEXin (KEFLEX) 500 MG capsule Take 1 capsule (500 mg) by mouth At Bedtime  Qty: 90 capsule, Refills: 3    Associated Diagnoses: Urinary tract infection without hematuria, site unspecified      Prenatal Vit-Fe Fumarate-FA (PRENATAL MULTIVITAMIN  PLUS IRON) 27-0.8 MG TABS per tablet Take 1 tablet by mouth daily      Cholecalciferol (VITAMIN D3 GUMMIES ADULT PO)       ibuprofen (ADVIL/MOTRIN) 600 MG tablet Take 1 tablet (600 mg) by mouth every 6 hours as needed for moderate pain Start after delivery  Qty: 60 tablet, Refills: 0    Associated Diagnoses: Encounter for supervision of other normal pregnancy in third trimester      Misc. Devices (BREAST PUMP) MISC 1 each as needed (as needed)  Qty: 1 each, Refills: 0    Associated Diagnoses: Lactation disorder      senna-docusate (SENOKOT-S/PERICOLACE) 8.6-50 MG tablet Take 1 tablet by mouth daily Start after delivery.  Qty: 100 tablet, Refills: 0    Associated Diagnoses: Encounter for supervision of other normal pregnancy in third trimester                   Consultations:   No consultations were requested during this admission        Assessment Day of Discharge    Vital signs:  Temp: 97.9  F (36.6  C) Temp src: Oral BP: 114/69     Resp: 18            Estimated body mass index is 38.63 kg/m  as calculated from the following:    Height as of 19: 1.689 m (5' 6.5\").    Weight as of 19: 110.2 kg (243 lb).        moderate variability, +accels, no decels  UCs irregular and mild, pt reports feeling fewer contractions  SVE 1 IO/3 EO/50%/-2 - unchanged from previous exam           Hospital Course:   The patient's hospital course was notable for  labor contractions. She changed her cervix initially from closed internally " to 1 cm. She received IV fluids and 1 dose of betamethasone at 0100. She subsequently was able to sleep overnight and noted that contractions have become less frequent and more mild. Denies LOF and VB. Continuing to feel normal fetal movement. This morning her cervical exam is unchanged, her FHR tracing in category I and rare mild contractions are noted.    Hemoglobin   Date Value Ref Range Status   2019 12.4 11.7 - 15.7 g/dL Final            Discharge Instructions and Follow-Up:   Discharge diet: Regular diet   Discharge activity: Light activity as tolerated   Discharge follow-up: Follow up at [facility] for Betamethasone dose #1 at 0100 on 19 and at next routine prenatal visit. Dr. Ellison agreeable to administering second dose of betamethasone at [facility] per patient request.   Wound care: n/a           Discharge Disposition:   Discharged to home with  labor precautions and pregnancy warning signs.         SUSSY Hui CNM

## 2019-09-01 NOTE — PLAN OF CARE
Pt VSS. Afebrile. Reports occasional mild contraction. SVE unchanged per CNM Katherine. Plan to discharge pt to home. Return for second betamethasone shot at 0100. Per CNM, pt to return to  [facility] and delivery for betamethasone.    Given discharge instructions. All questions answered.

## 2019-09-01 NOTE — PLAN OF CARE
VSS. Pt reports continues to report ctx, every 2-5 min per TOCO. Category 1 FHR. Pt received 1L fluid bolus. SVE 1/70/-1 on reassessment. Plan per provider to administer betamethosone, monitor ctx overnight. Pt moved to room 471. Report given to Skylar ALAN RN.

## 2019-09-01 NOTE — PROVIDER NOTIFICATION
09/01/19 0050   Provider Notification   Provider Name/Title SANDRA Blanc   Method of Notification At Bedside;In Department   Request Evaluate in Person   Notification Reason SVE;Status Update     Provider at bedside for status update. Pt continues to ctx q2-5min. Plan per provider to administer betamethasone and keep overnight for observation. Pt agreeable to plan, will tx to room 471.

## 2019-09-01 NOTE — PLAN OF CARE
VSS.  FHT- reactive (see flow sheets).  Contractions have spaced out.  Able to sleep well through contractions.

## 2019-09-01 NOTE — PROGRESS NOTES
Blood pressure 132/72, temperature 98.1  F (36.7  C), temperature source Oral, resp. rate 18, last menstrual period 2018, not currently breastfeeding.  Patient Vitals for the past 24 hrs:   BP Temp Temp src Resp   19 2134 132/72 98.1  F (36.7  C) Oral 18     General appearance: comfortable  Hac 1000cc IV bolus, feels contractions are less intense but still coming. Still cramping. No bleeding.  SVE changed to .   CONTACTIONS: mild, moderate, cramping and every 2-4 minutes  FETAL HEART TONES: continuous EFM- baseline 120 with moderate variability and positive accelerations. No decelerations.  ROM: not ruptured  PELVIC EXAM:ext os 3/ int os   # Pain Assessment:  Current Pain Score 2019   Patient currently in pain? yes   Pain score (0-10) -   Pain location -   Pain descriptors -   - Chhaya is experiencing pain due to PTL. Pain management was discussed with Chhaya and her spouse and the plan was created in a collaborative fashion.  Chhaya's response to the current recommendations: engaged  - comfort measures  Results for orders placed or performed during the hospital encounter of 19   UA with Microscopic   Result Value Ref Range    Color Urine Straw     Appearance Urine Clear     Glucose Urine Negative NEG^Negative mg/dL    Bilirubin Urine Negative NEG^Negative    Ketones Urine Negative NEG^Negative mg/dL    Specific Gravity Urine 1.005 1.003 - 1.035    Blood Urine Negative NEG^Negative    pH Urine 6.5 5.0 - 7.0 pH    Protein Albumin Urine Negative NEG^Negative mg/dL    Urobilinogen mg/dL Normal 0.0 - 2.0 mg/dL    Nitrite Urine Negative NEG^Negative    Leukocyte Esterase Urine Negative NEG^Negative    Source Clean catch urine     WBC Urine 1 0 - 5 /HPF    RBC Urine <1 0 - 2 /HPF    Bacteria Urine Few (A) NEG^Negative /HPF    Squamous Epithelial /HPF Urine 1 0 - 1 /HPF    Mucous Urine Present (A) NEG^Negative /LPF   Drug Screen Urine /   Result Value Ref Range     Amphetamine Qual Urine Negative NEG^Negative    Cannabinoids Qual Urine Negative NEG^Negative    Cocaine Qual Urine Negative NEG^Negative    Opiates Qualitative Urine Negative NEG^Negative    Pcp Qual Urine Negative NEG^Negative       ASSESSMENT:  ==============  IUP @ 36w3d  labor with cervical change   Fetal Heart Rate Tracing category one  GBS- negative  Patient Active Problem List   Diagnosis     History of miscarriage     Family history of Down syndrome     Encounter for supervision of other normal pregnancy in first trimester     UTI (urinary tract infection)     Personal history of urinary tract infection     Excessive weight gain during pregnancy in third trimester     LGA (large for gestational age) fetus affecting management of mother, third trimester, fetus 1     Encounter for triage in pregnant patient      contractions     PLAN:  ===========  Discussed with pt cervical change and need to observe overnight. Agreeable to plan  Discussed betamethasone for late . Agreeable. No hx of GDM  Consult with Dr Deleon- agrees with plan   SUSSY Rowland CNM

## 2019-09-01 NOTE — H&P
HOSPITAL TRIAGE NOTE  ===================    CHIEF COMPLAINT  ========================  Chhaya Thurman is a 28 year old patient presenting today at 36w2d for evaluation of  uterine contractions.    Patient's last menstrual period was 2018.  Estimated Date of Delivery: Sep 26, 2019     HPI  ==================   Pt states she has noticed contactions all afternoon and have gotten stronger this evening with cramping. No bleeding, no abd pain. baby active. No hx of PTL.  Pt had US on 19- that showed EFW 2772gm, 91% growth and AC=98%  Pt taking Keflex every day for hx of UTIs. Denies dysuria, frequency, urgency, CVAT or fevers  Prenatal record and labs reviewed from [facility], through [employer] EMR.    CONTRACTIONS: irreg, cramping and every 2-4 minutes  ABDOMINAL PAIN: none  FETAL MOVEMENT: active    VAGINAL BLEEDING: none  RUPTURE OF MEMBRANES: no  PELVIC PAIN: none    PREGNANCY COMPLICATIONS: hx of baby with trisomy 21 that  at 4 months of age.  Genetic screening wnl this preg  OTHER:     # Pain Assessment:  Current Pain Score 2018   Patient currently in pain? JEET   Pain score (0-10) -   Pain location -   Pain descriptors -   Chhaya hseth pain level was assessed and she currently denies pain.        REVIEW OF SYSTEMS  =====================  C: NEGATIVE for fever, chills  I: NEGATIVE for worrisome rashes, moles or lesions  E: NEGATIVE for vision changes or irritation  R: NEGATIVE for significant cough or SOB  CV: NEGATIVE for chest pain, palpitations or varicosities  GI: NEGATIVE for nausea, abdominal pain, heartburn, or change in bowel habits  : NEGATIVE for frequency, dysuria, or hematuria  M: NEGATIVE for significant arthralgias or myalgia  N: NEGATIVE for headache, weakness, dizziness or paresthesias  P: NEGATIVE for changes in mood or affect    PROBLEM LIST  ===============  Patient Active Problem List    Diagnosis Date Noted     LGA (large for gestational age)  fetus affecting management of mother, third trimester, fetus 1 2019     Priority: High     :   USEGA = 35 3/7 weeks.  EFW = 2772 grams, 91% for 33 weeks. AC  = 98%    ANITRA = 15.9cm.  FHR = 133bpm    Placenta posterior and grade 1   Comments: LGA  Further studies: Recommend repeat growth scan in 4 weeks to assist with delivery planning.          Encounter for supervision of other normal pregnancy in first trimester 2019     Priority: High      (passed at 4months) [facility] pt  3/4/19: genetic counseling appt today   NIPT in process  Level 2 ultrasound scheduled for   19- AFP neg       Encounter for triage in pregnant patient 2019     Priority: Medium      contractions 2019     Priority: Medium     Excessive weight gain during pregnancy in third trimester 07/10/2019     Priority: Medium     7/10/19- 37 lbs at 28w       Personal history of urinary tract infection 2019     Priority: Medium     UTI (urinary tract infection) 2018     Priority: Medium     5 UTI's in the past few months  3/13/19: Reviewed at HR rounds-plan daily suppression.  Patient notified and rx sent for Keflex 500mg at bedtime    2019 - UTI symptoms - UA shows moderate leuks. Wait for full culture.        Family history of Down syndrome 2018     Priority: Medium     - Daughter Ashy-        History of miscarriage 2017     Priority: Medium       HISTORIES  ==============  ALLERGIES:      Allergies   Allergen Reactions     Imitrex [Sumatriptan]      PAST MEDICAL HISTORY  Past Medical History:   Diagnosis Date     Chickenpox      Fetal cardiac anomaly affecting pregnancy, antepartum 2018     History of recurrent UTI (urinary tract infection)      SOCIAL HISTORY  Social History     Socioeconomic History     Marital status: Single     Spouse name: Nahid     Number of children: Not on file     Years of education: Not on file     Highest education level: Not on file   Occupational  History     Occupation: RN - OR coordinator    Social Needs     Financial resource strain: Not on file     Food insecurity:     Worry: Not on file     Inability: Not on file     Transportation needs:     Medical: Not on file     Non-medical: Not on file   Tobacco Use     Smoking status: Never Smoker     Smokeless tobacco: Never Used   Substance and Sexual Activity     Alcohol use: No     Alcohol/week: 0.0 oz     Comment: occas-quit with pregnancy     Drug use: No     Sexual activity: Yes     Partners: Male     Birth control/protection: None   Lifestyle     Physical activity:     Days per week: Not on file     Minutes per session: Not on file     Stress: Not on file   Relationships     Social connections:     Talks on phone: Not on file     Gets together: Not on file     Attends Spiritism service: Not on file     Active member of club or organization: Not on file     Attends meetings of clubs or organizations: Not on file     Relationship status: Not on file     Intimate partner violence:     Fear of current or ex partner: Not on file     Emotionally abused: Not on file     Physically abused: Not on file     Forced sexual activity: Not on file   Other Topics Concern     Parent/sibling w/ CABG, MI or angioplasty before 65F 55M? No   Social History Narrative    ** Merged History Encounter **          PARTNER:  here  EMPLOYMENT: RN in OR  FAMILY HISTORY  Family History   Problem Relation Age of Onset     Other Cancer Maternal Grandfather         skin     Hyperlipidemia Mother      Unknown/Adopted Father         unknown history     Stomach Cancer Maternal Grandmother      Other Cancer Maternal Grandmother      Down Syndrome Daughter      Heart Defect Daughter      Diabetes No family hx of      OB HISTORY  OB History    Para Term  AB Living   4 1 1 0 2 0   SAB TAB Ectopic Multiple Live Births   1 0 0 0 1      # Outcome Date GA Lbr Gerald/2nd Weight Sex Delivery Anes PTL Lv   4 Current            3 Term  18 37w4d 07:19 / 02:39 2.72 kg (5 lb 15.9 oz) F Vag-Spont EPI N DEC      Complications: Preeclampsia/Hypertension      Name: Ralph      Apgar1: 8  Apgar5: 9   2 SAB 17 9w1d    AB, MISSED      1 AB 17 8w0d    SAB        Prenatal Labs:   Lab Results   Component Value Date    ABO O 2019    RH Pos 2019    AS Neg 2019    HEPBANG Nonreactive 2019    TREPAB Negative 2018    RUQIGG 21 2019    HGB 12.4 2019     Rubella- immune  ULTRASOUND(s) reviewed: 19- EFW 2772gm, 91% growth and AC=98%    EXAM  ============  /72   Temp 98.1  F (36.7  C) (Oral)   Resp 18   LMP 2018   GENERAL APPEARANCE: healthy, alert and no distress  RESP: lungs clear to auscultation - no rales, rhonchi or wheezes  CV: regular rates and rhythm, normal S1 S2, no S3 or S4 and no murmur,and no varicosities  ABDOMEN:  soft, nontender, no epigastric pain  SKIN: no suspicious lesions or rashes  NEURO: Denies headache, blurred vision, other vision changes  PSYCH: mentation appears normal. and affect normal/bright  MS/ LEGS: No edema    CONTRACTIONS: moderate, cramping and every 2-4 minutes   FETAL HEART TONES: continuous EFM- baseline 125 with moderate variability and positive accelerations. No decelerations.  NST: REACTIVE  EFW:6lbs    PELVIC EXAM: ext os 3/ internal os closed/ 70/-1/post/soft  PRESENTATION: VERTEX per BSUS  BLOOD: no  DISCHARGE: none    ROM: no  ROMPLUS: not done    LABS: UA, UC and JESIS-7  Lab results reviewed- pending    DIAGNOSIS  ============  36w2d seen on the [facility] T[facility]  for  labor evaluation  NST: REACTIVE  Fetal Heart rate tracing:category one  Patient Active Problem List   Diagnosis     History of miscarriage     Family history of Down syndrome     Encounter for supervision of other normal pregnancy in first trimester     UTI (urinary tract infection)     Personal history of urinary tract infection     Excessive weight gain during pregnancy in  third trimester     LGA (large for gestational age) fetus affecting management of mother, third trimester, fetus 1     Encounter for triage in pregnant patient      contractions       PLAN  ============  IV fluid bolus now  Labs pending  Recheck in 2 hours if contactions space out  Kalyani Parra, SUSSY CNM

## 2019-09-01 NOTE — PROVIDER NOTIFICATION
08/31/19 2218   Provider Notification   Provider Name/Title SANDRA Dunham    Method of Notification At Bedside   Request Evaluate in Person   Notification Reason Patient Arrived;SVE;Uterine Activity;Status Update

## 2019-09-02 ENCOUNTER — HOSPITAL ENCOUNTER (OUTPATIENT)
Facility: CLINIC | Age: 29
Discharge: HOME | End: 2019-09-02
Attending: OBSTETRICS & GYNECOLOGY | Admitting: OBSTETRICS & GYNECOLOGY
Payer: COMMERCIAL

## 2019-09-02 ENCOUNTER — TELEPHONE (OUTPATIENT)
Dept: OBGYN | Facility: CLINIC | Age: 29
End: 2019-09-02

## 2019-09-02 LAB
BACTERIA SPEC CULT: NO GROWTH
Lab: NORMAL
SPECIMEN SOURCE: NORMAL

## 2019-09-02 PROCEDURE — 25000128 H RX IP 250 OP 636: Performed by: OBSTETRICS & GYNECOLOGY

## 2019-09-02 PROCEDURE — 96372 THER/PROPH/DIAG INJ SC/IM: CPT

## 2019-09-02 RX ORDER — BETAMETHASONE SODIUM PHOSPHATE AND BETAMETHASONE ACETATE 3; 3 MG/ML; MG/ML
12 INJECTION, SUSPENSION INTRA-ARTICULAR; INTRALESIONAL; INTRAMUSCULAR; SOFT TISSUE ONCE
Status: COMPLETED | OUTPATIENT
Start: 2019-09-02 | End: 2019-09-02

## 2019-09-02 RX ADMIN — BETAMETHASONE SODIUM PHOSPHATE AND BETAMETHASONE ACETATE 12 MG: 3; 3 INJECTION, SUSPENSION INTRA-ARTICULAR; INTRALESIONAL; INTRAMUSCULAR at 00:21

## 2019-09-02 NOTE — PROGRESS NOTES
Pt here for betamethasone injection.  Pt was evaluated last night at [facility] with pre-term contractions.  Here for second dose since this is closer to home.  MD and had signed and held orders.  POC is to administer Betamethasone and discharge pt to home.

## 2019-09-02 NOTE — DISCHARGE INSTRUCTIONS
Discharge to home after receiving 2nd betamethasone injection.    Discharge Instructions for Undelivered Patients    Diet:    Drink 8 to 12 glasses of liquids (milk, juice, water) every day.    You may eat meals and snacks..    Activity:    Rest the pelvic area. No sex. Do not stimulate breasts or nipples.    Count fetal kicks every day. (See handout.)    Call your doctor if your baby is moving less than usual.    Medicines:    My care team has reviewed my medicines with me.    My care team has given me a list of my medicines.    My care team has prescribed a new medicine. They have either sent it home with me or ordered it from the pharmacy.    Call your provider if you notice:    Swelling in your face or increased swelling in your hands or legs.    Headaches that are not relieved by Tylenol (acetaminophen).    Changes in your vision (blurring; seeing spots or stars).    Nausea (sick to your stomach) and vomiting (throwing up).    Weight gain of 5 pounds per week.    Heartburn that doesn't go away.    Signs of bladder infection: pain when you urinate (use the toilet), needing to go more often or more urgently.    The bag of killian (membranes) breaks, or you notice leaking in your underwear.    Bright red blood in your underwear.    Abdominal (lower belly) or stomach pain.    For first baby: Contractions (tightenings) less than 5 minutes apart for one hour or more.    Increase or change in vaginal discharge (note the color and amount).    Other: continue with orders following discharge from [place] 9/01/2019    Follow up with your provider: as scheduled

## 2019-09-03 NOTE — TELEPHONE ENCOUNTER
36w4d  Pt calling with  labor contractions. Had been seen on the [facility]place two nights ago with increasing cramping and contractions. Pt had minimal cervical change after overnight monitoring. Given betamethasone and had second betamethasone today.  No bleeding, baby active.   Pt instructed to eat and drink and try a warm bath and rest. If continued contractions, or if bleeding, cramping, decreased FM or ROM, to come to birthplace for evaluation. Pt agreeable to plan. SUSSY RowlandM

## 2019-09-04 DIAGNOSIS — O92.70 LACTATION DISORDER: ICD-10-CM

## 2019-09-04 DIAGNOSIS — Z34.83 ENCOUNTER FOR SUPERVISION OF OTHER NORMAL PREGNANCY IN THIRD TRIMESTER: ICD-10-CM

## 2019-09-04 LAB
ERYTHROCYTE [DISTWIDTH] IN BLOOD BY AUTOMATED COUNT: 13.1 % (ref 10–15)
HCT VFR BLD AUTO: 33.1 % (ref 35–47)
HGB BLD-MCNC: 10.7 G/DL (ref 11.7–15.7)
MCH RBC QN AUTO: 28.6 PG (ref 26.5–33)
MCHC RBC AUTO-ENTMCNC: 32.3 G/DL (ref 31.5–36.5)
MCV RBC AUTO: 89 FL (ref 78–100)
PLATELET # BLD AUTO: 227 10E9/L (ref 150–450)
RBC # BLD AUTO: 3.74 10E12/L (ref 3.8–5.2)
WBC # BLD AUTO: 11.7 10E9/L (ref 4–11)

## 2019-09-04 PROCEDURE — 85027 COMPLETE CBC AUTOMATED: CPT | Performed by: ADVANCED PRACTICE MIDWIFE

## 2019-09-04 PROCEDURE — 36415 COLL VENOUS BLD VENIPUNCTURE: CPT | Performed by: ADVANCED PRACTICE MIDWIFE

## 2019-09-05 PROBLEM — O99.013 ANEMIA DURING PREGNANCY IN THIRD TRIMESTER: Status: ACTIVE | Noted: 2019-09-05

## 2019-09-06 ENCOUNTER — OFFICE VISIT (OUTPATIENT)
Dept: OBGYN | Facility: CLINIC | Age: 29
End: 2019-09-06
Attending: ADVANCED PRACTICE MIDWIFE
Payer: COMMERCIAL

## 2019-09-06 VITALS
BODY MASS INDEX: 39.42 KG/M2 | DIASTOLIC BLOOD PRESSURE: 73 MMHG | HEART RATE: 89 BPM | WEIGHT: 245.3 LBS | SYSTOLIC BLOOD PRESSURE: 129 MMHG | HEIGHT: 66 IN

## 2019-09-06 DIAGNOSIS — O09.93 HRP (HIGH RISK PREGNANCY), THIRD TRIMESTER: Primary | ICD-10-CM

## 2019-09-06 DIAGNOSIS — Z23 NEED FOR IMMUNIZATION AGAINST INFLUENZA: ICD-10-CM

## 2019-09-06 PROBLEM — Z36.89 ENCOUNTER FOR TRIAGE IN PREGNANT PATIENT: Status: RESOLVED | Noted: 2019-08-31 | Resolved: 2019-09-06

## 2019-09-06 PROBLEM — O60.00 PRETERM LABOR: Status: RESOLVED | Noted: 2019-09-01 | Resolved: 2019-09-06

## 2019-09-06 PROBLEM — O47.00 PRETERM CONTRACTIONS: Status: RESOLVED | Noted: 2019-08-31 | Resolved: 2019-09-06

## 2019-09-06 PROCEDURE — G0463 HOSPITAL OUTPT CLINIC VISIT: HCPCS | Mod: ZF

## 2019-09-06 PROCEDURE — G0008 ADMIN INFLUENZA VIRUS VAC: HCPCS | Mod: ZF

## 2019-09-06 PROCEDURE — 25000128 H RX IP 250 OP 636: Mod: ZF

## 2019-09-06 PROCEDURE — 90686 IIV4 VACC NO PRSV 0.5 ML IM: CPT | Mod: ZF

## 2019-09-06 RX ORDER — UREA 10 %
500 LOTION (ML) TOPICAL DAILY
COMMUNITY
End: 2020-07-29

## 2019-09-06 RX ORDER — FERROUS SULFATE 325(65) MG
325 TABLET, DELAYED RELEASE (ENTERIC COATED) ORAL DAILY
COMMUNITY
End: 2020-07-29

## 2019-09-06 RX ORDER — MULTIVIT-MIN/IRON/FOLIC ACID/K 18-600-40
1000 CAPSULE ORAL DAILY
COMMUNITY
End: 2020-07-29

## 2019-09-06 ASSESSMENT — MIFFLIN-ST. JEOR: SCORE: 1841.48

## 2019-09-06 ASSESSMENT — PAIN SCALES - GENERAL: PAINLEVEL: NO PAIN (0)

## 2019-09-06 NOTE — LETTER
"2019       RE: Chhaya Thurman  [address] [id number]     Dear Colleague,    Thank you for referring your patient, Chhaya Thurman, to the [facility] at [facility]. Please see a copy of my visit note below.    Subjective:     28 year old  at 37w1d presents for routine prenatal visit.            Denies vaginal bleeding or leakage of fluid.  Continues to have irregular contractions.  Reports regular fetal movement.        No HA, visual changes, RUQ or epigastric pain.   Patient concerns: Feeling ready to meet her baby.  Feeling well overall.    Objective:  Vitals:    19 1110   BP: 129/73   Pulse: 89   Weight: 111.3 kg (245 lb 4.8 oz)   Height: 1.664 m (5' 5.5\")   See OB flowsheet    Assessment/Plan  Encounter Diagnoses   Name Primary?     HRP (high risk pregnancy), third trimester Yes     Need for immunization against influenza        Orders Placed This Encounter   Medications     Ascorbic Acid (VITAMIN C) 500 MG CAPS     Sig: Take 1,000 mg by mouth daily     cyanocobalamin (VITAMIN B-12) 500 MCG tablet     Sig: Take 500 mcg by mouth daily     ferrous sulfate (FE TABS) 325 (65 Fe) MG EC tablet     Sig: Take 325 mg by mouth daily     - Reviewed why/how to contact provider if headache/visual changes/RUQ or epigastric pain, decreased fetal movement, vaginal bleeding, leakage of fluid or strong/regular contractions.   Patient education/orders or handouts today:  Sign/symptoms of labor and When to call for labor or other concerns  Return to clinic in 1 week and prn if questions or concerns.     Irma Flowers, SUSSY FLORES          "

## 2019-09-06 NOTE — PROGRESS NOTES
"Subjective:     28 year old  at 37w1d presents for routine prenatal visit.            Denies vaginal bleeding or leakage of fluid.  Continues to have irregular contractions.  Reports regular fetal movement.        No HA, visual changes, RUQ or epigastric pain.   Patient concerns: Feeling ready to meet her baby.  Feeling well overall.  Objective:  Vitals:    19 1110   BP: 129/73   Pulse: 89   Weight: 111.3 kg (245 lb 4.8 oz)   Height: 1.664 m (5' 5.5\")    See OB flowsheet  Assessment/Plan     Encounter Diagnoses   Name Primary?     HRP (high risk pregnancy), third trimester Yes     Need for immunization against influenza        Orders Placed This Encounter   Medications     Ascorbic Acid (VITAMIN C) 500 MG CAPS     Sig: Take 1,000 mg by mouth daily     cyanocobalamin (VITAMIN B-12) 500 MCG tablet     Sig: Take 500 mcg by mouth daily     ferrous sulfate (FE TABS) 325 (65 Fe) MG EC tablet     Sig: Take 325 mg by mouth daily       - Reviewed why/how to contact provider if headache/visual changes/RUQ or epigastric pain, decreased fetal movement, vaginal bleeding, leakage of fluid or strong/regular contractions.   Patient education/orders or handouts today:  Sign/symptoms of labor and When to call for labor or other concerns  Return to clinic in 1 week and prn if questions or concerns.   SUSSY Elaine CNM    "

## 2019-09-06 NOTE — NURSING NOTE
Chief Complaint   Patient presents with     Prenatal Care   flu     Clinic Administered Medication Documentation    MEDICATION LIST:   Injectable Medication Documentation    Patient was given influenza. Prior to medication administration, verified patients identity using patient s name and date of birth. Please see MAR and medication order for additional information. Patient instructed to remain in clinic for 15 minutes.      Was entire vial of medication used? Yes  Vial/Syringe: Single dose vial  Expiration Date:  6-  Was this medication supplied by the patient? No

## 2019-09-08 ENCOUNTER — HOSPITAL ENCOUNTER (OUTPATIENT)
Facility: CLINIC | Age: 29
Discharge: HOME | End: 2019-09-08
Attending: ADVANCED PRACTICE MIDWIFE | Admitting: ADVANCED PRACTICE MIDWIFE
Payer: COMMERCIAL

## 2019-09-08 VITALS — TEMPERATURE: 98.2 F | SYSTOLIC BLOOD PRESSURE: 137 MMHG | RESPIRATION RATE: 18 BRPM | DIASTOLIC BLOOD PRESSURE: 77 MMHG

## 2019-09-08 PROBLEM — Z36.89 ENCOUNTER FOR TRIAGE IN PREGNANT PATIENT: Status: ACTIVE | Noted: 2019-09-08

## 2019-09-08 PROCEDURE — G0463 HOSPITAL OUTPT CLINIC VISIT: HCPCS

## 2019-09-08 PROCEDURE — 25000132 ZZH RX MED GY IP 250 OP 250 PS 637: Performed by: ADVANCED PRACTICE MIDWIFE

## 2019-09-08 RX ORDER — HYDROXYZINE HYDROCHLORIDE 50 MG/1
100 TABLET, FILM COATED ORAL ONCE
Status: COMPLETED | OUTPATIENT
Start: 2019-09-08 | End: 2019-09-08

## 2019-09-08 RX ADMIN — HYDROXYZINE HYDROCHLORIDE 100 MG: 50 TABLET ORAL at 21:44

## 2019-09-08 NOTE — LETTER
September 8, 2019    To Whom It May Concern:    Chhaya Thurman is being seen in our clinic for prenatal care and in our hospital for a late evening pregnancy evaluation on 9/8/19.      Her Estimated Date of Delivery: Sep 26, 2019.      Given the early labor contractions she experienced this evening, it is that she not work tomorrow.      Sincerely,        Lindsay Oliva CNM

## 2019-09-09 NOTE — DISCHARGE INSTRUCTIONS
Discharge Instruction for Undelivered Patients      You were seen for: Labor Assessment  We Consulted: Lindsay Lackey CNM  You had (Test or Medicine):fetal monitoring, SVE     Diet:   Drink 8 to 12 glasses of liquids (milk, juice, water) every day.  You may eat meals and snacks.     Activity:  Count fetal kicks everyday (see handout)  Call your doctor or nurse midwife if your baby is moving less than usual.     Call your provider if you notice:  Swelling in your face or increased swelling in your hands or legs.  Headaches that are not relieved by Tylenol (acetaminophen).  Changes in your vision (blurring: seeing spots or stars.)  Nausea (sick to your stomach) and vomiting (throwing up).   Weight gain of 5 pounds or more per week.  Heartburn that doesn't go away.  Signs of bladder infection: pain when you urinate (use the toilet), need to go more often and more urgently.  The bag of killian (rupture of membranes) breaks, or you notice leaking in your underwear.  Bright red blood in your underwear.  Abdominal (lower belly) or stomach pain.  For first baby: Contractions (tightening) less than 5 minutes apart for one hour or more.  Second (plus) baby: Contractions (tightening) less than 10 minutes apart and getting stronger.  Increase or change in vaginal discharge (note the color and amount)      Follow-up:  As scheduled in the clinic

## 2019-09-09 NOTE — PROGRESS NOTES
HOSPITAL TRIAGE NOTE  ===================    CHIEF COMPLAINT  ========================  Chhaya Thurman is a 28 year old patient presenting today at 37w3d for evaluation of uterine contractions.    Patient's last menstrual period was 2018.  Estimated Date of Delivery: Sep 26, 2019   contx most of the day, started getting strong in the last few hours, pt worried about living so far from [facility] (1 hr). Denies bleeding, SROM. Baby has been active    HPI  ==================     Prenatal record and labs reviewed from [facility], through [employer] EMR.    CONTRACTIONS: mild and every 8 minutes  ABDOMINAL PAIN: dull and cramping  FETAL MOVEMENT: active    VAGINAL BLEEDING: none  RUPTURE OF MEMBRANES: no  PELVIC PAIN: none, dull and cramping          # Pain Assessment:  Current Pain Score 2019   Patient currently in pain? yes   Pain score (0-10) -   Pain location -   Pain descriptors -   - Chhaya is experiencing pain due to mild contx. Pain management was discussed with Chhaya and her spouse and the plan was created in a collaborative fashion.  Chhaya's response to the current recommendations: engaged  - thinks she could sleep through contx        REVIEW OF SYSTEMS  =====================  C: NEGATIVE for fever, chills  I: NEGATIVE for worrisome rashes, moles or lesions  E: NEGATIVE for vision changes or irritation  R: NEGATIVE for significant cough or SOB  CV: NEGATIVE for chest pain, palpitations or varicosities  GI: NEGATIVE for nausea, abdominal pain, heartburn, or change in bowel habits  : NEGATIVE for frequency, dysuria, or hematuria  M: NEGATIVE for significant arthralgias or myalgia  N: NEGATIVE for headache, weakness, dizziness or paresthesias  P: NEGATIVE for changes in mood or affect    PROBLEM LIST  ===============  Patient Active Problem List    Diagnosis Date Noted     Encounter for supervision of other normal pregnancy in first trimester 2019     Priority:  High      (passed at 4months) [facility] pt  3/4/19: genetic counseling appt today   NIPT in process  Level 2 ultrasound scheduled for   19- AFP neg       Encounter for triage in pregnant patient 2019     Priority: Medium     Anemia during pregnancy in third trimester 2019     Priority: Medium     2019 - Hg noted 10.7.  Dietary iron, Daily iron supplement, Vitamin C, Vitamin B12.  Recheck in labor and prn.  Mychart sent.  Routed to nurse team to call pt       LGA (large for gestational age) fetus affecting management of mother, third trimester, fetus 1 2019     Priority: Medium     :   USEGA = 35 3/7 weeks.  EFW = 2772 grams, 91% for 33 weeks. AC  = 98%    ANITRA = 15.9cm.  FHR = 133bpm    Placenta posterior and grade 1   Comments: LGA  Further studies: Recommend repeat growth scan in 4 weeks to assist with delivery planning.          Excessive weight gain during pregnancy in third trimester 07/10/2019     Priority: Medium     7/10/19- 37 lbs at 28w       Personal history of urinary tract infection 2019     Priority: Medium     UTI (urinary tract infection) 2018     Priority: Medium     5 UTI's in the past few months  3/13/19: Reviewed at HR rounds-plan daily suppression.  Patient notified and rx sent for Keflex 500mg at bedtime  2019 - UTI symptoms - UA shows moderate leuks. Wait for full culture.   19- UA- neg, UC____       Family history of Down syndrome 2018     Priority: Medium     - Daughter Emry-        History of miscarriage 2017     Priority: Medium       HISTORIES  ==============  ALLERGIES:      Allergies   Allergen Reactions     Imitrex [Sumatriptan]      PAST MEDICAL HISTORY  Past Medical History:   Diagnosis Date     Chickenpox      Fetal cardiac anomaly affecting pregnancy, antepartum 2018     History of recurrent UTI (urinary tract infection)      Hypertension     with first pregnancy     SOCIAL HISTORY  Social History      Socioeconomic History     Marital status: Single     Spouse name: Nahid     Number of children: Not on file     Years of education: Not on file     Highest education level: Not on file   Occupational History     Occupation: RN - OR coordinator    Social Needs     Financial resource strain: Not on file     Food insecurity:     Worry: Not on file     Inability: Not on file     Transportation needs:     Medical: Not on file     Non-medical: Not on file   Tobacco Use     Smoking status: Never Smoker     Smokeless tobacco: Never Used   Substance and Sexual Activity     Alcohol use: No     Alcohol/week: 0.0 oz     Comment: occas-quit with pregnancy     Drug use: No     Sexual activity: Yes     Partners: Male     Birth control/protection: None   Lifestyle     Physical activity:     Days per week: Not on file     Minutes per session: Not on file     Stress: Not on file   Relationships     Social connections:     Talks on phone: Not on file     Gets together: Not on file     Attends Caodaism service: Not on file     Active member of club or organization: Not on file     Attends meetings of clubs or organizations: Not on file     Relationship status: Not on file     Intimate partner violence:     Fear of current or ex partner: Not on file     Emotionally abused: Not on file     Physically abused: Not on file     Forced sexual activity: Not on file   Other Topics Concern     Parent/sibling w/ CABG, MI or angioplasty before 65F 55M? No   Social History Narrative    ** Merged History Encounter **          PARTNER: at bedside  EMPLOYMENT: RN  FAMILY HISTORY  Family History   Problem Relation Age of Onset     Other Cancer Maternal Grandfather         skin     Hyperlipidemia Mother      Unknown/Adopted Father         unknown history     Stomach Cancer Maternal Grandmother      Other Cancer Maternal Grandmother      Down Syndrome Daughter      Heart Defect Daughter      Diabetes No family hx of      OB HISTORY  OB History     Para Term  AB Living   4 1 1 0 2 0   SAB TAB Ectopic Multiple Live Births   1 0 0 0 1      # Outcome Date GA Lbr Gerald/2nd Weight Sex Delivery Anes PTL Lv   4 Current            3 Term 18 37w4d 07:19 / 02:39 2.72 kg (5 lb 15.9 oz) F Vag-Spont EPI N DEC      Complications: Preeclampsia/Hypertension      Name: Ralph      Apgar1: 8  Apgar5: 9   2 SAB 17 9w1d    AB, MISSED      1 AB 17 8w0d    SAB        Prenatal Labs:   Lab Results   Component Value Date    ABO O 2019    RH Pos 2019    AS Neg 2019    HEPBANG Nonreactive 2019    TREPAB Negative 2018    RUQIGG 21 2019    HGB 10.7 (L) 2019       EXAM  ============  /77   Temp 98.2  F (36.8  C) (Oral)   Resp 18   LMP 2018   GENERAL APPEARANCE: healthy, alert and no distress  ABDOMEN:  soft, nontender, no epigastric pain  SKIN: no suspicious lesions or rashes  NEURO: Denies headache, blurred vision, other vision changes  PSYCH: mentation appears normal. and affect normal/bright      CONTRACTIONS: mild and every 8 minutes   FETAL HEART TONES: continuous EFM- baseline 130 with moderate variability and positive accelerations. No decelerations.  NST: REACTIVE      PELVIC EXAM: 3.5/ 30%/ Posterior/ average/ -3     PRESENTATION: VERTEX, confirmed by BSUS  BLOOD: no  DISCHARGE: none    ROM: no  ROMPLUS: not done    DIAGNOSIS  ============  37w3d seen on the [facility] Triage for labor evaluation- prodromal labor    Fetal Heart rate tracing:category one    PLAN  ============  Discharge to home with labor instuctions per discharge instruction form  Call or return to the Birthplace with contractions, cramping, abdominal or pelvic pain, vaginal bleeding, leaking fluid or decreased fetal movement.  Follow up- this week for BPPs and CNM visit as scheduled   Medications- 100mg atarax po now to go home and sleep  Lindsay Lackey, APRN CNM

## 2019-09-09 NOTE — PLAN OF CARE
Data: Patient presented [facility] the [facility] at .   Reason for maternal/fetal assessment per patient is Laboring (ctx since 1000, stronger at 1800)  . Patient is a . Prenatal record reviewed.      OB History    Para Term  AB Living   4 1 1 0 2 0   SAB TAB Ectopic Multiple Live Births   1 0 0 0 1      # Outcome Date GA Lbr Gerald/2nd Weight Sex Delivery Anes PTL Lv   4 Current            3 Term 18 37w4d 07:19 / 02:39 2.72 kg (5 lb 15.9 oz) F Vag-Spont EPI N DEC      Complications: Preeclampsia/Hypertension      Name: Ralph      Apgar1: 8  Apgar5: 9   2 SAB 17 9w1d    AB, MISSED      1 AB 17 8w0d    SAB         Medical History:   Past Medical History:   Diagnosis Date     Chickenpox      Fetal cardiac anomaly affecting pregnancy, antepartum 2018     History of recurrent UTI (urinary tract infection)      Hypertension     with first pregnancy   . Gestational Age 37w3d. VSS. Cervix: dilated to 3 and effaced <30%.  Fetal movement present. Patient denies vaginal discharge, pelvic pressure, UTI symptoms, GI problems, bloody show, vaginal bleeding, edema, headache, visual disturbances, epigastric or URQ pain, abdominal pain, rupture of membranes. Support persons Luke present.  Action: Verbal consent for EFM. Triage assessment completed. Fetal assessment: Presumed adequate fetal oxygenation documented (see flow record). Patient education pamphlets given on fetal movement counts. Patient instructed to report change in fetal movement, vaginal leaking of fluid or bleeding, abdominal pain, or any concerns related to the pregnancy to her nurse/physician.   Response:  Lindsay Page CNM informed of arrival. Plan per provider is discharge with vistaril. Patient verbalized understanding of education and verbalized agreement with plan. Discharged ambulatory at 2200.

## 2019-09-12 ENCOUNTER — HOSPITAL ENCOUNTER (OUTPATIENT)
Facility: CLINIC | Age: 29
Discharge: HOME | End: 2019-09-12
Attending: ADVANCED PRACTICE MIDWIFE | Admitting: ADVANCED PRACTICE MIDWIFE
Payer: COMMERCIAL

## 2019-09-12 ENCOUNTER — ANCILLARY PROCEDURE (OUTPATIENT)
Dept: ULTRASOUND IMAGING | Facility: CLINIC | Age: 29
End: 2019-09-12
Attending: ADVANCED PRACTICE MIDWIFE
Payer: COMMERCIAL

## 2019-09-12 ENCOUNTER — OFFICE VISIT (OUTPATIENT)
Dept: OBGYN | Facility: CLINIC | Age: 29
End: 2019-09-12
Attending: ADVANCED PRACTICE MIDWIFE
Payer: COMMERCIAL

## 2019-09-12 VITALS
RESPIRATION RATE: 18 BRPM | DIASTOLIC BLOOD PRESSURE: 66 MMHG | SYSTOLIC BLOOD PRESSURE: 122 MMHG | HEART RATE: 81 BPM | TEMPERATURE: 98.6 F

## 2019-09-12 VITALS
HEART RATE: 79 BPM | HEIGHT: 66 IN | SYSTOLIC BLOOD PRESSURE: 118 MMHG | BODY MASS INDEX: 39.1 KG/M2 | DIASTOLIC BLOOD PRESSURE: 78 MMHG | WEIGHT: 243.3 LBS

## 2019-09-12 DIAGNOSIS — Z34.83 ENCOUNTER FOR SUPERVISION OF OTHER NORMAL PREGNANCY IN THIRD TRIMESTER: ICD-10-CM

## 2019-09-12 DIAGNOSIS — Z34.83 ENCOUNTER FOR SUPERVISION OF OTHER NORMAL PREGNANCY IN THIRD TRIMESTER: Primary | ICD-10-CM

## 2019-09-12 PROBLEM — Z36.89 ENCOUNTER FOR TRIAGE IN PREGNANT PATIENT: Status: RESOLVED | Noted: 2019-09-08 | Resolved: 2019-09-12

## 2019-09-12 PROBLEM — R03.0 ELEVATED BP WITHOUT DIAGNOSIS OF HYPERTENSION: Status: ACTIVE | Noted: 2019-09-12

## 2019-09-12 LAB
ALT SERPL W P-5'-P-CCNC: 14 U/L (ref 0–50)
ANION GAP SERPL CALCULATED.3IONS-SCNC: 8 MMOL/L (ref 3–14)
AST SERPL W P-5'-P-CCNC: 13 U/L (ref 0–45)
BUN SERPL-MCNC: 7 MG/DL (ref 7–30)
CALCIUM SERPL-MCNC: 8.4 MG/DL (ref 8.5–10.1)
CHLORIDE SERPL-SCNC: 107 MMOL/L (ref 94–109)
CO2 SERPL-SCNC: 22 MMOL/L (ref 20–32)
CREAT SERPL-MCNC: 0.52 MG/DL (ref 0.52–1.04)
CREAT UR-MCNC: 27 MG/DL
ERYTHROCYTE [DISTWIDTH] IN BLOOD BY AUTOMATED COUNT: 13.8 % (ref 10–15)
GFR SERPL CREATININE-BSD FRML MDRD: >90 ML/MIN/{1.73_M2}
GLUCOSE SERPL-MCNC: 78 MG/DL (ref 70–99)
HCT VFR BLD AUTO: 35.5 % (ref 35–47)
HGB BLD-MCNC: 11.6 G/DL (ref 11.7–15.7)
MCH RBC QN AUTO: 28.4 PG (ref 26.5–33)
MCHC RBC AUTO-ENTMCNC: 32.7 G/DL (ref 31.5–36.5)
MCV RBC AUTO: 87 FL (ref 78–100)
PLATELET # BLD AUTO: 228 10E9/L (ref 150–450)
POTASSIUM SERPL-SCNC: 3.8 MMOL/L (ref 3.4–5.3)
PROT UR-MCNC: <0.05 G/L
PROT/CREAT 24H UR: NORMAL G/G CR (ref 0–0.2)
RBC # BLD AUTO: 4.08 10E12/L (ref 3.8–5.2)
SODIUM SERPL-SCNC: 137 MMOL/L (ref 133–144)
URATE SERPL-MCNC: 4.5 MG/DL (ref 2.6–6)
WBC # BLD AUTO: 12.1 10E9/L (ref 4–11)

## 2019-09-12 PROCEDURE — 84156 ASSAY OF PROTEIN URINE: CPT | Performed by: ADVANCED PRACTICE MIDWIFE

## 2019-09-12 PROCEDURE — 84450 TRANSFERASE (AST) (SGOT): CPT | Performed by: ADVANCED PRACTICE MIDWIFE

## 2019-09-12 PROCEDURE — 76816 OB US FOLLOW-UP PER FETUS: CPT

## 2019-09-12 PROCEDURE — 59025 FETAL NON-STRESS TEST: CPT

## 2019-09-12 PROCEDURE — 84460 ALANINE AMINO (ALT) (SGPT): CPT | Performed by: ADVANCED PRACTICE MIDWIFE

## 2019-09-12 PROCEDURE — 36415 COLL VENOUS BLD VENIPUNCTURE: CPT | Performed by: ADVANCED PRACTICE MIDWIFE

## 2019-09-12 PROCEDURE — 85027 COMPLETE CBC AUTOMATED: CPT | Performed by: ADVANCED PRACTICE MIDWIFE

## 2019-09-12 PROCEDURE — G0463 HOSPITAL OUTPT CLINIC VISIT: HCPCS | Mod: 25

## 2019-09-12 PROCEDURE — 84550 ASSAY OF BLOOD/URIC ACID: CPT | Performed by: ADVANCED PRACTICE MIDWIFE

## 2019-09-12 PROCEDURE — 80048 BASIC METABOLIC PNL TOTAL CA: CPT | Performed by: ADVANCED PRACTICE MIDWIFE

## 2019-09-12 PROCEDURE — G0463 HOSPITAL OUTPT CLINIC VISIT: HCPCS | Mod: ZF

## 2019-09-12 RX ORDER — ONDANSETRON 2 MG/ML
4 INJECTION INTRAMUSCULAR; INTRAVENOUS EVERY 6 HOURS PRN
Status: DISCONTINUED | OUTPATIENT
Start: 2019-09-12 | End: 2019-09-12 | Stop reason: HOSPADM

## 2019-09-12 ASSESSMENT — PAIN SCALES - GENERAL: PAINLEVEL: NO PAIN (0)

## 2019-09-12 ASSESSMENT — MIFFLIN-ST. JEOR: SCORE: 1832.41

## 2019-09-12 NOTE — PROGRESS NOTES
"Subjective:     28 year old  at 38w0d presents for routine prenatal visit.           Reports irregular contractions. Was seen in triage on  and was 3.5cm.   Denies vaginal bleeding, discharge or leakage of fluid. Reports +fetal movement.    Patient concerns: Feeling well overall. Blood pressure today 141/84, repeat 121/78, 121/80, 118/78.   No HA, vision changes, ruq/epigastric pain.       Had growth ultrasound today. EFW 89%tile, AC >99%tile.     Objective:  Vitals:    19 1420   BP: (!) 141/84   Pulse: 79   Weight: 110.4 kg (243 lb 4.8 oz)   Height: 1.664 m (5' 5.5\")      See OB flowsheet     UItrasound:  EFW 89%tile, AC >99%tile. ANITRA 14.3cm. FHR 129bpm    Assessment/Plan     Encounter Diagnosis   Name Primary?     Encounter for supervision of other normal pregnancy in third trimester Yes     - Reviewed why/how to contact provider if headache/visual changes/RUQ or epigastric pain, decreased fetal movement, vaginal bleeding, leakage of fluid or strong/regular contractions.   Patient education/orders or handouts today:  Sign/symptoms of labor, When to call for labor or other concerns, BPP, NST and Induction of labor    - Reviewed today's growth ultrasound.  - Recommended evaluation at BP for serial BPs and pre-eclampsia labs.  Reviewed diagnostic criteria for gestational hypertension and pre-eclampsia. Discussed maternal and fetal risks with these diagnoses and recommendation for induction of labor.  - Pt and  agreeable to recommendation.  - Ambulated to BP in stable condition.  - Charge RN and on call CNM notified.     SUSSY Arreola CNM   "

## 2019-09-12 NOTE — DISCHARGE INSTRUCTIONS
Discharge Instruction for Undelivered Patients      You were seen for: Rule Out Pre-Eclampsia  We Consulted: Davina Chand CNM  You had (Test or Medicine):Labs, Fetal Non Stress Test     Diet:   Drink 8 to 12 glasses of liquids (milk, juice, water) every day.  You may eat meals and snacks.     Activity:  Count fetal kicks everyday (see handout)  Call your doctor or nurse midwife if your baby is moving less than usual.     Call your provider if you notice:  Swelling in your face or increased swelling in your hands or legs.  Headaches that are not relieved by Tylenol (acetaminophen).  Changes in your vision (blurring: seeing spots or stars.)  Nausea (sick to your stomach) and vomiting (throwing up).   Weight gain of 5 pounds or more per week.  Heartburn that doesn't go away.  Signs of bladder infection: pain when you urinate (use the toilet), need to go more often and more urgently.  The bag of killian (rupture of membranes) breaks, or you notice leaking in your underwear.  Bright red blood in your underwear.  Abdominal (lower belly) or stomach pain.  Second (plus) baby: Contractions (tightening) less than 10 minutes apart and getting stronger.  Increase or change in vaginal discharge (note the color and amount)      Follow-up: Check BP on Monday 9/17/19.  If able, may check BP at your workplace, otherwise:  Make an appointment to be seen on Monday, for BP check; if elevated, you will go to [facility].  Also, [facility]  will need to be updated of 9/17/19 BP result, and please make an appointment to be seen at [facility] clinic on Friday, 9/20/19.

## 2019-09-12 NOTE — PROVIDER NOTIFICATION
19 1740   Provider Notification   Provider Name/Title LUPE Chand CNM   Method of Notification Phone     Data: Patient presented to the [facility] at 1453.   Reason for maternal/fetal assessment per patient is Rule Out Pre-eclampsia  . Patient is a . Prenatal record reviewed.      OB History    Para Term  AB Living   4 1 1 0 2 0   SAB TAB Ectopic Multiple Live Births   1 0 0 0 1      # Outcome Date GA Lbr Gerald/2nd Weight Sex Delivery Anes PTL Lv   4 Current            3 Term 18 37w4d 07:19 / 02:39 2.72 kg (5 lb 15.9 oz) F Vag-Spont EPI N DEC      Complications: Preeclampsia/Hypertension      Name: Emry      Apgar1: 8  Apgar5: 9   2 SAB 17 9w1d    AB, MISSED      1 AB 17 8w0d    SAB         Medical History:   Past Medical History:   Diagnosis Date     Chickenpox      Fetal cardiac anomaly affecting pregnancy, antepartum 2018     History of recurrent UTI (urinary tract infection)      Hypertension     with first pregnancy   . Gestational Age 38w0d. VSS. Cervix: 4cm per LUPE Chand CNM.  Fetal movement present. Patient denies cramping, backache, vaginal discharge, pelvic pressure, UTI symptoms, GI problems, bloody show, vaginal bleeding, edema, headache, visual disturbances, epigastric or URQ pain, abdominal pain, rupture of membranes. Support person present.  Action: Verbal consent for EFM. Triage assessment completed. EFM applied for Non Stress Test. Uterine assessment 5-10 minutes, palpating as mild. Fetal assessment: Presumed adequate fetal oxygenation documented (see flow record). Patient education pamphlets given on fetal movement counts and follow up appointments. Patient instructed to report change in fetal movement, vaginal leaking of fluid or bleeding, abdominal pain, or any concerns related to the pregnancy to her Provider.   Response: After SVE and labs stable, Plan per Davina Chand CNM, to discharge patient to home. Patient verbalized understanding of  education and verbalized agreement with plan. Discharged ambulatory at 1740.

## 2019-09-12 NOTE — PROGRESS NOTES
HOSPITAL TRIAGE NOTE  ===================    CHIEF COMPLAINT  ========================  Chhaya Thurman is a 28 year old patient presenting today at 38w0d for evaluation of hypertension disorder of pregnancy.      Patient's last menstrual period was 2018.  Estimated Date of Delivery: Sep 26, 2019       HPI  ==================   Pt presented to clinic at [facility] for routine prenatal visit and was noted to have one elevated SBP in 140's at 1420.  No HA, visual changes, RUQ or epigastric pain.  Is agreeable to have serial BPs and lab work.  Prenatal record and labs reviewed from [facility], through [facility] EMR.  - LGA this pregnancy  - h/o GHTN last pregnancy  - on daily suppressive dosing for h/o UTI  -first daughter passed ~4 months d/t down syndrome   -recommendation for post- echo.         CONTRACTIONS: irreg and mild  ABDOMINAL PAIN: none  FETAL MOVEMENT: active    VAGINAL BLEEDING: none  RUPTURE OF MEMBRANES: no  PELVIC PAIN: none    PREGNANCY COMPLICATIONS: none  OTHER: Has history of  demise from Down Syndrome and Cardiac issue    # Pain Assessment:  Current Pain Score 2019   Patient currently in pain? denies   Pain score (0-10) -   Pain location -   Pain descriptors -   - Chhaya is experiencing pain due to early labor. Pain management was discussed with Chhaya and her significant other and the plan was created in a collaborative fashion.  Chhaya's response to the current recommendations: engaged  - Coping well        REVIEW OF SYSTEMS  =====================  C: NEGATIVE for fever, chills  I: NEGATIVE for worrisome rashes, moles or lesions  E: NEGATIVE for vision changes or irritation  R: NEGATIVE for significant cough or SOB  CV: NEGATIVE for chest pain, palpitations or varicosities  GI: NEGATIVE for nausea, abdominal pain, heartburn, or change in bowel habits  : NEGATIVE for frequency, dysuria, or hematuria  M: NEGATIVE for significant arthralgias or  myalgia  N: NEGATIVE for headache, weakness, dizziness or paresthesias  P: NEGATIVE for changes in mood or affect    PROBLEM LIST  ===============  Patient Active Problem List    Diagnosis Date Noted     Elevated BP without diagnosis of hypertension 2019     Priority: Medium     first born  within 1 year of life 2019     Priority: Medium     Class: Acute     Testing showed mild MTHFR. THIS  needs cord blood sent for MTHFR and to be started on folate and B12.   Alley Henao MD  (per Yamilka Morin)       Anemia during pregnancy in third trimester 2019     Priority: Medium     2019 - Hg noted 10.7.  Dietary iron, Daily iron supplement, Vitamin C, Vitamin B12.  Recheck in labor and prn.  Mychart sent.  Routed to nurse team to call pt       LGA (large for gestational age) fetus affecting management of mother, third trimester, fetus 1 2019     Priority: Medium     :   USEGA = 35 3/7 weeks.  EFW = 2772 grams, 91% for 33 weeks. AC  = 98%    ANITRA = 15.9cm.  FHR = 133bpm    Placenta posterior and grade 1   Comments: LGA  Further studies: Recommend repeat growth scan in 4 weeks to assist with delivery planning.          Excessive weight gain during pregnancy in third trimester 07/10/2019     Priority: Medium     7/10/19- 37 lbs at 28w       Personal history of urinary tract infection 2019     Priority: Medium     Encounter for supervision of other normal pregnancy in first trimester 2019     Priority: Medium      (passed at 4months) [facility] pt  3/4/19: genetic counseling appt today   NIPT in process  Level 2 ultrasound scheduled for   19- AFP neg       UTI (urinary tract infection) 2018     Priority: Medium     5 UTI's in the past few months  3/13/19: Reviewed at HR rounds-plan daily suppression.  Patient notified and rx sent for Keflex 500mg at bedtime  2019 - UTI symptoms - UA shows moderate leuks. Wait for full culture.   19- UA- neg, UC____        Family history of Down syndrome 2018     Priority: Medium     2018- Daughter Ralph-        History of miscarriage 2017     Priority: Medium       HISTORIES  ==============  ALLERGIES:      Allergies   Allergen Reactions     Imitrex [Sumatriptan]      PAST MEDICAL HISTORY  Past Medical History:   Diagnosis Date     Chickenpox      Fetal cardiac anomaly affecting pregnancy, antepartum 2018     History of recurrent UTI (urinary tract infection)      Hypertension     with first pregnancy     SOCIAL HISTORY  Social History     Socioeconomic History     Marital status: Single     Spouse name: Nahid     Number of children: Not on file     Years of education: Not on file     Highest education level: Not on file   Occupational History     Occupation: RN - OR coordinator    Social Needs     Financial resource strain: Not on file     Food insecurity:     Worry: Not on file     Inability: Not on file     Transportation needs:     Medical: Not on file     Non-medical: Not on file   Tobacco Use     Smoking status: Never Smoker     Smokeless tobacco: Never Used   Substance and Sexual Activity     Alcohol use: No     Alcohol/week: 0.0 oz     Comment: occas-quit with pregnancy     Drug use: No     Sexual activity: Yes     Partners: Male     Birth control/protection: None   Lifestyle     Physical activity:     Days per week: Not on file     Minutes per session: Not on file     Stress: Not on file   Relationships     Social connections:     Talks on phone: Not on file     Gets together: Not on file     Attends Shinto service: Not on file     Active member of club or organization: Not on file     Attends meetings of clubs or organizations: Not on file     Relationship status: Not on file     Intimate partner violence:     Fear of current or ex partner: Not on file     Emotionally abused: Not on file     Physically abused: Not on file     Forced sexual activity: Not on file   Other Topics Concern      Parent/sibling w/ CABG, MI or angioplasty before 65F 55M? No   Social History Narrative    ** Merged History Encounter **          PARTNER: Present and supportive    FAMILY HISTORY  Family History   Problem Relation Age of Onset     Other Cancer Maternal Grandfather         skin     Hyperlipidemia Mother      Unknown/Adopted Father         unknown history     Stomach Cancer Maternal Grandmother      Other Cancer Maternal Grandmother      Down Syndrome Daughter      Heart Defect Daughter      Diabetes No family hx of      OB HISTORY  OB History    Para Term  AB Living   4 1 1 0 2 0   SAB TAB Ectopic Multiple Live Births   1 0 0 0 1      # Outcome Date GA Lbr Gerald/2nd Weight Sex Delivery Anes PTL Lv   4 Current            3 Term 18 37w4d 07:19 / 02:39 2.72 kg (5 lb 15.9 oz) F Vag-Spont EPI N DEC      Complications: Preeclampsia/Hypertension      Name: Ralph      Apgar1: 8  Apgar5: 9   2 SAB 17 9w1d    AB, MISSED      1 AB 17 8w0d    SAB        Prenatal Labs:   Lab Results   Component Value Date    ABO O 2019    RH Pos 2019    AS Neg 2019    HEPBANG Nonreactive 2019    TREPAB Negative 2018    RUQIGG 21 2019    HGB 11.6 (L) 2019     Rubella- Immune    ULTRASOUND(s) reviewed:  Study Result 19    28 year old female, , presents at 38 0/7 weeks for obstetric ultrasound assessment indicated by LGA.     Single fetus     Presentation cepahlic     USEGA = 38 3/7 weeks.  EFW = 3772 grams, 89 % for 38 weeks. AC = >99%        ANITRA = 14.3cm.  FHR = 129bpm      Placenta posterior and grade 1     AGA but AC>99th% ile.         MATTHEW Osei MD            EXAM  ============  /66   Pulse 81   Temp 98.6  F (37  C) (Oral)   Resp 18   LMP 2018   GENERAL APPEARANCE: healthy, alert and no distress  RESP: lungs clear to auscultation - no rales, rhonchi or wheezes  CV: regular rates and rhythm, normal S1 S2, no S3 or  S4 and no murmur,and no varicosities  ABDOMEN:  soft, nontender, no epigastric pain  SKIN: no suspicious lesions or rashes  NEURO: Denies headache, blurred vision, other vision changes  PSYCH: mentation appears normal. and affect normal/bright  MS/ LEGS: Reflexes normal bilaterally, No clonus bilaterally, No edema and Negative Suki's     CONTRACTIONS: 5-10minutes x  seconds.  Mild to palpation.  Soft resting tone.   FETAL HEART TONES: continuous EFM- baseline 120 with moderate variability and positive accelerations. No decelerations.  NST: REACTIVE  EFW:8lbs    PELVIC EXAM: 4/ 60%/ Mid/ average/ -3   PRESENTATION: Cephalic by official US at [facility] today  BLOOD: no  DISCHARGE: none    ROM: no  ROMPLUS: not done    LABS: Pre-E labs- ALT, AST, creatinine, uric acid, CBC with platelets, Protein/Creatinine Ratio,   Lab results reviewed-   Results for orders placed or performed during the hospital encounter of 09/12/19   CBC with platelets   Result Value Ref Range    WBC 12.1 (H) 4.0 - 11.0 10e9/L    RBC Count 4.08 3.8 - 5.2 10e12/L    Hemoglobin 11.6 (L) 11.7 - 15.7 g/dL    Hematocrit 35.5 35.0 - 47.0 %    MCV 87 78 - 100 fl    MCH 28.4 26.5 - 33.0 pg    MCHC 32.7 31.5 - 36.5 g/dL    RDW 13.8 10.0 - 15.0 %    Platelet Count 228 150 - 450 10e9/L   AST   Result Value Ref Range    AST 13 0 - 45 U/L   ALT   Result Value Ref Range    ALT 14 0 - 50 U/L   Basic metabolic panel   Result Value Ref Range    Sodium 137 133 - 144 mmol/L    Potassium 3.8 3.4 - 5.3 mmol/L    Chloride 107 94 - 109 mmol/L    Carbon Dioxide 22 20 - 32 mmol/L    Anion Gap 8 3 - 14 mmol/L    Glucose 78 70 - 99 mg/dL    Urea Nitrogen 7 7 - 30 mg/dL    Creatinine 0.52 0.52 - 1.04 mg/dL    GFR Estimate >90 >60 mL/min/[1.73_m2]    GFR Estimate If Black >90 >60 mL/min/[1.73_m2]    Calcium 8.4 (L) 8.5 - 10.1 mg/dL   Uric acid   Result Value Ref Range    Uric Acid 4.5 2.6 - 6.0 mg/dL   Protein  random urine with Creat Ratio   Result Value Ref Range    Protein  Random Urine <0.05 g/L    Protein Total Urine g/gr Creatinine Unable to calculate due to low value 0 - 0.2 g/g Cr   Creatinine urine calculation only   Result Value Ref Range    Creatinine Urine 27 mg/dL       DIAGNOSIS  ============  28 year old  at 38w0d seen on the [facility] Triage for hypertension evaluation - normotensive, normal labs    NST: REACTIVE    Patient Active Problem List   Diagnosis     History of miscarriage     Family history of Down syndrome     Encounter for supervision of other normal pregnancy in first trimester     UTI (urinary tract infection)     Personal history of urinary tract infection     Excessive weight gain during pregnancy in third trimester     LGA (large for gestational age) fetus affecting management of mother, third trimester, fetus 1     Anemia during pregnancy in third trimester     first born  within 1 year of life     Elevated BP without diagnosis of hypertension       PLAN  ============  Discharge to home with labor instuctions per discharge instruction form  Call or return to the [facility]place with contractions, cramping, abdominal or pelvic pain, vaginal bleeding, leaking fluid or decreased fetal movement.  Follow up- BP check on Monday.  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  Will call [facility] with result - if normotensive and no s/s will have CARLYLE as planned end of week.   If elevated >/=  or >/DBP 90 will come in for evaluation in triage.  If any s/s over the weekend will contact on call provider asap.    SUSSY Dacosta CNM

## 2019-09-12 NOTE — LETTER
"2019       RE: Chhaya Thurman  [address] [address]     Dear Colleague,    Thank you for referring your patient, Chhaya Thurman, to the [facility] at [facility]. Please see a copy of my visit note below.    Subjective:     28 year old  at 38w0d presents for routine prenatal visit.           Reports irregular contractions. Was seen in triage on  and was 3.5cm.   Denies vaginal bleeding, discharge or leakage of fluid. Reports +fetal movement.    Patient concerns: Feeling well overall. Blood pressure today 141/84, repeat 121/78, 121/80, 118/78.   No HA, vision changes, ruq/epigastric pain.       Had growth ultrasound today. EFW 89%tile, AC >99%tile.     Objective:  Vitals:    19 1420   BP: (!) 141/84   Pulse: 79   Weight: 110.4 kg (243 lb 4.8 oz)   Height: 1.664 m (5' 5.5\")   See OB flowsheet     UItrasound:  EFW 89%tile, AC >99%tile. ANITRA 14.3cm. FHR 129bpm    Assessment/Plan  Encounter Diagnosis   Name Primary?     Encounter for supervision of other normal pregnancy in third trimester Yes     - Reviewed why/how to contact provider if headache/visual changes/RUQ or epigastric pain, decreased fetal movement, vaginal bleeding, leakage of fluid or strong/regular contractions.   Patient education/orders or handouts today:  Sign/symptoms of labor, When to call for labor or other concerns, BPP, NST and Induction of labor    - Reviewed today's growth ultrasound.  - Recommended evaluation at  for serial BPs and pre-eclampsia labs.  Reviewed diagnostic criteria for gestational hypertension and pre-eclampsia. Discussed maternal and fetal risks with these diagnoses and recommendation for induction of labor.  - Pt and  agreeable to recommendation.  - Ambulated to BP in stable condition.  - Charge RN and on call CNM notified.     SUSSY Arreola CNM         "

## 2019-09-16 ENCOUNTER — TELEPHONE (OUTPATIENT)
Dept: OBGYN | Facility: CLINIC | Age: 29
End: 2019-09-16

## 2019-09-16 NOTE — TELEPHONE ENCOUNTER
Leena  at 38 4/7 wks GA reports had one elevated b/p so midwives wanted her to check b/p today and call them to triage. HX GHTN last pregnancy. Not on any b/p medications. Pre-E labs WNL. 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 Denies HA, vision changes,or  RUQ pain. Has appt in clinic this Thursday. Aware to call clinic if develops above s/s.Pt indicated understanding and agreed with plan. Forwarding to on call midwife.

## 2019-09-19 ENCOUNTER — OFFICE VISIT (OUTPATIENT)
Dept: OBGYN | Facility: CLINIC | Age: 29
End: 2019-09-19
Attending: ADVANCED PRACTICE MIDWIFE
Payer: COMMERCIAL

## 2019-09-19 VITALS
WEIGHT: 246.3 LBS | BODY MASS INDEX: 39.58 KG/M2 | HEART RATE: 80 BPM | HEIGHT: 66 IN | SYSTOLIC BLOOD PRESSURE: 124 MMHG | DIASTOLIC BLOOD PRESSURE: 78 MMHG

## 2019-09-19 DIAGNOSIS — O99.013 ANEMIA DURING PREGNANCY IN THIRD TRIMESTER: ICD-10-CM

## 2019-09-19 DIAGNOSIS — R03.0 ELEVATED BP WITHOUT DIAGNOSIS OF HYPERTENSION: ICD-10-CM

## 2019-09-19 DIAGNOSIS — O36.63X1 LGA (LARGE FOR GESTATIONAL AGE) FETUS AFFECTING MANAGEMENT OF MOTHER, THIRD TRIMESTER, FETUS 1: ICD-10-CM

## 2019-09-19 DIAGNOSIS — Z87.440 PERSONAL HISTORY OF URINARY TRACT INFECTION: ICD-10-CM

## 2019-09-19 DIAGNOSIS — Z82.79 FAMILY HISTORY OF DOWN SYNDROME: ICD-10-CM

## 2019-09-19 DIAGNOSIS — O26.03 EXCESSIVE WEIGHT GAIN DURING PREGNANCY IN THIRD TRIMESTER: ICD-10-CM

## 2019-09-19 DIAGNOSIS — Z34.81 ENCOUNTER FOR SUPERVISION OF OTHER NORMAL PREGNANCY IN FIRST TRIMESTER: Primary | ICD-10-CM

## 2019-09-19 PROCEDURE — G0463 HOSPITAL OUTPT CLINIC VISIT: HCPCS | Mod: ZF

## 2019-09-19 ASSESSMENT — MIFFLIN-ST. JEOR: SCORE: 1846.21

## 2019-09-19 ASSESSMENT — PAIN SCALES - GENERAL: PAINLEVEL: NO PAIN (0)

## 2019-09-19 NOTE — LETTER
"2019       RE: Chhaya Thurman  [address] [address]     Dear Colleague,    Thank you for referring your patient, Chhaya Thurman, to the [facility] at [facility]. Please see a copy of my visit note below.    Subjective:     28 year old  at 39w0d presents for routine prenatal visit.            no vaginal bleeding or leakage of fluid.  some contractions.  pos fetal movement.        No HA, visual changes, RUQ or epigastric pain.   Patient concerns:   Feeling well overall.    Objective:  Vitals:    19 1424   BP: 124/78   Pulse: 80   Weight: 111.7 kg (246 lb 4.8 oz)   Height: 1.664 m (5' 5.51\")   See OB flowsheet    Assessment/Plan  Encounter Diagnoses   Name Primary?     Encounter for supervision of other normal pregnancy in first trimester Yes     Family history of Down syndrome      Personal history of urinary tract infection      Excessive weight gain during pregnancy in third trimester      LGA (large for gestational age) fetus affecting management of mother, third trimester, fetus 1      Anemia during pregnancy in third trimester      Elevated BP without diagnosis of hypertension      first born  within 1 year of life      No orders of the defined types were placed in this encounter.    No orders of the defined types were placed in this encounter.  Eid 9-10, membrane sweeping today, min bloody show w exam.    Patient desires  Induction, would like tomorrow-scheduled.  - Reviewed why/how to contact provider if headache/visual changes/RUQ or epigastric pain, decreased fetal movement, vaginal bleeding, leakage of fluid or strong/regular contractions.   Patient education/orders or handouts today:  Sign/symptoms of labor, When to call for labor or other concerns and Induction of labor  IOL tomorrow.    Lindsay Lackey, APRN CNM        "

## 2019-09-19 NOTE — PROGRESS NOTES
"Subjective:     28 year old  at 39w0d presents for routine prenatal visit.            no vaginal bleeding or leakage of fluid.  some contractions.  pos fetal movement.        No HA, visual changes, RUQ or epigastric pain.   Patient concerns:   Feeling well overall.  Objective:  Vitals:    19 1424   BP: 124/78   Pulse: 80   Weight: 111.7 kg (246 lb 4.8 oz)   Height: 1.664 m (5' 5.51\")    See OB flowsheet  Assessment/Plan     Encounter Diagnoses   Name Primary?     Encounter for supervision of other normal pregnancy in first trimester Yes     Family history of Down syndrome      Personal history of urinary tract infection      Excessive weight gain during pregnancy in third trimester      LGA (large for gestational age) fetus affecting management of mother, third trimester, fetus 1      Anemia during pregnancy in third trimester      Elevated BP without diagnosis of hypertension      first born  within 1 year of life      No orders of the defined types were placed in this encounter.    No orders of the defined types were placed in this encounter.  Eid 9-10, membrane sweeping today, min bloody show w exam.    Patient desires  Induction, would like tomorrow-scheduled.  - Reviewed why/how to contact provider if headache/visual changes/RUQ or epigastric pain, decreased fetal movement, vaginal bleeding, leakage of fluid or strong/regular contractions.   Patient education/orders or handouts today:  Sign/symptoms of labor, When to call for labor or other concerns and Induction of labor  IOL tomorrow   Lindsay Lackey, APRN CNM      "

## 2019-09-20 ENCOUNTER — HOSPITAL ENCOUNTER (INPATIENT)
Facility: CLINIC | Age: 29
LOS: 2 days | Discharge: HOME HEALTH | End: 2019-09-22
Attending: OBSTETRICS & GYNECOLOGY | Admitting: ADVANCED PRACTICE MIDWIFE
Payer: COMMERCIAL

## 2019-09-20 ENCOUNTER — ANESTHESIA (OUTPATIENT)
Dept: OBGYN | Facility: CLINIC | Age: 29
End: 2019-09-20
Payer: COMMERCIAL

## 2019-09-20 ENCOUNTER — ANESTHESIA EVENT (OUTPATIENT)
Dept: OBGYN | Facility: CLINIC | Age: 29
End: 2019-09-20
Payer: COMMERCIAL

## 2019-09-20 LAB
ABO + RH BLD: NORMAL
ABO + RH BLD: NORMAL
BASOPHILS # BLD AUTO: 0 10E9/L (ref 0–0.2)
BASOPHILS NFR BLD AUTO: 0.3 %
BLD GP AB SCN SERPL QL: NORMAL
BLOOD BANK CMNT PATIENT-IMP: NORMAL
DIFFERENTIAL METHOD BLD: ABNORMAL
EOSINOPHIL # BLD AUTO: 0.1 10E9/L (ref 0–0.7)
EOSINOPHIL NFR BLD AUTO: 1.2 %
ERYTHROCYTE [DISTWIDTH] IN BLOOD BY AUTOMATED COUNT: 15.8 % (ref 10–15)
HCT VFR BLD AUTO: 36.4 % (ref 35–47)
HGB BLD-MCNC: 11.5 G/DL (ref 11.7–15.7)
IMM GRANULOCYTES # BLD: 0 10E9/L (ref 0–0.4)
IMM GRANULOCYTES NFR BLD: 0.4 %
LYMPHOCYTES # BLD AUTO: 2.7 10E9/L (ref 0.8–5.3)
LYMPHOCYTES NFR BLD AUTO: 25.9 %
MCH RBC QN AUTO: 28.2 PG (ref 26.5–33)
MCHC RBC AUTO-ENTMCNC: 31.6 G/DL (ref 31.5–36.5)
MCV RBC AUTO: 89 FL (ref 78–100)
MONOCYTES # BLD AUTO: 0.7 10E9/L (ref 0–1.3)
MONOCYTES NFR BLD AUTO: 6.4 %
NEUTROPHILS # BLD AUTO: 7 10E9/L (ref 1.6–8.3)
NEUTROPHILS NFR BLD AUTO: 65.8 %
NRBC # BLD AUTO: 0 10*3/UL
NRBC BLD AUTO-RTO: 0 /100
PLATELET # BLD AUTO: 197 10E9/L (ref 150–450)
RBC # BLD AUTO: 4.08 10E12/L (ref 3.8–5.2)
SPECIMEN EXP DATE BLD: NORMAL
T PALLIDUM AB SER QL: NONREACTIVE
WBC # BLD AUTO: 10.6 10E9/L (ref 4–11)

## 2019-09-20 PROCEDURE — 00HU33Z INSERTION OF INFUSION DEVICE INTO SPINAL CANAL, PERCUTANEOUS APPROACH: ICD-10-PCS | Performed by: ANESTHESIOLOGY

## 2019-09-20 PROCEDURE — 25000128 H RX IP 250 OP 636

## 2019-09-20 PROCEDURE — 85025 COMPLETE CBC W/AUTO DIFF WBC: CPT | Performed by: ADVANCED PRACTICE MIDWIFE

## 2019-09-20 PROCEDURE — 12000001 ZZH R&B MED SURG/OB UMMC

## 2019-09-20 PROCEDURE — 25000128 H RX IP 250 OP 636: Performed by: ADVANCED PRACTICE MIDWIFE

## 2019-09-20 PROCEDURE — 86901 BLOOD TYPING SEROLOGIC RH(D): CPT | Performed by: ADVANCED PRACTICE MIDWIFE

## 2019-09-20 PROCEDURE — 86850 RBC ANTIBODY SCREEN: CPT | Performed by: ADVANCED PRACTICE MIDWIFE

## 2019-09-20 PROCEDURE — 86780 TREPONEMA PALLIDUM: CPT | Performed by: ADVANCED PRACTICE MIDWIFE

## 2019-09-20 PROCEDURE — 86900 BLOOD TYPING SEROLOGIC ABO: CPT | Performed by: ADVANCED PRACTICE MIDWIFE

## 2019-09-20 PROCEDURE — 25000128 H RX IP 250 OP 636: Performed by: ANESTHESIOLOGY

## 2019-09-20 PROCEDURE — 72200001 ZZH LABOR CARE VAGINAL DELIVERY SINGLE

## 2019-09-20 PROCEDURE — 25000125 ZZHC RX 250: Performed by: ANESTHESIOLOGY

## 2019-09-20 PROCEDURE — 25000125 ZZHC RX 250: Performed by: ADVANCED PRACTICE MIDWIFE

## 2019-09-20 PROCEDURE — 3E033VJ INTRODUCTION OF OTHER HORMONE INTO PERIPHERAL VEIN, PERCUTANEOUS APPROACH: ICD-10-PCS | Performed by: ADVANCED PRACTICE MIDWIFE

## 2019-09-20 PROCEDURE — 3E0S3BZ INTRODUCTION OF ANESTHETIC AGENT INTO EPIDURAL SPACE, PERCUTANEOUS APPROACH: ICD-10-PCS | Performed by: ANESTHESIOLOGY

## 2019-09-20 PROCEDURE — 0HQ9XZZ REPAIR PERINEUM SKIN, EXTERNAL APPROACH: ICD-10-PCS | Performed by: ADVANCED PRACTICE MIDWIFE

## 2019-09-20 PROCEDURE — 25000132 ZZH RX MED GY IP 250 OP 250 PS 637: Performed by: ADVANCED PRACTICE MIDWIFE

## 2019-09-20 PROCEDURE — 25800030 ZZH RX IP 258 OP 636: Performed by: ADVANCED PRACTICE MIDWIFE

## 2019-09-20 RX ORDER — SODIUM CHLORIDE, SODIUM LACTATE, POTASSIUM CHLORIDE, CALCIUM CHLORIDE 600; 310; 30; 20 MG/100ML; MG/100ML; MG/100ML; MG/100ML
INJECTION, SOLUTION INTRAVENOUS CONTINUOUS
Status: DISCONTINUED | OUTPATIENT
Start: 2019-09-20 | End: 2019-09-20

## 2019-09-20 RX ORDER — OXYTOCIN/0.9 % SODIUM CHLORIDE 30/500 ML
340 PLASTIC BAG, INJECTION (ML) INTRAVENOUS CONTINUOUS PRN
Status: DISCONTINUED | OUTPATIENT
Start: 2019-09-20 | End: 2019-09-22 | Stop reason: HOSPADM

## 2019-09-20 RX ORDER — AMOXICILLIN 250 MG
1 CAPSULE ORAL 2 TIMES DAILY
Status: DISCONTINUED | OUTPATIENT
Start: 2019-09-20 | End: 2019-09-22 | Stop reason: HOSPADM

## 2019-09-20 RX ORDER — OXYTOCIN 10 [USP'U]/ML
10 INJECTION, SOLUTION INTRAMUSCULAR; INTRAVENOUS
Status: DISCONTINUED | OUTPATIENT
Start: 2019-09-20 | End: 2019-09-22 | Stop reason: HOSPADM

## 2019-09-20 RX ORDER — ACETAMINOPHEN 325 MG/1
650 TABLET ORAL EVERY 4 HOURS PRN
Status: DISCONTINUED | OUTPATIENT
Start: 2019-09-20 | End: 2019-09-20

## 2019-09-20 RX ORDER — CARBOPROST TROMETHAMINE 250 UG/ML
250 INJECTION, SOLUTION INTRAMUSCULAR
Status: DISCONTINUED | OUTPATIENT
Start: 2019-09-20 | End: 2019-09-20

## 2019-09-20 RX ORDER — METOCLOPRAMIDE HYDROCHLORIDE 5 MG/ML
10 INJECTION INTRAMUSCULAR; INTRAVENOUS EVERY 6 HOURS PRN
Status: DISCONTINUED | OUTPATIENT
Start: 2019-09-20 | End: 2019-09-20

## 2019-09-20 RX ORDER — NALOXONE HYDROCHLORIDE 0.4 MG/ML
.1-.4 INJECTION, SOLUTION INTRAMUSCULAR; INTRAVENOUS; SUBCUTANEOUS
Status: DISCONTINUED | OUTPATIENT
Start: 2019-09-20 | End: 2019-09-20

## 2019-09-20 RX ORDER — EPHEDRINE SULFATE 50 MG/ML
INJECTION, SOLUTION INTRAMUSCULAR; INTRAVENOUS; SUBCUTANEOUS
Status: DISCONTINUED
Start: 2019-09-20 | End: 2019-09-20 | Stop reason: WASHOUT

## 2019-09-20 RX ORDER — LANOLIN 100 %
OINTMENT (GRAM) TOPICAL
Status: DISCONTINUED | OUTPATIENT
Start: 2019-09-20 | End: 2019-09-22 | Stop reason: HOSPADM

## 2019-09-20 RX ORDER — METHYLERGONOVINE MALEATE 0.2 MG/ML
200 INJECTION INTRAVENOUS
Status: DISCONTINUED | OUTPATIENT
Start: 2019-09-20 | End: 2019-09-20

## 2019-09-20 RX ORDER — OXYTOCIN/0.9 % SODIUM CHLORIDE 30/500 ML
100-340 PLASTIC BAG, INJECTION (ML) INTRAVENOUS CONTINUOUS PRN
Status: COMPLETED | OUTPATIENT
Start: 2019-09-20 | End: 2019-09-20

## 2019-09-20 RX ORDER — LIDOCAINE 40 MG/G
CREAM TOPICAL
Status: DISCONTINUED | OUTPATIENT
Start: 2019-09-20 | End: 2019-09-20

## 2019-09-20 RX ORDER — ONDANSETRON 2 MG/ML
4 INJECTION INTRAMUSCULAR; INTRAVENOUS EVERY 6 HOURS PRN
Status: DISCONTINUED | OUTPATIENT
Start: 2019-09-20 | End: 2019-09-20

## 2019-09-20 RX ORDER — PROCHLORPERAZINE 25 MG
25 SUPPOSITORY, RECTAL RECTAL EVERY 12 HOURS PRN
Status: DISCONTINUED | OUTPATIENT
Start: 2019-09-20 | End: 2019-09-20

## 2019-09-20 RX ORDER — EPHEDRINE SULFATE 50 MG/ML
5 INJECTION, SOLUTION INTRAMUSCULAR; INTRAVENOUS; SUBCUTANEOUS
Status: DISCONTINUED | OUTPATIENT
Start: 2019-09-20 | End: 2019-09-20

## 2019-09-20 RX ORDER — TERBUTALINE SULFATE 1 MG/ML
0.25 INJECTION, SOLUTION SUBCUTANEOUS
Status: DISCONTINUED | OUTPATIENT
Start: 2019-09-20 | End: 2019-09-20

## 2019-09-20 RX ORDER — FENTANYL CITRATE 50 UG/ML
50-100 INJECTION, SOLUTION INTRAMUSCULAR; INTRAVENOUS
Status: DISCONTINUED | OUTPATIENT
Start: 2019-09-20 | End: 2019-09-20

## 2019-09-20 RX ORDER — MISOPROSTOL 200 UG/1
TABLET ORAL
Status: DISCONTINUED
Start: 2019-09-20 | End: 2019-09-20 | Stop reason: HOSPADM

## 2019-09-20 RX ORDER — AMOXICILLIN 250 MG
2 CAPSULE ORAL 2 TIMES DAILY
Status: DISCONTINUED | OUTPATIENT
Start: 2019-09-20 | End: 2019-09-22 | Stop reason: HOSPADM

## 2019-09-20 RX ORDER — OXYTOCIN/0.9 % SODIUM CHLORIDE 30/500 ML
100 PLASTIC BAG, INJECTION (ML) INTRAVENOUS CONTINUOUS
Status: DISCONTINUED | OUTPATIENT
Start: 2019-09-20 | End: 2019-09-22 | Stop reason: HOSPADM

## 2019-09-20 RX ORDER — OXYTOCIN 10 [USP'U]/ML
10 INJECTION, SOLUTION INTRAMUSCULAR; INTRAVENOUS
Status: DISCONTINUED | OUTPATIENT
Start: 2019-09-20 | End: 2019-09-20

## 2019-09-20 RX ORDER — NALBUPHINE HYDROCHLORIDE 10 MG/ML
2.5-5 INJECTION, SOLUTION INTRAMUSCULAR; INTRAVENOUS; SUBCUTANEOUS EVERY 6 HOURS PRN
Status: DISCONTINUED | OUTPATIENT
Start: 2019-09-20 | End: 2019-09-20

## 2019-09-20 RX ORDER — BISACODYL 10 MG
10 SUPPOSITORY, RECTAL RECTAL DAILY PRN
Status: DISCONTINUED | OUTPATIENT
Start: 2019-09-22 | End: 2019-09-22 | Stop reason: HOSPADM

## 2019-09-20 RX ORDER — OXYTOCIN/0.9 % SODIUM CHLORIDE 30/500 ML
1-24 PLASTIC BAG, INJECTION (ML) INTRAVENOUS CONTINUOUS
Status: DISCONTINUED | OUTPATIENT
Start: 2019-09-20 | End: 2019-09-20

## 2019-09-20 RX ORDER — LIDOCAINE HYDROCHLORIDE AND EPINEPHRINE 15; 5 MG/ML; UG/ML
INJECTION, SOLUTION EPIDURAL PRN
Status: DISCONTINUED | OUTPATIENT
Start: 2019-09-20 | End: 2019-09-20 | Stop reason: HOSPADM

## 2019-09-20 RX ORDER — OXYTOCIN 10 [USP'U]/ML
INJECTION, SOLUTION INTRAMUSCULAR; INTRAVENOUS
Status: DISCONTINUED
Start: 2019-09-20 | End: 2019-09-20 | Stop reason: WASHOUT

## 2019-09-20 RX ORDER — NALOXONE HYDROCHLORIDE 0.4 MG/ML
.1-.4 INJECTION, SOLUTION INTRAMUSCULAR; INTRAVENOUS; SUBCUTANEOUS
Status: DISCONTINUED | OUTPATIENT
Start: 2019-09-20 | End: 2019-09-22 | Stop reason: HOSPADM

## 2019-09-20 RX ORDER — OXYCODONE AND ACETAMINOPHEN 5; 325 MG/1; MG/1
1 TABLET ORAL
Status: DISCONTINUED | OUTPATIENT
Start: 2019-09-20 | End: 2019-09-20

## 2019-09-20 RX ORDER — IBUPROFEN 800 MG/1
800 TABLET, FILM COATED ORAL
Status: DISCONTINUED | OUTPATIENT
Start: 2019-09-20 | End: 2019-09-20

## 2019-09-20 RX ORDER — MISOPROSTOL 200 UG/1
400 TABLET ORAL
Status: DISCONTINUED | OUTPATIENT
Start: 2019-09-20 | End: 2019-09-22 | Stop reason: HOSPADM

## 2019-09-20 RX ORDER — IBUPROFEN 800 MG/1
800 TABLET, FILM COATED ORAL EVERY 6 HOURS PRN
Status: DISCONTINUED | OUTPATIENT
Start: 2019-09-20 | End: 2019-09-22 | Stop reason: HOSPADM

## 2019-09-20 RX ORDER — BUPIVACAINE HYDROCHLORIDE 2.5 MG/ML
INJECTION, SOLUTION INFILTRATION; PERINEURAL PRN
Status: DISCONTINUED | OUTPATIENT
Start: 2019-09-20 | End: 2019-09-20 | Stop reason: HOSPADM

## 2019-09-20 RX ORDER — HYDROCORTISONE 2.5 %
CREAM (GRAM) TOPICAL 3 TIMES DAILY PRN
Status: DISCONTINUED | OUTPATIENT
Start: 2019-09-20 | End: 2019-09-22 | Stop reason: HOSPADM

## 2019-09-20 RX ORDER — LIDOCAINE HYDROCHLORIDE 10 MG/ML
INJECTION, SOLUTION EPIDURAL; INFILTRATION; INTRACAUDAL; PERINEURAL
Status: DISCONTINUED
Start: 2019-09-20 | End: 2019-09-20 | Stop reason: HOSPADM

## 2019-09-20 RX ORDER — ACETAMINOPHEN 325 MG/1
650 TABLET ORAL EVERY 4 HOURS PRN
Status: DISCONTINUED | OUTPATIENT
Start: 2019-09-20 | End: 2019-09-22 | Stop reason: HOSPADM

## 2019-09-20 RX ORDER — FENTANYL/BUPIVACAINE/NS/PF 2-1250MCG
PLASTIC BAG, INJECTION (ML) INJECTION CONTINUOUS PRN
Status: DISCONTINUED | OUTPATIENT
Start: 2019-09-20 | End: 2019-09-20 | Stop reason: HOSPADM

## 2019-09-20 RX ADMIN — LIDOCAINE HYDROCHLORIDE 20 ML: 10 INJECTION, SOLUTION EPIDURAL; INFILTRATION; INTRACAUDAL; PERINEURAL at 19:05

## 2019-09-20 RX ADMIN — Medication 340 ML/HR: at 18:58

## 2019-09-20 RX ADMIN — SODIUM CHLORIDE, POTASSIUM CHLORIDE, SODIUM LACTATE AND CALCIUM CHLORIDE 125 ML/HR: 600; 310; 30; 20 INJECTION, SOLUTION INTRAVENOUS at 10:50

## 2019-09-20 RX ADMIN — Medication 10 ML/HR: at 16:56

## 2019-09-20 RX ADMIN — LIDOCAINE HYDROCHLORIDE,EPINEPHRINE BITARTRATE 3 ML: 15; .005 INJECTION, SOLUTION EPIDURAL; INFILTRATION; INTRACAUDAL; PERINEURAL at 16:52

## 2019-09-20 RX ADMIN — BUPIVACAINE HYDROCHLORIDE 8 ML: 2.5 INJECTION, SOLUTION INFILTRATION; PERINEURAL at 16:55

## 2019-09-20 RX ADMIN — Medication 2 MILLI-UNITS/MIN: at 10:50

## 2019-09-20 RX ADMIN — Medication 10 ML/HR: at 17:02

## 2019-09-20 RX ADMIN — SODIUM CHLORIDE, POTASSIUM CHLORIDE, SODIUM LACTATE AND CALCIUM CHLORIDE: 600; 310; 30; 20 INJECTION, SOLUTION INTRAVENOUS at 18:43

## 2019-09-20 RX ADMIN — SODIUM CHLORIDE, POTASSIUM CHLORIDE, SODIUM LACTATE AND CALCIUM CHLORIDE 1000 ML: 600; 310; 30; 20 INJECTION, SOLUTION INTRAVENOUS at 16:01

## 2019-09-20 RX ADMIN — SODIUM CHLORIDE, POTASSIUM CHLORIDE, SODIUM LACTATE AND CALCIUM CHLORIDE: 600; 310; 30; 20 INJECTION, SOLUTION INTRAVENOUS at 16:21

## 2019-09-20 RX ADMIN — IBUPROFEN 800 MG: 800 TABLET ORAL at 19:54

## 2019-09-20 RX ADMIN — FENTANYL CITRATE 100 MCG: 50 INJECTION INTRAMUSCULAR; INTRAVENOUS at 16:24

## 2019-09-20 ASSESSMENT — MIFFLIN-ST. JEOR: SCORE: 1852.6

## 2019-09-20 NOTE — ANESTHESIA PROCEDURE NOTES
Epidural Procedure Note    Staff:     Anesthesiologist:  Boom Gibson DO  Location: OB   Pre-procedure checklist:   patient identified, IV checked, site marked, risks and benefits discussed, informed consent, monitors and equipment checked, pre-op evaluation, at physician/surgeon's request and post-op pain management      Correct Patient: Yes      Correct Position: Yes      Correct Site: Yes      Correct Procedure: Yes      Correct Laterality:  Yes    Site Marked:  Yes  Procedure:     Procedure:  Epidural catheter    ASA:  2    Position:  Sitting    Sterile Prep: Betadine      Insertion site:  L4-5    Local skin infiltration:  1% lidocaine    amount (mL):  3    Approach:  Midline    Needle gauge (G):  17    Needle Length (in):  5    Block Needle Type:  Touhy    Injection Technique:  LORT saline    ALEJANDRO at (cm):  6    Attempts:  1    Redirects:  0    Catheter gauge (G):  19    Catheter threaded easily: Yes      Threaded to cm at skin:  10    Threaded in epidural space (cm):  4    Paresthesias:  No    Aspiration negative for Heme or CSF: Yes      Test dose (mL):  3     Local anesthetic:  Lidocaine 1.5% w/ 1:200,000 epinephrine    Test dose negative for signs of intravascular, subdural or intrathecal injection: Yes    Assessment/Narrative:     Sensory Level Left:  T9    Sensory Level Right:  T9

## 2019-09-20 NOTE — H&P
ADMIT NOTE  =================  39w1d    Chhaya Thurman is a 28 year old female with an Patient's last menstrual period was 2018. and Estimated Date of Delivery: Sep 26, 2019 [facility] at 9:25 AM with for induction of labor.  Indication: elective, history of loss of her first child and 2 miscarriages.     HPI  ================  Contractions- none  Fetal movement- active  ROM- no   Vaginal bleeding- none  GBS- negative  FOB- is involved, supportive and here with her today.   Other labor support- no    Weight gain- 194 - 246 lbs, Total weight gain- 51 lbs  Height- 66  BMI- 31  First prenatal visit at 8 weeks, Total visits- 14    PROBLEM LIST  =================  Patient Active Problem List    Diagnosis Date Noted     Labor and delivery, indication for care 2019     Priority: Medium     Elevated BP without diagnosis of hypertension 2019     Priority: Medium     19 - Follow up- BP check on Monday.  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  Will call [facility] with result - if normotensive and no s/s will have CARLYLE as planned end of week.   If elevated >/=  or >/DBP 90 will come in for evaluation in triage.  If any s/s over the weekend will contact on call provider asap.        first born  within 1 year of life 2019     Priority: Medium     Class: Acute     Testing showed mild MTHFR. THIS  needs cord blood sent for MTHFR and to be started on folate and B12.   Alley Henao MD  (per Yamilka Morin)       Anemia during pregnancy in third trimester 2019     Priority: Medium     2019 - Hg noted 10.7.  Dietary iron, Daily iron supplement, Vitamin C, Vitamin B12.  Recheck in labor and prn.  Mychart sent.  Routed to nurse team to call pt       LGA (large for gestational age) fetus affecting management of mother, third trimester, fetus 1 2019     Priority:  Medium     :   USEGA = 35 3/7 weeks.  EFW = 2772 grams, 91% for 33 weeks. AC  = 98%    ANITRA = 15.9cm.  FHR = 133bpm    Placenta posterior and grade 1   Comments: LGA  Further studies: Recommend repeat growth scan in 4 weeks to assist with delivery planning.    USEGA = 38 3/7 weeks.  EFW = 3772 grams, 89 % for 38 weeks. AC = >99%       Excessive weight gain during pregnancy in third trimester 07/10/2019     Priority: Medium     7/10/19- 37 lbs at 28w       Personal history of urinary tract infection 2019     Priority: Medium     Encounter for supervision of other normal pregnancy in first trimester 2019     Priority: Medium      (passed at 4months) [facility] pt  3/4/19: genetic counseling appt today   NIPT in process  Level 2 ultrasound scheduled for   19- AFP neg       UTI (urinary tract infection) 2018     Priority: Medium     5 UTI's in the past few months  3/13/19: Reviewed at [facility] rounds-plan daily suppression.  Patient notified and rx sent for Keflex 500mg at bedtime  2019 - UTI symptoms - UA shows moderate leuks. Wait for full culture.   19- UA- neg, UC____       Family history of Down syndrome 2018     Priority: Medium     - Daughter Ralph-        History of miscarriage 2017     Priority: Medium       HISTORIES  ============  Allergies   Allergen Reactions     Imitrex [Sumatriptan]      Past Medical History:   Diagnosis Date     Chickenpox      Fetal cardiac anomaly affecting pregnancy, antepartum 2018     History of recurrent UTI (urinary tract infection)      Hypertension     with first pregnancy     Past Surgical History:   Procedure Laterality Date     DILATION AND CURETTAGE, OPERATIVE HYSTEROSCOPY, COMBINED N/A 2017    Procedure: COMBINED DILATION AND CURETTAGE, OPERATIVE HYSTEROSCOPY;  Hysteroscopy with dilation and curettage, polypectomy;  Surgeon: Mable Hanson MD;  Location: [facility] OR     ENT SURGERY  20 years ago    tubes in  "three times     MOUTH SURGERY  2008    wisdom teeth -age 16     TONSILLECTOMY     .  Family History   Problem Relation Age of Onset     Other Cancer Maternal Grandfather         skin     Hyperlipidemia Mother      Unknown/Adopted Father         unknown history     Stomach Cancer Maternal Grandmother      Other Cancer Maternal Grandmother      Down Syndrome Daughter      Heart Defect Daughter      Diabetes No family hx of      Social History     Tobacco Use     Smoking status: Never Smoker     Smokeless tobacco: Never Used   Substance Use Topics     Alcohol use: No     Alcohol/week: 0.0 oz     Comment: occas-quit with pregnancy     OB History    Para Term  AB Living   4 1 1 0 2 0   SAB TAB Ectopic Multiple Live Births   1 0 0 0 1      # Outcome Date GA Lbr Gerald/2nd Weight Sex Delivery Anes PTL Lv   4 Current            3 Term 18 37w4d 07:19 / 02:39 2.72 kg (5 lb 15.9 oz) F Vag-Spont EPI N DEC      Complications: Preeclampsia/Hypertension      Name: Ralph      Apgar1: 8  Apgar5: 9   2 SAB 17 9w1d    AB, MISSED      1 AB 17 8w0d    SAB           LABS:   ===========  Prenatal Labs:  Rhogam not indicated   Lab Results   Component Value Date    ABO O 2019    RH Pos 2019    AS Neg 2019    HEPBANG Nonreactive 2019    TREPAB Negative 2018    HGB 11.6 (L) 2019     Rubella immune  Lab Results   Component Value Date    GBS Negative 2019     Other labs:  No results found for this or any previous visit (from the past 24 hour(s)).    ROS  =========  Pt denies significant respiratory, cardiovacular, GI, or muscular/skeletalcomplaints.    See RN data base ROS.     PHYSICAL EXAM:  ===============  /75   Pulse 71   Temp 98.4  F (36.9  C) (Oral)   Resp 16   Ht 1.676 m (5' 6\")   Wt 111.6 kg (246 lb)   LMP 2018   BMI 39.71 kg/m    General appearance: comfortable  GENERAL APPEARANCE: healthy, alert and no distress  RESP: lungs clear to " auscultation - no rales, rhonchi or wheezes  BREAST: normal without masses, tenderness or nipple discharge and no palpable axillary masses or adenopathy  CV: regular rates and rhythm, normal S1 S2, no S3 or S4 and no murmur,and no varicosities  ABDOMEN:  soft, nontender, no epigastric pain  SKIN: no suspicious lesions or rashes  NEURO: Denies headache, blurred vision, other vision changes  PSYCH: mentation appears normal. and affect normal/bright  Legs: Reflexes normal bilaterally and No clonus bilaterally     Abdomen: gravid, vertex fetus per Leopold's, non-tender between contractions.   Cephalic presentation confirmed by BSUS  EFW-  9 lbs.   CONTRACTIONS: none  FETAL HEART TONES: continuous EFM- baseline 125 with moderate variability and positive accelerations. No decelerations.  PELVIC EXAM: 5/ 80%/ Posterior/ average/ -2   PEREZ SCORE: 8  BLOODY SHOW: no   ROM:no  FLUID: none  AMNISURE: not done    ASSESSMENT:  ==============  IUP @ 39w1d admitted for induction of labor.  Indication: elective and history of infant loss within first year of life   NST REACTIVE  Fetal Heart Rate - category one  GBS- negative    Patient Active Problem List   Diagnosis     History of miscarriage     Family history of Down syndrome     Encounter for supervision of other normal pregnancy in first trimester     UTI (urinary tract infection)     Personal history of urinary tract infection     Excessive weight gain during pregnancy in third trimester     LGA (large for gestational age) fetus affecting management of mother, third trimester, fetus 1     Anemia during pregnancy in third trimester     first born  within 1 year of life     Elevated BP without diagnosis of hypertension     Labor and delivery, indication for care       PLAN:  ===========  Admit - see IP orders  Pain medication options reviewed. Pt is interested in epidural  Labor induction with Pitocin reviewed with pt. Agreeable to plan  Ambulation, hydration, position  changes, birthing ball and tub options to facilitate labor reviewed with pt .  Anticipate   Observation and reevaluate in 1-2 hours prn  Reviewed information about MTHFR gene found in autopsy report that was given to pt yesterday in clinic - they agree to discussion with pediatrician on call today to discuss recommendations for folic acid supplement.   Steven Barkley paged to come discuss plan with pt.     Lynn Bo CNM     NST Documentation  =============

## 2019-09-20 NOTE — PROGRESS NOTES
"[facility] Labor and Delivery Progress Note    Chhaya Thurman MRN# [id number]   Age: 28 year old YOB: 1990         Subjective:   Pt is up in room they have two more family members joining them now. Dr. Barkley was in to visit with family about the significance of recent pathology findings (see previous notes) and told them she would be back in to revisit this tomorrow after she has done some more fact finding.             Objective:     Patient Vitals for the past 24 hrs:   BP Temp Temp src Pulse Resp Height Weight BMI (Calculated)   19 1301 120/67 -- -- -- -- -- -- --   19 0916 126/75 98.4  F (36.9  C) Oral 71 16 1.676 m (5' 6\") 111.6 kg (246 lb) 39.7       Cervical Exam: deferred.    Membranes: Intact     Fetal Heart Rate:    Monitor: young    Variability: moderate (amplitude range 6 to 25 bpm)    Baseline Rate: normal range    Fetal Heart Ra te Tracing: cat II     Contractions: every 3-5 minutes, lasting 40-60 seconds, mild    Pitocin: 8mU per hour        Assessment:   Chhaya Thurman is a 28 year old  who is 39w1d here with elective IOL           Plan:   Labor induction with Pitocin  Pain medication epidural when pt requests  Birth ball brought in per pt request    Lynn Bo     Addendum: Called back to room around 1455 and + SROM, potentially for light mec, will continue to monitor.      "

## 2019-09-20 NOTE — PLAN OF CARE
CNM in for SVE and discussion of pitocin. Pt in agreement with starting pitocin. Discussed start, titration, monitoring. Pt interested in epidural at some time and MD updated re pt request for no residents. Will encourage amb, movement, pos changes. Exp mgt.

## 2019-09-20 NOTE — PROGRESS NOTES
"[facility] Labor and Delivery Progress Note    Chhaya Thurman MRN# [id number]   Age: 28 year old YOB: 1990         Subjective:   Pt is up walking in room. Is eating lightly and reports is starting to feel some cramping/contractions.            Objective:     Patient Vitals for the past 24 hrs:   BP Temp Temp src Pulse Resp Height Weight BMI (Calculated)   19 0916 126/75 98.4  F (36.9  C) Oral 71 16 1.676 m (5' 6\") 111.6 kg (246 lb) 39.7         Cervical Exam: deferred    Membranes: Intact     Fetal Heart Rate:    Monitor: young    Variability: moderate (amplitude range 6 to 25 bpm)    Baseline Rate: normal range    Fetal Heart Rate Tracing: Cat 1  Contractions: Every 2-12 minutes, irregular, lasting 40-60 seconds, mild to palpation.    Pitocin is at 4 mU/hr      Assessment:   Chhaya Thurman is a 28 year old  who is 39w1d here with elective IOL         Plan:   Labor induction with Pitocin  Pain medication epidural when pt requests  Continue to encourage ambulation/upright positioning  Updated that Dr. Barkley will be in at some point today and is aware of the request to come see the family      Lynn Bo    "

## 2019-09-20 NOTE — PLAN OF CARE
Patient reporting increased pain and discomfort.  Requesting an epidural for pain relief.  MDA called and in room at 1645. Patient and procedure correctly identified/ verified with MDA. Consent signed. 1000 mL fluid bolus given prior to epidural placement.  Epidural placed by MDA without complication. Test dose/ bolus given by MDA, patient tolerated well. Patient reports pain improving after procedure.

## 2019-09-20 NOTE — PROGRESS NOTES
"[facility] Labor and Delivery Progress Note    Chhaya Thurman MRN# [id number]   Age: 28 year old YOB: 1990         Subjective:   Leena is comfortable with her epidural but has increased pelvic pressure with contractions. She continues to leak some light mec stained fluid.            Objective:     Patient Vitals for the past 24 hrs:   BP Temp Temp src Pulse Resp SpO2 Height Weight BMI (Calculated) Oximeter Heart Rate   09/20/19 1735 119/54 -- -- -- -- 98 % -- -- -- 84 bpm   09/20/19 1730 122/69 98  F (36.7  C) Oral -- -- 96 % -- -- -- 88 bpm   09/20/19 1723 134/71 -- -- -- 16 -- -- -- -- 80 bpm   09/20/19 1721 127/71 -- -- -- 16 -- -- -- -- 75 bpm   09/20/19 1719 126/76 -- -- -- 16 -- -- -- -- 77 bpm   09/20/19 1717 123/67 -- -- -- 16 -- -- -- -- 75 bpm   09/20/19 1715 125/69 -- -- -- 16 98 % -- -- -- 78 bpm   09/20/19 1713 122/69 -- -- -- 16 -- -- -- -- 86 bpm   09/20/19 1711 124/68 -- -- -- 16 -- -- -- -- 78 bpm   09/20/19 1709 124/69 -- -- -- 16 -- -- -- -- 75 bpm   09/20/19 1707 126/70 -- -- -- 18 -- -- -- -- 80 bpm   09/20/19 1705 123/70 -- -- -- 18 98 % -- -- -- 89 bpm   09/20/19 1703 127/72 -- -- -- 16 -- -- -- -- 80 bpm   09/20/19 1701 128/74 -- -- -- 18 -- -- -- -- 80 bpm   09/20/19 1630 133/72 97.3  F (36.3  C) Oral -- 20 -- -- -- -- 83 bpm   09/20/19 1541 134/77 99.1  F (37.3  C) Axillary -- 18 -- -- -- -- 81 bpm   09/20/19 1455 -- 98.2  F (36.8  C) Oral -- -- -- -- -- -- --   09/20/19 1301 120/67 -- -- -- -- -- -- -- -- --   09/20/19 0916 126/75 98.4  F (36.9  C) Oral 71 16 -- 1.676 m (5' 6\") 111.6 kg (246 lb) 39.7 --       Cervical Exam: 9.5/100/0    Membranes: Leaking     Fetal Heart Rate:    Monitor: young    Variability: moderate (amplitude range 6 to 25 bpm)    Baseline Rate: normal range    Fetal Heart Rate Tracing: Cat II, recurrent early, variable and late declerations.     Contractions: every 2-4 minutes lasting  seconds, strong to palpation.    Pitocin is now " down to 4 mU.       Assessment:   Chhaya Thurman is a 28 year old  who is 39w1d here with elective IOL.        Plan:   Labor induction with Pitocin  Pain medication epidural  Using peanut ball  Labor down based on FHTs  Anticipate     Lynn Bo

## 2019-09-20 NOTE — PROGRESS NOTES
"[facility] Labor and Delivery Progress Note    Chhaya Thurman MRN# [id number]   Age: 28 year old YOB: 1990         Subjective:   Leena is pushing with strong effort and with continuous support from  Nahid, CNSHANNA and RN           Objective:     Patient Vitals for the past 24 hrs:   BP Temp Temp src Pulse Resp SpO2 Height Weight BMI (Calculated) Oximeter Heart Rate   09/20/19 1821 -- 98  F (36.7  C) Axillary -- -- -- -- -- -- --   09/20/19 1815 136/70 -- -- -- 16 98 % -- -- -- 97 bpm   09/20/19 1745 110/57 -- -- -- 16 -- -- -- -- 85 bpm   09/20/19 1740 108/58 -- -- -- 16 98 % -- -- -- 83 bpm   09/20/19 1735 119/54 -- -- -- 16 98 % -- -- -- 84 bpm   09/20/19 1730 122/69 98  F (36.7  C) Oral -- 16 96 % -- -- -- 88 bpm   09/20/19 1723 134/71 -- -- -- 16 -- -- -- -- 80 bpm   09/20/19 1721 127/71 -- -- -- 16 -- -- -- -- 75 bpm   09/20/19 1719 126/76 -- -- -- 16 -- -- -- -- 77 bpm   09/20/19 1717 123/67 -- -- -- 16 -- -- -- -- 75 bpm   09/20/19 1715 125/69 -- -- -- 16 98 % -- -- -- 78 bpm   09/20/19 1713 122/69 -- -- -- 16 -- -- -- -- 86 bpm   09/20/19 1711 124/68 -- -- -- 16 -- -- -- -- 78 bpm   09/20/19 1709 124/69 -- -- -- 16 -- -- -- -- 75 bpm   09/20/19 1707 126/70 -- -- -- 18 -- -- -- -- 80 bpm   09/20/19 1705 123/70 -- -- -- 18 98 % -- -- -- 89 bpm   09/20/19 1703 127/72 -- -- -- 16 -- -- -- -- 80 bpm   09/20/19 1701 128/74 -- -- -- 18 -- -- -- -- 80 bpm   09/20/19 1630 133/72 97.3  F (36.3  C) Oral -- 20 -- -- -- -- 83 bpm   09/20/19 1541 134/77 99.1  F (37.3  C) Axillary -- 18 -- -- -- -- 81 bpm   09/20/19 1455 -- 98.2  F (36.8  C) Oral -- -- -- -- -- -- --   09/20/19 1301 120/67 -- -- -- -- -- -- -- -- --   09/20/19 0916 126/75 98.4  F (36.9  C) Oral 71 16 -- 1.676 m (5' 6\") 111.6 kg (246 lb) 39.7 --         Cervical Exam: 10 / 100% / 1         Membranes: Leaking     Fetal Heart Rate:    Monitor: young    Variability: moderate (amplitude range 6 to 25 bpm)    Baseline Rate: normal " range    Fetal Heart Rate Tracing: Cat II  Contractions: strong, pushing    Pitocin remains at 4mU        Assessment:   Chhaya Thurman is a 28 year old  who is 39w1d here with IOL         Plan:   Reviewed plan for pushing and anticipation of a shoulder dystocia with pt  Anticipate   Report given to Malaika Bo

## 2019-09-20 NOTE — ANESTHESIA PREPROCEDURE EVALUATION
Anesthesia Pre-Procedure Evaluation    Patient: Chhaya Thurman   MRN:     [id number] Gender:   female   Age:    28 year old :      1990        Preoperative Diagnosis: * No surgery found *        Past Medical History:   Diagnosis Date     Chickenpox      Fetal cardiac anomaly affecting pregnancy, antepartum 2018     History of recurrent UTI (urinary tract infection)      Hypertension     with first pregnancy      Past Surgical History:   Procedure Laterality Date     DILATION AND CURETTAGE, OPERATIVE HYSTEROSCOPY, COMBINED N/A 2017    Procedure: COMBINED DILATION AND CURETTAGE, OPERATIVE HYSTEROSCOPY;  Hysteroscopy with dilation and curettage, polypectomy;  Surgeon: Mable Hanson MD;  Location: [facility] OR     ENT SURGERY  20 years ago    tubes in three times     MOUTH SURGERY      wisdom teeth -age 16     TONSILLECTOMY            Anesthesia Evaluation     .             ROS/MED HX    ENT/Pulmonary:  - neg pulmonary ROS     Neurologic:  - neg neurologic ROS     Cardiovascular:     (+) hypertension--PIH, --. : . . . :. .       METS/Exercise Tolerance:     Hematologic:         Musculoskeletal:         GI/Hepatic:     (+) GERD       Renal/Genitourinary:         Endo:         Psychiatric:         Infectious Disease:         Malignancy:         Other:                     [facility] AN PHYSICAL EXAM    LABS:  CBC:   Lab Results   Component Value Date    WBC 10.6 2019    WBC 12.1 (H) 2019    HGB 11.5 (L) 2019    HGB 11.6 (L) 2019    HCT 36.4 2019    HCT 35.5 2019     2019     2019     BMP:   Lab Results   Component Value Date     2019     2017    POTASSIUM 3.8 2019    POTASSIUM 3.8 2017    CHLORIDE 107 2019    CHLORIDE 106 2017    CO2 22 2019    CO2 24 2017    BUN 7 2019    BUN 10 2017    CR 0.52 2019    CR 0.62 2018    GLC 78 2019    GLC 87  "01/27/2017     COAGS: No results found for: PTT, INR, FIBR  POC:   Lab Results   Component Value Date    HCG Negative 01/14/2019     OTHER:   Lab Results   Component Value Date    A1C 5.0 03/11/2019    NORI 8.4 (L) 09/12/2019    ALBUMIN 3.5 09/14/2016    PROTTOTAL 7.9 09/14/2016    ALT 14 09/12/2019    AST 13 09/12/2019    ALKPHOS 80 09/14/2016    BILITOTAL 0.2 09/14/2016    TSH 1.41 07/20/2017        Preop Vitals    BP Readings from Last 3 Encounters:   09/20/19 134/77   09/19/19 124/78   09/12/19 122/66    Pulse Readings from Last 3 Encounters:   09/20/19 71   09/19/19 80   09/12/19 81      Resp Readings from Last 3 Encounters:   09/20/19 18   09/12/19 18   09/08/19 18    SpO2 Readings from Last 3 Encounters:   02/22/19 99%   02/10/19 100%   01/28/19 98%      Temp Readings from Last 1 Encounters:   09/20/19 37.3  C (99.1  F) (Axillary)    Ht Readings from Last 1 Encounters:   09/20/19 1.676 m (5' 6\")      Wt Readings from Last 1 Encounters:   09/20/19 111.6 kg (246 lb)    Estimated body mass index is 39.71 kg/m  as calculated from the following:    Height as of this encounter: 1.676 m (5' 6\").    Weight as of this encounter: 111.6 kg (246 lb).     LDA:  Peripheral IV 09/20/19 Anterior;Left Hand (Active)   Number of days: 0        [facility] AN PLAN NO PONV RULE    neg OB ROS             Boom Gibson, DO  "

## 2019-09-20 NOTE — PLAN OF CARE
Pt to BP for IOL. Hx baby with trisomy 21 who  last October r/t complications of same. Appears well and happy today max wilson. Offer to call spiritual health services at any time. [place] to room, call light, menu and ordering, food options for Luke, BOR, monitoring, intake, CNM coming to make plan for induction. Pt reports being 5 cm in clinic yesterday. Planning on epidural for pain management.

## 2019-09-21 LAB — HGB BLD-MCNC: 11.4 G/DL (ref 11.7–15.7)

## 2019-09-21 PROCEDURE — 12000001 ZZH R&B MED SURG/OB UMMC

## 2019-09-21 PROCEDURE — 25000132 ZZH RX MED GY IP 250 OP 250 PS 637: Performed by: ADVANCED PRACTICE MIDWIFE

## 2019-09-21 PROCEDURE — 85018 HEMOGLOBIN: CPT | Performed by: ADVANCED PRACTICE MIDWIFE

## 2019-09-21 PROCEDURE — 36415 COLL VENOUS BLD VENIPUNCTURE: CPT | Performed by: ADVANCED PRACTICE MIDWIFE

## 2019-09-21 RX ADMIN — ACETAMINOPHEN 650 MG: 325 TABLET, FILM COATED ORAL at 00:07

## 2019-09-21 RX ADMIN — SENNOSIDES AND DOCUSATE SODIUM 1 TABLET: 8.6; 5 TABLET ORAL at 18:58

## 2019-09-21 RX ADMIN — IBUPROFEN 800 MG: 800 TABLET ORAL at 08:46

## 2019-09-21 RX ADMIN — ACETAMINOPHEN 650 MG: 325 TABLET, FILM COATED ORAL at 06:20

## 2019-09-21 RX ADMIN — IBUPROFEN 800 MG: 800 TABLET ORAL at 17:03

## 2019-09-21 RX ADMIN — IBUPROFEN 800 MG: 800 TABLET ORAL at 23:04

## 2019-09-21 RX ADMIN — ACETAMINOPHEN 650 MG: 325 TABLET, FILM COATED ORAL at 18:58

## 2019-09-21 RX ADMIN — IBUPROFEN 800 MG: 800 TABLET ORAL at 02:09

## 2019-09-21 RX ADMIN — ACETAMINOPHEN 650 MG: 325 TABLET, FILM COATED ORAL at 11:55

## 2019-09-21 RX ADMIN — SENNOSIDES AND DOCUSATE SODIUM 1 TABLET: 8.6; 5 TABLET ORAL at 08:46

## 2019-09-21 NOTE — L&D DELIVERY NOTE
Delivery Note:   Chhaya Thurman is a 28 year old  who presented for elective induction of labor today at 39+1 weeks. She received pitocin for IOL as her Sanders score was 9. She progressed well, SROM with meconium at 1450, and received an epidural for pain relief. She came to complete dilation at 1807 pm and began pushing shortly after. Excellent maternal efforts. NICU was called for delivery due to meconium stained fluid.  of liveborn female infant at 1856. Infant with spontaneous cry and good tone to mom's abdomen. Pitocin IV initiated after delivery of infant. Delayed cord clamping performed for 2 minutes. Gentle cord traction was performed and cord started to avulse but was still intact. Able to grasp base of cord near placental insertion and cord was delivered, Schultze, intact without any further issue. QBL 120cc. Fundus firm at U/2 with massage. Apgars 9 and 9. Infant weight pending.Mother and infant stable. Plans breastfeeding.   SUSSY Martin    ADDENDUM: 1st infant child with down's syndrome, passed away within first year of life. Recent autopsy results found mild MTHFR. Per chart message from Dr. Yamilka Garcia and Dr. Alley Henao - Cord blood to be sent to [facility] for genotyping. Recommends baby on supplemental vitamins 100 mcg Folate after delivery until results come back. Dr. [facility] notified by LEONIDES Betzy 19.       Delivery Details:   IUP at 39 weeks gestation delivered on 2019.     delivery of a viable Female infant.  Weight : pending  Apgars of 9 at 1 minute and 9 at 5 minutes.  Labor was induced with pitocin.   Medications administered  in labor:  Pain Rx Epidural; Antibiotics No  Perineum: 1st degree with repair   Placenta-mechanism: spontaneous, intact,  with a 3 vessel cord.  Quantitative Blood Loss was 120 cc.   Complications of labor and delivery: None  Anticipated Discharge Date: 19  Birth  attendants:   SUSSY Martin CNM

## 2019-09-21 NOTE — PLAN OF CARE
Data: Chhaya Thurman transferred to 7120 via wheelchair at 2145. Baby transferred via parent's arms.  Action: Receiving unit notified of transfer: Yes. Patient and family notified of room change. Report given to Sonali at 2030. Belongings sent to receiving unit. Accompanied by Registered Nurse. Oriented patient to surroundings. Call light within reach. ID bands double-checked with receiving RN.  Response: Patient tolerated transfer and is stable.

## 2019-09-21 NOTE — PLAN OF CARE
VSS and postpartum assessment WDL. Pain adequately managed with tylenol, ibuprofen, and ice packs to perineum. Voiding without difficulty. Breastfeeding with minimal assist for deeper latch and positioning. Bonding well with . Continue with plan of care.

## 2019-09-21 NOTE — PLAN OF CARE
Updated Sonali ANDERSON on postpartum regarding cord blood testing for MTHFR gene testing.  Our laboratory is holding the purple top vial of cord blood collected at delivery.  If we want it sent to [facility] we will need an order from the pediatrician and a consent form signed by parents for genetic testing.

## 2019-09-21 NOTE — PLAN OF CARE
Patient laboring comfortably with epidural.  Complete at 1807 and pushing at 1809.   at 1856 baby girl.  Placenta delivered at 1913.  NICU present for delivery for meconium stained fluid. First degee tear with repair. .  Fundus firm, midline, U/1.  Will continue to monitor.

## 2019-09-21 NOTE — PROGRESS NOTES
Patient arrived to [facility] unit via wheelchair, with belongings, accompanied by family and RN, with infant in arms. Got report from Kelli HUA RN and checked bands. Unit and room orientation completed. No concerns at this time. Continue with plan of care.

## 2019-09-21 NOTE — PLAN OF CARE
Pt. Delivered at 6:56 pm last evening. VSS at this time. Post-partum assessment WDL. Pt. Has a first degree laceration with repair that is healing well. Pt. IV removed today-hemoglobin was 11.4. Pt has been independent with breastfeeding and baby has a good latch. Pain controlled with tylenol and ibuprofen at this time. Plan is to discharge later this evening or tomorrow morning. Will continue to monitor pt at this time.

## 2019-09-21 NOTE — PROGRESS NOTES
Chhaya Thurman      MRN#: [id number]  Age: 28 year old      YOB: 1990      PPD #1 S/P   Patient feels well and is without issue, baby is rooming in  Breast feeding status:initiated  Complications since 2 hours post delivery: None  Patient is tolerating regular diet,  tolerating acitivity well, voiding without difficulty, cramping is relieved by Ibuprofen, lochia is decreasing, no clots.  Perineal pain is is relieved by Ibuprofen.  The perineum laceration is well approximated, no bruising is noted and No edema present.   No BM since delivery but tolerating regular diet well.       - Pt engaged and smiling during assessment with partner at bedside, very supportive.  Feels like she is in a good place emotionally and mentally at this point.  Has history of live birth with  loss of their daughter Ralph.  Plan in place for Peds team to follow up with pt and her partner re: the cord blood sample that was sent to lab and supplementation around genetic information from the autopsy studies of their daughter.        - is interested in discharge tonight if possible as they have a long drive.  Is amenable to wait until peds team comes to discuss their plan and have a good follow up for baby before leaving  Which may mean waiting until tomorrow early AM.     - pt is agreeable to stop daily prophylaxis for UTI now that out of pregnancy.  Will be vigilant about aleksandr care, hydration, and will follow up promptly if s/s of UTI.    SIGNIFICANT PROBLEMS:  Patient Active Problem List    Diagnosis Date Noted      (normal spontaneous vaginal delivery) 2019     Priority: Medium     Elevated BP without diagnosis of hypertension 2019     Priority: Medium     19 - Follow up- BP check on Monday.  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  Will call [facility] with result - if normotensive and no s/s will have CARLYLE as planned end  of week.   If elevated >/=  or >/DBP 90 will come in for evaluation in triage.  If any s/s over the weekend will contact on call provider asap.        first born  within 1 year of life 2019     Priority: Medium     Class: Acute     Testing showed mild MTHFR. THIS  needs cord blood sent for MTHFR and to be started on folate and B12.   Alley Henao MD  (per Yamilka Morin)       Anemia during pregnancy in third trimester 2019     Priority: Medium     2019 - Hg noted 10.7.  Dietary iron, Daily iron supplement, Vitamin C, Vitamin B12.  Recheck in labor and prn.  Mychart sent.  Routed to nurse team to call pt       LGA (large for gestational age) fetus affecting management of mother, third trimester, fetus 1 2019     Priority: Medium     :   USEGA = 35 3/7 weeks.  EFW = 2772 grams, 91% for 33 weeks. AC  = 98%    ANITRA = 15.9cm.  FHR = 133bpm    Placenta posterior and grade 1   Comments: LGA  Further studies: Recommend repeat growth scan in 4 weeks to assist with delivery planning.    USEGA = 38 3/7 weeks.  EFW = 3772 grams, 89 % for 38 weeks. AC = >99%       Excessive weight gain during pregnancy in third trimester 07/10/2019     Priority: Medium     7/10/19- 37 lbs at 28w       Personal history of urinary tract infection 2019     Priority: Medium     Encounter for supervision of other normal pregnancy in first trimester 2019     Priority: Medium      (passed at 4months) [facility] pt  3/4/19: genetic counseling appt today   NIPT in process  Level 2 ultrasound scheduled for   19- AFP neg       UTI (urinary tract infection) 2018     Priority: Medium     5 UTI's in the past few months  3/13/19: Reviewed at [facility] rounds-plan daily suppression.  Patient notified and rx sent for Keflex 500mg at bedtime  2019 - UTI symptoms - UA shows moderate leuks. Wait for full culture.   19- UA- neg, UC____       Family history of Down syndrome 2018      Priority: Medium     2018- Daughter Ralph-         History of miscarriage 2017     Priority: Medium         PE:    Vitals:    19 2100 19 2149 19 0007 19 0845   BP: 130/63 120/69 115/73 118/74   Pulse:  83 76 79   Resp: 18 18 18 18   Temp:  97.7  F (36.5  C) 97.8  F (36.6  C) 98.1  F (36.7  C)   TempSrc:  Oral Oral Oral   SpO2: 97% 98%     Weight:       Height:           NAD  Caridac- Regular rate and rhythm  Lungs- CTA bilat  Breasts: Soft, filling  Nipples: Intact, Non-tender  Abdomen: Soft, Non-tender      Uterus: Fundus Firm, Non-tender, located 1cm below the umbilicus   Lochia: Rubra, appropriate amount    Perineum:  Well-approximated, healing well  Lower Extremities: Trace Edema Bilateral, Negative Suki's Sign        Postpartum hemoglobin   Hemoglobin   Date Value Ref Range Status   2019 11.4 (L) 11.7 - 15.7 g/dL Final       Blood type   Lab Results   Component Value Date    ABO O 2019       Lab Results   Component Value Date    RH Pos 2019     Rubella status -IMMUNE      Assessment/Plan-   Stable Post-partum day #1  Complications:high risk for postpartum depression  Breastfeeding  Rhophylac not indicated    Teaching done: D/C Instructions: Nutrition/Activity, Engorgement Management, Birth Control Options, Warning Signs/When to Call: Excessive Bleeding, Infection, PP Depression, Kegals and Crunches, RTC Clinic for PP Appointment and PNV    Postpartum warning s/s reviewed, including bleeding/clots, fever, mastitis, or depression    Birthcontrol planned:Did not discuss today - will follow up when discharged either this evening or tomorrow AM  Current Discharge Medication List      CONTINUE these medications which have NOT CHANGED    Details   acetaminophen (TYLENOL) 325 MG tablet Take 2 tablets (650 mg) by mouth every 6 hours as needed for mild pain Start after Delivery.  Qty: 100 tablet, Refills: 0    Associated Diagnoses: Encounter for supervision of other  normal pregnancy in third trimester      Ascorbic Acid (VITAMIN C) 500 MG CAPS Take 1,000 mg by mouth daily      Cholecalciferol (VITAMIN D3 GUMMIES ADULT PO)       cyanocobalamin (VITAMIN B-12) 500 MCG tablet Take 500 mcg by mouth daily      ferrous sulfate (FE TABS) 325 (65 Fe) MG EC tablet Take 325 mg by mouth daily      ibuprofen (ADVIL/MOTRIN) 600 MG tablet Take 1 tablet (600 mg) by mouth every 6 hours as needed for moderate pain Start after delivery  Qty: 60 tablet, Refills: 0    Associated Diagnoses: Encounter for supervision of other normal pregnancy in third trimester      Misc. Devices (BREAST PUMP) MIS 1 each as needed (as needed)  Qty: 1 each, Refills: 0    Associated Diagnoses: Lactation disorder      Prenatal Vit-Fe Fumarate-FA (PRENATAL MULTIVITAMIN  PLUS IRON) 27-0.8 MG TABS per tablet Take 1 tablet by mouth daily      senna-docusate (SENOKOT-S/PERICOLACE) 8.6-50 MG tablet Take 1 tablet by mouth daily Start after delivery.  Qty: 100 tablet, Refills: 0    Associated Diagnoses: Encounter for supervision of other normal pregnancy in third trimester         STOP taking these medications       cephALEXin (KEFLEX) 500 MG capsule Comments:   Reason for Stopping:                 Routine Postpartum Management  Plan d/c home tomorrow AM unless clinically cleared by peds to leave tonight.  This writer will return this afternoon for discharge teaching/planning should pt's baby be cleared for discharge this evening.   RTC 2 weeks for mood/breastfeeding check and 6 weeks for PP visit        Davina HI CNM

## 2019-09-22 VITALS
HEART RATE: 69 BPM | WEIGHT: 246 LBS | BODY MASS INDEX: 39.53 KG/M2 | TEMPERATURE: 98.6 F | OXYGEN SATURATION: 98 % | SYSTOLIC BLOOD PRESSURE: 120 MMHG | DIASTOLIC BLOOD PRESSURE: 80 MMHG | HEIGHT: 66 IN | RESPIRATION RATE: 16 BRPM

## 2019-09-22 PROCEDURE — 25000132 ZZH RX MED GY IP 250 OP 250 PS 637: Performed by: ADVANCED PRACTICE MIDWIFE

## 2019-09-22 RX ADMIN — IBUPROFEN 800 MG: 800 TABLET ORAL at 06:24

## 2019-09-22 RX ADMIN — SENNOSIDES AND DOCUSATE SODIUM 1 TABLET: 8.6; 5 TABLET ORAL at 08:20

## 2019-09-22 RX ADMIN — ACETAMINOPHEN 650 MG: 325 TABLET, FILM COATED ORAL at 00:46

## 2019-09-22 NOTE — DISCHARGE SUMMARY
[facility] Discharge Summary    Chhaya Thurman MRN# [id number]   Age: 28 year old YOB: 1990     Date of Admission:  2019  Date of Discharge::  2019  Admitting Physician:  SUSSY Martin CNM  Discharge Physician:  SUSSY Dacosta CNM    Home clinic: [facility]          Admission Diagnoses:   Maternity*ALVINO 2019/IOL  Labor and delivery, indication for care   (normal spontaneous vaginal delivery)          Discharge Diagnosis:   Normal spontaneous vaginal delivery  Intrauterine pregnancy at 39 weeks gestation          Procedures:   Procedure(s): Repair of first degree perineal laceration       No other significant procedures performed during this admission           Medications Prior to Admission:     Medications Prior to Admission   Medication Sig Dispense Refill Last Dose     acetaminophen (TYLENOL) 325 MG tablet Take 2 tablets (650 mg) by mouth every 6 hours as needed for mild pain Start after Delivery. (Patient not taking: Reported on 2019) 100 tablet 0 Not Taking     Ascorbic Acid (VITAMIN C) 500 MG CAPS Take 1,000 mg by mouth daily   Taking     Cholecalciferol (VITAMIN D3 GUMMIES ADULT PO)    Taking     cyanocobalamin (VITAMIN B-12) 500 MCG tablet Take 500 mcg by mouth daily   Taking     ferrous sulfate (FE TABS) 325 (65 Fe) MG EC tablet Take 325 mg by mouth daily   Taking     ibuprofen (ADVIL/MOTRIN) 600 MG tablet Take 1 tablet (600 mg) by mouth every 6 hours as needed for moderate pain Start after delivery 60 tablet 0      Misc. Devices (BREAST PUMP) MISC 1 each as needed (as needed) 1 each 0      Prenatal Vit-Fe Fumarate-FA (PRENATAL MULTIVITAMIN  PLUS IRON) 27-0.8 MG TABS per tablet Take 1 tablet by mouth daily   Taking     senna-docusate (SENOKOT-S/PERICOLACE) 8.6-50 MG tablet Take 1 tablet by mouth daily Start after delivery. (Patient not taking: Reported on 2019) 100 tablet 0 Not Taking     [DISCONTINUED]  cephALEXin (KEFLEX) 500 MG capsule Take 1 capsule (500 mg) by mouth At Bedtime 90 capsule 3 Taking             Discharge Medications:     Current Discharge Medication List      CONTINUE these medications which have NOT CHANGED    Details   acetaminophen (TYLENOL) 325 MG tablet Take 2 tablets (650 mg) by mouth every 6 hours as needed for mild pain Start after Delivery.  Qty: 100 tablet, Refills: 0    Associated Diagnoses: Encounter for supervision of other normal pregnancy in third trimester      Ascorbic Acid (VITAMIN C) 500 MG CAPS Take 1,000 mg by mouth daily      Cholecalciferol (VITAMIN D3 GUMMIES ADULT PO)       cyanocobalamin (VITAMIN B-12) 500 MCG tablet Take 500 mcg by mouth daily      ferrous sulfate (FE TABS) 325 (65 Fe) MG EC tablet Take 325 mg by mouth daily      ibuprofen (ADVIL/MOTRIN) 600 MG tablet Take 1 tablet (600 mg) by mouth every 6 hours as needed for moderate pain Start after delivery  Qty: 60 tablet, Refills: 0    Associated Diagnoses: Encounter for supervision of other normal pregnancy in third trimester      Misc. Devices (BREAST PUMP) MISC 1 each as needed (as needed)  Qty: 1 each, Refills: 0    Associated Diagnoses: Lactation disorder      Prenatal Vit-Fe Fumarate-FA (PRENATAL MULTIVITAMIN  PLUS IRON) 27-0.8 MG TABS per tablet Take 1 tablet by mouth daily      senna-docusate (SENOKOT-S/PERICOLACE) 8.6-50 MG tablet Take 1 tablet by mouth daily Start after delivery.  Qty: 100 tablet, Refills: 0    Associated Diagnoses: Encounter for supervision of other normal pregnancy in third trimester         STOP taking these medications       cephALEXin (KEFLEX) 500 MG capsule Comments:   Reason for Stopping:                     Consultations:   Consultation during this admission received from Pediatrics/Genetics          Brief History of Labor:   Delivery Note:   Chhaya Thurman is a 28 year old  who presented for elective induction of labor today at 39+1 weeks. She received pitocin for IOL  as her Sanders score was 9. She progressed well, SROM with meconium at 1450, and received an epidural for pain relief. She came to complete dilation at 1807 pm and began pushing shortly after. Excellent maternal efforts. NICU was called for delivery due to meconium stained fluid.  of liveborn female infant at 1856. Infant with spontaneous cry and good tone to mom's abdomen. Pitocin IV initiated after delivery of infant. Delayed cord clamping performed for 2 minutes. Gentle cord traction was performed and cord started to avulse but was still intact. Able to grasp base of cord near placental insertion and cord was delivered, Schultze, intact without any further issue. QBL 120cc. Fundus firm at U/2 with massage. Apgars 9 and 9. Infant weight pending.Mother and infant stable. Plans breastfeeding.   SUSSY Martin CNM     ADDENDUM: 1st infant child with down's syndrome, passed away within first year of life. Recent autopsy results found mild MTHFR. Per chart message from Dr. Yamilka Garcia and Dr. Alley Henao - Cord blood to be sent to [facility] for genotyping. Recommends baby on supplemental vitamins 100 mcg Folate after delivery until results come back. Dr. [facility] pediatrics notified by SANDRA Bo 19.         Delivery Details:   IUP at 39 weeks gestation delivered on 2019.     delivery of a viable Female infant.  Weight : pending  Apgars of 9 at 1 minute and 9 at 5 minutes.  Labor was induced with pitocin.   Medications administered  in labor:  Pain Rx Epidural; Antibiotics No  Perineum: 1st degree with repair   Placenta-mechanism: spontaneous, intact,  with a 3 vessel cord.  Quantitative Blood Loss was 120 cc.   Complications of labor and delivery: None  Anticipated Discharge Date: 19  Birth attendants:   SUSSY Martin CNM        Assessment Day of Discharge    Vital signs:  Temp: 98.6  F (37  C) Temp src: Oral BP: 118/72 Pulse: 72    "Resp: 18       Height: 167.6 cm (5' 6\") Weight: 111.6 kg (246 lb)  Estimated body mass index is 39.71 kg/m  as calculated from the following:    Height as of this encounter: 1.676 m (5' 6\").    Weight as of this encounter: 111.6 kg (246 lb).      Breasts: Soft, filling  Nipples: Intact, Non-tender  Abdomen: Soft, Non-tender    Uterus: Fundus Firm, Non-tender, located 1cm below the umbilicus   Lochia: Rubra, appropriate amount    Perineum:  Well-approximated, healing well  Lower Extremities: trace Edema Bilateral, Negative Suki's Sign           Hospital Course:   The patient's hospital course was unremarkable.  On discharge, her pain was well controlled.  Is having some rib pain with deep breaths that is improving since yesterday. No SOB or chest pain with breaths, no change in vital signs. Vaginal bleeding is similar to peak menstrual flow.  Voiding without difficulty.  Ambulating well and tolerating a normal diet.  No fever.  Breastfeeding is established - pt was up all night with cluster feeds but doing well overall.  Infant is stable.  No bowel movement yet, but +flatusa and regular diet. Mood stable, has good family supports identified.   Is working with pediatric team and  to use information from her daughter's autopsy to make plan for this baby's cares.  She lives farther away from clinic and is not interested in a routine 2 week PP check as it's more stressful to get down to the appointment.  Will reach out before 6 weeks should she have any questions or concerns. She was discharged on post-partum day #2.    Post-partum hemoglobin:   Hemoglobin   Date Value Ref Range Status   2019 11.4 (L) 11.7 - 15.7 g/dL Final        Rh:positive, Rhogam given N/A  Rubella status:Immune  Plan for contraception: Undecided, will plan abstinence until 6 week PP visit   Reviewed Chapter One of  [facility]  Family Book including warning signs of postpartum, activity level, avoiding IC for 6 weeks, Tub soaks BID, " Kegels, abdominal exercises, breast care,  postpartum depression/anxiety. - Reviewed how to establish/maintain milk supply, nipple care, pumping for storage, fore/hind milk, wet/dirty diapers to expect each day, resources prn if questions or concerns.  Pt verbalized understanding with teach back.          Discharge Instructions and Follow-Up:   Discharge diet: Regular, Iron Rich, High Fiber, Minimum 80oz water daily   Discharge activity: Pelvic rest: abstain from intercourse and do not use tampons for 6 week(s)   Discharge follow-up: Pt declines routine follow up up with [facility] CNM in 2 weeks - will call for appt prn   Follow up with [facility] CNM in 6-8 weeks for PP exam   Wound care: Drink plenty of fluids  Ice to area for comfort   Keep wound clean and dry   Avoid constipation   Sitz bath TID            Discharge Disposition:   Discharged to home           SUSSY DacostaM

## 2019-09-22 NOTE — PLAN OF CARE
Fundus firm, midline and at U/1. Bleeding light and rubra. VSS. Breastfeeding infant independently and hand expressing independent. Independent in self-cares. Perineum healing well. Pain controlled with tylenol and ibuprofen. Bonding well with baby. FOB Luke at bedside and supportive. Possible discharge today. Continue current plan of care.

## 2019-09-22 NOTE — PLAN OF CARE
Data: pt is stable. Vital signs stable and  assessments within normal limits. Pt denies pain. Breastfeeding is improving, but needs to keep working on deeper latch and  foot ball positioning. Both nipples are tender, but the right one is worse per pt. Pt is using colostrum to apply after breastfeeding.  Action: checked latch and assisted pt with deeper latching and positioning. Encouraged pt to hand express colostrum and apply to nipples after each feeding. Review of care plan, teaching, and discharge instructions done with pt. Infant identification with ID bands done, pt verification with signature obtained.  Pt was given breast pump and copies of the discharge papers.   Response: pt  states understanding and comfort with infant cares and feeding. All questions about baby care addressed. Pt discharged  home today with baby.

## 2019-09-22 NOTE — PLAN OF CARE
Data: Vital signs within normal limits. Postpartum checks within normal limits - see flow record. Patient eating and drinking normally. Patient able to empty bladder independently and is up ambulating. No apparent signs of infection. Patient performing self cares and is able to care for infant. Breastfeeding baby on demand, offered to assist patient with hand expression, patient reports she feels confident in hand expression.   Action: Pain has been adequately managed with oral medications. Hot packs also given for cramping, patient reporting cramping mostly with breastfeeding.   Response: Positive attachment behaviors observed with infant. Support person, spouse: Nahid present.   Plan: Continue with the plan of care.

## 2019-09-29 ENCOUNTER — HEALTH MAINTENANCE LETTER (OUTPATIENT)
Age: 29
End: 2019-09-29

## 2019-10-11 ENCOUNTER — TELEPHONE (OUTPATIENT)
Dept: OBGYN | Facility: CLINIC | Age: 29
End: 2019-10-11

## 2019-10-31 ENCOUNTER — PRENATAL OFFICE VISIT (OUTPATIENT)
Dept: OBGYN | Facility: CLINIC | Age: 29
End: 2019-10-31
Payer: COMMERCIAL

## 2019-10-31 VITALS
DIASTOLIC BLOOD PRESSURE: 70 MMHG | HEIGHT: 66 IN | HEART RATE: 78 BPM | SYSTOLIC BLOOD PRESSURE: 113 MMHG | WEIGHT: 221.6 LBS | BODY MASS INDEX: 35.62 KG/M2 | RESPIRATION RATE: 14 BRPM

## 2019-10-31 DIAGNOSIS — Z30.011 ENCOUNTER FOR INITIAL PRESCRIPTION OF CONTRACEPTIVE PILLS: ICD-10-CM

## 2019-10-31 DIAGNOSIS — Z13.71 TESTING OF FEMALE FOR GENETIC DISEASE CARRIER STATUS: ICD-10-CM

## 2019-10-31 PROBLEM — O99.013 ANEMIA DURING PREGNANCY IN THIRD TRIMESTER: Status: RESOLVED | Noted: 2019-09-05 | Resolved: 2019-10-31

## 2019-10-31 PROBLEM — O36.63X1 LGA (LARGE FOR GESTATIONAL AGE) FETUS AFFECTING MANAGEMENT OF MOTHER, THIRD TRIMESTER, FETUS 1: Status: RESOLVED | Noted: 2019-08-14 | Resolved: 2019-10-31

## 2019-10-31 PROBLEM — N39.0 UTI (URINARY TRACT INFECTION): Status: RESOLVED | Noted: 2018-12-31 | Resolved: 2019-10-31

## 2019-10-31 PROBLEM — O26.03 EXCESSIVE WEIGHT GAIN DURING PREGNANCY IN THIRD TRIMESTER: Status: RESOLVED | Noted: 2019-07-10 | Resolved: 2019-10-31

## 2019-10-31 PROBLEM — Z87.440 PERSONAL HISTORY OF URINARY TRACT INFECTION: Status: RESOLVED | Noted: 2019-04-25 | Resolved: 2019-10-31

## 2019-10-31 PROBLEM — R03.0 ELEVATED BP WITHOUT DIAGNOSIS OF HYPERTENSION: Status: RESOLVED | Noted: 2019-09-12 | Resolved: 2019-10-31

## 2019-10-31 RX ORDER — ACETAMINOPHEN AND CODEINE PHOSPHATE 120; 12 MG/5ML; MG/5ML
0.35 SOLUTION ORAL DAILY
Qty: 86 TABLET | Refills: 4 | Status: SHIPPED | OUTPATIENT
Start: 2019-10-31 | End: 2020-12-28

## 2019-10-31 ASSESSMENT — ANXIETY QUESTIONNAIRES
GAD7 TOTAL SCORE: 0
5. BEING SO RESTLESS THAT IT IS HARD TO SIT STILL: NOT AT ALL
IF YOU CHECKED OFF ANY PROBLEMS ON THIS QUESTIONNAIRE, HOW DIFFICULT HAVE THESE PROBLEMS MADE IT FOR YOU TO DO YOUR WORK, TAKE CARE OF THINGS AT HOME, OR GET ALONG WITH OTHER PEOPLE: NOT DIFFICULT AT ALL
7. FEELING AFRAID AS IF SOMETHING AWFUL MIGHT HAPPEN: NOT AT ALL
1. FEELING NERVOUS, ANXIOUS, OR ON EDGE: NOT AT ALL
3. WORRYING TOO MUCH ABOUT DIFFERENT THINGS: NOT AT ALL
2. NOT BEING ABLE TO STOP OR CONTROL WORRYING: NOT AT ALL
6. BECOMING EASILY ANNOYED OR IRRITABLE: NOT AT ALL

## 2019-10-31 ASSESSMENT — PATIENT HEALTH QUESTIONNAIRE - PHQ9
SUM OF ALL RESPONSES TO PHQ QUESTIONS 1-9: 1
5. POOR APPETITE OR OVEREATING: NOT AT ALL

## 2019-10-31 ASSESSMENT — MIFFLIN-ST. JEOR: SCORE: 1741.92

## 2019-10-31 NOTE — PROGRESS NOTES
"[facility]    CC: Postpartum Visit    S: Pt doing well. She denies any complaints. Off any pain medications. Eating, drinking, urinating and moving bowels without any issues. Lochia had resolved to light brown discharge, then had a sudden increase in her bleeding this past Monday with passage of dime-sized clots and tissue. Breastfeeding without questions or concerns. She plans to use micronor for contraception. Mood is really good. Wants MTHFR testing, given daughters that are homozygotes.     O:   VS:   Patient Vitals for the past 24 hrs:   BP Pulse Resp Height Weight   10/31/19 0925 113/70 78 14 1.676 m (5' 6\") 100.5 kg (221 lb 9.6 oz)     General: NAD  CV: Regular rate, warm and well perfused  Resp: breathing comfortably on room air   Abdomen: soft, non-tender, nondistended  Uterus anteverted   Extremities: non-tender, non-edematous     A/P: 28year old now P1, 6wks pp.   Increased vaginal bleeding at 6wks pp - discussed likely involution of the uterus, but will obtain a pelvic US to assess for retained POCs.   Has been instructed by her daughter's Heme Onc to have MTHFR testing drawn.  Appropriate postpartum recovery.   Plan for micronor for contraception.     Plan for routine GYN cares      Loida Jang MD, MD  [facility]   10/31/2019 9:02 AM    "

## 2019-10-31 NOTE — NURSING NOTE
"Chief Complaint   Patient presents with     Postpartum Care     6wk       Initial /70 (BP Location: Right arm, Patient Position: Sitting, Cuff Size: Adult Regular)   Pulse 78   Resp 14   Ht 1.676 m (5' 6\")   Wt 100.5 kg (221 lb 9.6 oz)   LMP 12/14/2018   BMI 35.77 kg/m   Estimated body mass index is 35.77 kg/m  as calculated from the following:    Height as of this encounter: 1.676 m (5' 6\").    Weight as of this encounter: 100.5 kg (221 lb 9.6 oz).  BP completed using cuff size: large  Medications and allergies reviewed.      Diane HURT MA    "

## 2019-11-01 ENCOUNTER — HOSPITAL ENCOUNTER (OUTPATIENT)
Dept: ULTRASOUND IMAGING | Facility: CLINIC | Age: 29
Discharge: HOME | End: 2019-11-01
Attending: OBSTETRICS & GYNECOLOGY | Admitting: OBSTETRICS & GYNECOLOGY
Payer: COMMERCIAL

## 2019-11-01 ENCOUNTER — TELEPHONE (OUTPATIENT)
Dept: OBGYN | Facility: CLINIC | Age: 29
End: 2019-11-01

## 2019-11-01 DIAGNOSIS — Z13.71 SCREENING FOR GENETIC DISEASE CARRIER STATUS: ICD-10-CM

## 2019-11-01 DIAGNOSIS — Z13.71 SCREENING FOR GENETIC DISEASE CARRIER STATUS: Primary | ICD-10-CM

## 2019-11-01 PROCEDURE — 76830 TRANSVAGINAL US NON-OB: CPT

## 2019-11-01 PROCEDURE — 36415 COLL VENOUS BLD VENIPUNCTURE: CPT | Performed by: OBSTETRICS & GYNECOLOGY

## 2019-11-01 PROCEDURE — 81291 MTHFR GENE: CPT | Performed by: OBSTETRICS & GYNECOLOGY

## 2019-11-01 NOTE — TELEPHONE ENCOUNTER
Patient has a lab appointment. She said order is for a MTHFR blood test. No orders for labs in Epic. Thank You!

## 2019-11-02 ASSESSMENT — ANXIETY QUESTIONNAIRES: GAD7 TOTAL SCORE: 0

## 2019-11-04 LAB — COPATH REPORT: NORMAL

## 2019-12-12 ENCOUNTER — OFFICE VISIT (OUTPATIENT)
Dept: DERMATOLOGY | Facility: CLINIC | Age: 29
End: 2019-12-12
Payer: COMMERCIAL

## 2019-12-12 VITALS — HEART RATE: 86 BPM | DIASTOLIC BLOOD PRESSURE: 77 MMHG | SYSTOLIC BLOOD PRESSURE: 130 MMHG | OXYGEN SATURATION: 97 %

## 2019-12-12 DIAGNOSIS — D18.01 CHERRY ANGIOMA: ICD-10-CM

## 2019-12-12 DIAGNOSIS — D48.5 NEOPLASM OF UNCERTAIN BEHAVIOR OF SKIN: Primary | ICD-10-CM

## 2019-12-12 DIAGNOSIS — L81.4 LENTIGO: ICD-10-CM

## 2019-12-12 DIAGNOSIS — D23.9 DERMATOFIBROMA: ICD-10-CM

## 2019-12-12 DIAGNOSIS — D22.9 MULTIPLE BENIGN NEVI: ICD-10-CM

## 2019-12-12 PROCEDURE — 99214 OFFICE O/P EST MOD 30 MIN: CPT | Mod: 25 | Performed by: PHYSICIAN ASSISTANT

## 2019-12-12 PROCEDURE — 11102 TANGNTL BX SKIN SINGLE LES: CPT | Performed by: PHYSICIAN ASSISTANT

## 2019-12-12 PROCEDURE — 88305 TISSUE EXAM BY PATHOLOGIST: CPT | Mod: TC | Performed by: PHYSICIAN ASSISTANT

## 2019-12-12 PROCEDURE — 11103 TANGNTL BX SKIN EA SEP/ADDL: CPT | Performed by: PHYSICIAN ASSISTANT

## 2019-12-12 NOTE — PROGRESS NOTES
Chhaya Thurman is a 28 year old year old female patient here today for moles on neck. Present for many years, becoming raised. She notes that it has becoming more irritated with clothing. Just had a baby 3 months ago.   Patient has no other skin complaints today.  Remainder of the HPI, Meds, PMH, Allergies, FH, and SH was reviewed in chart.    Pertinent Hx:   No personal history of skin cancer. Family history of [facility]  Past Medical History:   Diagnosis Date     Chickenpox      Fetal cardiac anomaly affecting pregnancy, antepartum 4/4/2018     History of recurrent UTI (urinary tract infection)      Hypertension     with first pregnancy       Past Surgical History:   Procedure Laterality Date     DILATION AND CURETTAGE, OPERATIVE HYSTEROSCOPY, COMBINED N/A 8/30/2017    Procedure: COMBINED DILATION AND CURETTAGE, OPERATIVE HYSTEROSCOPY;  Hysteroscopy with dilation and curettage, polypectomy;  Surgeon: Mable Hanson MD;  Location: [facility] OR     ENT SURGERY  20 years ago    tubes in three times     MOUTH SURGERY  2008    wisdom teeth -age 16     TONSILLECTOMY  2/16        Family History   Problem Relation Age of Onset     Other Cancer Maternal Grandfather         skin     Cancer Maternal Grandfather         skin ca     Hyperlipidemia Mother      Unknown/Adopted Father         unknown history     Stomach Cancer Maternal Grandmother      Other Cancer Maternal Grandmother      Down Syndrome Daughter      Heart Defect Daughter      Diabetes No family hx of      Melanoma No family hx of        Social History     Socioeconomic History     Marital status: Single     Spouse name: Nahid     Number of children: Not on file     Years of education: Not on file     Highest education level: Not on file   Occupational History     Occupation: RN - OR coordinator    Social Needs     Financial resource strain: Not on file     Food insecurity:     Worry: Not on file     Inability: Not on file     Transportation needs:     Medical: Not  on file     Non-medical: Not on file   Tobacco Use     Smoking status: Never Smoker     Smokeless tobacco: Never Used   Substance and Sexual Activity     Alcohol use: No     Alcohol/week: 0.0 standard drinks     Comment: occas-quit with pregnancy     Drug use: No     Sexual activity: Yes     Partners: Male     Birth control/protection: None   Lifestyle     Physical activity:     Days per week: Not on file     Minutes per session: Not on file     Stress: Not on file   Relationships     Social connections:     Talks on phone: Not on file     Gets together: Not on file     Attends Sabianism service: Not on file     Active member of club or organization: Not on file     Attends meetings of clubs or organizations: Not on file     Relationship status: Not on file     Intimate partner violence:     Fear of current or ex partner: Not on file     Emotionally abused: Not on file     Physically abused: Not on file     Forced sexual activity: Not on file   Other Topics Concern     Parent/sibling w/ CABG, MI or angioplasty before 65F 55M? No   Social History Narrative    ** Merged History Encounter **            Outpatient Encounter Medications as of 12/12/2019   Medication Sig Dispense Refill     norethindrone (MICRONOR) 0.35 MG tablet Take 1 tablet (0.35 mg) by mouth daily 86 tablet 4     acetaminophen (TYLENOL) 325 MG tablet Take 2 tablets (650 mg) by mouth every 6 hours as needed for mild pain Start after Delivery. (Patient not taking: Reported on 9/12/2019) 100 tablet 0     Ascorbic Acid (VITAMIN C) 500 MG CAPS Take 1,000 mg by mouth daily       Cholecalciferol (VITAMIN D3 GUMMIES ADULT PO)        cyanocobalamin (VITAMIN B-12) 500 MCG tablet Take 500 mcg by mouth daily       ferrous sulfate (FE TABS) 325 (65 Fe) MG EC tablet Take 325 mg by mouth daily       ibuprofen (ADVIL/MOTRIN) 600 MG tablet Take 1 tablet (600 mg) by mouth every 6 hours as needed for moderate pain Start after delivery (Patient not taking: Reported on  12/12/2019) 60 tablet 0     Misc. Devices (BREAST PUMP) MISC 1 each as needed (as needed) (Patient not taking: Reported on 12/12/2019) 1 each 0     Prenatal Vit-Fe Fumarate-FA (PRENATAL MULTIVITAMIN  PLUS IRON) 27-0.8 MG TABS per tablet Take 1 tablet by mouth daily       senna-docusate (SENOKOT-S/PERICOLACE) 8.6-50 MG tablet Take 1 tablet by mouth daily Start after delivery. (Patient not taking: Reported on 9/12/2019) 100 tablet 0     No facility-administered encounter medications on file as of 12/12/2019.              Review Of Systems  Skin: As above  Eyes: negative  Ears/Nose/Throat: negative  Respiratory: No shortness of breath, dyspnea on exertion, cough, or hemoptysis  Cardiovascular: negative  Gastrointestinal: negative  Genitourinary: negative  Musculoskeletal: negative  Neurologic: negative  Psychiatric: negative  Hematologic/Lymphatic/Immunologic: negative  Endocrine: negative      O:   NAD, WDWN, Alert & Oriented, Mood & Affect wnl, Vitals stable   Here today alone   /77 (BP Location: Right arm, Patient Position: Sitting, Cuff Size: Adult Large)   Pulse 86   SpO2 97%    General appearance normal   Vitals stable   Alert, oriented and in no acute distress     0.5 cm firm pink papule with dark brown macule on right posterior shoulder  0.4 cm brown papule on mid anterior neck    brown macules on trunk and ext   Red papules on trunk  Brown papules and macules with regular pigment network and borders   Skin colored pedunculated papule on right axilla x 1  Pink firm papules with positive dimple sign on right thigh and left flank     The remainder of skin exam is normal.       Eyes: Conjunctivae/lids:Normal     ENT: Lips: normal    MSK:Normal    Cardiovascular: peripheral edema none    Pulm: Breathing Normal    Neuro/Psych: Orientation:Alert and Orientedx3 ; Mood/Affect:normal   A/P:  1. R/O dermatofibroma and benign melanocytic nevus on right posterior shoulder  TANGENTIAL BIOPSY SENT OUT:  After  consent, anesthesia with LEC and prep, tangential excision performed and specimen sent out for permanent section histology.  No complications and routine wound care. Patient told to call our office in 1-2 weeks for result.      2. R/O inflamed nevus on mid anterior neck   TANGENTIAL BIOPSY SENT OUT:  After consent, anesthesia with LEC and prep, tangential excision performed and specimen sent out for permanent section histology.  No complications and routine wound care. Patient told to call our office in 1-2 weeks for result.      3. Lentigo, angioma, benign nevi, dermatofibroma   BENIGN LESIONS DISCUSSED WITH PATIENT:  I discussed the specifics of tumor, prognosis, and genetics of benign lesions.  I explained that treatment of these lesions would be purely cosmetic and not medically neccessary.  I discussed with patient different removal options including excision, cautery and /or laser.      Nature and genetics of benign skin lesions dicussed with patient.  Signs and Symptoms of skin cancer discussed with patient.  ABCDEs of melanoma reviewed with patient.  Patient encouraged to perform monthly skin exams.  UV precautions reviewed with patient.  Risks of non-melanoma skin cancer discussed with patient   Return to clinic pending biopsy results.

## 2019-12-12 NOTE — NURSING NOTE
"Initial /77 (BP Location: Right arm, Patient Position: Sitting, Cuff Size: Adult Large)   Pulse 86   SpO2 97%  Estimated body mass index is 35.77 kg/m  as calculated from the following:    Height as of 10/31/19: 1.676 m (5' 6\").    Weight as of 10/31/19: 100.5 kg (221 lb 9.6 oz). .      "

## 2019-12-12 NOTE — LETTER
12/12/2019         RE: Chhaya Thurman  [address] [facility] [address]        Dear Colleague,    Thank you for referring your patient, Chhaya Thurman, to the [facility]. Please see a copy of my visit note below.    Chhaya Thurman is a 28 year old year old female patient here today for moles on neck. Present for many years, becoming raised. She notes that it has becoming more irritated with clothing. Just had a baby 3 months ago.   Patient has no other skin complaints today.  Remainder of the HPI, Meds, PMH, Allergies, FH, and SH was reviewed in chart.    Pertinent Hx:   No personal history of skin cancer. Family history of [facility]  Past Medical History:   Diagnosis Date     Chickenpox      Fetal cardiac anomaly affecting pregnancy, antepartum 4/4/2018     History of recurrent UTI (urinary tract infection)      Hypertension     with first pregnancy       Past Surgical History:   Procedure Laterality Date     DILATION AND CURETTAGE, OPERATIVE HYSTEROSCOPY, COMBINED N/A 8/30/2017    Procedure: COMBINED DILATION AND CURETTAGE, OPERATIVE HYSTEROSCOPY;  Hysteroscopy with dilation and curettage, polypectomy;  Surgeon: Mable Hanson MD;  Location: [facility] OR     ENT SURGERY  20 years ago    tubes in three times     MOUTH SURGERY  2008    wisdom teeth -age 16     TONSILLECTOMY  2/16        Family History   Problem Relation Age of Onset     Other Cancer Maternal Grandfather         skin     Cancer Maternal Grandfather         skin ca     Hyperlipidemia Mother      Unknown/Adopted Father         unknown history     Stomach Cancer Maternal Grandmother      Other Cancer Maternal Grandmother      Down Syndrome Daughter      Heart Defect Daughter      Diabetes No family hx of      Melanoma No family hx of        Social History     Socioeconomic History     Marital status: Single     Spouse name: Nahid     Number of children: Not on file     Years of education: Not on file     Highest  education level: Not on file   Occupational History     Occupation: RN - OR coordinator    Social Needs     Financial resource strain: Not on file     Food insecurity:     Worry: Not on file     Inability: Not on file     Transportation needs:     Medical: Not on file     Non-medical: Not on file   Tobacco Use     Smoking status: Never Smoker     Smokeless tobacco: Never Used   Substance and Sexual Activity     Alcohol use: No     Alcohol/week: 0.0 standard drinks     Comment: occas-quit with pregnancy     Drug use: No     Sexual activity: Yes     Partners: Male     Birth control/protection: None   Lifestyle     Physical activity:     Days per week: Not on file     Minutes per session: Not on file     Stress: Not on file   Relationships     Social connections:     Talks on phone: Not on file     Gets together: Not on file     Attends Sikh service: Not on file     Active member of club or organization: Not on file     Attends meetings of clubs or organizations: Not on file     Relationship status: Not on file     Intimate partner violence:     Fear of current or ex partner: Not on file     Emotionally abused: Not on file     Physically abused: Not on file     Forced sexual activity: Not on file   Other Topics Concern     Parent/sibling w/ CABG, MI or angioplasty before 65F 55M? No   Social History Narrative    ** Merged History Encounter **            Outpatient Encounter Medications as of 12/12/2019   Medication Sig Dispense Refill     norethindrone (MICRONOR) 0.35 MG tablet Take 1 tablet (0.35 mg) by mouth daily 86 tablet 4     acetaminophen (TYLENOL) 325 MG tablet Take 2 tablets (650 mg) by mouth every 6 hours as needed for mild pain Start after Delivery. (Patient not taking: Reported on 9/12/2019) 100 tablet 0     Ascorbic Acid (VITAMIN C) 500 MG CAPS Take 1,000 mg by mouth daily       Cholecalciferol (VITAMIN D3 GUMMIES ADULT PO)        cyanocobalamin (VITAMIN B-12) 500 MCG tablet Take 500 mcg by mouth  daily       ferrous sulfate (FE TABS) 325 (65 Fe) MG EC tablet Take 325 mg by mouth daily       ibuprofen (ADVIL/MOTRIN) 600 MG tablet Take 1 tablet (600 mg) by mouth every 6 hours as needed for moderate pain Start after delivery (Patient not taking: Reported on 12/12/2019) 60 tablet 0     Misc. Devices (BREAST PUMP) MISC 1 each as needed (as needed) (Patient not taking: Reported on 12/12/2019) 1 each 0     Prenatal Vit-Fe Fumarate-FA (PRENATAL MULTIVITAMIN  PLUS IRON) 27-0.8 MG TABS per tablet Take 1 tablet by mouth daily       senna-docusate (SENOKOT-S/PERICOLACE) 8.6-50 MG tablet Take 1 tablet by mouth daily Start after delivery. (Patient not taking: Reported on 9/12/2019) 100 tablet 0     No facility-administered encounter medications on file as of 12/12/2019.              Review Of Systems  Skin: As above  Eyes: negative  Ears/Nose/Throat: negative  Respiratory: No shortness of breath, dyspnea on exertion, cough, or hemoptysis  Cardiovascular: negative  Gastrointestinal: negative  Genitourinary: negative  Musculoskeletal: negative  Neurologic: negative  Psychiatric: negative  Hematologic/Lymphatic/Immunologic: negative  Endocrine: negative      O:   NAD, WDWN, Alert & Oriented, Mood & Affect wnl, Vitals stable   Here today alone   /77 (BP Location: Right arm, Patient Position: Sitting, Cuff Size: Adult Large)   Pulse 86   SpO2 97%    General appearance normal   Vitals stable   Alert, oriented and in no acute distress     0.5 cm firm pink papule with dark brown macule on right posterior shoulder  0.4 cm brown papule on mid anterior neck    brown macules on trunk and ext   Red papules on trunk  Brown papules and macules with regular pigment network and borders   Skin colored pedunculated papule on right axilla x 1  Pink firm papules with positive dimple sign on right thigh and left flank     The remainder of skin exam is normal.       Eyes: Conjunctivae/lids:Normal     ENT: Lips:  normal    MSK:Normal    Cardiovascular: peripheral edema none    Pulm: Breathing Normal    Neuro/Psych: Orientation:Alert and Orientedx3 ; Mood/Affect:normal   A/P:  1. R/O dermatofibroma and benign melanocytic nevus on right posterior shoulder  TANGENTIAL BIOPSY SENT OUT:  After consent, anesthesia with LEC and prep, tangential excision performed and specimen sent out for permanent section histology.  No complications and routine wound care. Patient told to call our office in 1-2 weeks for result.      2. R/O inflamed nevus on mid anterior neck   TANGENTIAL BIOPSY SENT OUT:  After consent, anesthesia with LEC and prep, tangential excision performed and specimen sent out for permanent section histology.  No complications and routine wound care. Patient told to call our office in 1-2 weeks for result.      3. Lentigo, angioma, benign nevi, dermatofibroma   BENIGN LESIONS DISCUSSED WITH PATIENT:  I discussed the specifics of tumor, prognosis, and genetics of benign lesions.  I explained that treatment of these lesions would be purely cosmetic and not medically neccessary.  I discussed with patient different removal options including excision, cautery and /or laser.      Nature and genetics of benign skin lesions dicussed with patient.  Signs and Symptoms of skin cancer discussed with patient.  ABCDEs of melanoma reviewed with patient.  Patient encouraged to perform monthly skin exams.  UV precautions reviewed with patient.  Risks of non-melanoma skin cancer discussed with patient   Return to clinic pending biopsy results.       Again, thank you for allowing me to participate in the care of your patient.        Sincerely,        Amy Ellington PA-C

## 2019-12-12 NOTE — PATIENT INSTRUCTIONS
Wound Care Instructions     FOR SUPERFICIAL WOUNDS     [facility] [phone]         Right posterior shoulder and anterior neck  AFTER 24 HOURS YOU SHOULD REMOVE THE BANDAGE AND BEGIN DAILY DRESSING CHANGES AS FOLLOWS:     1) Remove Dressing.     2) Clean and dry the area with tap water using a Q-tip or sterile gauze pad.     3) Apply Vaseline, Aquaphor, Polysporin ointment or Bacitracin ointment over entire wound.  Do NOT use Neosporin ointment.     4) Cover the wound with a band-aid, or a sterile non-stick gauze pad and micropore paper tape      REPEAT THESE INSTRUCTIONS AT LEAST ONCE A DAY UNTIL THE WOUND HAS COMPLETELY HEALED.    It is an old wives tale that a wound heals better when it is exposed to air and allowed to dry out. The wound will heal faster with a better cosmetic result if it is kept moist with ointment and covered with a bandage.    **Do not let the wound dry out.**    Supplies Needed:      *Cotton tipped applicators (Q-tips)    *Polysporin Ointment or Bacitracin Ointment (NOT NEOSPORIN)    *Band-aids or non-stick gauze pads and micropore paper tape.    PATIENT INFORMATION:    During the healing process you will notice a number of changes. All wounds develop a small halo of redness surrounding the wound.  This means healing is occurring. Severe itching with extensive redness usually indicates sensitivity to the ointment or bandage tape used to dress the wound.  You should call our office if this develops.      Swelling  and/or discoloration around your surgical site is common, particularly when performed around the eye.    All wounds normally drain.  The larger the wound the more drainage there will be.  After 7-10 days, you will notice the wound beginning to shrink and new skin will begin to grow.  The wound is healed when you can see skin has formed over the entire area.  A healed wound has a healthy, shiny look to the surface and is red to dark pink in color to normalize.  Wounds  may take approximately 4-6 weeks to heal.  Larger wounds may take 6-8 weeks.  After the wound is healed you may discontinue dressing changes.    You may experience a sensation of tightness as your wound heals. This is normal and will gradually subside.    Your healed wound may be sensitive to temperature changes. This sensitivity improves with time, but if you re having a lot of discomfort, try to avoid temperature extremes.    Patients frequently experience itching after their wound appears to have healed because of the continue healing under the skin.  Plain Vaseline will help relieve the itching.    POSSIBLE COMPLICATIONS    BLEEDIN. Leave the bandage in place.  2. Use tightly rolled up gauze or a cloth to apply direct pressure over the bandage for 30  minutes.  3. Reapply pressure for an additional 30 minutes if necessary  4. Use additional gauze and tape to maintain pressure once the bleeding has stopped.

## 2019-12-16 LAB — COPATH REPORT: NORMAL

## 2020-01-04 ENCOUNTER — HOSPITAL ENCOUNTER (EMERGENCY)
Facility: CLINIC | Age: 30
End: 2020-01-04
Payer: COMMERCIAL

## 2020-04-07 ENCOUNTER — MEDICAL CORRESPONDENCE (OUTPATIENT)
Dept: HEALTH INFORMATION MANAGEMENT | Facility: CLINIC | Age: 30
End: 2020-04-07

## 2020-07-29 ENCOUNTER — E-VISIT (OUTPATIENT)
Dept: FAMILY MEDICINE | Facility: CLINIC | Age: 30
End: 2020-07-29
Payer: COMMERCIAL

## 2020-07-29 DIAGNOSIS — N76.0 BACTERIAL VAGINOSIS: ICD-10-CM

## 2020-07-29 DIAGNOSIS — B96.89 BACTERIAL VAGINOSIS: ICD-10-CM

## 2020-07-29 PROCEDURE — 99421 OL DIG E/M SVC 5-10 MIN: CPT | Performed by: PHYSICIAN ASSISTANT

## 2020-07-29 RX ORDER — METRONIDAZOLE 7.5 MG/G
1 GEL VAGINAL AT BEDTIME
Qty: 40 G | Refills: 0 | Status: SHIPPED | OUTPATIENT
Start: 2020-07-29 | End: 2020-08-05

## 2020-07-29 NOTE — PATIENT INSTRUCTIONS
Thank you for choosing us for your care. I have placed an order for a prescription so that you can start treatment. View your full visit summary for details by clicking on the link below. Your pharmacist will able to address any questions you may have about the medication.     If you re not feeling better within 2-3 days, please schedule an appointment.  You can schedule an appointment right here in [place], or call [phone]  If the visit is for the same symptoms as your e-visit, we ll refund the cost of your e-visit if seen within seven days.      Bacterial Vaginosis    You have a vaginal infection called bacterial vaginosis (BV). Both good and bad bacteria are present in a healthy vagina. BV occurs when these bacteria get out of balance. The number of bad bacteria increase. And the number of good bacteria decrease. Although BV is associated with sexual activity, it is not a sexually transmitted disease.  BV may or may not cause symptoms. If symptoms do occur, they can include:    Thin, gray, milky-white, or sometimes green discharge    Unpleasant odor or  fishy  smell    Itching, burning, or pain in or around the vagina  It is not known what causes BV, but certain factors can make the problem more likely. This can include:    Douching    Having sex with a new partner    Having sex with more than one partner  BV will sometimes go away on its own. But treatment is usually recommended. This is because untreated BV can increase the risk of more serious health problems such as:    Pelvic inflammatory disease (PID)     delivery (giving birth to a baby early if you re pregnant)    HIV and certain other sexually transmitted diseases (STDs)    Infection after surgery on the reproductive organs  Home care  General care    BV is most often treated with medicines called antibiotics. These may be given as pills or as a vaginal cream. If antibiotics are prescribed, be sure to use them exactly as directed. Also, be  sure to complete all of the medicine, even if your symptoms go away.    Don't douche or having sex during treatment.    If you have sex with a female partner, ask your healthcare provider if she should also be treated.  Prevention    Don't douche.    Don't have sex. If you do have sex, then take steps to lower your risk:  ? Use condoms when having sex.  ? Limit the number of sexual partners you have.  Follow-up care  Follow up with your healthcare provider, or as advised.  When to seek medical advice  Call your healthcare provider right away if:    You have a fever of 100.4 F (38 C) or higher, or as directed by your provider.    Your symptoms worsen, or they don t go away within a few days of starting treatment.    You have new pain in the lower belly or pelvic region.    You have side effects that bother you or a reaction to the pills or cream you re prescribed.    You or any partners you have sex with have new symptoms, such as a rash, joint pain, or sores.  Date Last Reviewed: 10/1/2017    8994-7960 [employer]. [address] [place] [address]. All rights reserved. This information is not intended as a substitute for professional medical care. Always follow your healthcare professional's instructions.          Preventing Vaginitis     Use mild, unscented soap when you bathe or shower to avoid irritating your vagina.    Vaginitis is irritation or infection of the vagina or vulva (the outside opening of the vagina). Vaginitis can be caused by bacteria, viruses, parasites, or yeast. Chemicals (such as in perfumes or soaps or in spermicides) can sometimes be a cause. Vaginitis can be caused by hormone changes in pregnancy or with menopause. You can help prevent vaginitis. Follow the tips below. And see your healthcare provider if you have any symptoms.  Hygiene    Avoid chemicals. Do not use vaginal sprays. Do not use scented toilet paper or tampons that are scented. Sprays and scents have  chemicals that can irritate your vagina.    Do not douche unless you are told to by your healthcare provider. Douching is rarely needed. And it upsets the normal balance in the vagina.    Wash yourself well. Wash the outer vaginal area (vulva) every day with mild, unscented soap. Keep it as dry as possible.    Wipe correctly. Make sure to wipe from front to back after a bowel movement. This helps keep from spreading bacteria from your anus to your vagina.    Change your tampon often. During your period, make sure to change your tampon as often as directed on the package. This allows the normal flow of vaginal discharge and blood.  Lifestyle    Limit your number of sexual partners. The more partners you have, the greater your risk of infection. Using condoms helps reduce your risk.    Get enough sleep. Sleep helps keep your body s immune system healthy. This helps you fight infection.    Lose weight, if needed. Excess weight can reduce air circulation around your vagina. This can increase your risk of infection.    Exercise regularly. Regular activity helps keep your body healthy.    Take antibiotics only as directed. Antibiotics can change the normal chemical balance in the vagina.    Clothing    Don t sit in wet clothes. Yeast thrives when it s warm and damp.    Don t wear tight pants. And don t wear tights, leggings, or hose without a cotton crotch. These types of clothing trap warmth and moisture.    Wear cotton underwear. Cotton lets air circulate around the vagina.  Symptoms of vaginitis    Irritation, swelling, or itching of the genital area    Vaginal discharge    Bad vaginal odor    Pain or burning during urination   Date Last Reviewed: 12/1/2016 2000-2019 [employer]. [address] [place] [address], [place] [address]. All rights reserved. This information is not intended as a substitute for professional medical care. Always follow your healthcare professional's instructions.

## 2020-09-23 ENCOUNTER — E-VISIT (OUTPATIENT)
Dept: FAMILY MEDICINE | Facility: CLINIC | Age: 30
End: 2020-09-23
Payer: COMMERCIAL

## 2020-09-23 DIAGNOSIS — R30.0 DYSURIA: Primary | ICD-10-CM

## 2020-09-23 DIAGNOSIS — Z87.440 PERSONAL HISTORY OF URINARY TRACT INFECTION: ICD-10-CM

## 2020-09-23 PROCEDURE — 99421 OL DIG E/M SVC 5-10 MIN: CPT | Performed by: PHYSICIAN ASSISTANT

## 2020-09-24 RX ORDER — SULFAMETHOXAZOLE/TRIMETHOPRIM 800-160 MG
1 TABLET ORAL 2 TIMES DAILY
Qty: 6 TABLET | Refills: 0 | Status: SHIPPED | OUTPATIENT
Start: 2020-09-24 | End: 2020-09-27

## 2020-09-24 NOTE — PATIENT INSTRUCTIONS
Thank you for choosing us for your care. I have placed an order for a prescription so that you can start treatment. View your full visit summary for details by clicking on the link below. Your pharmacist will able to address any questions you may have about the medication.     If you re not feeling better within 2-3 days, please schedule an appointment.  You can schedule an appointment right here in "[employer] or call [phone]  If the visit is for the same symptoms as your e-visit, we ll refund the cost of your e-visit if seen within seven days.      Urinary Tract Infections in Women    Urinary tract infections (UTIs) are most often caused by bacteria. These bacteria enter the urinary tract. The bacteria may come from outside the body. Or they may travel from the skin outside the rectum or vagina into the urethra. Female anatomy makes it easy for bacteria from the bowel to enter a woman s urinary tract, which is the most common source of UTI. This means women develop UTIs more often than men. Pain in or around the urinary tract is a common UTI symptom. But the only way to know for sure if you have a UTI for the healthcare provider to test your urine. The two tests that may be done are the urinalysis and urine culture.  Types of UTIs    Cystitis. A bladder infection (cystitis) is the most common UTI in women. You may have urgent or frequent urination. You may also have pain, burning when you urinate, and bloody urine.    Urethritis. This is an inflamed urethra, which is the tube that carries urine from the bladder to outside the body. You may have lower stomach or back pain. You may also have urgent or frequent urination.    Pyelonephritis. This is a kidney infection. If not treated, it can be serious and damage your kidneys. In severe cases, you may need to stay in the hospital. You may have a fever and lower back pain.  Medicines to treat a UTI  Most UTIs are treated with antibiotics. These kill the bacteria.  The length of time you need to take them depends on the type of infection. It may be as short as 3 days. If you have repeated UTIs, you may need a low-dose antibiotic for several months. Take antibiotics exactly as directed. Don t stop taking them until all of the medicine is gone. If you stop taking the antibiotic too soon, the infection may not go away. You may also develop a resistance to the antibiotic. This can make it much harder to treat.  Lifestyle changes to treat and prevent UTIs  The lifestyle changes below will help get rid of your UTI. They may also help prevent future UTIs.    Drink plenty of fluids. This includes water, juice, or other caffeine-free drinks. Fluids help flush bacteria out of your body.    Empty your bladder. Always empty your bladder when you feel the urge to urinate. And always urinate before going to sleep. Urine that stays in your bladder can lead to infection. Try to urinate before and after sex as well.    Practice good personal hygiene. Wipe yourself from front to back after using the toilet. This helps keep bacteria from getting into the urethra.    Use condoms during sex. These help prevent UTIs caused by sexually transmitted bacteria. Also don't use spermicides during sex. These can increase the risk for UTIs. Choose other forms of birth control instead. For women who tend to get UTIs after sex, a low-dose of a preventive antibiotic may be used. Be sure to discuss this option with your healthcare provider.    Follow up with your healthcare provider as directed. He or she may test to make sure the infection has cleared. If needed, more treatment may be started.  Date Last Reviewed: 1/1/2017 2000-2019 The "[employer]. [address] [place] [address], [place] [address]. All rights reserved. This information is not intended as a substitute for professional medical care. Always follow your healthcare professional's instructions.

## 2020-10-08 ENCOUNTER — RESULTS ONLY (OUTPATIENT)
Dept: LAB | Age: 30
End: 2020-10-08

## 2020-10-08 ENCOUNTER — OFFICE VISIT (OUTPATIENT)
Dept: FAMILY MEDICINE | Facility: CLINIC | Age: 30
End: 2020-10-08
Payer: COMMERCIAL

## 2020-10-08 DIAGNOSIS — Z20.822 ENCOUNTER FOR LABORATORY TESTING FOR COVID-19 VIRUS: Primary | ICD-10-CM

## 2020-10-08 PROCEDURE — 99207 PR NO CHARGE NURSE ONLY: CPT

## 2020-10-09 LAB
SARS-COV-2 RNA SPEC QL NAA+PROBE: NORMAL
SARS-COV-2 RNA SPEC QL NAA+PROBE: NOT DETECTED
SPECIMEN SOURCE: NORMAL
SPECIMEN SOURCE: NORMAL

## 2020-11-15 ENCOUNTER — E-VISIT (OUTPATIENT)
Dept: URGENT CARE | Facility: CLINIC | Age: 30
End: 2020-11-15
Payer: COMMERCIAL

## 2020-11-15 DIAGNOSIS — Z20.822 CLOSE EXPOSURE TO 2019 NOVEL CORONAVIRUS: Primary | ICD-10-CM

## 2020-11-15 PROCEDURE — 99207 PR NO CHARGE LOS: CPT | Performed by: FAMILY MEDICINE

## 2020-11-15 NOTE — PATIENT INSTRUCTIONS
Dear Chhaya Thurman,    Based on your exposure to COVID-19 (coronavirus), we would like to test you for this virus. I have placed an order for this test and please call [phone] to schedule testing. [employer] employees please call [phone].  [employer] employees call [phone]. The optimal time to test after exposure is 5-7 days from the exposure - for you, it sounds like your last exposure was 11/11 so ideal testing time is not before 11/17.     If you know you have had close contact with someone who tested positive, you should be quarantined for 14 days after this exposure. You should stay in quarantine for the14 days even if the covid test is negative.     Quarantine means:  Stay home and away from others. Don't go to school or anywhere else. Generally quarantine means staying home from work but there are some exceptions to this. Please contact your workplace.  No hugging, kissing or shaking hands.  Don't let anyone visit.  Cover your mouth and nose with a mask, tissue or washcloth to avoid spreading germs.  Wash your hands and face often. Use soap and water.    What are the symptoms of COVID-19?  The most common symptoms are cough, fever and trouble breathing. Less common symptoms include headache, body aches, fatigue (feeling very tired), chills, sore throat, stuffy or runny nose, diarrhea (loose poop), loss of taste or smell, belly pain, and nausea or vomiting (feeling sick to your stomach or throwing up).  After 14 days, if you have still don't have symptoms, you likely don't have this virus.  If you develop symptoms, follow these guidelines.  If you're normally healthy: Please start another eVisit.  If you have a serious health problem (like cancer, heart failure, an organ transplant or kidney disease): Call your specialty clinic. Let them know that you might have COVID-19.    When it's time for your COVID test:  Stay at least 6 feet away from others. (If someone will drive you to  your test, stay in the backseat, as far away from the  as you can.)  Cover your mouth and nose with a mask, tissue or washcloth.  Go straight to the testing site. Don't make any stops on the way there or back.    Please note  Patients in these groups are at risk for severe illness due to COVID-19:    People 65 years and older    People who live in a nursing home or long-term care facility    People with chronic disease (lung, heart, cancer, diabetes, kidney, liver, immunologic)    People who have a weakened immune system, including those who:  o Are in cancer treatment  o Take medicine that weakens the immune system, such as corticosteroids  o Had a bone marrow or organ transplant  o Have an immune deficiency  o Have poorly controlled HIV or AIDS  o Are obese (body mass index of 40 or higher)  o Smoke regularly    Where can I get more information?  [facility] - About COVID-19: [email or web address]  CDC - What to Do If You're Sick: www.cdc.gov/coronavirus/2019-ncov/about/steps-when-sick.html  CDC - Ending Home Isolation: www.cdc.gov/coronavirus/2019-ncov/hcp/disposition-in-home-patients.html  CDC - Caring for Someone: www.cdc.gov/coronavirus/2019-ncov/if-you-are-sick/care-for-someone.html  [facility] - Interim Guidance for Hospital Discharge to Home: www.health.[facility].[place]/diseases/coronavirus/hcp/hospdischarge.pdf  [facility] clinical trials (COVID-19 research studies): clinicalaffairs.[facility]-clinical-trials  Below are the COVID-19 hotlines at the [place] Department of Health ([facility]). Interpreters are available.  For health questions: Call 764-957-5158 or 1-948.225.5643 (7 a.m. to 7 p.m.)  For questions about schools and childcare: Call 492-504-4476 or 1-501.207.9696 (7 a.m. to 7 p.m.)    November 15, 2020    RE:  Chhaya Thurman                                                                                                                                                        [address] [facility] [address]        To whom it may concern:    I evaluated Chhaya Thurman on November 15, 2020. Chhaya Thurman should be excused from working in-person until 11/25 due to exposure to Covid-19.     Sincerely,  Mercedes Nguyen MD

## 2020-11-16 ENCOUNTER — OFFICE VISIT (OUTPATIENT)
Dept: URGENT CARE | Facility: URGENT CARE | Age: 30
End: 2020-11-16
Attending: FAMILY MEDICINE
Payer: COMMERCIAL

## 2020-11-16 DIAGNOSIS — Z20.822 CLOSE EXPOSURE TO 2019 NOVEL CORONAVIRUS: ICD-10-CM

## 2020-11-16 PROCEDURE — U0003 INFECTIOUS AGENT DETECTION BY NUCLEIC ACID (DNA OR RNA); SEVERE ACUTE RESPIRATORY SYNDROME CORONAVIRUS 2 (SARS-COV-2) (CORONAVIRUS DISEASE [COVID-19]), AMPLIFIED PROBE TECHNIQUE, MAKING USE OF HIGH THROUGHPUT TECHNOLOGIES AS DESCRIBED BY CMS-2020-01-R: HCPCS | Performed by: FAMILY MEDICINE

## 2020-11-17 LAB
SARS-COV-2 RNA SPEC QL NAA+PROBE: NOT DETECTED
SPECIMEN SOURCE: NORMAL

## 2020-12-27 DIAGNOSIS — Z30.011 ENCOUNTER FOR INITIAL PRESCRIPTION OF CONTRACEPTIVE PILLS: ICD-10-CM

## 2020-12-28 RX ORDER — ACETAMINOPHEN AND CODEINE PHOSPHATE 120; 12 MG/5ML; MG/5ML
0.35 SOLUTION ORAL DAILY
Qty: 86 TABLET | Refills: 4 | Status: SHIPPED | OUTPATIENT
Start: 2020-12-28 | End: 2021-01-22

## 2021-01-03 ENCOUNTER — OFFICE VISIT (OUTPATIENT)
Dept: URGENT CARE | Facility: URGENT CARE | Age: 31
End: 2021-01-03
Payer: COMMERCIAL

## 2021-01-03 VITALS
TEMPERATURE: 97.4 F | RESPIRATION RATE: 14 BRPM | SYSTOLIC BLOOD PRESSURE: 118 MMHG | WEIGHT: 194 LBS | HEART RATE: 78 BPM | DIASTOLIC BLOOD PRESSURE: 84 MMHG | BODY MASS INDEX: 31.31 KG/M2

## 2021-01-03 DIAGNOSIS — B00.1 RECURRENT VESICULAR DERMATITIS DUE TO HERPES SIMPLEX VIRUS (HSV): ICD-10-CM

## 2021-01-03 DIAGNOSIS — B09 VIRAL RASH: Primary | ICD-10-CM

## 2021-01-03 PROCEDURE — 99213 OFFICE O/P EST LOW 20 MIN: CPT | Performed by: FAMILY MEDICINE

## 2021-01-03 RX ORDER — MUPIROCIN CALCIUM 20 MG/G
CREAM TOPICAL 3 TIMES DAILY
Qty: 15 G | Refills: 0 | Status: SHIPPED | OUTPATIENT
Start: 2021-01-03 | End: 2021-01-03 | Stop reason: DRUGHIGH

## 2021-01-03 RX ORDER — VALACYCLOVIR HYDROCHLORIDE 1 G/1
1000 TABLET, FILM COATED ORAL 3 TIMES DAILY
Qty: 21 TABLET | Refills: 0 | Status: SHIPPED | OUTPATIENT
Start: 2021-01-03 | End: 2021-01-22

## 2021-01-03 RX ORDER — MUPIROCIN 20 MG/G
OINTMENT TOPICAL 3 TIMES DAILY
Qty: 15 G | Refills: 0 | Status: SHIPPED | OUTPATIENT
Start: 2021-01-03 | End: 2021-01-22

## 2021-01-03 ASSESSMENT — PAIN SCALES - GENERAL: PAINLEVEL: MILD PAIN (2)

## 2021-01-03 NOTE — NURSING NOTE
Date: 1/3/2021    Time of Call: 11:20 AM     Diagnosis:  Viral rash     [ VORB ] Ordering provider: Dr Damian Jaramillo MD   Order: Bactroban TID 15g     Order received by: Shari Gomez LPN........1/3/2021 11:21 AM

## 2021-01-03 NOTE — PROGRESS NOTES
SUBJECTIVE:  Chhaya Thurman is a 30 year old female who presents to the clinic today for a rash.  Onset of rash was 1 week(s) ago.   Rash is gradual onset, still present and worsening.  Location of the rash: ear.  Quality/symptoms of rash: itching, burning and red   Symptoms are mild and rash seems to be worsening.  Previous history of a similar rash? No  Recent exposure history: wearing mask for much of the day    Associated symptoms include: nothing.    Past Medical History:   Diagnosis Date     Chickenpox      Fetal cardiac anomaly affecting pregnancy, antepartum 4/4/2018     History of recurrent UTI (urinary tract infection)      Hypertension     with first pregnancy     Current Outpatient Medications   Medication Sig Dispense Refill     mupirocin (BACTROBAN) 2 % external ointment Apply topically 3 times daily 15 g 0     valACYclovir (VALTREX) 1000 mg tablet Take 1 tablet (1,000 mg) by mouth 3 times daily for 7 days 21 tablet 0     norethindrone (MICRONOR) 0.35 MG tablet Take 1 tablet (0.35 mg) by mouth daily (Patient not taking: Reported on 1/3/2021) 86 tablet 4     History   Smoking Status     Never Smoker   Smokeless Tobacco     Never Used       ROS:  Review of systems negative except as stated above.    EXAM:   /84 (BP Location: Right arm, Patient Position: Sitting, Cuff Size: Adult Regular)   Pulse 78   Temp 97.4  F (36.3  C) (Tympanic)   Resp 14   Wt 88 kg (194 lb)   Breastfeeding Yes   BMI 31.31 kg/m    GENERAL: alert, no acute distress.  SKIN: Rash description: Distribution: patch of very small vesicles and red on scalp posterior to rt ear.  Pinna clear.  No scale.  Slight tenderness.  Denies neuralgic pain.  Ear: tms and canal are normal  ASSESSMENT:  Probable viral rash, could be shingles but one week old and not typical.  Cannot R/O bacterial component.    PLAN:  Valtrex po and bactroban cream topical.  Follow up with primary care provider if no improvement.

## 2021-01-14 ENCOUNTER — HEALTH MAINTENANCE LETTER (OUTPATIENT)
Age: 31
End: 2021-01-14

## 2021-01-22 ENCOUNTER — OFFICE VISIT (OUTPATIENT)
Dept: OBGYN | Facility: CLINIC | Age: 31
End: 2021-01-22
Payer: COMMERCIAL

## 2021-01-22 VITALS
SYSTOLIC BLOOD PRESSURE: 124 MMHG | BODY MASS INDEX: 30.18 KG/M2 | TEMPERATURE: 99.3 F | WEIGHT: 187 LBS | DIASTOLIC BLOOD PRESSURE: 78 MMHG | HEART RATE: 85 BPM

## 2021-01-22 DIAGNOSIS — N76.0 BV (BACTERIAL VAGINOSIS): ICD-10-CM

## 2021-01-22 DIAGNOSIS — N89.8 VAGINAL DISCHARGE: Primary | ICD-10-CM

## 2021-01-22 DIAGNOSIS — Z01.84 ANTIBODY RESPONSE EXAMINATION: ICD-10-CM

## 2021-01-22 DIAGNOSIS — B96.89 BV (BACTERIAL VAGINOSIS): ICD-10-CM

## 2021-01-22 DIAGNOSIS — R10.2 PELVIC PAIN IN FEMALE: ICD-10-CM

## 2021-01-22 LAB
SPECIMEN SOURCE: ABNORMAL
WET PREP SPEC: ABNORMAL

## 2021-01-22 PROCEDURE — 86787 VARICELLA-ZOSTER ANTIBODY: CPT | Performed by: OBSTETRICS & GYNECOLOGY

## 2021-01-22 PROCEDURE — 87210 SMEAR WET MOUNT SALINE/INK: CPT | Performed by: OBSTETRICS & GYNECOLOGY

## 2021-01-22 PROCEDURE — 86762 RUBELLA ANTIBODY: CPT | Performed by: OBSTETRICS & GYNECOLOGY

## 2021-01-22 PROCEDURE — 99213 OFFICE O/P EST LOW 20 MIN: CPT | Performed by: OBSTETRICS & GYNECOLOGY

## 2021-01-22 PROCEDURE — 36415 COLL VENOUS BLD VENIPUNCTURE: CPT | Performed by: OBSTETRICS & GYNECOLOGY

## 2021-01-22 RX ORDER — METRONIDAZOLE 500 MG/1
500 TABLET ORAL 2 TIMES DAILY
Qty: 14 TABLET | Refills: 0 | Status: SHIPPED | OUTPATIENT
Start: 2021-01-22 | End: 2021-01-29

## 2021-01-22 NOTE — NURSING NOTE
"Initial /78 (BP Location: Right arm, Patient Position: Chair, Cuff Size: Adult Large)   Pulse 85   Temp 99.3  F (37.4  C) (Tympanic)   Wt 84.8 kg (187 lb)   LMP 01/03/2021   Breastfeeding No   BMI 30.18 kg/m   Estimated body mass index is 30.18 kg/m  as calculated from the following:    Height as of 10/31/19: 1.676 m (5' 6\").    Weight as of this encounter: 84.8 kg (187 lb). .    Alicja Guerin MA    "

## 2021-01-22 NOTE — PROGRESS NOTES
[facility] Consult Note        HPI: Chhaya Thurman is a 30 year old  who presents for preconception visit.  She states that she has had 4 prior pregnancies.  The first 2 or early first trimester losses the past spontaneously.  She subsequently had a term pregnancy in which the child was affected by Down syndrome and passed away shortly after birth.  Her most recent pregnancy was a term viable delivery.  She had been using progesterone only pills until last month.  States that several months ago she had some right-sided pelvic discomfort that was worse with intercourse.  That discomfort has generally since resolved but wants to be sure there is nothing regarding this that would interfere with subsequent pregnancy.  She does note that following her second miscarriage she had recurrent pregnancy work-up    Completed in clinic.  On review it appears that she had normal antiphospholipid syndrome testing and normal chromosomal testing.  She reports that her partner's chromosomal testing was also normal.  She is currently taking a prenatal vitamin.    ROS: 10 pt ROS neg other than HPI    PMH:   Past Medical History:   Diagnosis Date     Chickenpox      Fetal cardiac anomaly affecting pregnancy, antepartum 2018     History of recurrent UTI (urinary tract infection)      Hypertension     with first pregnancy       PSHx:   Past Surgical History:   Procedure Laterality Date     DILATION AND CURETTAGE, OPERATIVE HYSTEROSCOPY, COMBINED N/A 2017    Procedure: COMBINED DILATION AND CURETTAGE, OPERATIVE HYSTEROSCOPY;  Hysteroscopy with dilation and curettage, polypectomy;  Surgeon: Mable Hanson MD;  Location: [facility] OR     ENT SURGERY  20 years ago    tubes in three times     MOUTH SURGERY  2008    wisdom teeth -age 16     TONSILLECTOMY         Medications:   No current outpatient medications on file prior to visit.  No current facility-administered medications on file prior to visit.        Allergies:       Allergies   Allergen Reactions     Imitrex [Sumatriptan]        Social History:   Social History     Socioeconomic History     Marital status:      Spouse name: Nahid     Number of children: Not on file     Years of education: Not on file     Highest education level: Not on file   Occupational History     Occupation: RN - OR coordinator    Social Needs     Financial resource strain: Not on file     Food insecurity     Worry: Not on file     Inability: Not on file     Transportation needs     Medical: Not on file     Non-medical: Not on file   Tobacco Use     Smoking status: Never Smoker     Smokeless tobacco: Never Used   Substance and Sexual Activity     Alcohol use: No     Alcohol/week: 0.0 standard drinks     Comment: occas-quit with pregnancy     Drug use: No     Sexual activity: Yes     Partners: Male     Birth control/protection: None   Lifestyle     Physical activity     Days per week: Not on file     Minutes per session: Not on file     Stress: Not on file   Relationships     Social connections     Talks on phone: Not on file     Gets together: Not on file     Attends [employer] service: Not on file     Active member of club or organization: Not on file     Attends meetings of clubs or organizations: Not on file     Relationship status: Not on file     Intimate partner violence     Fear of current or ex partner: Not on file     Emotionally abused: Not on file     Physically abused: Not on file     Forced sexual activity: Not on file   Other Topics Concern     Parent/sibling w/ CABG, MI or angioplasty before 65F 55M? No   Social History Narrative    ** Merged History Encounter **            Family History:  Family History   Problem Relation Age of Onset     Other Cancer Maternal Grandfather         skin     Cancer Maternal Grandfather         skin ca     Hyperlipidemia Mother      Unknown/Adopted Father         unknown history     Stomach Cancer Maternal Grandmother      Other Cancer Maternal  Grandmother      Down Syndrome Daughter      Heart Defect Daughter      Diabetes No family hx of      Melanoma No family hx of        Physical Exam:   Vitals:    21 1006   BP: 124/78   BP Location: Right arm   Patient Position: Chair   Cuff Size: Adult Large   Pulse: 85   Temp: 99.3  F (37.4  C)   TempSrc: Tympanic   Weight: 84.8 kg (187 lb)      Gen: lying in bed, NAD  CV: Reg rate, well perfused  Pulm: no increased work of breathing  Abd: non-tender, non-distended, no masses   Pelvis: normal appearing external genitalia, vaginal mucosa, cervix, bimanual exam with normal size and contour of uterus with no adnexal masses  Extremities: non-tender, no erythema; no edema  Psych: normal mood and affect  Neuro: no focal deficits    Labs:  APLS and chromosomal testing reviewed and normal    A&P: Chhaya Thurman is a 30 year old  who presents for preconception visit.  Reviewed patient's prior obstetric history and discussed with her that at this time I do not think there is further work to be done with regard to her history of trisomy 21 in prior pregnancy.  She has had normal chromosomal work-up.  Would recommend NIPT testing and subsequent pregnancy.  She also has a history of miscarriage x2, in reviewing her records from after the second miscarriage it appears that this was worked up and no concerning findings arose and she subsequently had to pregnancies carried to term.  Given her plans for conception soon did recommend varicella and rubella titers to confirm immunity with subsequent vaccination as needed.  Also recommended that she continue on prenatal vitamins.  Patient does have some vague pelvic discomfort that has since improved.  Suspect possibly that she had ruptured ovarian cyst as it sounds as if it was only present for a couple of weeks and that she was only using progesterone only pill for contraception at the time.  No concerning findings on exam today.  Recommended that we complete pelvic  ultrasound to rule out any structural cause for her discomfort.  Patient is agreeable.  We will therefore plan to complete ultrasound and lab work-up as above.  He also lanes of some vaginal discharge today.  Wet prep collected and returned positive for clue cells and therefore Flagyl prescription was sent to the pharmacy on file.    Belinda Kirkland MD   1/22/2021 10:16 AM

## 2021-01-23 LAB
RUBV IGG SERPL IA-ACNC: 26 IU/ML
VZV IGG SER QL IA: 3.9 AI (ref 0–0.8)

## 2021-03-02 ENCOUNTER — IMMUNIZATION (OUTPATIENT)
Dept: FAMILY MEDICINE | Facility: CLINIC | Age: 31
End: 2021-03-02
Payer: COMMERCIAL

## 2021-03-02 PROCEDURE — 91300 PR COVID VAC PFIZER DIL RECON 30 MCG/0.3 ML IM: CPT

## 2021-03-02 PROCEDURE — 0001A PR COVID VAC PFIZER DIL RECON 30 MCG/0.3 ML IM: CPT

## 2021-03-12 ENCOUNTER — E-VISIT (OUTPATIENT)
Dept: FAMILY MEDICINE | Facility: CLINIC | Age: 31
End: 2021-03-12
Payer: COMMERCIAL

## 2021-03-12 DIAGNOSIS — H10.31 ACUTE CONJUNCTIVITIS OF RIGHT EYE, UNSPECIFIED ACUTE CONJUNCTIVITIS TYPE: Primary | ICD-10-CM

## 2021-03-12 PROCEDURE — 99421 OL DIG E/M SVC 5-10 MIN: CPT | Performed by: PHYSICIAN ASSISTANT

## 2021-03-12 RX ORDER — POLYMYXIN B SULFATE AND TRIMETHOPRIM 1; 10000 MG/ML; [USP'U]/ML
1-2 SOLUTION OPHTHALMIC EVERY 6 HOURS
Qty: 10 ML | Refills: 0 | Status: SHIPPED | OUTPATIENT
Start: 2021-03-12 | End: 2021-03-17

## 2021-03-12 NOTE — PATIENT INSTRUCTIONS
Thank you for choosing us for your care. I have placed an order for a prescription so that you can start treatment. View your full visit summary for details by clicking on the link below. Your pharmacist will able to address any questions you may have about the medication.     If you re not feeling better within 2-3 days, please schedule an appointment.  You can schedule an appointment right here in [facility], or call [phone]  If the visit is for the same symptoms as your eVisit, we ll refund the cost of your eVisit if seen within seven days.

## 2021-03-23 ENCOUNTER — IMMUNIZATION (OUTPATIENT)
Dept: FAMILY MEDICINE | Facility: CLINIC | Age: 31
End: 2021-03-23
Attending: FAMILY MEDICINE
Payer: COMMERCIAL

## 2021-03-23 PROCEDURE — 0002A PR COVID VAC PFIZER DIL RECON 30 MCG/0.3 ML IM: CPT

## 2021-03-23 PROCEDURE — 91300 PR COVID VAC PFIZER DIL RECON 30 MCG/0.3 ML IM: CPT

## 2021-05-10 ENCOUNTER — TELEPHONE (OUTPATIENT)
Dept: FAMILY MEDICINE | Facility: CLINIC | Age: 31
End: 2021-05-10

## 2021-05-10 DIAGNOSIS — B09 VIRAL RASH: ICD-10-CM

## 2021-05-10 DIAGNOSIS — B00.1 RECURRENT VESICULAR DERMATITIS DUE TO HERPES SIMPLEX VIRUS (HSV): ICD-10-CM

## 2021-05-10 RX ORDER — MUPIROCIN 20 MG/G
OINTMENT TOPICAL 3 TIMES DAILY
Qty: 15 G | Refills: 0 | Status: CANCELLED | OUTPATIENT
Start: 2021-05-10

## 2021-07-15 ENCOUNTER — APPOINTMENT (OUTPATIENT)
Dept: OBGYN | Facility: CLINIC | Age: 31
End: 2021-07-15
Payer: COMMERCIAL

## 2021-07-15 ENCOUNTER — PRENATAL OFFICE VISIT (OUTPATIENT)
Dept: OBGYN | Facility: CLINIC | Age: 31
End: 2021-07-15

## 2021-07-15 DIAGNOSIS — Z34.80 PRENATAL CARE, SUBSEQUENT PREGNANCY: ICD-10-CM

## 2021-07-15 PROCEDURE — 99207 PR NO CHARGE NURSE ONLY: CPT | Performed by: OBSTETRICS & GYNECOLOGY

## 2021-07-15 RX ORDER — PRENATAL VIT/IRON FUM/FOLIC AC 27MG-0.8MG
1 TABLET ORAL DAILY
COMMUNITY
Start: 2020-12-15 | End: 2024-08-28

## 2021-07-15 NOTE — PROGRESS NOTES
Denied need for review of teaching  [facility] OB Intake Nurse    Patient supplied answers from flow sheet for:  Prenatal OB Questionnaire.  Past Medical History  Have you ever recieved care for your mental health? : (!) Yes  Have you ever been in a major accident or suffered serious trauma?: No  Within the last year, has anyone hit, slapped, kicked or otherwise hurt you?: No  In the last year, has anyone forced you to have sex when you didn't want to?: No    Past Medical History 2   Have you ever received a blood transfusion?: No  Would you accept a blood transfusion if was medically recommended?: Yes  Does anyone in your home smoke?: No   Is your blood type Rh negative?: No  Have you ever ?: (!) Yes  Have you been hospitalized for a nonsurgical reason excluding normal delivery?: (!) Yes (pneumonia as an infant)  Have you ever had an abnormal pap smear?: No    Past Medical History (Continued)  Do you have a history of abnormalities of the uterus?: No  Did your mother take SAMARIA or any other hormones when she was pregnant with you?: No  Do you have any other problems we have not asked about which you feel may be important to this pregnancy?: No

## 2021-08-04 ENCOUNTER — PRENATAL OFFICE VISIT (OUTPATIENT)
Dept: OBGYN | Facility: CLINIC | Age: 31
End: 2021-08-04
Payer: COMMERCIAL

## 2021-08-04 VITALS
WEIGHT: 194 LBS | TEMPERATURE: 98.7 F | DIASTOLIC BLOOD PRESSURE: 72 MMHG | HEIGHT: 67 IN | HEART RATE: 84 BPM | RESPIRATION RATE: 18 BRPM | SYSTOLIC BLOOD PRESSURE: 127 MMHG | BODY MASS INDEX: 30.45 KG/M2

## 2021-08-04 DIAGNOSIS — Z34.81 PRENATAL CARE, SUBSEQUENT PREGNANCY IN FIRST TRIMESTER: Primary | ICD-10-CM

## 2021-08-04 DIAGNOSIS — Z87.59 HISTORY OF PREGNANCY INDUCED HYPERTENSION: ICD-10-CM

## 2021-08-04 DIAGNOSIS — O99.211 OBESITY AFFECTING PREGNANCY IN FIRST TRIMESTER: ICD-10-CM

## 2021-08-04 LAB
ALBUMIN UR-MCNC: NEGATIVE MG/DL
APPEARANCE UR: CLEAR
BILIRUB UR QL STRIP: NEGATIVE
COLOR UR AUTO: YELLOW
GLUCOSE UR STRIP-MCNC: NEGATIVE MG/DL
HGB UR QL STRIP: NEGATIVE
KETONES UR STRIP-MCNC: NEGATIVE MG/DL
LEUKOCYTE ESTERASE UR QL STRIP: NEGATIVE
NITRATE UR QL: NEGATIVE
PH UR STRIP: 5.5 [PH] (ref 5–7)
RBC #/AREA URNS AUTO: NORMAL /HPF
SP GR UR STRIP: >=1.03 (ref 1–1.03)
UROBILINOGEN UR STRIP-ACNC: 0.2 E.U./DL
WBC #/AREA URNS AUTO: NORMAL /HPF

## 2021-08-04 PROCEDURE — 87624 HPV HI-RISK TYP POOLED RSLT: CPT | Performed by: OBSTETRICS & GYNECOLOGY

## 2021-08-04 PROCEDURE — 76817 TRANSVAGINAL US OBSTETRIC: CPT | Performed by: OBSTETRICS & GYNECOLOGY

## 2021-08-04 PROCEDURE — 87086 URINE CULTURE/COLONY COUNT: CPT | Performed by: OBSTETRICS & GYNECOLOGY

## 2021-08-04 PROCEDURE — 87491 CHLMYD TRACH DNA AMP PROBE: CPT | Performed by: OBSTETRICS & GYNECOLOGY

## 2021-08-04 PROCEDURE — 87591 N.GONORRHOEAE DNA AMP PROB: CPT | Performed by: OBSTETRICS & GYNECOLOGY

## 2021-08-04 PROCEDURE — G0145 SCR C/V CYTO,THINLAYER,RESCR: HCPCS | Performed by: OBSTETRICS & GYNECOLOGY

## 2021-08-04 PROCEDURE — 81001 URINALYSIS AUTO W/SCOPE: CPT | Performed by: OBSTETRICS & GYNECOLOGY

## 2021-08-04 PROCEDURE — 99207 PR FIRST OB VISIT: CPT | Performed by: OBSTETRICS & GYNECOLOGY

## 2021-08-04 ASSESSMENT — MIFFLIN-ST. JEOR: SCORE: 1622.61

## 2021-08-04 NOTE — NURSING NOTE
"Initial /72 (BP Location: Left arm, Patient Position: Chair, Cuff Size: Adult Regular)   Pulse 84   Temp 98.7  F (37.1  C) (Tympanic)   Resp 18   Ht 1.702 m (5' 7\")   Wt 88 kg (194 lb)   LMP 06/01/2021   Breastfeeding No   BMI 30.38 kg/m   Estimated body mass index is 30.38 kg/m  as calculated from the following:    Height as of this encounter: 1.702 m (5' 7\").    Weight as of this encounter: 88 kg (194 lb). .      "

## 2021-08-04 NOTE — PROGRESS NOTES
[facility]   OB/GYN Clinic    CC: New OB     Subjective:    Chhaya is a 30 year old  at 9w1d by Patient's last menstrual period was 2021. who presents for her initial OB visit. This is a planned pregnancy and her and her partner are excited. She reports feeling well. Denies any vaginal bleeding. Denies nausea and vomiting. +RLP.   Prior to a +UPT, she reports regular monthly periods without contraception use.       OB History    Para Term  AB Living   5 2 2 0 2 1   SAB TAB Ectopic Multiple Live Births   1 0 0 0 2      # Outcome Date GA Lbr Gerald/2nd Weight Sex Delivery Anes PTL Lv   5 Current            4 Term 19 39w1d 03:17 / 00:49 3.78 kg (8 lb 5.3 oz) F Vag-Spont EPI N GEORGE      Name: Bridgette      Apgar1: 9  Apgar5: 9   3 Term 18 37w4d 07:19 / 02:39 2.72 kg (5 lb 15.9 oz) F Vag-Spont EPI N DEC      Complications: Preeclampsia/Hypertension      Name: Ralph      Apgar1: 8  Apgar5: 9   2 SAB 17 9w1d    AB, MISSED      1 AB 17 8w0d    SAB      Hx of death of her daughter Ralph secondary to T21 and heart defect    Past Medical History:   Diagnosis Date     Chickenpox      Fetal cardiac anomaly affecting pregnancy, antepartum 2018     History of recurrent UTI (urinary tract infection)      Hypertension     with first pregnancy       Past Surgical History:   Procedure Laterality Date     DILATION AND CURETTAGE, OPERATIVE HYSTEROSCOPY, COMBINED N/A 2017    Procedure: COMBINED DILATION AND CURETTAGE, OPERATIVE HYSTEROSCOPY;  Hysteroscopy with dilation and curettage, polypectomy;  Surgeon: Mable Hanson MD;  Location: [facility] OR     ENT SURGERY  20 years ago    tubes in three times     MOUTH SURGERY      wisdom teeth -age 16     TONSILLECTOMY         Current Outpatient Medications   Medication Sig Dispense Refill     Prenatal Vit-Fe Fumarate-FA (PRENATAL MULTIVITAMIN W/IRON) 27-0.8 MG tablet Take 1 tablet by mouth daily         Family  "History   Problem Relation Age of Onset     Unknown/Adopted Father         unknown history     Hyperlipidemia Mother      Stomach Cancer Maternal Grandmother      Other Cancer Maternal Grandmother      Other Cancer Maternal Grandfather         skin     Cancer Maternal Grandfather         skin ca     Down Syndrome Daughter      Heart Defect Daughter          at 4 months     Diabetes No family hx of      Melanoma No family hx of        Social History     Tobacco Use     Smoking status: Never Smoker     Smokeless tobacco: Never Used   Substance Use Topics     Alcohol use: No     Alcohol/week: 0.0 standard drinks     Comment: occas-quit with pregnancy       ROS: A 10 pt ROS was completed and found to be negative unless mentioned in the HPI.     Objective:  /72 (BP Location: Left arm, Patient Position: Chair, Cuff Size: Adult Regular)   Pulse 84   Temp 98.7  F (37.1  C) (Tympanic)   Resp 18   Ht 1.702 m (5' 7\")   Wt 88 kg (194 lb)   LMP 2021   Breastfeeding No   BMI 30.38 kg/m      Estimated body mass index is 30.38 kg/m  as calculated from the following:    Height as of this encounter: 1.702 m (5' 7\").    Weight as of this encounter: 88 kg (194 lb).    Physical Exam:  Gen: Pleasant, talkative female in no apparent distress   Respiratory: Lungs clear, breathing comfortably on room air   Cardiac: Regular rate and rhythm with no murmurs, gallops or rubs. Warm and well-perfused.   GI: Abd soft and non-tender  : External genitalia is free of lesion. Urethra and bartholin glands normal.  Vaginal mucosa is moist and pink without unusual discharge.  Cervix is without lesions or discharge.  Pap smear and endocervical sampling obtained without incident.  Bimanual exam reveals mobile anteverted uterus without cervical motion tenderness.  Adenexa are mobile and non-tender bilaterally. No appreciable adnexal enlargement. Uterus is consistent with a 9 week gestation.   MSK: Grossly normal movement of all " four extremities  Psych: mood and affect bright   Lower extremity: edema not present     Transvaginal US: dixon IUP measuring 8w6d, +cardiac activity at 179 bpm, normal adnexa   See scanned images    Assessment/Plan:   30 year old  at 9w1d by LMP of Patient's last menstrual period was 2021. c/w 9w1d US who presents for her initial OB visit.   1) Plan to draw new OB lab today (T&S, CBC, HIV, RPR, HepBsAg, Hep B antibody, rubella, GC/Chlam, UC)  2) Discussed routine prenatal care, group practice model at [facility], tertiary support and frequency of visits. Options for  testing for chromosomal and birth defects were discussed with the patient including nuchal translucency/blood marker testing in the first trimester, progenity and quad screening. She would like NIPT  3) Plan for dating/viability scan now - AL[facility]INO 3/8/2022  4) Concerns:    - hx of death of daughter 2/2 T21 and heart defect: plan MFM L2, NIPT, declined MFM or genetic counseling, likely plan Fetal ECHO   - Hx of HTN in first pregnancy; plan baseline hELLP labs, plan ASA  5) Weight gain: discussed weight gain expectations (BMI >30: 11-20lbs) - plan hgba1c with labs  6) PAP smear: collected today   7) Delivery plan: plans to deliver at [facility], discussed 36wk consult with their group   8) Immunizations: flu shot in the fall, Tdap at 28wks, s/p COVID vaccine   9) Discussed risk of COVID in pregnancy including a doubled risk of needing ICU levels care, intubation or death. Recommended social distancing, mask-wearing and avoiding unnecessary contacts. S/p vaccine.     Return to clinic in 4 weeks for routine OB cares.   Plan 1 week RN only appt for labs.     Loida Jang MD  OB/GYN  2021

## 2021-08-05 LAB
C TRACH DNA SPEC QL PROBE+SIG AMP: NEGATIVE
N GONORRHOEA DNA SPEC QL NAA+PROBE: NEGATIVE

## 2021-08-06 LAB — BACTERIA UR CULT: NORMAL

## 2021-08-09 LAB
BKR LAB AP GYN ADEQUACY: NORMAL
BKR LAB AP GYN INTERPRETATION: NORMAL
BKR LAB AP HPV REFLEX: NORMAL
BKR LAB AP LMP: NORMAL
BKR LAB AP PREVIOUS ABNORMAL: NORMAL
PATH REPORT.COMMENTS IMP SPEC: NORMAL
PATH REPORT.RELEVANT HX SPEC: NORMAL

## 2021-08-11 LAB
HUMAN PAPILLOMA VIRUS 16 DNA: NEGATIVE
HUMAN PAPILLOMA VIRUS 18 DNA: NEGATIVE
HUMAN PAPILLOMA VIRUS FINAL DIAGNOSIS: NORMAL
HUMAN PAPILLOMA VIRUS OTHER HR: NEGATIVE

## 2021-08-13 ENCOUNTER — ALLIED HEALTH/NURSE VISIT (OUTPATIENT)
Dept: OBGYN | Facility: CLINIC | Age: 31
End: 2021-08-13
Payer: COMMERCIAL

## 2021-08-13 DIAGNOSIS — O99.211 OBESITY AFFECTING PREGNANCY IN FIRST TRIMESTER: ICD-10-CM

## 2021-08-13 DIAGNOSIS — Z34.81 PRENATAL CARE, SUBSEQUENT PREGNANCY IN FIRST TRIMESTER: ICD-10-CM

## 2021-08-13 DIAGNOSIS — Z87.59 HISTORY OF PREGNANCY INDUCED HYPERTENSION: ICD-10-CM

## 2021-08-13 LAB
ABO/RH(D): NORMAL
ALT SERPL W P-5'-P-CCNC: 11 U/L (ref 0–50)
ANTIBODY SCREEN: NEGATIVE
AST SERPL W P-5'-P-CCNC: 10 U/L (ref 0–45)
CREAT SERPL-MCNC: 0.7 MG/DL (ref 0.52–1.04)
CREAT UR-MCNC: 92 MG/DL
ERYTHROCYTE [DISTWIDTH] IN BLOOD BY AUTOMATED COUNT: 12 % (ref 10–15)
GFR SERPL CREATININE-BSD FRML MDRD: >90 ML/MIN/1.73M2
HBA1C MFR BLD: 5 % (ref 0–5.6)
HCT VFR BLD AUTO: 37.7 % (ref 35–47)
HGB BLD-MCNC: 12.6 G/DL (ref 11.7–15.7)
MCH RBC QN AUTO: 32.3 PG (ref 26.5–33)
MCHC RBC AUTO-ENTMCNC: 33.4 G/DL (ref 31.5–36.5)
MCV RBC AUTO: 97 FL (ref 78–100)
PLATELET # BLD AUTO: 220 10E3/UL (ref 150–450)
PROT UR-MCNC: 0.08 G/L
PROT/CREAT 24H UR: 0.09 G/G CR (ref 0–0.2)
RBC # BLD AUTO: 3.9 10E6/UL (ref 3.8–5.2)
SPECIMEN EXPIRATION DATE: NORMAL
WBC # BLD AUTO: 9.3 10E3/UL (ref 4–11)

## 2021-08-13 PROCEDURE — 86850 RBC ANTIBODY SCREEN: CPT

## 2021-08-13 PROCEDURE — 83036 HEMOGLOBIN GLYCOSYLATED A1C: CPT

## 2021-08-13 PROCEDURE — 86901 BLOOD TYPING SEROLOGIC RH(D): CPT

## 2021-08-13 PROCEDURE — 36415 COLL VENOUS BLD VENIPUNCTURE: CPT

## 2021-08-13 PROCEDURE — 84460 ALANINE AMINO (ALT) (SGPT): CPT

## 2021-08-13 PROCEDURE — 84156 ASSAY OF PROTEIN URINE: CPT

## 2021-08-13 PROCEDURE — 85027 COMPLETE CBC AUTOMATED: CPT

## 2021-08-13 PROCEDURE — 86762 RUBELLA ANTIBODY: CPT

## 2021-08-13 PROCEDURE — 87340 HEPATITIS B SURFACE AG IA: CPT

## 2021-08-13 PROCEDURE — 86780 TREPONEMA PALLIDUM: CPT

## 2021-08-13 PROCEDURE — 82565 ASSAY OF CREATININE: CPT

## 2021-08-13 PROCEDURE — 86803 HEPATITIS C AB TEST: CPT

## 2021-08-13 PROCEDURE — 86900 BLOOD TYPING SEROLOGIC ABO: CPT

## 2021-08-13 PROCEDURE — 84450 TRANSFERASE (AST) (SGOT): CPT

## 2021-08-13 PROCEDURE — 87389 HIV-1 AG W/HIV-1&-2 AB AG IA: CPT

## 2021-08-13 PROCEDURE — 99207 PR NO CHARGE NURSE ONLY: CPT

## 2021-08-14 LAB — T PALLIDUM AB SER QL: NONREACTIVE

## 2021-08-16 LAB
HBV SURFACE AG SERPL QL IA: NONREACTIVE
HCV AB SERPL QL IA: NONREACTIVE
HIV 1+2 AB+HIV1 P24 AG SERPL QL IA: NONREACTIVE
RUBV IGG SERPL QL IA: 1.89 INDEX
RUBV IGG SERPL QL IA: POSITIVE

## 2021-08-17 ENCOUNTER — HOSPITAL ENCOUNTER (OUTPATIENT)
Facility: CLINIC | Age: 31
End: 2021-08-17
Admitting: OBSTETRICS & GYNECOLOGY
Payer: COMMERCIAL

## 2021-08-23 LAB — SCANNED LAB RESULT: NORMAL

## 2021-09-02 ENCOUNTER — PRENATAL OFFICE VISIT (OUTPATIENT)
Dept: OBGYN | Facility: CLINIC | Age: 31
End: 2021-09-02
Payer: COMMERCIAL

## 2021-09-02 VITALS
TEMPERATURE: 97.6 F | HEART RATE: 72 BPM | HEIGHT: 67 IN | DIASTOLIC BLOOD PRESSURE: 69 MMHG | RESPIRATION RATE: 12 BRPM | BODY MASS INDEX: 30.61 KG/M2 | SYSTOLIC BLOOD PRESSURE: 118 MMHG | WEIGHT: 195 LBS

## 2021-09-02 DIAGNOSIS — O09.292 HISTORY OF FETAL ANOMALY IN PRIOR PREGNANCY, CURRENTLY PREGNANT IN SECOND TRIMESTER: Primary | ICD-10-CM

## 2021-09-02 PROCEDURE — 99207 PR PRENATAL VISIT: CPT | Performed by: OBSTETRICS & GYNECOLOGY

## 2021-09-02 ASSESSMENT — MIFFLIN-ST. JEOR: SCORE: 1627.14

## 2021-09-02 NOTE — NURSING NOTE
"Initial /69 (BP Location: Right arm, Patient Position: Sitting, Cuff Size: Adult Regular)   Pulse 72   Temp 97.6  F (36.4  C) (Tympanic)   Resp 12   Ht 1.702 m (5' 7\")   Wt 88.5 kg (195 lb)   LMP 06/01/2021   BMI 30.54 kg/m   Estimated body mass index is 30.54 kg/m  as calculated from the following:    Height as of this encounter: 1.702 m (5' 7\").    Weight as of this encounter: 88.5 kg (195 lb). .      "

## 2021-09-02 NOTE — PROGRESS NOTES
"[facility]   OB/GYN Clinic     CC: F/U OB      Subjective:     Chhaya is a 30 year old  at 13w2d by Patient's last menstrual period was 2021. who presents for F/U OB visit. Nausea improving. Denies any vaginal bleeding.         Objective:  /69 (BP Location: Right arm, Patient Position: Sitting, Cuff Size: Adult Regular)   Pulse 72   Temp 97.6  F (36.4  C) (Tympanic)   Resp 12   Ht 1.702 m (5' 7\")   Wt 88.5 kg (195 lb)   LMP 2021   BMI 30.54 kg/m       Physical Exam:  Gen: Pleasant, talkative female in no apparent distress   Respiratory: Lungs clear, breathing comfortably on room air   Cardiac: Regular rate and rhythm with no murmurs, gallops or rubs. Warm and well-perfused.   GI: Abd soft and non-tender  MSK: Grossly normal movement of all four extremities  Psych: mood and affect bright   Lower extremity: edema not present      See OB flowsheet    Assessment/Plan:   30 year old  at 13w2d by LMP of Patient's last menstrual period was 2021. c/w 9w1d US who presents for F/U OB visit.   1) Normal new OB labs.   2) NIPT - normal GIRL  3) Plan for dating/viability scan now - [facility]VINO 3/8/2022  4) Concerns:                - hx of death of daughter 2/2 T21 and heart defect: plan MFM L2, NIPT, declined initial MFM or genetic counseling, likely plan Fetal ECHO               - Hx of HTN in first pregnancy; plan baseline hELLP labs - nl, plan ASA    - MTHFR mutation: no hx of clot, anticoagulation not generally warranted, but would have MFM weigh in    - desires delivery at [facility], plan 36 wk consult with [facility]   S/p COVID vaccine.      Return to clinic in 4 weeks for routine OB cares.      Loida Jang MD  OB/GYN  2021          "

## 2021-09-03 ENCOUNTER — TRANSCRIBE ORDERS (OUTPATIENT)
Dept: MATERNAL FETAL MEDICINE | Facility: CLINIC | Age: 31
End: 2021-09-03

## 2021-09-03 DIAGNOSIS — O26.90 PREGNANCY RELATED CONDITION, ANTEPARTUM: Primary | ICD-10-CM

## 2021-09-29 ENCOUNTER — PRENATAL OFFICE VISIT (OUTPATIENT)
Dept: OBGYN | Facility: CLINIC | Age: 31
End: 2021-09-29
Payer: COMMERCIAL

## 2021-09-29 VITALS
SYSTOLIC BLOOD PRESSURE: 124 MMHG | BODY MASS INDEX: 31.42 KG/M2 | WEIGHT: 200.2 LBS | HEIGHT: 67 IN | HEART RATE: 77 BPM | TEMPERATURE: 96.9 F | RESPIRATION RATE: 16 BRPM | DIASTOLIC BLOOD PRESSURE: 75 MMHG

## 2021-09-29 DIAGNOSIS — N89.8 VAGINAL DISCHARGE: Primary | ICD-10-CM

## 2021-09-29 LAB
CLUE CELLS: PRESENT
TRICHOMONAS, WET PREP: ABNORMAL
WBC'S/HIGH POWER FIELD, WET PREP: ABNORMAL
YEAST, WET PREP: ABNORMAL

## 2021-09-29 PROCEDURE — 99207 PR PRENATAL VISIT: CPT | Performed by: OBSTETRICS & GYNECOLOGY

## 2021-09-29 PROCEDURE — 87210 SMEAR WET MOUNT SALINE/INK: CPT | Performed by: OBSTETRICS & GYNECOLOGY

## 2021-09-29 RX ORDER — METRONIDAZOLE 500 MG/1
500 TABLET ORAL 2 TIMES DAILY
Qty: 14 TABLET | Refills: 0 | Status: SHIPPED | OUTPATIENT
Start: 2021-09-29 | End: 2021-10-06

## 2021-09-29 ASSESSMENT — MIFFLIN-ST. JEOR: SCORE: 1650.73

## 2021-09-29 NOTE — PROGRESS NOTES
"[facility]   OB/GYN Clinic     CC: F/U OB      Subjective:     Chhaya is a 30 year old  at 17w1d by Patient's last menstrual period was 2021. who presents for F/U OB visit. Vaginal discharge. No VB. Maybe flutters of FM.         Objective:  /75 (BP Location: Right arm, Patient Position: Sitting, Cuff Size: Adult Regular)   Pulse 77   Temp 96.9  F (36.1  C) (Tympanic)   Resp 16   Ht 1.702 m (5' 7\")   Wt 90.8 kg (200 lb 3.2 oz)   LMP 2021   BMI 31.36 kg/m       Physical Exam:  Gen: Pleasant, talkative female in no apparent distress   Respiratory: Lungs clear, breathing comfortably on room air   Cardiac: Regular rate and rhythm with no murmurs, gallops or rubs. Warm and well-perfused.   GI: Abd soft and non-tender  GYN: speculum exam with thin but copious white discharge  MSK: Grossly normal movement of all four extremities  Psych: mood and affect bright   Lower extremity: edema not present      See OB flowsheet     Assessment/Plan:   30 year old  at 17w1d by LMP of Patient's last menstrual period was 2021. c/w 9w1d US who presents for F/U OB visit.   1) Normal new OB labs.   2) NIPT - normal GIRL  3) Plan for dating/viability scan now - [facility]VINO 3/8/2022  4) Concerns:                - hx of death of daughter 2/2 T21 and heart defect: plan MFM L2, NIPT, declined 1st tri MFM or genetic counseling, likely plan Fetal ECHO               - Hx of HTN in first pregnancy; plan baseline hELLP labs - nl, plan ASA                - MTHFR mutation: no hx of clot, anticoagulation not generally warranted, but would have MFM weigh in                - desires delivery at [facility], plan 36 wk consult with [facility]   S/p COVID vaccine.   5) wet prep collected      Return to clinic in 4 weeks for routine OB cares.      Loida Jang MD  OB/GYN  2021     "

## 2021-09-29 NOTE — NURSING NOTE
"Initial /75 (BP Location: Right arm, Patient Position: Sitting, Cuff Size: Adult Regular)   Pulse 77   Temp 96.9  F (36.1  C) (Tympanic)   Resp 16   Ht 1.702 m (5' 7\")   Wt 90.8 kg (200 lb 3.2 oz)   LMP 06/01/2021   BMI 31.36 kg/m   Estimated body mass index is 31.36 kg/m  as calculated from the following:    Height as of this encounter: 1.702 m (5' 7\").    Weight as of this encounter: 90.8 kg (200 lb 3.2 oz). .      NRAsato LPN  "

## 2021-10-05 ENCOUNTER — PRE VISIT (OUTPATIENT)
Dept: MATERNAL FETAL MEDICINE | Facility: CLINIC | Age: 31
End: 2021-10-05

## 2021-10-07 ENCOUNTER — HOSPITAL ENCOUNTER (OUTPATIENT)
Dept: ULTRASOUND IMAGING | Facility: CLINIC | Age: 31
End: 2021-10-07
Attending: OBSTETRICS & GYNECOLOGY
Payer: COMMERCIAL

## 2021-10-07 ENCOUNTER — OFFICE VISIT (OUTPATIENT)
Dept: MATERNAL FETAL MEDICINE | Facility: CLINIC | Age: 31
End: 2021-10-07
Attending: OBSTETRICS & GYNECOLOGY
Payer: COMMERCIAL

## 2021-10-07 DIAGNOSIS — Z82.79 FAMILY HISTORY OF CONGENITAL HEART DEFECT: ICD-10-CM

## 2021-10-07 DIAGNOSIS — O26.90 PREGNANCY RELATED CONDITION, ANTEPARTUM: ICD-10-CM

## 2021-10-07 DIAGNOSIS — O35.9XX0 SUSPECTED FETAL ANOMALY, ANTEPARTUM, SINGLE OR UNSPECIFIED FETUS: Primary | ICD-10-CM

## 2021-10-07 PROCEDURE — 76811 OB US DETAILED SNGL FETUS: CPT | Mod: 26 | Performed by: OBSTETRICS & GYNECOLOGY

## 2021-10-07 PROCEDURE — 76811 OB US DETAILED SNGL FETUS: CPT

## 2021-10-28 ENCOUNTER — PRENATAL OFFICE VISIT (OUTPATIENT)
Dept: OBGYN | Facility: CLINIC | Age: 31
End: 2021-10-28
Payer: COMMERCIAL

## 2021-10-28 VITALS
WEIGHT: 207.2 LBS | BODY MASS INDEX: 32.52 KG/M2 | TEMPERATURE: 98.6 F | HEART RATE: 77 BPM | RESPIRATION RATE: 12 BRPM | HEIGHT: 67 IN | SYSTOLIC BLOOD PRESSURE: 123 MMHG | DIASTOLIC BLOOD PRESSURE: 77 MMHG

## 2021-10-28 DIAGNOSIS — Z34.80 PRENATAL CARE OF MULTIGRAVIDA, ANTEPARTUM: Primary | ICD-10-CM

## 2021-10-28 PROCEDURE — 99207 PR PRENATAL VISIT: CPT | Performed by: OBSTETRICS & GYNECOLOGY

## 2021-10-28 ASSESSMENT — MIFFLIN-ST. JEOR: SCORE: 1682.48

## 2021-10-28 NOTE — NURSING NOTE
"Initial /77 (BP Location: Right arm, Patient Position: Sitting, Cuff Size: Adult Regular)   Pulse 77   Temp 98.6  F (37  C) (Tympanic)   Resp 12   Ht 1.702 m (5' 7\")   Wt 94 kg (207 lb 3.2 oz)   LMP 06/01/2021   BMI 32.45 kg/m   Estimated body mass index is 32.45 kg/m  as calculated from the following:    Height as of this encounter: 1.702 m (5' 7\").    Weight as of this encounter: 94 kg (207 lb 3.2 oz). .      "

## 2021-10-28 NOTE — PROGRESS NOTES
"[facility]   OB/GYN Clinic    CC: Return OB     Subjective:     Chhaya is a 30 year old  at 21w2d by 21 who presents for return OB visit. She reports feeling well. Denies uterine cramping, vaginal bleeding or leaking, dysuria. +fetal movement.     Concerns:     Objective:  /77 (BP Location: Right arm, Patient Position: Sitting, Cuff Size: Adult Regular)   Pulse 77   Temp 98.6  F (37  C) (Tympanic)   Resp 12   Ht 1.702 m (5' 7\")   Wt 94 kg (207 lb 3.2 oz)   LMP 2021   BMI 32.45 kg/m      Estimated body mass index is 32.45 kg/m  as calculated from the following:    Height as of this encounter: 1.702 m (5' 7\").    Weight as of this encounter: 94 kg (207 lb 3.2 oz).    Physical Exam:  Gen: Pleasant, talkative female in no apparent distress   Respiratory: breathing comfortably on room air   Cardiac: Warm and well-perfused.   GI: Abd soft and non-tender, gravid  MSK: Grossly normal movement of all four extremities  Psych: mood and affect bright   Lower extremity: edema not present     Fetal dop tones: 140 bpm  Fundal height: 22 cm    Assessment/Plan:   Chhaya is a 30 year old  at 21w2d by 21 who presents for follow-up OB visit.   1) Normal new OB labs: T&S, CBC, HIV, RPR, HepBsAg, Hep B antibody, rubella, GC/Chlam. Plan for 3rd tri lab at 28wks.   2) Genetics testing: NIPT - normal GIRL  3) MFM comprehensive US confirmed ALVINO 18w2d  4) Concerns:            - History of death of her first daughter due to T21 and heart defect; plan L2 and fetal ECHO, nl NIPT  5) PMH/OBHx problems:             - History of HTN in 1st pregnancy- baseline HELLP labs nl  6) Medication review: no changes, continue prenatal vitamin   7) Delivery plan: wants to deliver at the [facility], plan consult at 36wks     Return to clinic in 4 weeks.     Vivian Holliday MS3    I agree with the medical student's documentation above and have edited it for accuracy. I was present for and performed " the physical exam and interview of the patient myself. All medical decision-making was performed by me. If a procedure was performed, that was performed by me. Additionally, if billing is based on time, I am only using the time that I personally spent with the patient in my time statement.     Loida Jang MD  OB/GYN

## 2021-11-05 ENCOUNTER — HOSPITAL ENCOUNTER (OUTPATIENT)
Dept: ULTRASOUND IMAGING | Facility: CLINIC | Age: 31
End: 2021-11-05
Attending: OBSTETRICS & GYNECOLOGY
Payer: COMMERCIAL

## 2021-11-05 ENCOUNTER — OFFICE VISIT (OUTPATIENT)
Dept: MATERNAL FETAL MEDICINE | Facility: CLINIC | Age: 31
End: 2021-11-05
Attending: OBSTETRICS & GYNECOLOGY
Payer: COMMERCIAL

## 2021-11-05 DIAGNOSIS — Z82.79 FAMILY HISTORY OF CONGENITAL HEART DEFECT: Primary | ICD-10-CM

## 2021-11-05 DIAGNOSIS — Z82.79 FAMILY HISTORY OF CONGENITAL HEART DEFECT: ICD-10-CM

## 2021-11-05 DIAGNOSIS — O35.9XX0 SUSPECTED FETAL ANOMALY, ANTEPARTUM, SINGLE OR UNSPECIFIED FETUS: ICD-10-CM

## 2021-11-05 PROCEDURE — 76825 ECHO EXAM OF FETAL HEART: CPT | Mod: 26 | Performed by: OBSTETRICS & GYNECOLOGY

## 2021-11-05 PROCEDURE — 93325 DOPPLER ECHO COLOR FLOW MAPG: CPT | Mod: 26 | Performed by: OBSTETRICS & GYNECOLOGY

## 2021-11-05 PROCEDURE — 76816 OB US FOLLOW-UP PER FETUS: CPT

## 2021-11-05 PROCEDURE — 76816 OB US FOLLOW-UP PER FETUS: CPT | Mod: 26 | Performed by: OBSTETRICS & GYNECOLOGY

## 2021-11-05 PROCEDURE — 76827 ECHO EXAM OF FETAL HEART: CPT | Mod: 26 | Performed by: OBSTETRICS & GYNECOLOGY

## 2021-11-05 PROCEDURE — 93325 DOPPLER ECHO COLOR FLOW MAPG: CPT

## 2021-11-22 ENCOUNTER — LAB (OUTPATIENT)
Dept: LAB | Facility: CLINIC | Age: 31
End: 2021-11-22
Payer: COMMERCIAL

## 2021-11-22 ENCOUNTER — PRENATAL OFFICE VISIT (OUTPATIENT)
Dept: OBGYN | Facility: CLINIC | Age: 31
End: 2021-11-22
Payer: COMMERCIAL

## 2021-11-22 VITALS
HEART RATE: 83 BPM | TEMPERATURE: 98.4 F | DIASTOLIC BLOOD PRESSURE: 73 MMHG | BODY MASS INDEX: 33.52 KG/M2 | SYSTOLIC BLOOD PRESSURE: 128 MMHG | WEIGHT: 214 LBS

## 2021-11-22 DIAGNOSIS — Z34.92 PRENATAL CARE, SECOND TRIMESTER: Primary | ICD-10-CM

## 2021-11-22 DIAGNOSIS — Z34.80 PRENATAL CARE OF MULTIGRAVIDA, ANTEPARTUM: ICD-10-CM

## 2021-11-22 LAB
ERYTHROCYTE [DISTWIDTH] IN BLOOD BY AUTOMATED COUNT: 12.5 % (ref 10–15)
GLUCOSE 1H P 50 G GLC PO SERPL-MCNC: 110 MG/DL (ref 70–129)
HCT VFR BLD AUTO: 37.9 % (ref 35–47)
HGB BLD-MCNC: 12.9 G/DL (ref 11.7–15.7)
MCH RBC QN AUTO: 32.9 PG (ref 26.5–33)
MCHC RBC AUTO-ENTMCNC: 34 G/DL (ref 31.5–36.5)
MCV RBC AUTO: 97 FL (ref 78–100)
PLATELET # BLD AUTO: 198 10E3/UL (ref 150–450)
RBC # BLD AUTO: 3.92 10E6/UL (ref 3.8–5.2)
T PALLIDUM AB SER QL: NONREACTIVE
WBC # BLD AUTO: 10.2 10E3/UL (ref 4–11)

## 2021-11-22 PROCEDURE — 86780 TREPONEMA PALLIDUM: CPT

## 2021-11-22 PROCEDURE — 85027 COMPLETE CBC AUTOMATED: CPT

## 2021-11-22 PROCEDURE — 99207 PR PRENATAL VISIT: CPT | Performed by: OBSTETRICS & GYNECOLOGY

## 2021-11-22 PROCEDURE — 82950 GLUCOSE TEST: CPT

## 2021-11-22 PROCEDURE — 36415 COLL VENOUS BLD VENIPUNCTURE: CPT

## 2021-11-22 NOTE — NURSING NOTE
"Initial /73 (BP Location: Right arm, Patient Position: Chair, Cuff Size: Adult Regular)   Pulse 83   Temp 98.4  F (36.9  C) (Tympanic)   Wt 97.1 kg (214 lb)   LMP 06/01/2021   Breastfeeding No   BMI 33.52 kg/m   Estimated body mass index is 33.52 kg/m  as calculated from the following:    Height as of 10/28/21: 1.702 m (5' 7\").    Weight as of this encounter: 97.1 kg (214 lb). .    Alicja Guerin MA    "

## 2021-11-22 NOTE — PROGRESS NOTES
[facility]   OB/GYN Clinic     CC: Return OB      Subjective:      Chhaya is a 30 year old  at 24w6d who presents for return OB visit. She reports feeling well. Denies uterine cramping, vaginal bleeding or leaking, dysuria. +fetal movement.      Concerns:    /73 (BP Location: Right arm, Patient Position: Chair, Cuff Size: Adult Regular)   Pulse 83   Temp 98.4  F (36.9  C) (Tympanic)   Wt 97.1 kg (214 lb)   LMP 2021   Breastfeeding No   BMI 33.52 kg/m       Physical Exam:  Gen: Pleasant, talkative female in no apparent distress   Respiratory: breathing comfortably on room air   Cardiac: Warm and well-perfused.   GI: Abd soft and non-tender, gravid  MSK: Grossly normal movement of all four extremities  Psych: mood and affect bright   Lower extremity: edema not present      Fetal dop tones: 150s  FH 24     Assessment/Plan:   Chhaya is a 30 year old  at 24w6d who presents for follow-up OB visit.   1) Normal new OB labs: T&S, CBC, HIV, RPR, HepBsAg, Hep B antibody, rubella, GC/Chlam. Plan for 3rd tri lab at 28wks.   2) Genetics testing: NIPT - normal GIRL  3) MFM comprehensive US WNL  4) Concerns:            - History of death of her first daughter due to T21 and heart defect; plan L2 and fetal ECHO, nl NIPT  5) PMH/OBHx problems:             - History of HTN in 1st pregnancy- baseline HELLP labs nl  6) Medication review: no changes, continue prenatal vitamin   7) Delivery plan: wants to deliver at the [facility], plan consult at 36wks      Return to clinic in 4 weeks.     Belinda Kirkland MD   2021 2:53 PM

## 2021-12-20 ENCOUNTER — PRENATAL OFFICE VISIT (OUTPATIENT)
Dept: OBGYN | Facility: CLINIC | Age: 31
End: 2021-12-20
Payer: COMMERCIAL

## 2021-12-20 VITALS
SYSTOLIC BLOOD PRESSURE: 117 MMHG | DIASTOLIC BLOOD PRESSURE: 73 MMHG | BODY MASS INDEX: 35.93 KG/M2 | TEMPERATURE: 97.5 F | WEIGHT: 228.9 LBS | HEIGHT: 67 IN | RESPIRATION RATE: 17 BRPM

## 2021-12-20 DIAGNOSIS — Z34.83 PRENATAL CARE, SUBSEQUENT PREGNANCY IN THIRD TRIMESTER: Primary | ICD-10-CM

## 2021-12-20 PROCEDURE — 90715 TDAP VACCINE 7 YRS/> IM: CPT | Performed by: ADVANCED PRACTICE MIDWIFE

## 2021-12-20 PROCEDURE — 99207 PR PRENATAL VISIT: CPT | Performed by: ADVANCED PRACTICE MIDWIFE

## 2021-12-20 PROCEDURE — 90471 IMMUNIZATION ADMIN: CPT | Performed by: ADVANCED PRACTICE MIDWIFE

## 2021-12-20 ASSESSMENT — MIFFLIN-ST. JEOR: SCORE: 1780.91

## 2021-12-20 NOTE — NURSING NOTE
"Initial /73 (BP Location: Left arm, Patient Position: Sitting, Cuff Size: Adult Regular)   Temp 97.5  F (36.4  C) (Tympanic)   Resp 17   Ht 1.702 m (5' 7\")   Wt 103.8 kg (228 lb 14.4 oz)   LMP 06/01/2021   Breastfeeding No   BMI 35.85 kg/m   Estimated body mass index is 35.85 kg/m  as calculated from the following:    Height as of this encounter: 1.702 m (5' 7\").    Weight as of this encounter: 103.8 kg (228 lb 14.4 oz). .    Prior to immunization administration, verified patients identity using patient s name and date of birth. Please see Immunization Activity for additional information.     Screening Questionnaire for Adult Immunization    Are you sick today?   No   Do you have allergies to medications, food, a vaccine component or latex?   No   Have you ever had a serious reaction after receiving a vaccination?   No   Do you have a long-term health problem with heart, lung, kidney, or metabolic disease (e.g., diabetes), asthma, a blood disorder, no spleen, complement component deficiency, a cochlear implant, or a spinal fluid leak?  Are you on long-term aspirin therapy?   No   Do you have cancer, leukemia, HIV/AIDS, or any other immune system problem?   No   Do you have a parent, brother, or sister with an immune system problem?   No   In the past 3 months, have you taken medications that affect  your immune system, such as prednisone, other steroids, or anticancer drugs; drugs for the treatment of rheumatoid arthritis, Crohn s disease, or psoriasis; or have you had radiation treatments?   No   Have you had a seizure, or a brain or other nervous system problem?   No   During the past year, have you received a transfusion of blood or blood    products, or been given immune (gamma) globulin or antiviral drug?   No   For women: Are you pregnant or is there a chance you could become       pregnant during the next month?   Yes   Have you received any vaccinations in the past 4 weeks?   No     Immunization " questionnaire was positive for at least one answer, pregnant.        Per orders of Uzma Gardiner, injection of TDAP given by Sonali Causey MA. Patient instructed to remain in clinic for 15 minutes afterwards, and to report any adverse reaction to me immediately.       Screening performed by Sonali Causey MA on 12/20/2021 at 4:12 PM.    Sonali Causey LPN

## 2021-12-20 NOTE — PROGRESS NOTES
Feeling well.  Baby is active. Denies any leaking of fluid, vaginal bleeding, regular uterine contractions, or headaches or other concerns.  TDAP today.  She had her COVID booster.  She plans to transfer to [place] at some point so her baby can be born at [facility] with a NICU.    Reviewed to call for contractions, loss of fluid, vaginal bleeding, decreased fetal movement or any other questions or concerns.    RTC in 2 weeks.  Anjana Villalpando, GUHLAM, APRN, CNM

## 2022-01-06 ENCOUNTER — PRENATAL OFFICE VISIT (OUTPATIENT)
Dept: OBGYN | Facility: CLINIC | Age: 32
End: 2022-01-06
Payer: COMMERCIAL

## 2022-01-06 VITALS
HEART RATE: 74 BPM | RESPIRATION RATE: 16 BRPM | DIASTOLIC BLOOD PRESSURE: 77 MMHG | HEIGHT: 67 IN | WEIGHT: 235.1 LBS | BODY MASS INDEX: 36.9 KG/M2 | SYSTOLIC BLOOD PRESSURE: 122 MMHG | TEMPERATURE: 97.2 F

## 2022-01-06 DIAGNOSIS — Z34.83 PRENATAL CARE, SUBSEQUENT PREGNANCY IN THIRD TRIMESTER: Primary | ICD-10-CM

## 2022-01-06 PROCEDURE — 99207 PR PRENATAL VISIT: CPT | Performed by: OBSTETRICS & GYNECOLOGY

## 2022-01-06 ASSESSMENT — MIFFLIN-ST. JEOR: SCORE: 1804.04

## 2022-01-06 NOTE — PROGRESS NOTES
"[facility] OB/GYN Clinic    Return OB Note    CC: Return OB     Subjective:  Chhaya is a 31 year old  at 31w2d   Denies vaginal bleeding, loss of fluid, or regular contractions. Good fetal movement.  Complaints today: More uncomfortable with advancing gestation    Objective:  /77 (BP Location: Right arm, Patient Position: Chair, Cuff Size: Adult Regular)   Pulse 74   Temp 97.2  F (36.2  C) (Tympanic)   Resp 16   Ht 1.702 m (5' 7\")   Wt 106.6 kg (235 lb 1.6 oz)   LMP 2021   Breastfeeding No   BMI 36.82 kg/m      Fundal height: 32cm  FHT: 145bpm    Assessment/Plan:   Encounter Diagnosis   Name Primary?     Prenatal care, subsequent pregnancy in third trimester Yes       IUP at 31w2d  -History of death of first child due to T21 and heart defect: NIPT normal, L2 and fetal echo WNL  -History of hypertension in first pregnancy: baseline HELLP labs normal, has not been on ASA  -Desires delivery at [facility] to have NICU available, has appointment for consultation scheduled  -Strict return precautions given    RTC 2 weeks    Merly Callaway DO    "

## 2022-01-06 NOTE — NURSING NOTE
"Initial /77 (BP Location: Right arm, Patient Position: Chair, Cuff Size: Adult Regular)   Pulse 74   Temp 97.2  F (36.2  C) (Tympanic)   Resp 16   Ht 1.702 m (5' 7\")   Wt 106.6 kg (235 lb 1.6 oz)   LMP 06/01/2021   Breastfeeding No   BMI 36.82 kg/m   Estimated body mass index is 36.82 kg/m  as calculated from the following:    Height as of this encounter: 1.702 m (5' 7\").    Weight as of this encounter: 106.6 kg (235 lb 1.6 oz). .    Sonali Causey LPN  "

## 2022-01-10 DIAGNOSIS — Z34.83 PRENATAL CARE, SUBSEQUENT PREGNANCY IN THIRD TRIMESTER: Primary | ICD-10-CM

## 2022-01-17 ENCOUNTER — PRENATAL OFFICE VISIT (OUTPATIENT)
Dept: OBGYN | Facility: CLINIC | Age: 32
End: 2022-01-17
Payer: COMMERCIAL

## 2022-01-17 VITALS
BODY MASS INDEX: 36.65 KG/M2 | WEIGHT: 234 LBS | TEMPERATURE: 98.9 F | SYSTOLIC BLOOD PRESSURE: 128 MMHG | HEART RATE: 84 BPM | DIASTOLIC BLOOD PRESSURE: 74 MMHG

## 2022-01-17 DIAGNOSIS — Z34.83 PRENATAL CARE, SUBSEQUENT PREGNANCY IN THIRD TRIMESTER: Primary | ICD-10-CM

## 2022-01-17 PROCEDURE — 99207 PR PRENATAL VISIT: CPT | Performed by: OBSTETRICS & GYNECOLOGY

## 2022-01-17 NOTE — PROGRESS NOTES
"[facility]   OB/GYN Clinic    CC: Return OB     Subjective:    Chhaya is a 31 year old  at 32w6d by Patient's last menstrual period was 2021. who presents for return OB visit. She reports feeling well. Denies uterine cramping, vaginal bleeding or leaking, dysuria. +fetal movement. Had COVID, tested positive 9 days ago. Had fatigue, cough, sore throat that are all improving. No significant SOB. Plans to switch prenatal care over to [facility] with Dr. Tiana Thurman at next visit on .    Objective:  /74 (BP Location: Left arm, Patient Position: Chair, Cuff Size: Adult Large)   Pulse 84   Temp 98.9  F (37.2  C) (Tympanic)   Wt 106.1 kg (234 lb)   LMP 2021   Breastfeeding No   BMI 36.65 kg/m      Estimated body mass index is 36.65 kg/m  as calculated from the following:    Height as of 22: 1.702 m (5' 7\").    Weight as of this encounter: 106.1 kg (234 lb).    Physical Exam:  Gen: Pleasant, talkative female in no apparent distress   Respiratory: breathing comfortably on room air   Cardiac: Warm and well-perfused.   GI: Abd soft and non-tender, gravid  MSK: Grossly normal movement of all four extremities  Psych: mood and affect bright     Fetal dop tones: 144 bpm  Fundal height: 33    Assessment/Plan:   31 year old  at 32w6d who presents for follow-up OB visit.   - COVID+ on 21, symptomatically improving without significant dyspnea  - 3rd tri labs normal  - s/p Tdap  -Hx of death of first child due to T21 and heart defect: NIPT normal, L2 and fetal echo WNL  -HTN hx in first pregnancy: baseline HELLP labs normal, has not been on ASA  -Nurse triage line given    Transferring care to [facility] for next visit on 22.    Rocky Murphy, MS3     I agree with the medical student's documentation above and have edited it for accuracy. I was present for and performed the physical exam and interview of the patient myself. All medical decision-making was performed by me. If " a procedure was performed, that was performed by me. Additionally, if billing is based on time, I am only using the time that I personally spent with the patient in my time statement.     Loida Jang MD  OB/GYN

## 2022-01-31 ENCOUNTER — PRENATAL OFFICE VISIT (OUTPATIENT)
Dept: OBGYN | Facility: CLINIC | Age: 32
End: 2022-01-31
Payer: COMMERCIAL

## 2022-01-31 VITALS
BODY MASS INDEX: 37.21 KG/M2 | OXYGEN SATURATION: 98 % | HEART RATE: 89 BPM | WEIGHT: 237.6 LBS | TEMPERATURE: 97.1 F | DIASTOLIC BLOOD PRESSURE: 81 MMHG | SYSTOLIC BLOOD PRESSURE: 129 MMHG

## 2022-01-31 DIAGNOSIS — O98.513 COVID-19 AFFECTING PREGNANCY IN THIRD TRIMESTER: Primary | ICD-10-CM

## 2022-01-31 DIAGNOSIS — U07.1 COVID-19 AFFECTING PREGNANCY IN THIRD TRIMESTER: Primary | ICD-10-CM

## 2022-01-31 PROCEDURE — 99207 PR PRENATAL VISIT: CPT | Performed by: OBSTETRICS & GYNECOLOGY

## 2022-01-31 NOTE — PROGRESS NOTES
Doing well.   Recovered from COVID.   Good fetal movement.   Had oligo first pregnancy. Bedside US shows ample amniotic fluid and cephalic presentation. Fetal breathing seen.   Discussed ultrasound after COVID recovery - ordered.   RTC 2 weeks

## 2022-02-03 ENCOUNTER — HOSPITAL ENCOUNTER (OUTPATIENT)
Dept: ULTRASOUND IMAGING | Facility: CLINIC | Age: 32
Discharge: HOME | End: 2022-02-03
Attending: OBSTETRICS & GYNECOLOGY | Admitting: OBSTETRICS & GYNECOLOGY
Payer: COMMERCIAL

## 2022-02-03 DIAGNOSIS — U07.1 COVID-19 AFFECTING PREGNANCY IN THIRD TRIMESTER: ICD-10-CM

## 2022-02-03 DIAGNOSIS — O98.513 COVID-19 AFFECTING PREGNANCY IN THIRD TRIMESTER: ICD-10-CM

## 2022-02-03 PROCEDURE — 76816 OB US FOLLOW-UP PER FETUS: CPT | Mod: 26 | Performed by: RADIOLOGY

## 2022-02-03 PROCEDURE — 76816 OB US FOLLOW-UP PER FETUS: CPT

## 2022-02-10 ENCOUNTER — PRENATAL OFFICE VISIT (OUTPATIENT)
Dept: OBGYN | Facility: CLINIC | Age: 32
End: 2022-02-10
Payer: COMMERCIAL

## 2022-02-10 VITALS
SYSTOLIC BLOOD PRESSURE: 137 MMHG | WEIGHT: 243.3 LBS | BODY MASS INDEX: 38.11 KG/M2 | HEART RATE: 87 BPM | DIASTOLIC BLOOD PRESSURE: 78 MMHG | OXYGEN SATURATION: 96 %

## 2022-02-10 DIAGNOSIS — Z34.93 ENCOUNTER FOR PREGNANCY RELATED EXAMINATION IN THIRD TRIMESTER: Primary | ICD-10-CM

## 2022-02-10 PROCEDURE — 99207 PR PRENATAL VISIT: CPT | Performed by: OBSTETRICS & GYNECOLOGY

## 2022-02-10 PROCEDURE — 87653 STREP B DNA AMP PROBE: CPT | Performed by: OBSTETRICS & GYNECOLOGY

## 2022-02-10 NOTE — PROGRESS NOTES
Return OB visit    Subjective:  Patient reports active fetal movement. She had lots of contractions last night, pretty much every 5 minutes and painful but then better today. Denies VB or LOF.     Objective:  /78   Pulse 87   Wt 110.4 kg (243 lb 4.8 oz)   LMP 2021   SpO2 96%   Breastfeeding No   BMI 38.11 kg/m     See OB flow sheet    SVE: -3, posterior and firm  Assessment and Plan    Chhaya Gama is a 31 year old  at 36w2d here for CARLYLE visit     This visit:  -GBS obtained   -Reviewed warning signs of labor and symptoms of pre-e   -Also talked briefly about delivering planning. Patient concerned that EFW was in the 90%ile but does have a proven pelvis to 8 lb 5 oz, GTT normal this pregnancy.   Discussed the option of an IOL at 39 weeks as baby is fully developed at this point in time and would be smaller at 39 weeks vs 40 or 41 weeks and could be associated with a decreased risk of birth trauma related to fetal macrosomia although this is not a medical recommendation at this time. She would like to see how she feels in the next few weeks before deciding if she would want to be induced.     Next visit:  -Routine PNC, revisit delivery planning     RTC in 1 week or sooner SHELBIE Ace MD

## 2022-02-11 LAB
GP B STREP DNA SPEC QL NAA+PROBE: NEGATIVE
PATIENT PENICILLIN, AMOXICILLIN, CEPHALOSPORINS ALLERGY: NO

## 2022-02-13 ENCOUNTER — HEALTH MAINTENANCE LETTER (OUTPATIENT)
Age: 32
End: 2022-02-13

## 2022-02-16 ENCOUNTER — PRENATAL OFFICE VISIT (OUTPATIENT)
Dept: OBGYN | Facility: CLINIC | Age: 32
End: 2022-02-16
Payer: COMMERCIAL

## 2022-02-16 VITALS
SYSTOLIC BLOOD PRESSURE: 130 MMHG | BODY MASS INDEX: 38.2 KG/M2 | WEIGHT: 243.9 LBS | OXYGEN SATURATION: 98 % | DIASTOLIC BLOOD PRESSURE: 79 MMHG | HEART RATE: 87 BPM

## 2022-02-16 DIAGNOSIS — Z34.93 ENCOUNTER FOR PREGNANCY RELATED EXAMINATION IN THIRD TRIMESTER: ICD-10-CM

## 2022-02-16 DIAGNOSIS — Z34.83 ENCOUNTER FOR SUPERVISION OF OTHER NORMAL PREGNANCY IN THIRD TRIMESTER: Primary | ICD-10-CM

## 2022-02-16 DIAGNOSIS — Z11.59 SCREENING FOR VIRAL DISEASE: ICD-10-CM

## 2022-02-16 LAB — HGB BLD-MCNC: 11.4 G/DL (ref 11.7–15.7)

## 2022-02-16 PROCEDURE — 36415 COLL VENOUS BLD VENIPUNCTURE: CPT | Performed by: OBSTETRICS & GYNECOLOGY

## 2022-02-16 PROCEDURE — 99207 PR PRENATAL VISIT: CPT | Performed by: OBSTETRICS & GYNECOLOGY

## 2022-02-16 RX ORDER — NALOXONE HYDROCHLORIDE 0.4 MG/ML
0.4 INJECTION, SOLUTION INTRAMUSCULAR; INTRAVENOUS; SUBCUTANEOUS
Status: CANCELLED | OUTPATIENT
Start: 2022-02-16

## 2022-02-16 RX ORDER — PROCHLORPERAZINE MALEATE 5 MG
10 TABLET ORAL EVERY 6 HOURS PRN
Status: CANCELLED | OUTPATIENT
Start: 2022-02-16

## 2022-02-16 RX ORDER — IBUPROFEN 600 MG/1
600 TABLET, FILM COATED ORAL EVERY 6 HOURS PRN
COMMUNITY
Start: 2022-02-16 | End: 2022-03-18

## 2022-02-16 RX ORDER — KETOROLAC TROMETHAMINE 30 MG/ML
30 INJECTION, SOLUTION INTRAMUSCULAR; INTRAVENOUS
Status: CANCELLED | OUTPATIENT
Start: 2022-02-16 | End: 2022-02-21

## 2022-02-16 RX ORDER — SODIUM CHLORIDE, SODIUM LACTATE, POTASSIUM CHLORIDE, CALCIUM CHLORIDE 600; 310; 30; 20 MG/100ML; MG/100ML; MG/100ML; MG/100ML
INJECTION, SOLUTION INTRAVENOUS CONTINUOUS
Status: CANCELLED | OUTPATIENT
Start: 2022-02-16

## 2022-02-16 RX ORDER — MISOPROSTOL 200 UG/1
800 TABLET ORAL
Status: CANCELLED | OUTPATIENT
Start: 2022-02-16

## 2022-02-16 RX ORDER — OXYTOCIN 10 [USP'U]/ML
10 INJECTION, SOLUTION INTRAMUSCULAR; INTRAVENOUS
Status: CANCELLED | OUTPATIENT
Start: 2022-02-16

## 2022-02-16 RX ORDER — MISOPROSTOL 100 UG/1
400 TABLET ORAL
Status: CANCELLED | OUTPATIENT
Start: 2022-02-16

## 2022-02-16 RX ORDER — METOCLOPRAMIDE HYDROCHLORIDE 5 MG/ML
10 INJECTION INTRAMUSCULAR; INTRAVENOUS EVERY 6 HOURS PRN
Status: CANCELLED | OUTPATIENT
Start: 2022-02-16

## 2022-02-16 RX ORDER — BREAST PUMP
1 EACH MISCELLANEOUS CONTINUOUS
Qty: 1 EACH | Refills: 0 | Status: SHIPPED | OUTPATIENT
Start: 2022-02-16 | End: 2024-02-26

## 2022-02-16 RX ORDER — LIDOCAINE 40 MG/G
CREAM TOPICAL
Status: CANCELLED | OUTPATIENT
Start: 2022-02-16

## 2022-02-16 RX ORDER — ONDANSETRON 4 MG/1
4 TABLET, ORALLY DISINTEGRATING ORAL EVERY 6 HOURS PRN
Status: CANCELLED | OUTPATIENT
Start: 2022-02-16

## 2022-02-16 RX ORDER — METHYLERGONOVINE MALEATE 0.2 MG/ML
200 INJECTION INTRAVENOUS
Status: CANCELLED | OUTPATIENT
Start: 2022-02-16

## 2022-02-16 RX ORDER — ACETAMINOPHEN 325 MG/1
650 TABLET ORAL EVERY 4 HOURS PRN
Status: CANCELLED | OUTPATIENT
Start: 2022-02-16

## 2022-02-16 RX ORDER — FENTANYL CITRATE 50 UG/ML
50-100 INJECTION, SOLUTION INTRAMUSCULAR; INTRAVENOUS
Status: CANCELLED | OUTPATIENT
Start: 2022-02-16

## 2022-02-16 RX ORDER — CARBOPROST TROMETHAMINE 250 UG/ML
250 INJECTION, SOLUTION INTRAMUSCULAR
Status: CANCELLED | OUTPATIENT
Start: 2022-02-16

## 2022-02-16 RX ORDER — NALOXONE HYDROCHLORIDE 0.4 MG/ML
0.2 INJECTION, SOLUTION INTRAMUSCULAR; INTRAVENOUS; SUBCUTANEOUS
Status: CANCELLED | OUTPATIENT
Start: 2022-02-16

## 2022-02-16 RX ORDER — ONDANSETRON 2 MG/ML
4 INJECTION INTRAMUSCULAR; INTRAVENOUS EVERY 6 HOURS PRN
Status: CANCELLED | OUTPATIENT
Start: 2022-02-16

## 2022-02-16 RX ORDER — PROCHLORPERAZINE 25 MG
25 SUPPOSITORY, RECTAL RECTAL EVERY 12 HOURS PRN
Status: CANCELLED | OUTPATIENT
Start: 2022-02-16

## 2022-02-16 RX ORDER — AMOXICILLIN 250 MG
1 CAPSULE ORAL DAILY
COMMUNITY
Start: 2022-02-16 | End: 2022-03-18

## 2022-02-16 RX ORDER — OXYTOCIN/0.9 % SODIUM CHLORIDE 30/500 ML
340 PLASTIC BAG, INJECTION (ML) INTRAVENOUS CONTINUOUS PRN
Status: CANCELLED | OUTPATIENT
Start: 2022-02-16

## 2022-02-16 RX ORDER — IBUPROFEN 200 MG
600 TABLET ORAL
Status: CANCELLED | OUTPATIENT
Start: 2022-02-16 | End: 2022-02-21

## 2022-02-16 RX ORDER — METOCLOPRAMIDE 5 MG/1
10 TABLET ORAL EVERY 6 HOURS PRN
Status: CANCELLED | OUTPATIENT
Start: 2022-02-16

## 2022-02-16 RX ORDER — OXYTOCIN/0.9 % SODIUM CHLORIDE 30/500 ML
100-340 PLASTIC BAG, INJECTION (ML) INTRAVENOUS CONTINUOUS PRN
Status: CANCELLED | OUTPATIENT
Start: 2022-02-16

## 2022-02-16 RX ORDER — ACETAMINOPHEN 325 MG/1
650 TABLET ORAL EVERY 6 HOURS PRN
COMMUNITY
Start: 2022-02-16 | End: 2022-03-18

## 2022-02-16 NOTE — PROGRESS NOTES
GBS: Negative  Hemoglobin   Date Value Ref Range Status   11/22/2021 12.9 11.7 - 15.7 g/dL Final   09/21/2019 11.4 (L) 11.7 - 15.7 g/dL Final   ]    Breast pump rx: script done   Labor orders: signed and held  Birth plan: IOL at 39 weeks  Length of stay: discussed   Disability paperwork: will bring  Resident involvement: discussed and agrees.  PP meds OTC    Good fetal movement.   IOL scheduled for 39 weeks, COVID test ordered.   Reports she needs cord blood testing for MTHFR. Done for last child, then specimen issues and needed heel prick.   RTC one week

## 2022-02-17 PROBLEM — Z34.00 PRENATAL CARE, FIRST PREGNANCY: Status: RESOLVED | Noted: 2017-01-23 | Resolved: 2017-06-29

## 2022-02-17 PROBLEM — Z34.81 ENCOUNTER FOR SUPERVISION OF OTHER NORMAL PREGNANCY IN FIRST TRIMESTER: Status: RESOLVED | Noted: 2019-02-17 | Resolved: 2019-10-31

## 2022-02-25 ENCOUNTER — ANESTHESIA (OUTPATIENT)
Dept: OBGYN | Facility: CLINIC | Age: 32
End: 2022-02-25
Payer: COMMERCIAL

## 2022-02-25 ENCOUNTER — ANESTHESIA EVENT (OUTPATIENT)
Dept: OBGYN | Facility: CLINIC | Age: 32
End: 2022-02-25
Payer: COMMERCIAL

## 2022-02-25 ENCOUNTER — HOSPITAL ENCOUNTER (INPATIENT)
Facility: CLINIC | Age: 32
LOS: 1 days | Discharge: HOME | End: 2022-02-26
Attending: OBSTETRICS & GYNECOLOGY | Admitting: OBSTETRICS & GYNECOLOGY
Payer: COMMERCIAL

## 2022-02-25 LAB
ABO/RH(D): NORMAL
ALT SERPL W P-5'-P-CCNC: 16 U/L (ref 0–50)
ANTIBODY SCREEN: NEGATIVE
AST SERPL W P-5'-P-CCNC: 17 U/L (ref 0–45)
CREAT SERPL-MCNC: 0.68 MG/DL (ref 0.52–1.04)
GFR SERPL CREATININE-BSD FRML MDRD: >90 ML/MIN/1.73M2
HGB BLD-MCNC: 11.7 G/DL (ref 11.7–15.7)
HOLD SPECIMEN: NORMAL
PLATELET # BLD AUTO: 199 10E3/UL (ref 150–450)
SPECIMEN EXPIRATION DATE: NORMAL
T PALLIDUM AB SER QL: NONREACTIVE

## 2022-02-25 PROCEDURE — 86850 RBC ANTIBODY SCREEN: CPT | Performed by: OBSTETRICS & GYNECOLOGY

## 2022-02-25 PROCEDURE — 85018 HEMOGLOBIN: CPT | Performed by: OBSTETRICS & GYNECOLOGY

## 2022-02-25 PROCEDURE — 86780 TREPONEMA PALLIDUM: CPT | Performed by: OBSTETRICS & GYNECOLOGY

## 2022-02-25 PROCEDURE — 250N000011 HC RX IP 250 OP 636: Performed by: OBSTETRICS & GYNECOLOGY

## 2022-02-25 PROCEDURE — 3E033VJ INTRODUCTION OF OTHER HORMONE INTO PERIPHERAL VEIN, PERCUTANEOUS APPROACH: ICD-10-PCS | Performed by: OBSTETRICS & GYNECOLOGY

## 2022-02-25 PROCEDURE — 250N000011 HC RX IP 250 OP 636: Performed by: ANESTHESIOLOGY

## 2022-02-25 PROCEDURE — 250N000013 HC RX MED GY IP 250 OP 250 PS 637: Performed by: STUDENT IN AN ORGANIZED HEALTH CARE EDUCATION/TRAINING PROGRAM

## 2022-02-25 PROCEDURE — 84450 TRANSFERASE (AST) (SGOT): CPT | Performed by: STUDENT IN AN ORGANIZED HEALTH CARE EDUCATION/TRAINING PROGRAM

## 2022-02-25 PROCEDURE — 722N000001 HC LABOR CARE VAGINAL DELIVERY SINGLE

## 2022-02-25 PROCEDURE — 250N000009 HC RX 250: Performed by: OBSTETRICS & GYNECOLOGY

## 2022-02-25 PROCEDURE — 258N000003 HC RX IP 258 OP 636: Performed by: OBSTETRICS & GYNECOLOGY

## 2022-02-25 PROCEDURE — 250N000009 HC RX 250: Performed by: ANESTHESIOLOGY

## 2022-02-25 PROCEDURE — 370N000003 HC ANESTHESIA WARD SERVICE

## 2022-02-25 PROCEDURE — 86901 BLOOD TYPING SEROLOGIC RH(D): CPT | Performed by: OBSTETRICS & GYNECOLOGY

## 2022-02-25 PROCEDURE — 36415 COLL VENOUS BLD VENIPUNCTURE: CPT | Performed by: OBSTETRICS & GYNECOLOGY

## 2022-02-25 PROCEDURE — 82565 ASSAY OF CREATININE: CPT | Performed by: STUDENT IN AN ORGANIZED HEALTH CARE EDUCATION/TRAINING PROGRAM

## 2022-02-25 PROCEDURE — 250N000009 HC RX 250: Performed by: STUDENT IN AN ORGANIZED HEALTH CARE EDUCATION/TRAINING PROGRAM

## 2022-02-25 PROCEDURE — 85049 AUTOMATED PLATELET COUNT: CPT | Performed by: OBSTETRICS & GYNECOLOGY

## 2022-02-25 PROCEDURE — 00HU33Z INSERTION OF INFUSION DEVICE INTO SPINAL CANAL, PERCUTANEOUS APPROACH: ICD-10-PCS | Performed by: OBSTETRICS & GYNECOLOGY

## 2022-02-25 PROCEDURE — 59510 CESAREAN DELIVERY: CPT | Mod: GC | Performed by: OBSTETRICS & GYNECOLOGY

## 2022-02-25 PROCEDURE — 120N000002 HC R&B MED SURG/OB UMMC

## 2022-02-25 PROCEDURE — 84460 ALANINE AMINO (ALT) (SGPT): CPT | Performed by: STUDENT IN AN ORGANIZED HEALTH CARE EDUCATION/TRAINING PROGRAM

## 2022-02-25 PROCEDURE — G0463 HOSPITAL OUTPT CLINIC VISIT: HCPCS

## 2022-02-25 RX ORDER — SODIUM CHLORIDE, SODIUM LACTATE, POTASSIUM CHLORIDE, CALCIUM CHLORIDE 600; 310; 30; 20 MG/100ML; MG/100ML; MG/100ML; MG/100ML
INJECTION, SOLUTION INTRAVENOUS CONTINUOUS
Status: DISCONTINUED | OUTPATIENT
Start: 2022-02-25 | End: 2022-02-25 | Stop reason: HOSPADM

## 2022-02-25 RX ORDER — KETOROLAC TROMETHAMINE 30 MG/ML
30 INJECTION, SOLUTION INTRAMUSCULAR; INTRAVENOUS
Status: DISCONTINUED | OUTPATIENT
Start: 2022-02-25 | End: 2022-02-26 | Stop reason: HOSPADM

## 2022-02-25 RX ORDER — LIDOCAINE HYDROCHLORIDE 10 MG/ML
INJECTION, SOLUTION EPIDURAL; INFILTRATION; INTRACAUDAL; PERINEURAL
Status: DISCONTINUED
Start: 2022-02-25 | End: 2022-02-25 | Stop reason: WASHOUT

## 2022-02-25 RX ORDER — MISOPROSTOL 200 UG/1
400 TABLET ORAL
Status: DISCONTINUED | OUTPATIENT
Start: 2022-02-25 | End: 2022-02-25 | Stop reason: HOSPADM

## 2022-02-25 RX ORDER — METOCLOPRAMIDE HYDROCHLORIDE 5 MG/ML
10 INJECTION INTRAMUSCULAR; INTRAVENOUS EVERY 6 HOURS PRN
Status: DISCONTINUED | OUTPATIENT
Start: 2022-02-25 | End: 2022-02-25 | Stop reason: HOSPADM

## 2022-02-25 RX ORDER — NALOXONE HYDROCHLORIDE 0.4 MG/ML
0.2 INJECTION, SOLUTION INTRAMUSCULAR; INTRAVENOUS; SUBCUTANEOUS
Status: DISCONTINUED | OUTPATIENT
Start: 2022-02-25 | End: 2022-02-25 | Stop reason: HOSPADM

## 2022-02-25 RX ORDER — LIDOCAINE 40 MG/G
CREAM TOPICAL
Status: DISCONTINUED | OUTPATIENT
Start: 2022-02-25 | End: 2022-02-25 | Stop reason: HOSPADM

## 2022-02-25 RX ORDER — CARBOPROST TROMETHAMINE 250 UG/ML
250 INJECTION, SOLUTION INTRAMUSCULAR
Status: DISCONTINUED | OUTPATIENT
Start: 2022-02-25 | End: 2022-02-25 | Stop reason: HOSPADM

## 2022-02-25 RX ORDER — EPHEDRINE SULFATE 50 MG/ML
5 INJECTION, SOLUTION INTRAMUSCULAR; INTRAVENOUS; SUBCUTANEOUS
Status: DISCONTINUED | OUTPATIENT
Start: 2022-02-25 | End: 2022-02-25 | Stop reason: HOSPADM

## 2022-02-25 RX ORDER — BISACODYL 10 MG
10 SUPPOSITORY, RECTAL RECTAL DAILY PRN
Status: DISCONTINUED | OUTPATIENT
Start: 2022-02-25 | End: 2022-02-26 | Stop reason: HOSPADM

## 2022-02-25 RX ORDER — MISOPROSTOL 200 UG/1
TABLET ORAL
Status: DISCONTINUED
Start: 2022-02-25 | End: 2022-02-25 | Stop reason: HOSPADM

## 2022-02-25 RX ORDER — OXYTOCIN 10 [USP'U]/ML
10 INJECTION, SOLUTION INTRAMUSCULAR; INTRAVENOUS
Status: DISCONTINUED | OUTPATIENT
Start: 2022-02-25 | End: 2022-02-26 | Stop reason: HOSPADM

## 2022-02-25 RX ORDER — TRANEXAMIC ACID 10 MG/ML
1 INJECTION, SOLUTION INTRAVENOUS EVERY 30 MIN PRN
Status: DISCONTINUED | OUTPATIENT
Start: 2022-02-25 | End: 2022-02-25 | Stop reason: HOSPADM

## 2022-02-25 RX ORDER — ACETAMINOPHEN 325 MG/1
650 TABLET ORAL EVERY 4 HOURS PRN
Status: DISCONTINUED | OUTPATIENT
Start: 2022-02-25 | End: 2022-02-26 | Stop reason: HOSPADM

## 2022-02-25 RX ORDER — PROCHLORPERAZINE 25 MG
25 SUPPOSITORY, RECTAL RECTAL EVERY 12 HOURS PRN
Status: DISCONTINUED | OUTPATIENT
Start: 2022-02-25 | End: 2022-02-25 | Stop reason: HOSPADM

## 2022-02-25 RX ORDER — OXYTOCIN/0.9 % SODIUM CHLORIDE 30/500 ML
1-24 PLASTIC BAG, INJECTION (ML) INTRAVENOUS CONTINUOUS
Status: DISCONTINUED | OUTPATIENT
Start: 2022-02-25 | End: 2022-02-25 | Stop reason: HOSPADM

## 2022-02-25 RX ORDER — FENTANYL CITRATE 50 UG/ML
50-100 INJECTION, SOLUTION INTRAMUSCULAR; INTRAVENOUS
Status: DISCONTINUED | OUTPATIENT
Start: 2022-02-25 | End: 2022-02-25 | Stop reason: HOSPADM

## 2022-02-25 RX ORDER — NALOXONE HYDROCHLORIDE 0.4 MG/ML
0.4 INJECTION, SOLUTION INTRAMUSCULAR; INTRAVENOUS; SUBCUTANEOUS
Status: DISCONTINUED | OUTPATIENT
Start: 2022-02-25 | End: 2022-02-25 | Stop reason: HOSPADM

## 2022-02-25 RX ORDER — LIDOCAINE HYDROCHLORIDE AND EPINEPHRINE 15; 5 MG/ML; UG/ML
INJECTION, SOLUTION EPIDURAL PRN
Status: DISCONTINUED | OUTPATIENT
Start: 2022-02-25 | End: 2022-02-25

## 2022-02-25 RX ORDER — FENTANYL/ROPIVACAINE/NS/PF 2MCG/ML-.1
PLASTIC BAG, INJECTION (ML) EPIDURAL PRN
Status: DISCONTINUED | OUTPATIENT
Start: 2022-02-25 | End: 2022-02-25

## 2022-02-25 RX ORDER — METHYLERGONOVINE MALEATE 0.2 MG/ML
200 INJECTION INTRAVENOUS
Status: DISCONTINUED | OUTPATIENT
Start: 2022-02-25 | End: 2022-02-26 | Stop reason: HOSPADM

## 2022-02-25 RX ORDER — BUPIVACAINE HYDROCHLORIDE 2.5 MG/ML
8 INJECTION, SOLUTION EPIDURAL; INFILTRATION; INTRACAUDAL ONCE
Status: DISCONTINUED | OUTPATIENT
Start: 2022-02-25 | End: 2022-02-25 | Stop reason: HOSPADM

## 2022-02-25 RX ORDER — METOCLOPRAMIDE 10 MG/1
10 TABLET ORAL EVERY 6 HOURS PRN
Status: DISCONTINUED | OUTPATIENT
Start: 2022-02-25 | End: 2022-02-25 | Stop reason: HOSPADM

## 2022-02-25 RX ORDER — ACETAMINOPHEN 325 MG/1
650 TABLET ORAL EVERY 4 HOURS PRN
Status: DISCONTINUED | OUTPATIENT
Start: 2022-02-25 | End: 2022-02-25 | Stop reason: HOSPADM

## 2022-02-25 RX ORDER — BUPIVACAINE HYDROCHLORIDE 2.5 MG/ML
INJECTION, SOLUTION EPIDURAL; INFILTRATION; INTRACAUDAL
Status: COMPLETED | OUTPATIENT
Start: 2022-02-25 | End: 2022-02-25

## 2022-02-25 RX ORDER — DOCUSATE SODIUM 100 MG/1
100 CAPSULE, LIQUID FILLED ORAL DAILY
Status: DISCONTINUED | OUTPATIENT
Start: 2022-02-25 | End: 2022-02-26 | Stop reason: HOSPADM

## 2022-02-25 RX ORDER — MODIFIED LANOLIN
OINTMENT (GRAM) TOPICAL
Status: DISCONTINUED | OUTPATIENT
Start: 2022-02-25 | End: 2022-02-26 | Stop reason: HOSPADM

## 2022-02-25 RX ORDER — MISOPROSTOL 200 UG/1
800 TABLET ORAL
Status: DISCONTINUED | OUTPATIENT
Start: 2022-02-25 | End: 2022-02-25 | Stop reason: HOSPADM

## 2022-02-25 RX ORDER — TRANEXAMIC ACID 10 MG/ML
1 INJECTION, SOLUTION INTRAVENOUS EVERY 30 MIN PRN
Status: DISCONTINUED | OUTPATIENT
Start: 2022-02-25 | End: 2022-02-26 | Stop reason: HOSPADM

## 2022-02-25 RX ORDER — SODIUM CHLORIDE, SODIUM LACTATE, POTASSIUM CHLORIDE, CALCIUM CHLORIDE 600; 310; 30; 20 MG/100ML; MG/100ML; MG/100ML; MG/100ML
INJECTION, SOLUTION INTRAVENOUS CONTINUOUS PRN
Status: DISCONTINUED | OUTPATIENT
Start: 2022-02-25 | End: 2022-02-25 | Stop reason: HOSPADM

## 2022-02-25 RX ORDER — OXYTOCIN 10 [USP'U]/ML
10 INJECTION, SOLUTION INTRAMUSCULAR; INTRAVENOUS
Status: DISCONTINUED | OUTPATIENT
Start: 2022-02-25 | End: 2022-02-25 | Stop reason: HOSPADM

## 2022-02-25 RX ORDER — OXYTOCIN/0.9 % SODIUM CHLORIDE 30/500 ML
340 PLASTIC BAG, INJECTION (ML) INTRAVENOUS CONTINUOUS PRN
Status: DISCONTINUED | OUTPATIENT
Start: 2022-02-25 | End: 2022-02-26 | Stop reason: HOSPADM

## 2022-02-25 RX ORDER — OXYTOCIN/0.9 % SODIUM CHLORIDE 30/500 ML
340 PLASTIC BAG, INJECTION (ML) INTRAVENOUS CONTINUOUS PRN
Status: COMPLETED | OUTPATIENT
Start: 2022-02-25 | End: 2022-02-25

## 2022-02-25 RX ORDER — HYDROCORTISONE 2.5 %
CREAM (GRAM) TOPICAL 3 TIMES DAILY PRN
Status: DISCONTINUED | OUTPATIENT
Start: 2022-02-25 | End: 2022-02-26 | Stop reason: HOSPADM

## 2022-02-25 RX ORDER — FENTANYL/ROPIVACAINE/NS/PF 2MCG/ML-.1
PLASTIC BAG, INJECTION (ML) EPIDURAL
Status: DISCONTINUED | OUTPATIENT
Start: 2022-02-25 | End: 2022-02-25 | Stop reason: HOSPADM

## 2022-02-25 RX ORDER — PROCHLORPERAZINE MALEATE 10 MG
10 TABLET ORAL EVERY 6 HOURS PRN
Status: DISCONTINUED | OUTPATIENT
Start: 2022-02-25 | End: 2022-02-25 | Stop reason: HOSPADM

## 2022-02-25 RX ORDER — NALBUPHINE HYDROCHLORIDE 10 MG/ML
2.5-5 INJECTION, SOLUTION INTRAMUSCULAR; INTRAVENOUS; SUBCUTANEOUS EVERY 6 HOURS PRN
Status: DISCONTINUED | OUTPATIENT
Start: 2022-02-25 | End: 2022-02-25

## 2022-02-25 RX ORDER — OXYTOCIN/0.9 % SODIUM CHLORIDE 30/500 ML
100-340 PLASTIC BAG, INJECTION (ML) INTRAVENOUS CONTINUOUS PRN
Status: DISCONTINUED | OUTPATIENT
Start: 2022-02-25 | End: 2022-02-26 | Stop reason: HOSPADM

## 2022-02-25 RX ORDER — CARBOPROST TROMETHAMINE 250 UG/ML
250 INJECTION, SOLUTION INTRAMUSCULAR
Status: DISCONTINUED | OUTPATIENT
Start: 2022-02-25 | End: 2022-02-26 | Stop reason: HOSPADM

## 2022-02-25 RX ORDER — ONDANSETRON 4 MG/1
4 TABLET, ORALLY DISINTEGRATING ORAL EVERY 6 HOURS PRN
Status: DISCONTINUED | OUTPATIENT
Start: 2022-02-25 | End: 2022-02-25 | Stop reason: HOSPADM

## 2022-02-25 RX ORDER — IBUPROFEN 800 MG/1
800 TABLET, FILM COATED ORAL EVERY 6 HOURS PRN
Status: DISCONTINUED | OUTPATIENT
Start: 2022-02-25 | End: 2022-02-26 | Stop reason: HOSPADM

## 2022-02-25 RX ORDER — MISOPROSTOL 200 UG/1
400 TABLET ORAL
Status: DISCONTINUED | OUTPATIENT
Start: 2022-02-25 | End: 2022-02-26 | Stop reason: HOSPADM

## 2022-02-25 RX ORDER — METHYLERGONOVINE MALEATE 0.2 MG/ML
200 INJECTION INTRAVENOUS
Status: DISCONTINUED | OUTPATIENT
Start: 2022-02-25 | End: 2022-02-25 | Stop reason: HOSPADM

## 2022-02-25 RX ORDER — ONDANSETRON 2 MG/ML
4 INJECTION INTRAMUSCULAR; INTRAVENOUS EVERY 6 HOURS PRN
Status: DISCONTINUED | OUTPATIENT
Start: 2022-02-25 | End: 2022-02-25 | Stop reason: HOSPADM

## 2022-02-25 RX ORDER — MISOPROSTOL 200 UG/1
800 TABLET ORAL
Status: DISCONTINUED | OUTPATIENT
Start: 2022-02-25 | End: 2022-02-26 | Stop reason: HOSPADM

## 2022-02-25 RX ADMIN — BUPIVACAINE HYDROCHLORIDE 10 ML: 2.5 INJECTION, SOLUTION EPIDURAL; INFILTRATION; INTRACAUDAL at 06:22

## 2022-02-25 RX ADMIN — SODIUM CHLORIDE, POTASSIUM CHLORIDE, SODIUM LACTATE AND CALCIUM CHLORIDE 500 ML: 600; 310; 30; 20 INJECTION, SOLUTION INTRAVENOUS at 09:50

## 2022-02-25 RX ADMIN — KETOROLAC TROMETHAMINE 30 MG: 30 INJECTION, SOLUTION INTRAMUSCULAR at 10:52

## 2022-02-25 RX ADMIN — Medication: at 06:39

## 2022-02-25 RX ADMIN — IBUPROFEN 800 MG: 800 TABLET, FILM COATED ORAL at 22:03

## 2022-02-25 RX ADMIN — Medication 2 MILLI-UNITS/MIN: at 05:32

## 2022-02-25 RX ADMIN — LIDOCAINE HYDROCHLORIDE,EPINEPHRINE BITARTRATE 3 ML: 15; .005 INJECTION, SOLUTION EPIDURAL; INFILTRATION; INTRACAUDAL; PERINEURAL at 06:24

## 2022-02-25 RX ADMIN — Medication 340 ML/HR: at 10:26

## 2022-02-25 RX ADMIN — SODIUM CHLORIDE, POTASSIUM CHLORIDE, SODIUM LACTATE AND CALCIUM CHLORIDE 1000 ML: 600; 310; 30; 20 INJECTION, SOLUTION INTRAVENOUS at 05:32

## 2022-02-25 RX ADMIN — IBUPROFEN 800 MG: 800 TABLET, FILM COATED ORAL at 16:01

## 2022-02-25 RX ADMIN — Medication 10 ML: at 06:28

## 2022-02-25 NOTE — PLAN OF CARE
VSS and postpartum assessments WDL.  Up ad hayley with steady gait and independent with cares.  Bonding well with infant, Maizey.  Breastfeeding on cue independently.  Pain managed with toradol x1.  Her  is present and supportive.  Will continue with postpartum cares and education per plan of care.

## 2022-02-25 NOTE — L&D DELIVERY NOTE
OB Vaginal Delivery Note    Chhaya Gama MRN# [id number]   Age: 31 year old YOB: 1990     Chhaya Gama is a 31 year old  at 38w3d by LMP c/w 8w6d US who was admitted for PROM on 2022. Pregnancy was notable for COVID infection in pregnancy, Hx T21 infant with subsequent demise. Upon admission her cervix was 3/60/-3. She received IV pitocin for augmentation. She received an epidural with good pain relief. She was complete at 0950 and began pushing at 0958. At 1023 a vigorous female infant was delivered in the LIA position with apgars of 9 and 9. Weight pending. Delayed cord clamping was done for >60 seconds. The placenta delivered spontaneously, intact with a 3 vessel cord after 6 minutes. Pitocin was given after delivery of the infant. A small right periurethral laceration was present and hemostatic so not repaired. QBL of 300mL. Sponge and sharp counts were correct. Dr. Spaulding was present for delivery.     Mark Delgado MD  ObGyn Resident, PGY-2  2022 10:51 AM    GA: 38w3d  GP:   Labor Complications:    EBL:   mL  Delivery QBL: 300 mL  Delivery Type: Vaginal, Spontaneous   ROM to Delivery Time: (Delivered) Hours: 11 Minutes: 8   Weight:     1 Minute 5 Minute 10 Minute   Apgar Totals: 9   9        MERLENE SPAULDING;MARK DELGADO     Delivery Details:  Chhaya Gama, a 31 year old  female delivered a viable infant with apgars of 9  and 9 . Patient was fully dilated and pushing after 5  hours 50  minutes in active labor. Delivery was via vaginal, spontaneous  to a sterile field under epidural  anesthesia. Infant delivered in vertex  left  occiput  anterior  position. Anterior and posterior shoulders delivered without difficulty. The cord was clamped, cut twice and 3 vessels  were noted. Cord blood was obtained in routine fashion with the following disposition: lab .      Cord complications: none   Placenta delivered at 2022  10:29 AM . Placental disposition was Hospital disposal . Fundal massage performed and fundus found to be firm.     Episiotomy: none    Perineum, vagina, cervix were inspected, and the following lacerations were noted:   Perineal lacerations: none   periurethral laceration: right             Excellent hemostasis was noted. Needle count correct. Infant and patient in delivery room in good and stable condition.        Syed Gama-Chhaya [[id number]]    Labor Event Times    Labor onset date: 22 Onset time:  4:00 AM   Dilation complete date: 22 Complete time:  9:50 AM   Start pushing date/time: 2022 0958      Labor Length    1st Stage (hrs): 5 (min): 50   2nd Stage (hrs): 0 (min): 33   3rd Stage (hrs): 0 (min): 6      Labor Events     labor?: No   steroids: None  Labor Type: Spontaneous, Augmentation     Antibiotics received during labor?: No     Rupture date/time: 225   Rupture type: Spontaneous rupture of membranes occuring during spontaneous labor or augmentation  Fluid color: Clear  Fluid odor: Normal     Induction date/time:     Cervical ripening date/time:     Indications for induction: Premature ROM     Augmentation: Oxytocin  Augmentation date/time: 22       Delivery/Placenta Date and Time    Delivery Date: 22 Delivery Time: 10:23 AM   Placenta Date/Time: 2022 10:29 AM  Oxytocin given at the time of delivery: after delivery of baby  Delivering clinician: Jeimy Delgado MD   Other personnel present at delivery:  Provider Role   Spring Spaulding MD Obstetrician   Jeimy Delgado MD Resident         Vaginal Counts     Initial count performed by 2 team members:  Two Team Members   BEE [place] G2       Needles Suture Needles Sponges (RETIRED) Instruments   Initial counts 2 0 5    Added to count 0 0 0    Relief counts 0 0 0    Final counts 2 0 5          Placed during labor Accounted for at the end of labor   FSE NA NA   IUPC NA  NA   Cervadil NA NA              Final count performed by 2 team members:  Two Team Members   BEE [place] G2      Final count correct?: Yes     Apgars    Living status: Living   1 Minute 5 Minute 10 Minute 15 Minute 20 Minute   Skin color: 1  1       Heart rate: 2  2       Reflex irritability: 2  2       Muscle tone: 2  2       Respiratory effort: 2  2       Total: 9  9       Apgars assigned by: APARNA CHEEMA RNC     Cord    Vessels: 3 Vessels    Cord Complications: None               Cord Blood Disposition: Lab    Gases Sent?: No    Delayed cord clamping?: Yes       [place] Resuscitation    Methods: None   Care at Delivery: Spontaneous cry, to mother for skin to skin  Output in Delivery Room: Voided     [place] Measurements    Output in delivery room: Voided     Skin to Skin and Feeding Plan    Skin to skin initiation date/time: 1841    Skin to skin with: Mother  Skin to skin end date/time:        Labor Events and Shoulder Dystocia    Fetal Tracing Prior to Delivery: Category 2  Fetal Tracing Comments: Decels with pushing  Shoulder dystocia present?: Neg     Delivery (Maternal) (Provider to Complete) ([id number])    Episiotomy: None  Perineal lacerations: None    Periurethral laceration: right Repaired?: No   Repair suture: None  Genital tract inspection done: Pos     Blood Loss  Mother: Leena Gama #[id number]   Start of Mother's Information    Delivery Blood Loss  22 0400 - 22 1054    Delivery QBL (mL) Hospital Encounter 300 mL    Total  300 mL         End of Mother's Information  Mother: Leena Gama #[id number]          Delivery - Provider to Complete ([id number])    Delivering clinician: Jeimy Delgado MD  Attempted Delivery Types (Choose all that apply): Spontaneous Vaginal Delivery  Delivery Type (Choose the 1 that will go to the Birth History): Vaginal, Spontaneous                   Other personnel:  Provider Role   Spring Spaulding MD Obstetrician   Danny  Jeimy SHANNON MD Resident                Placenta    Date/Time: 2/25/2022 10:29 AM  Removal: Spontaneous  Disposition: Hospital disposal           Anesthesia    Method: Epidural  Cervical dilation at placement: 4-7                Presentation and Position    Presentation: Vertex    Position: Left Occiput Anterior                 Jeimy Delgado MD

## 2022-02-25 NOTE — PROGRESS NOTES
Dr Delgado at bedside, reviewed EFM tracing and interventions.  Pt desires SVE to check progress. Brief for delivery including prepare for possible shoulder dystocia. Continue plan of care. Anticipate .

## 2022-02-25 NOTE — PLAN OF CARE
Vital signs within normal limits. Postpartum checks within normal limits. Patient eating and drinking normally. Patient able to empty bladder independently and is up ambulating. Adequate pain control.  Patient performing self cares and is able to care for infant. Breastfeeding on cue, requires assist with deeper latch and positioning.Positive attachment behaviors observed with infant. Support person present and very supportive. Will continue to assess/assist as needed.

## 2022-02-25 NOTE — PLAN OF CARE
Patient arrived to [facility] unit via wheelchair at 1300,with belongings, accompanied by spouse/ significant other, with infant in arms. Received report from Ashly SHI RN and checked bands. Unit and room orientation started. Call light given; no concerns present at this time. Continue with plan of care.

## 2022-02-25 NOTE — DISCHARGE SUMMARY
VAGINAL DELIVERY DISCHARGE SUMMARY    Admit date: 2022  Discharge date: 2022    Admit Dx:   - 31 year old  at 38w3d   - PROM  - Covid infection in 2022  - History of trisomy 21 in previous pregnancy  - MTHFR mutation    Discharge Dx:  - Same as above, s/p   - gHTN      Procedures:  - Spontaneous vaginal delivery  - Epidural analgesia    Admit HPI:  Ms. Chhaya Gama is a  at 38w3d by 8w6d US, admitted for spontaneous labor on 2022. Please see her admit H&P for full details of her PMH, PSH, Meds, Allergies and exam on admit.    Hospital course:  Chhaya Gama was admitted to the hospital on 2022 for the above listed indications. Upon admission her cervix was 3/60/-3. She received IV pitocin for augmentation. She received an epidural with good pain relief. She was complete at 0950 and began pushing at 0958. At 1023 a vigorous female infant was delivered in the LIA position with apgars of 9 and 9. Weight 3572 kg. Delayed cord clamping was done for >60 seconds. The placenta delivered spontaneously, intact with a 3 vessel cord after 6 minutes. Pitocin was given after delivery of the infant. A small right periurethral laceration was present and hemostatic so not repaired. QBL of 300mL. Sponge and sharp counts were correct. Dr. Spaulding was present for delivery.     Her postpartum course was uncomplicated. On PPD#1, she was meeting all of her postpartum goals and deemed stable for discharge. She was voiding without difficulty, tolerating a regular diet without nausea and vomiting, her pain was well controlled on oral pain medicines and her lochia was appropriate. Her Rh status was positive, and Rhogam was not indicated.     Discharge Medications:     Review of your medicines      CONTINUE these medicines which have NOT CHANGED      Dose / Directions   * Acetaminophen 325 MG Caps      Dose: 325-650 mg  Take 325-650 mg by mouth every 4 hours as needed  Refills: 0     *  acetaminophen 325 MG tablet  Commonly known as: TYLENOL  Used for: Encounter for supervision of other normal pregnancy in third trimester      Dose: 650 mg  Take 2 tablets (650 mg) by mouth every 6 hours as needed for mild pain Start after Delivery.  Refills: 0     breast pump Misc  Used for: Encounter for supervision of other normal pregnancy in third trimester      Dose: 1 each  1 each continuous  Quantity: 1 each  Refills: 0     ibuprofen 600 MG tablet  Commonly known as: ADVIL/MOTRIN  Used for: Encounter for supervision of other normal pregnancy in third trimester      Dose: 600 mg  Take 1 tablet (600 mg) by mouth every 6 hours as needed for moderate pain Start after delivery  Refills: 0     prenatal multivitamin w/iron 27-0.8 MG tablet      Dose: 1 tablet  Take 1 tablet by mouth daily  Refills: 0     senna-docusate 8.6-50 MG tablet  Commonly known as: SENOKOT-S/PERICOLACE  Used for: Encounter for supervision of other normal pregnancy in third trimester      Dose: 1 tablet  Take 1 tablet by mouth daily Start after delivery.  Refills: 0         * This list has 2 medication(s) that are the same as other medications prescribed for you. Read the directions carefully, and ask your doctor or other care provider to review them with you.              Discharge/Disposition:  Chhaya Gama was discharged to home in stable condition with the following instructions/medications:  1) Call for temperature > 100.4, bright red vaginal bleeding >1 pad an hour x 2 hours, foul smelling vaginal discharge, pain not controlled by usual oral pain meds, persistent nausea and vomiting not controlled on medications  2) She desired vasectomy for contraception.  3) For feeding she decided to breast.  4) She was instructed to follow-up with her primary OB in 6 weeks for a routine postpartum visit.    Chandan Rosales MD  OB/GYN PGY-3  02/26/22 7:06 AM

## 2022-02-25 NOTE — H&P
OB History and Physical     Chhaya Gama    MRN# [id number]  YOB: 1990      HPI: Chhaya Gama is a 31 year old  at 38w3d by LMP c/w 8w6d US who presents today for SROM. Large gush of clear fluid at 2315 when ambulating to the restroom from bed. Non malodorous. Patient states that she has been having contractions that are increasing.  She is unsure how often she is feeling them but approximates 1-2 every 10 minutes.  States that there close to making it difficult for her to speak through them.  Having good fetal movement.  Denies any manny vaginal bleeding.  Patient notes that she had a headache earlier that has resolved at this point in time.    Pregnancy notable for:   - Covid infection in 2022  -History of trisomy 21 in previous pregnancy  - MTHFR    Her previous pregnancies were notable for hypertension though she is unsure if she ever had a diagnosis of preeclampsia. Her delivery was uncomplicated. Denies history of postpartum hemorrhage, shoulder dystocia, pre-eclampsia. No history of asthma or high blood pressure.    She denies fever, chills, SOB, chest pain, palpitations, N/V, LE swelling/tenderness.  No concerns for headache, vision changes, RUQ or epigastric pain        Prenatal Labs:   Lab Results   Component Value Date    ABO O 2019    RH Pos 2019    AS Negative 2022    HEPBANG Nonreactive 2021    CHPCRT Negative 2019    GCPCRT Negative 2019    TREPAB Negative 2018    HGB 11.7 2022       GBS Status:   Lab Results   Component Value Date    GBS Negative 2019         OBHX:   OB History    Para Term  AB Living   5 2 2 0 2 1   SAB IAB Ectopic Multiple Live Births   1 0 0 0 2      # Outcome Date GA Lbr Gerald/2nd Weight Sex Delivery Anes PTL Lv   5 Current            4 Term 19 39w1d 03:17 / 00:49 3.78 kg (8 lb 5.3 oz) F Vag-Spont EPI N GEORGE      Name: Bridgette      Apgar1: 9  Apgar5: 9   3 Term 18  37w4d 07:19 / 02:39 2.72 kg (5 lb 15.9 oz) F Vag-Spont EPI N DEC      Complications: Preeclampsia/Hypertension      Name: Ralph      Apgar1: 8  Apgar5: 9   2 SAB 07/02/17 9w1d    AB, MISSED      1 AB 01/27/17 8w0d    SAB          MedicalHX:   Past Medical History:   Diagnosis Date     Chickenpox      Fetal cardiac anomaly affecting pregnancy, antepartum 4/4/2018     History of recurrent UTI (urinary tract infection)      Hypertension     with first pregnancy       SurgicalHX:   Past Surgical History:   Procedure Laterality Date     DILATION AND CURETTAGE, OPERATIVE HYSTEROSCOPY, COMBINED N/A 8/30/2017    Procedure: COMBINED DILATION AND CURETTAGE, OPERATIVE HYSTEROSCOPY;  Hysteroscopy with dilation and curettage, polypectomy;  Surgeon: Mable Hanson MD;  Location: [facility] OR     ENT SURGERY  20 years ago    tubes in three times     MOUTH SURGERY  2008    wisdom teeth -age 16     TONSILLECTOMY  2/16       Medications:   No current facility-administered medications on file prior to encounter.  acetaminophen (TYLENOL) 325 MG tablet, Take 2 tablets (650 mg) by mouth every 6 hours as needed for mild pain Start after Delivery.  Acetaminophen 325 MG CAPS, Take 325-650 mg by mouth every 4 hours as needed  ibuprofen (ADVIL/MOTRIN) 600 MG tablet, Take 1 tablet (600 mg) by mouth every 6 hours as needed for moderate pain Start after delivery  Prenatal Vit-Fe Fumarate-FA (PRENATAL MULTIVITAMIN W/IRON) 27-0.8 MG tablet, Take 1 tablet by mouth daily  senna-docusate (SENOKOT-S/PERICOLACE) 8.6-50 MG tablet, Take 1 tablet by mouth daily Start after delivery.  Misc. Devices (BREAST PUMP) MISC, 1 each continuous        Allergies:  Allergies   Allergen Reactions     Imitrex [Sumatriptan]        FamilyHX:  Family History   Problem Relation Age of Onset     Unknown/Adopted Father         unknown history     Hyperlipidemia Mother      Stomach Cancer Maternal Grandmother      Other Cancer Maternal Grandmother      Other Cancer Maternal  "Grandfather         skin     Cancer Maternal Grandfather         skin ca     Down Syndrome Daughter      Heart Defect Daughter          at 4 months     Diabetes No family hx of      Melanoma No family hx of        SocialHX:   Social History     Socioeconomic History     Marital status:      Spouse name: Nahid     Number of children: None     Years of education: None     Highest education level: None   Occupational History     Occupation: RN - OR coordinator    Tobacco Use     Smoking status: Never Smoker     Smokeless tobacco: Never Used   Vaping Use     Vaping Use: Never used   Substance and Sexual Activity     Alcohol use: No     Alcohol/week: 0.0 standard drinks     Comment: occas-quit with pregnancy     Drug use: No     Sexual activity: Yes     Partners: Male     Birth control/protection: None   Other Topics Concern     Parent/sibling w/ CABG, MI or angioplasty before 65F 55M? No   Social History Narrative    ** Merged History Encounter **          Social Determinants of Health     Financial Resource Strain: Not on file   Food Insecurity: Not on file   Transportation Needs: Not on file   Physical Activity: Not on file   Stress: Not on file   Social Connections: Not on file   Intimate Partner Violence: Not on file   Housing Stability: Not on file       ROS: 10 point ROS negative other than above    Physical Exam:  Patient Vitals for the past 24 hrs:   BP Temp Temp src Pulse Resp Height Weight   22 0309 -- 98.5  F (36.9  C) Oral -- 20 -- --   22 0210 (!) 141/80 98.8  F (37.1  C) Oral -- 18 -- --   22 0102 134/83 98.6  F (37  C) Oral 101 16 1.676 m (5' 6\") 112 kg (247 lb)     General: AAOx3, appropriately interactive, NAD, appears generally well  CV: RRR, normal S1/S2, no m/r/g  Lungs: CTAB, non-labored breathing, no wheezes, rales, or rhonchi  Abdomen: soft, gravid, non-tender, EFW 7.5#; cephalic by BSUS  SVE:  3/60/-3, clear fluid on exam  Extremities: Non-tender with trace edema " bilaterally in LE    FHT: 140 moderate variability, accels present, no decels   [place]: 1-2 contractions in ten minutes    Labs:   CBC, RPR, type and screen, AST, ALT, creatinine    Assessment & Plan: 31 year old  at 38w3d by 8w6d US, here for spontaneous labor.    Spontaneous Labor and SROM:  - Admit to labor and delivery   - Observe for spontaneous progress, consider pitocin for augmentation   - SVE prn; Cervical exam on admission: 3/60/-3, medium, posterior  - Membranes: S/p SROM 2315  - Labs: CBC, T&S, RPR  - GBS negative; Antibiotics not indicated   - Pain Control: Per patient request; Discussed options    - Vistaril and IM Morphine for therapeutic sleep   - Desires epidural in active labor.  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  - Diet: Regular in early labor. NPO once on pitocin or in active labor    Elevated BPs  History of of at least gestational hypertension with mild range blood pressure upon admission.  - Serial BP monitoring   - IV Antihypertensives prn for sustained severe range blood pressures (>160/>110)  - Labs: HELLP labs     MTHFR  -No history of blood clots or stroke  -Not currently taking aspirin  -No medical intervention at this time we will continue to monitor     Covid Infection in pregnancy  -Covid infection in 2022  -Not taking aspirin at this time  -EFW at the 90th percentile (3276 g) on February 3, 2022 at 35 weeks 2 days    History of trisomy 21 in previous pregnancy  -NIPT, fetal echo, level 2 ultrasound within normal limits    PNC  - Rh pos, Rubella immune, GCT wnl, GBS neg  - Other prenatal labs wnl  - Imaging: see imaging tab    FWB:   Cat I tracing, reactive and reassuring; Ceph by BSUS; EFW 7.5#  - Continuous Fetal Monitoring  - Intrauterine resuscitative measures prn      Patient discussed with Dr. Rola Dang MD  OBGYN PGY-2  2022 3:24 AM    Physician Attestation   I, Rola Rodriguez MD,  "personally examined and evaluated this patient.  I discussed the patient with the resident/fellow and care team, and agree with the assessment and plan of care as documented in the note of 22.      I personally reviewed vital signs, medications, labs and imaging.    [place] findings: Leena Gama is a 31 year old  at 38w3d by LMP c/w 8w6d us who is admitted with SROM and labor.  Patient states that her contractions since coming in have continued to be getting more intense and frequent.  She reports continuing to leak clear fluid.  On exam patient appears uncomfortable with contractions, breathing through contractions.  BP on admit mild range.  Admit HELLP labs WNL.  If patient has straight catheterization after getting epidural, can send specimen to test for UPC.  GBS negative.  FHT category 1, reactive.  Patient does plan epidural for pain control.  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  She prefers no anesthesia resident for epidural.  Patient also requests that if she needs , she would like the attending MD to \"do the cutting\" and is ok with resident involvement in her labor and delivery care.  Patient shares with the nurse her request to have MTHFR genetic testing on the baby after delivery.  Looking in to type of blood tube and method of collection for this request.  Expectant management now and if contractions space, will plan to start pitocin.    Rola Rodriguez MD  Date of Service (when I saw the patient): 22                "

## 2022-02-25 NOTE — H&P
OB History and Physical     Chhaya Gama    MRN# [id number]  YOB: 1990      HPI: Chhaya Gama is a 31 year old  at 38w3d by LMP c/w 8w6d US who presents today for SROM. Large gush of clear fluid at 2315 when ambulating to the restroom from bed. Non malodorous. Patient states that she has been having contractions that are increasing.  She is unsure how often she is feeling them but approximates 1-2 every 10 minutes.  States that there close to making it difficult for her to speak through them.  Having good fetal movement.  Denies any manny vaginal bleeding.  Patient notes that she had a headache earlier that has resolved at this point in time.    Pregnancy notable for:   - Covid infection in 2022  -History of trisomy 21 in previous pregnancy  - MTHFR    Her previous pregnancies were notable for hypertension though she is unsure if she ever had a diagnosis of preeclampsia. Her delivery was uncomplicated. Denies history of postpartum hemorrhage, shoulder dystocia, pre-eclampsia. No history of asthma or high blood pressure.    She denies fever, chills, SOB, chest pain, palpitations, N/V, LE swelling/tenderness.  No concerns for headache, vision changes, RUQ or epigastric pain        Prenatal Labs:   Lab Results   Component Value Date    ABO O 2019    RH Pos 2019    AS Negative 2022    HEPBANG Nonreactive 2021    CHPCRT Negative 2019    GCPCRT Negative 2019    TREPAB Negative 2018    HGB 11.7 2022       GBS Status:   Lab Results   Component Value Date    GBS Negative 2019         OBHX:   OB History    Para Term  AB Living   5 2 2 0 2 1   SAB IAB Ectopic Multiple Live Births   1 0 0 0 2      # Outcome Date GA Lbr Gerald/2nd Weight Sex Delivery Anes PTL Lv   5 Current            4 Term 19 39w1d 03:17 / 00:49 3.78 kg (8 lb 5.3 oz) F Vag-Spont EPI N GEORGE      Name: Bridgette      Apgar1: 9  Apgar5: 9   3 Term 18  37w4d 07:19 / 02:39 2.72 kg (5 lb 15.9 oz) F Vag-Spont EPI N DEC      Complications: Preeclampsia/Hypertension      Name: Ralph      Apgar1: 8  Apgar5: 9   2 SAB 07/02/17 9w1d    AB, MISSED      1 AB 01/27/17 8w0d    SAB          MedicalHX:   Past Medical History:   Diagnosis Date     Chickenpox      Fetal cardiac anomaly affecting pregnancy, antepartum 4/4/2018     History of recurrent UTI (urinary tract infection)      Hypertension     with first pregnancy       SurgicalHX:   Past Surgical History:   Procedure Laterality Date     DILATION AND CURETTAGE, OPERATIVE HYSTEROSCOPY, COMBINED N/A 8/30/2017    Procedure: COMBINED DILATION AND CURETTAGE, OPERATIVE HYSTEROSCOPY;  Hysteroscopy with dilation and curettage, polypectomy;  Surgeon: Mable Hanson MD;  Location: [facility] OR     ENT SURGERY  20 years ago    tubes in three times     MOUTH SURGERY  2008    wisdom teeth -age 16     TONSILLECTOMY  2/16       Medications:   No current facility-administered medications on file prior to encounter.  acetaminophen (TYLENOL) 325 MG tablet, Take 2 tablets (650 mg) by mouth every 6 hours as needed for mild pain Start after Delivery.  Acetaminophen 325 MG CAPS, Take 325-650 mg by mouth every 4 hours as needed  ibuprofen (ADVIL/MOTRIN) 600 MG tablet, Take 1 tablet (600 mg) by mouth every 6 hours as needed for moderate pain Start after delivery  Prenatal Vit-Fe Fumarate-FA (PRENATAL MULTIVITAMIN W/IRON) 27-0.8 MG tablet, Take 1 tablet by mouth daily  senna-docusate (SENOKOT-S/PERICOLACE) 8.6-50 MG tablet, Take 1 tablet by mouth daily Start after delivery.  Misc. Devices (BREAST PUMP) MISC, 1 each continuous        Allergies:  Allergies   Allergen Reactions     Imitrex [Sumatriptan]        FamilyHX:    Family History   Problem Relation Age of Onset     Unknown/Adopted Father         unknown history     Hyperlipidemia Mother      Stomach Cancer Maternal Grandmother      Other Cancer Maternal Grandmother      Other Cancer Maternal  Grandfather         skin     Cancer Maternal Grandfather         skin ca     Down Syndrome Daughter      Heart Defect Daughter          at 4 months     Diabetes No family hx of      Melanoma No family hx of        SocialHX:   Social History     Socioeconomic History     Marital status:      Spouse name: Nahid     Number of children: None     Years of education: None     Highest education level: None   Occupational History     Occupation: RN - OR coordinator    Tobacco Use     Smoking status: Never Smoker     Smokeless tobacco: Never Used   Vaping Use     Vaping Use: Never used   Substance and Sexual Activity     Alcohol use: No     Alcohol/week: 0.0 standard drinks     Comment: occas-quit with pregnancy     Drug use: No     Sexual activity: Yes     Partners: Male     Birth control/protection: None   Other Topics Concern     Parent/sibling w/ CABG, MI or angioplasty before 65F 55M? No   Social History Narrative    ** Merged History Encounter **          Social Determinants of Health     Financial Resource Strain: Not on file   Food Insecurity: Not on file   Transportation Needs: Not on file   Physical Activity: Not on file   Stress: Not on file   Social Connections: Not on file   Intimate Partner Violence: Not on file   Housing Stability: Not on file       ROS: 10 point ROS negative other than above    Physical Exam:  Patient Vitals for the past 24 hrs:   BP Temp Temp src Pulse Resp SpO2 Height Weight   22 0626 113/55 -- -- -- 18 -- -- --   22 0625 -- -- -- -- -- 100 % -- --   22 0620 124/69 -- -- -- 18 100 % -- --   22 0619 119/71 -- -- -- 18 -- -- --   22 0616 120/71 -- -- -- -- -- -- --   22 0615 -- -- -- -- -- 100 % -- --   22 0614 119/73 -- -- -- 20 -- -- --   22 0610 130/72 -- -- -- 20 100 % -- --   22 0605 -- -- -- -- -- 100 % -- --   22 0600 -- 98.8  F (37.1  C) Oral -- 20 100 % -- --   22 0430 -- 98.8  F (37.1  C) Oral -- -- -- -- --  "  22 0310 138/75 -- -- -- -- -- -- --   22 0309 -- 98.5  F (36.9  C) Oral -- 20 -- -- --   22 0210 (!) 141/80 98.8  F (37.1  C) Oral -- 18 -- -- --   22 0102 134/83 98.6  F (37  C) Oral 101 16 -- 1.676 m (5' 6\") 112 kg (247 lb)     General: AAOx3, appropriately interactive, NAD, appears generally well  CV: RRR, normal S1/S2, no m/r/g  Lungs: CTAB, non-labored breathing, no wheezes, rales, or rhonchi  Abdomen: soft, gravid, non-tender, EFW 7.5#; cephalic by BSUS  SVE:  3/60/-3, clear fluid on exam  Extremities: Non-tender with trace edema bilaterally in LE    FHT: 140 moderate variability, accels present, no decels   [place]: 1-2 contractions in ten minutes    Labs:   CBC, RPR, type and screen, AST, ALT, creatinine    Assessment & Plan: 31 year old  at 38w3d by 8w6d US, here for spontaneous labor.    Spontaneous Labor and SROM:  - Admit to labor and delivery   - Observe for spontaneous progress, consider pitocin for augmentation   - SVE prn; Cervical exam on admission: 3/60/-3, medium, posterior  - Membranes: S/p SROM 2315  - Labs: CBC, T&S, RPR  - GBS negative; Antibiotics not indicated   - Pain Control: Per patient request; Discussed options    - Vistaril and IM Morphine for therapeutic sleep   - Desires epidural in active labor.  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  - Diet: Regular in early labor. NPO once on pitocin or in active labor    Elevated BPs  History of of at least gestational hypertension with mild range blood pressure upon admission.  - Serial BP monitoring   - IV Antihypertensives prn for sustained severe range blood pressures (>160/>110)  - Labs: HELLP labs     MTHFR  -No history of blood clots or stroke  -Not currently taking aspirin  -No medical intervention at this time we will continue to monitor     Covid Infection in pregnancy  -Covid infection in 2022  -Not taking aspirin at this time  -EFW at the 90th percentile " "(3276 g) on February 3, 2022 at 35 weeks 2 days    History of trisomy 21 in previous pregnancy  -NIPT, fetal echo, level 2 ultrasound within normal limits    PNC  - Rh pos, Rubella immune, GCT wnl, GBS neg  - Other prenatal labs wnl  - Imaging: see imaging tab    FWB:   Cat I tracing, reactive and reassuring; Ceph by BSUS; EFW 9#  - Continuous Fetal Monitoring  - Intrauterine resuscitative measures prn      Patient discussed with Dr. Rola Dang MD  OBGYN PGY-2  2022 3:24 AM    Physician Attestation   I, Rola Rodriguez MD, personally examined and evaluated this patient.  I discussed the patient with the resident/fellow and care team, and agree with the assessment and plan of care as documented in the note of 22.      I personally reviewed vital signs, medications, labs and imaging.    [place] findings: Leena Gama is a 31 year old  at 38w3d by LMP c/w 8w6d us who is admitted with SROM and labor.  Patient states that her contractions since coming in have continued to be getting more intense and frequent.  She reports continuing to leak clear fluid.  On exam patient appears uncomfortable with contractions, breathing through contractions.  BP on admit mild range.  Admit HELLP labs WNL.  If patient has straight catheterization after getting epidural, can send specimen to test for UPC.  GBS negative.  FHT category 1, reactive.  Patient does plan epidural for pain control.  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  She prefers no anesthesia resident for epidural.  Patient also requests that if she needs , she would like the attending MD to \"do the cutting\" and is ok with resident involvement in her labor and delivery care.  Patient shares with the nurse her request to have MTHFR genetic testing on the baby after delivery.  Looking in to type of blood tube and method of collection for this request.  Expectant management now " and if contractions space, will plan to start pitocin.    Rola Rodriguez MD  Date of Service (when I saw the patient): 02/25/22

## 2022-02-25 NOTE — CARE PLAN
Data: Chhaya Gama transferred to 71 via wheelchair at 1245. Baby transferred via parent's arms.  Action: Receiving unit notified of transfer: Yes. Patient and family notified of room change. Report given to Uma PINA Rn at 1300. Belongings sent to receiving unit. Accompanied by Registered Nurse. Oriented patient to surroundings. Call light within reach. ID bands double-checked with receiving RN.  Response: Patient tolerated transfer and is stable.

## 2022-02-25 NOTE — PLAN OF CARE
Goal Outcome Evaluation:  Plan of Care Reviewed With: patient, spouse     PT is resting comfortably with epidural. Pitocin infusing for augmentation of labor and titrated per plan of care. Continuous FHR and uterine contraction monitoring. Support and encouragement provided.

## 2022-02-25 NOTE — ANESTHESIA PROCEDURE NOTES
Epidural catheter Procedure Note    Pre-Procedure   Staff -        Anesthesiologist:  Denise Rodriguez MD       Performed By: anesthesiologist       Location: OB       Procedure Start/Stop Times: 2/25/2022 5:55 AM and 2/25/2022 6:25 AM       Pre-Anesthestic Checklist: patient identified, IV checked, risks and benefits discussed, informed consent, monitors and equipment checked, pre-op evaluation, at physician/surgeon's request and post-op pain management  Timeout:       Correct Patient: Yes        Correct Procedure: Yes        Correct Site: Yes        Correct Position: Yes   Procedure Documentation  Procedure: epidural catheter       Patient Position: sitting       Skin prep: Chloraprep      Local skin infiltrated with mL of 1% lidocaine.        Insertion Site: L3-4. (midline approach).       Technique: LORT saline        Needle Type: Qian Xiaoâ€™er needle       Needle Gauge: 17.        Needle Length (Inches): 3.5        Catheter: 20 G.         Catheter threaded easily.         4 cm epidural space.         Threaded 10.5 cm at skin.         # of attempts: 1 and  # of redirects:  0    Assessment/Narrative         Paresthesias: No.      Test dose of 3 mL at 06:00 CST.         Test dose not negative, 3 minutes after injection, for signs of intravascular, subdural, or intrathecal injection.       Insertion/Infusion Method: LORT saline       No aspiration negative for Heme or CSF via Epidural Catheter.    Medication(s) Administered   0.25% Bupivacaine PF (Epidural), 10 mL  Medication Administration Time: 2/25/2022 6:22 AM     Comments:  Easily placed. Did not allow residents in her care

## 2022-02-25 NOTE — ANESTHESIA PREPROCEDURE EVALUATION
Anesthesia Pre-Procedure Evaluation    Patient: Chhaya Gama   MRN: [id number] : 1990        Procedure : * No procedures listed *          Past Medical History:   Diagnosis Date     Chickenpox      Fetal cardiac anomaly affecting pregnancy, antepartum 2018     History of recurrent UTI (urinary tract infection)      Hypertension     with first pregnancy      Past Surgical History:   Procedure Laterality Date     DILATION AND CURETTAGE, OPERATIVE HYSTEROSCOPY, COMBINED N/A 2017    Procedure: COMBINED DILATION AND CURETTAGE, OPERATIVE HYSTEROSCOPY;  Hysteroscopy with dilation and curettage, polypectomy;  Surgeon: Mable Hanson MD;  Location: [facility] OR     ENT SURGERY  20 years ago    tubes in three times     MOUTH SURGERY      wisdom teeth -age 16     TONSILLECTOMY        Allergies   Allergen Reactions     Imitrex [Sumatriptan]       Social History     Tobacco Use     Smoking status: Never Smoker     Smokeless tobacco: Never Used   Substance Use Topics     Alcohol use: No     Alcohol/week: 0.0 standard drinks     Comment: occas-quit with pregnancy      Wt Readings from Last 1 Encounters:   22 112 kg (247 lb)        Anesthesia Evaluation            ROS/MED HX  ENT/Pulmonary:  - neg pulmonary ROS     Neurologic:  - neg neurologic ROS     Cardiovascular:  - neg cardiovascular ROS   (+) hypertension-----    METS/Exercise Tolerance:     Hematologic:  - neg hematologic  ROS     Musculoskeletal:       GI/Hepatic:  - neg GI/hepatic ROS     Renal/Genitourinary:       Endo:  - neg endo ROS     Psychiatric/Substance Use:  - neg psychiatric ROS     Infectious Disease:       Malignancy:       Other:            Physical Exam    Airway        Mallampati: II   TM distance: > 3 FB   Neck ROM: full   Mouth opening: > 3 cm    Respiratory Devices and Support         Dental  no notable dental history         Cardiovascular   cardiovascular exam normal          Pulmonary   pulmonary exam normal                 OUTSIDE LABS:  CBC:   Lab Results   Component Value Date    WBC 10.2 11/22/2021    WBC 9.3 08/13/2021    HGB 11.7 02/25/2022    HGB 11.4 (L) 02/16/2022    HCT 37.9 11/22/2021    HCT 37.7 08/13/2021     02/25/2022     11/22/2021     BMP:   Lab Results   Component Value Date     09/12/2019     01/27/2017    POTASSIUM 3.8 09/12/2019    POTASSIUM 3.8 01/27/2017    CHLORIDE 107 09/12/2019    CHLORIDE 106 01/27/2017    CO2 22 09/12/2019    CO2 24 01/27/2017    BUN 7 09/12/2019    BUN 10 01/27/2017    CR 0.68 02/25/2022    CR 0.70 08/13/2021    GLC 78 09/12/2019    GLC 87 01/27/2017     COAGS: No results found for: PTT, INR, FIBR  POC:   Lab Results   Component Value Date    HCG Negative 01/14/2019     HEPATIC:   Lab Results   Component Value Date    ALBUMIN 3.5 09/14/2016    PROTTOTAL 7.9 09/14/2016    ALT 16 02/25/2022    AST 17 02/25/2022    ALKPHOS 80 09/14/2016    BILITOTAL 0.2 09/14/2016     OTHER:   Lab Results   Component Value Date    A1C 5.0 08/13/2021    NORI 8.4 (L) 09/12/2019    TSH 1.41 07/20/2017       Anesthesia Plan    ASA Status:  2      Anesthesia Type: Epidural.              Consents    Anesthesia Plan(s) and associated risks, benefits, and realistic alternatives discussed. Questions answered and patient/representative(s) expressed understanding.    - Discussed:     - Discussed with:  Patient         Postoperative Care            Comments:    Other Comments: Refuses residents to be involved in her anesthettc care       neg OB ROS.       Denise Patel MD

## 2022-02-25 NOTE — PROGRESS NOTES
[facility]  Labor Progress Note    S:  Patient starting to feel more comfortable with epidural.    O:   Patient Vitals for the past 4 hrs:   BP Temp Temp src Resp SpO2   2230 112/58 -- -- 20 100 %   22 0720 -- -- -- -- 100 %   2216 109/57 -- -- -- --   2215 -- -- -- -- 100 %   2212 110/67 -- -- -- --   2210 110/67 -- -- -- 99 %   2207 109/69 -- -- -- --   2203 107/60 -- -- -- --   22 0700 -- -- -- -- 100 %   22 0656 104/63 -- -- 18 --   2255 -- -- -- -- 100 %   22 0651 104/62 -- -- -- --   22 0650 -- -- -- -- 99 %   22 0646 111/64 -- -- -- --   22 0645 -- -- -- -- 100 %   22 0641 117/68 -- -- 18 --   2240 -- -- -- -- 100 %   2236 110/67 -- -- 18 --   2235 -- -- -- -- 99 %   2232 119/68 -- -- 18 --   2230 -- -- -- -- 100 %   22 0626 113/55 -- -- 18 --   22 -- -- -- -- 100 %   2220 124/69 -- -- 18 100 %   22 0619 119/71 -- -- 18 --   2216 120/71 -- -- -- --   2215 -- -- -- -- 100 %   22 0614 119/73 -- -- 20 --   22 0610 130/72 -- -- 20 100 %   22 0605 -- -- -- -- 100 %   22 0600 -- 98.8  F (37.1  C) Oral 20 100 %   22 0555 -- -- -- -- 100 %   22 0430 -- 98.8  F (37.1  C) Oral -- --     SVE: 8/-1    FHT: Baseline 134, moderate variability, present accelerations, present decelerations - intermittent late decels responsive to position changes  [place]: 3-4 contractions in 10 minutes    A/P:  Ms. Chhaya Gama is a 31 year old  at 38w3d by 8w6d US, here for spontaneous labor.    Labor: - PROM at 2315, currently on pit at 4. SVE /-1  FWB: - Category 2 FHT- continue position changes, bolus prn   - EFW 9.5lbs. Discussed shoulder dystocia precautions  PNC: - Rh positive, Rubella immune, GBS neg, GCT wnl, Placenta anterior    Jeimy  MD Danny  ObGyn Resident, PGY2  7:56 AM 2/25/2022

## 2022-02-25 NOTE — PLAN OF CARE
Goal Outcome Evaluation:    Plan of Care Reviewed With: patient, spouse  at 1023, female. See delivery summary

## 2022-02-25 NOTE — PROGRESS NOTES
Met patient and introduced myself.    Discussed monitoring progress both in labor and with pushing.  If fetal station remains high, would consider laboring down.  Also discuss closely monitoring fetal station during pushing phase, as if fetal station remains high despite good pushing efforts, may be a sign baby isn't fitting well into pelvis.    She is understanding of the above conversation and expresses desire to do whatever is safest.    Spring Spaulding MD

## 2022-02-25 NOTE — PROGRESS NOTES
[facility]  Labor Progress Note    S:  Patient starting to feel pressure with contractions    O:   Patient Vitals for the past 4 hrs:   BP Temp Temp src Resp SpO2   22 0930 127/74 -- -- -- 100 %   22 0800 121/72 -- -- 20 100 %   22 0730 112/58 -- -- 20 100 %   2220 -- -- -- -- 100 %   2216 109/57 -- -- -- --   2215 -- -- -- -- 100 %   2212 110/67 -- -- -- --   2210 110/67 -- -- -- 99 %   2207 109/69 -- -- -- --   2203 107/60 -- -- -- --   22 -- -- -- -- 100 %   2256 104/63 -- -- 18 --   2255 -- -- -- -- 100 %   22 0651 104/62 -- -- -- --   22 0650 -- -- -- -- 99 %   2246 111/64 -- -- -- --   2245 -- -- -- -- 100 %   22 0641 117/68 -- -- 18 --   2240 -- -- -- -- 100 %   2236 110/67 -- -- 18 --   2235 -- -- -- -- 99 %   2232 119/68 -- -- 18 --   2230 -- -- -- -- 100 %   2226 113/55 -- -- 18 --   2225 -- -- -- -- 100 %   2220 124/69 -- -- 18 100 %   2219 119/71 -- -- 18 --   02/25/22 0616 120/71 -- -- -- --   22 0615 -- -- -- -- 100 %   22 0614 119/73 -- -- 20 --   22 0610 130/72 -- -- 20 100 %   22 0605 -- -- -- -- 100 %   22 0600 -- 98.8  F (37.1  C) Oral 20 100 %   22 0555 -- -- -- -- 100 %     SVE: 10100/0    FHT: Baseline 135, moderate variability, present accelerations, present decelerations - rare late decels responsive to position changes  [place]: 3-4 contractions in 10 minutes    A/P:  Ms. Chhaya Gama is a 31 year old  at 38w3d by 8w6d US, here for spontaneous labor.    Labor: - PROM at 2315, currently on pit at 2. SVE complete/0 station  FWB: - Intermittent Category 2 FHT- continue position changes, bolus prn. Overall reassuring with moderate variability   - EFW 9.5lbs. Discussed shoulder dystocia  precautions  PNC: - Rh positive, Rubella immune, GBS neg, GCT wnl, Placenta anterior    Jeimy Delgado MD  ObGyn Resident, PGY2  9:54 AM 2/25/2022

## 2022-02-26 VITALS
HEART RATE: 92 BPM | SYSTOLIC BLOOD PRESSURE: 118 MMHG | WEIGHT: 247 LBS | BODY MASS INDEX: 39.7 KG/M2 | HEIGHT: 66 IN | TEMPERATURE: 98 F | OXYGEN SATURATION: 99 % | DIASTOLIC BLOOD PRESSURE: 70 MMHG | RESPIRATION RATE: 18 BRPM

## 2022-02-26 LAB — HGB BLD-MCNC: 11.1 G/DL (ref 11.7–15.7)

## 2022-02-26 PROCEDURE — 36415 COLL VENOUS BLD VENIPUNCTURE: CPT | Performed by: STUDENT IN AN ORGANIZED HEALTH CARE EDUCATION/TRAINING PROGRAM

## 2022-02-26 PROCEDURE — 85018 HEMOGLOBIN: CPT | Performed by: STUDENT IN AN ORGANIZED HEALTH CARE EDUCATION/TRAINING PROGRAM

## 2022-02-26 PROCEDURE — 250N000013 HC RX MED GY IP 250 OP 250 PS 637: Performed by: STUDENT IN AN ORGANIZED HEALTH CARE EDUCATION/TRAINING PROGRAM

## 2022-02-26 RX ADMIN — IBUPROFEN 800 MG: 800 TABLET, FILM COATED ORAL at 04:46

## 2022-02-26 RX ADMIN — IBUPROFEN 800 MG: 800 TABLET, FILM COATED ORAL at 09:34

## 2022-02-26 RX ADMIN — ACETAMINOPHEN 650 MG: 325 TABLET, FILM COATED ORAL at 08:25

## 2022-02-26 NOTE — PROGRESS NOTES
Post Partum Progress Note    Subjective:  Patient is doing well, pain well controlled. Tolerating PO, ambulating without any issues, voiding spontaneously, lochia within normal limits. Denies any fever, chills, SOB, chest pain, N/V, headache, dizziness. Planning on breastfeeding. Partner planning vasectomy for birth control. Declines other methods in the mean time.    Objective:  Patient Vitals for the past 24 hrs:   BP Temp Temp src Pulse Resp SpO2   22 0210 117/68 97.7  F (36.5  C) Oral 76 18 --   22 2115 122/74 98.1  F (36.7  C) Oral 78 18 --   22 1631 136/79 99  F (37.2  C) Oral 97 18 --   22 1230 119/61 -- -- -- 16 --   22 1215 121/61 -- -- -- 18 --   22 1200 129/62 -- -- 96 18 --   22 1145 135/58 -- -- 101 18 --   22 1130 130/62 -- -- -- 18 --   22 1115 138/66 -- -- -- 18 --   22 1100 128/69 98.6  F (37  C) Oral -- 20 --   22 1045 (!) 146/69 -- -- -- 20 --   22 1030 (!) 153/70 -- -- -- 20 --   22 0950 -- -- -- -- -- 99 %   22 0945 -- -- -- -- -- 100 %   22 0930 127/74 -- -- -- 20 100 %   22 0900 130/73 -- -- -- 20 100 %   22 0800 121/72 -- -- -- 20 100 %   22 0730 112/58 -- -- -- 20 100 %   22 0720 -- -- -- -- -- 100 %   22 0716 109/57 -- -- -- -- --   22 0715 -- -- -- -- -- 100 %   22 110 -- -- -- -- --   22 -- -- -- -- 99 %   22 109/69 -- -- -- -- --       General: NAD, resting comfortably  Resp:  Normal effort and rate on room air  Cv: well perfused  Abd:  Soft, nontender, nondistended, fundus firm below the umbilicus  Ext:  No edema in bilateral LE    HGB  Recent Labs   Lab 22  0224   HGB 11.7       Assessment and Plan:  31 year old old  PPD#1 s/p . Doing well, starting to meet discharge goals.    # Postpartum Care  - Pain: Tylenol, ibuprofen.  - Heme: > AM Hemoglobin pending. Asymptomatic from blood loss anemia;  will discharge with oral iron if <10.   - GI: Regular diet. Bowel regimen. Antiemetics PRN. Tolerating PO  - Rh positive, Rubella immune  - voiding spontaneously  - breastfeeding  - vasectomy for birth control    # gHTN  - BP wnl, no symptoms  - recommend BP monitoring at home     Anticipate discharge to home PPD#1    Chandan Rosales MD  OB/GYN PGY-3  02/26/22 7:06 AM

## 2022-02-26 NOTE — PROGRESS NOTES
S: Status Update  B:  (now) at 38.3 weeks gestation. Allergies: Imitrex Labs: O Positive, GBS: Negative, Rubella: Immune, HIV: Non-Reactive, Hep B: Non-Reactive, COVID recovered. Pregnancy Related Complications: History of Trisomy 21 in previous pregnancy, PROM, history of pre-eclampsia with first pregnancy, MTHRF mutation.  A: Vital signs WDL, afebrile. Fundus is firm, scant bleeding. Up in room independently. Denies passing any clots bigger than a golf ball. Voiding without difficulty. Denies headache, visual disturbances and RUQ pain. Is independent with infant cares.  is at bedside and supportive. Would like to discharge at 24 hours if able.   R: Continue with current plan of care. Possible 24 hour discharge.

## 2022-02-27 ENCOUNTER — NURSE TRIAGE (OUTPATIENT)
Dept: NURSING | Facility: CLINIC | Age: 32
End: 2022-02-27
Payer: COMMERCIAL

## 2022-02-27 ENCOUNTER — HOSPITAL ENCOUNTER (EMERGENCY)
Facility: CLINIC | Age: 32
Discharge: HOME | End: 2022-02-27
Attending: PHYSICIAN ASSISTANT | Admitting: PHYSICIAN ASSISTANT
Payer: COMMERCIAL

## 2022-02-27 VITALS
TEMPERATURE: 98.1 F | OXYGEN SATURATION: 99 % | RESPIRATION RATE: 16 BRPM | HEART RATE: 103 BPM | DIASTOLIC BLOOD PRESSURE: 72 MMHG | SYSTOLIC BLOOD PRESSURE: 112 MMHG

## 2022-02-27 DIAGNOSIS — U07.1 INFECTION DUE TO 2019 NOVEL CORONAVIRUS: ICD-10-CM

## 2022-02-27 DIAGNOSIS — N30.01 ACUTE CYSTITIS WITH HEMATURIA: ICD-10-CM

## 2022-02-27 LAB
ALBUMIN SERPL-MCNC: 2.2 G/DL (ref 3.4–5)
ALBUMIN UR-MCNC: NEGATIVE MG/DL
ALP SERPL-CCNC: 105 U/L (ref 40–150)
ALT SERPL W P-5'-P-CCNC: 22 U/L (ref 0–50)
ANION GAP SERPL CALCULATED.3IONS-SCNC: 6 MMOL/L (ref 3–14)
APPEARANCE UR: CLEAR
AST SERPL W P-5'-P-CCNC: 19 U/L (ref 0–45)
BACTERIA #/AREA URNS HPF: ABNORMAL /HPF
BASOPHILS # BLD AUTO: 0 10E3/UL (ref 0–0.2)
BASOPHILS NFR BLD AUTO: 0 %
BILIRUB SERPL-MCNC: 0.3 MG/DL (ref 0.2–1.3)
BILIRUB UR QL STRIP: NEGATIVE
BUN SERPL-MCNC: 9 MG/DL (ref 7–30)
CALCIUM SERPL-MCNC: 9.4 MG/DL (ref 8.5–10.1)
CHLORIDE BLD-SCNC: 108 MMOL/L (ref 94–109)
CO2 SERPL-SCNC: 23 MMOL/L (ref 20–32)
COLOR UR AUTO: YELLOW
CREAT SERPL-MCNC: 0.79 MG/DL (ref 0.52–1.04)
EOSINOPHIL # BLD AUTO: 0.2 10E3/UL (ref 0–0.7)
EOSINOPHIL NFR BLD AUTO: 1 %
ERYTHROCYTE [DISTWIDTH] IN BLOOD BY AUTOMATED COUNT: 13.6 % (ref 10–15)
FLUAV RNA SPEC QL NAA+PROBE: NEGATIVE
FLUBV RNA RESP QL NAA+PROBE: NEGATIVE
GFR SERPL CREATININE-BSD FRML MDRD: >90 ML/MIN/1.73M2
GLUCOSE BLD-MCNC: 116 MG/DL (ref 70–99)
GLUCOSE UR STRIP-MCNC: NEGATIVE MG/DL
HCT VFR BLD AUTO: 33.1 % (ref 35–47)
HGB BLD-MCNC: 10.8 G/DL (ref 11.7–15.7)
HGB UR QL STRIP: ABNORMAL
HOLD SPECIMEN: NORMAL
IMM GRANULOCYTES # BLD: 0.1 10E3/UL
IMM GRANULOCYTES NFR BLD: 1 %
KETONES UR STRIP-MCNC: NEGATIVE MG/DL
LEUKOCYTE ESTERASE UR QL STRIP: ABNORMAL
LYMPHOCYTES # BLD AUTO: 1.5 10E3/UL (ref 0.8–5.3)
LYMPHOCYTES NFR BLD AUTO: 10 %
MCH RBC QN AUTO: 29.2 PG (ref 26.5–33)
MCHC RBC AUTO-ENTMCNC: 32.6 G/DL (ref 31.5–36.5)
MCV RBC AUTO: 90 FL (ref 78–100)
MONOCYTES # BLD AUTO: 1.6 10E3/UL (ref 0–1.3)
MONOCYTES NFR BLD AUTO: 11 %
NEUTROPHILS # BLD AUTO: 11.3 10E3/UL (ref 1.6–8.3)
NEUTROPHILS NFR BLD AUTO: 77 %
NITRATE UR QL: NEGATIVE
NRBC # BLD AUTO: 0 10E3/UL
NRBC BLD AUTO-RTO: 0 /100
PH UR STRIP: 7.5 [PH] (ref 5–7)
PLATELET # BLD AUTO: 193 10E3/UL (ref 150–450)
POTASSIUM BLD-SCNC: 3.4 MMOL/L (ref 3.4–5.3)
PROT SERPL-MCNC: 6.1 G/DL (ref 6.8–8.8)
RBC # BLD AUTO: 3.7 10E6/UL (ref 3.8–5.2)
RBC URINE: <1 /HPF
SARS-COV-2 RNA RESP QL NAA+PROBE: POSITIVE
SODIUM SERPL-SCNC: 137 MMOL/L (ref 133–144)
SP GR UR STRIP: 1.01 (ref 1–1.03)
SQUAMOUS EPITHELIAL: 1 /HPF
UROBILINOGEN UR STRIP-MCNC: NORMAL MG/DL
WBC # BLD AUTO: 14.6 10E3/UL (ref 4–11)
WBC URINE: 10 /HPF

## 2022-02-27 PROCEDURE — 87636 SARSCOV2 & INF A&B AMP PRB: CPT | Performed by: PHYSICIAN ASSISTANT

## 2022-02-27 PROCEDURE — 81001 URINALYSIS AUTO W/SCOPE: CPT | Performed by: PHYSICIAN ASSISTANT

## 2022-02-27 PROCEDURE — 96360 HYDRATION IV INFUSION INIT: CPT | Performed by: PHYSICIAN ASSISTANT

## 2022-02-27 PROCEDURE — 99283 EMERGENCY DEPT VISIT LOW MDM: CPT | Performed by: PHYSICIAN ASSISTANT

## 2022-02-27 PROCEDURE — C9803 HOPD COVID-19 SPEC COLLECT: HCPCS | Performed by: PHYSICIAN ASSISTANT

## 2022-02-27 PROCEDURE — 258N000003 HC RX IP 258 OP 636: Performed by: PHYSICIAN ASSISTANT

## 2022-02-27 PROCEDURE — 80053 COMPREHEN METABOLIC PANEL: CPT | Performed by: PHYSICIAN ASSISTANT

## 2022-02-27 PROCEDURE — 85025 COMPLETE CBC W/AUTO DIFF WBC: CPT | Performed by: PHYSICIAN ASSISTANT

## 2022-02-27 PROCEDURE — 99283 EMERGENCY DEPT VISIT LOW MDM: CPT | Mod: 25 | Performed by: PHYSICIAN ASSISTANT

## 2022-02-27 PROCEDURE — 36415 COLL VENOUS BLD VENIPUNCTURE: CPT | Performed by: PHYSICIAN ASSISTANT

## 2022-02-27 PROCEDURE — 96361 HYDRATE IV INFUSION ADD-ON: CPT | Performed by: PHYSICIAN ASSISTANT

## 2022-02-27 RX ADMIN — SODIUM CHLORIDE 1000 ML: 9 INJECTION, SOLUTION INTRAVENOUS at 17:22

## 2022-02-27 ASSESSMENT — ENCOUNTER SYMPTOMS
NEUROLOGICAL NEGATIVE: 1
MUSCULOSKELETAL NEGATIVE: 1
RESPIRATORY NEGATIVE: 1
APPETITE CHANGE: 0
ACTIVITY CHANGE: 0
CHILLS: 1
DIAPHORESIS: 1
EYES NEGATIVE: 1
ENDOCRINE NEGATIVE: 1
GASTROINTESTINAL NEGATIVE: 1
PSYCHIATRIC NEGATIVE: 1
CARDIOVASCULAR NEGATIVE: 1
FEVER: 1

## 2022-02-27 NOTE — TELEPHONE ENCOUNTER
Patient calling says she had a baby on 2/25/2022 via vaginal delivery.  Says she has had the chills and fever last night and today.  Last temperature was 103.3 taken on forehead.    Triaged to disposition of Go to ED Now.    Anali Raymond, RN  Triage Nurse Advisor    COVID 19 Nurse Triage Plan/Patient Instructions    Please be aware that novel coronavirus (COVID-19) may be circulating in the community. If you develop symptoms such as fever, cough, or SOB or if you have concerns about the presence of another infection including coronavirus (COVID-19), please contact your health care provider or visit [email or web address].     Disposition/Instructions    ED Visit recommended. Follow protocol based instructions.     Bring Your Own Device:  Please also bring your smart device(s) (smart phones, tablets, laptops) and their charging cables for your personal use and to communicate with your care team during your visit.    Thank you for taking steps to prevent the spread of this virus.  o Limit your contact with others.  o Wear a simple mask to cover your cough.  o Wash your hands well and often.    Resources    [facility]: About COVID-19: [email or web address]    CDC: What to Do If You're Sick: www.cdc.gov/coronavirus/2019-ncov/about/steps-when-sick.html    CDC: Ending Home Isolation: www.cdc.gov/coronavirus/2019-ncov/hcp/disposition-in-home-patients.html     CDC: Caring for Someone: www.cdc.gov/coronavirus/2019-ncov/if-you-are-sick/care-for-someone.html     [facility]: Interim Guidance for Hospital Discharge to Home: www.health.[facility].mn.[place]/diseases/coronavirus/hcp/hospdischarge.pdf    [facility] clinical trials (COVID-19 research studies): [email or web address]-clinical-trials     Below are the COVID-19 hotlines at the [place] Department of [facility] ([facility]). Interpreters are available.   o For health questions: Call 275-022-3835 or 1-846.285.6157 (7 a.m. to 7 p.m.)  o For questions about  schools and childcare: Call 429-767-8713 or 1-574.617.8994 (7 a.m. to 7 p.m.)     Reason for Disposition    Fever > 103 F (39.4 C)    Additional Information    Negative: Difficult to awaken or acting confused (e.g., disoriented, slurred speech)    Negative: Shock suspected (e.g., cold/pale/clammy skin, too weak to stand, low BP, rapid pulse)    Negative: [1] Difficulty breathing AND [2] bluish lips, tongue or face    Negative: [1] Rash with purple (or blood-colored) spots or dots AND [2] patient sounds sick or weak to the triager    Negative: Sounds like a life-threatening emergency to the triager    Negative: Abdominal pain    Negative: Breast pain    Negative: Pain or burning with passing urine (urination)    Negative: Foul smelling vaginal discharge (i.e., lochia)    Negative: Other symptom is present, see that guideline  (e.g., colds, sore throat, earache, cough, diarrhea, vomiting)    Protocols used: POSTPARTUM - FEVER-A-AH

## 2022-02-27 NOTE — ED TRIAGE NOTES
Pt is post partum. Vaginal delivery on Friday. No complications. Pt having fevers now. Sweating in triage.

## 2022-02-27 NOTE — PLAN OF CARE
Pt stable. Independent with self and infant cares. Discharge instructions reviewed with patient. Homecare referral offered and declined. Pt verbalized understanding of d/c instructions and states all questions answered. Will discharge to home. Plan for follow-up in clinic in 6 weeks.

## 2022-02-27 NOTE — ED PROVIDER NOTES
History     Chief Complaint   Patient presents with     Fever     HPI  Chhaya Gama is a 31 year old female with a benign past medical history who presents with fever 103  F which began yesterday.  The patient is 2 days postpartum, had a vaginal delivery on 2022.  States that she had chills while in the hospital which was thought to be due to hormonal changes.  Over the past 24 hours she has developed high fevers and sweats with sweating on presentation in triage today.  She has been taking ibuprofen and or Tylenol with relief of fevers at this time.  Last Tylenol dose was 2 hours prior to presentation to clinic.  She is currently breast-feeding as well but has no overt pain in the breasts today.  Has mild irritation in the areolar area due to just starting breast-feeding.  Denies any URI symptoms currently, no headaches, no acute vision changes, no difficulties with breathing or swallowing, no sore throat, no runny nose or congestion, no chest pain or shortness of breath, no wheezing or coughing, no abdominal pain or pelvic pain currently.  Denies having vaginal discharge currently, no concerns with urination such as UTI symptoms.  No swelling or pain in the lower extremities.  States that she had COVID-19 in early January, all symptoms resolved.  She is currently vaccinated for influenza and COVID-19, including a COVID booster vaccine.  She had no antibiotics around the time of delivery, tested negative for group B strep.    Allergies:  Allergies   Allergen Reactions     Imitrex [Sumatriptan]        Problem List:    Patient Active Problem List    Diagnosis Date Noted     Indication for care in labor or delivery 2022     Priority: Medium     Prenatal care, subsequent pregnancy 07/15/2021     Priority: Medium     Family history of Down syndrome 2018     Priority: Medium     2018- Daughter Emry-         History of miscarriage 2017     Priority: Medium        Past Medical History:     Past Medical History:   Diagnosis Date     Chickenpox      Fetal cardiac anomaly affecting pregnancy, antepartum 2018     History of recurrent UTI (urinary tract infection)      Hypertension        Past Surgical History:    Past Surgical History:   Procedure Laterality Date     DILATION AND CURETTAGE, OPERATIVE HYSTEROSCOPY, COMBINED N/A 2017    Procedure: COMBINED DILATION AND CURETTAGE, OPERATIVE HYSTEROSCOPY;  Hysteroscopy with dilation and curettage, polypectomy;  Surgeon: Mable Hanson MD;  Location: [facility] OR     ENT SURGERY  20 years ago    tubes in three times     MOUTH SURGERY      wisdom teeth -age 16     TONSILLECTOMY         Family History:    Family History   Problem Relation Age of Onset     Unknown/Adopted Father         unknown history     Hyperlipidemia Mother      Stomach Cancer Maternal Grandmother      Other Cancer Maternal Grandmother      Other Cancer Maternal Grandfather         skin     Cancer Maternal Grandfather         skin ca     Down Syndrome Daughter      Heart Defect Daughter          at 4 months     Diabetes No family hx of      Melanoma No family hx of        Social History:  Marital Status:   [2]  Social History     Tobacco Use     Smoking status: Never Smoker     Smokeless tobacco: Never Used   Vaping Use     Vaping Use: Never used   Substance Use Topics     Alcohol use: No     Alcohol/week: 0.0 standard drinks     Comment: occas-quit with pregnancy     Drug use: No        Medications:    amoxicillin-clavulanate (AUGMENTIN) 875-125 MG tablet  acetaminophen (TYLENOL) 325 MG tablet  Acetaminophen 325 MG CAPS  ibuprofen (ADVIL/MOTRIN) 600 MG tablet  Misc. Devices (BREAST PUMP) MISC  Prenatal Vit-Fe Fumarate-FA (PRENATAL MULTIVITAMIN W/IRON) 27-0.8 MG tablet  senna-docusate (SENOKOT-S/PERICOLACE) 8.6-50 MG tablet      Review of Systems   Constitutional: Positive for chills, diaphoresis and fever. Negative for activity change and appetite change.    HENT: Negative.    Eyes: Negative.    Respiratory: Negative.    Cardiovascular: Negative.    Gastrointestinal: Negative.    Endocrine: Negative.    Genitourinary: Negative.    Musculoskeletal: Negative.    Skin: Negative.    Neurological: Negative.    Psychiatric/Behavioral: Negative.        Physical Exam   BP: 117/74  Pulse: 113  Temp: 98.1  F (36.7  C)  Resp: 16  SpO2: 96 %      Physical Exam  Constitutional:       General: She is not in acute distress.     Appearance: Normal appearance. She is not ill-appearing, toxic-appearing or diaphoretic.   HENT:      Head: Normocephalic and atraumatic.      Right Ear: Tympanic membrane, ear canal and external ear normal. No middle ear effusion. There is no impacted cerumen. No mastoid tenderness. Tympanic membrane is not injected, perforated, erythematous, retracted or bulging.      Left Ear: Tympanic membrane, ear canal and external ear normal.  No middle ear effusion. There is no impacted cerumen. No mastoid tenderness. Tympanic membrane is not injected, perforated, erythematous, retracted or bulging.      Nose: Nose normal. No congestion or rhinorrhea.      Mouth/Throat:      Mouth: Mucous membranes are moist.      Pharynx: No oropharyngeal exudate or posterior oropharyngeal erythema.   Eyes:      General: No scleral icterus.        Right eye: No discharge.         Left eye: No discharge.      Extraocular Movements: Extraocular movements intact.      Conjunctiva/sclera: Conjunctivae normal.   Cardiovascular:      Rate and Rhythm: Normal rate and regular rhythm.      Pulses: Normal pulses.      Heart sounds: Normal heart sounds. No murmur heard.  Pulmonary:      Effort: Pulmonary effort is normal. No respiratory distress.      Breath sounds: Normal breath sounds. No stridor. No wheezing or rhonchi.   Chest:   Breasts:      Luiz Score is 5. Breasts are symmetrical.      Right: Normal. No swelling, tenderness, axillary adenopathy or supraclavicular adenopathy.       Left: Normal. No swelling, tenderness, axillary adenopathy or supraclavicular adenopathy.       Abdominal:      General: Bowel sounds are normal. There is no distension.      Palpations: There is no mass.      Tenderness: There is no abdominal tenderness. There is no right CVA tenderness, left CVA tenderness, guarding or rebound.      Hernia: No hernia is present.      Comments: No abdominal pain or suprapubic pain on deep palpation.    Musculoskeletal:         General: No swelling or tenderness.      Cervical back: Neck supple. No rigidity or tenderness. No muscular tenderness.   Lymphadenopathy:      Cervical: No cervical adenopathy.      Right cervical: No superficial, deep or posterior cervical adenopathy.     Left cervical: No superficial, deep or posterior cervical adenopathy.      Upper Body:      Right upper body: No supraclavicular or axillary adenopathy.      Left upper body: No supraclavicular or axillary adenopathy.   Skin:     General: Skin is warm and dry.   Neurological:      General: No focal deficit present.      Mental Status: She is alert and oriented to person, place, and time.      Cranial Nerves: No cranial nerve deficit.      Sensory: No sensory deficit.      Motor: No weakness.      Coordination: Coordination normal.   Psychiatric:         Mood and Affect: Mood normal.         Behavior: Behavior normal.         Thought Content: Thought content normal.         Judgment: Judgment normal.         ED Course           Procedures         Results for orders placed or performed during the hospital encounter of 02/27/22 (from the past 24 hour(s))   CBC with platelets differential    Narrative    The following orders were created for panel order CBC with platelets differential.  Procedure                               Abnormality         Status                     ---------                               -----------         ------                     CBC with platelets and d...[[id number]]  Abnormal             Final result                 Please view results for these tests on the individual orders.   CBC with platelets and differential   Result Value Ref Range    WBC Count 14.6 (H) 4.0 - 11.0 10e3/uL    RBC Count 3.70 (L) 3.80 - 5.20 10e6/uL    Hemoglobin 10.8 (L) 11.7 - 15.7 g/dL    Hematocrit 33.1 (L) 35.0 - 47.0 %    MCV 90 78 - 100 fL    MCH 29.2 26.5 - 33.0 pg    MCHC 32.6 31.5 - 36.5 g/dL    RDW 13.6 10.0 - 15.0 %    Platelet Count 193 150 - 450 10e3/uL    % Neutrophils 77 %    % Lymphocytes 10 %    % Monocytes 11 %    % Eosinophils 1 %    % Basophils 0 %    % Immature Granulocytes 1 %    NRBCs per 100 WBC 0 <1 /100    Absolute Neutrophils 11.3 (H) 1.6 - 8.3 10e3/uL    Absolute Lymphocytes 1.5 0.8 - 5.3 10e3/uL    Absolute Monocytes 1.6 (H) 0.0 - 1.3 10e3/uL    Absolute Eosinophils 0.2 0.0 - 0.7 10e3/uL    Absolute Basophils 0.0 0.0 - 0.2 10e3/uL    Absolute Immature Granulocytes 0.1 <=0.4 10e3/uL    Absolute NRBCs 0.0 10e3/uL   Comprehensive metabolic panel   Result Value Ref Range    Sodium 137 133 - 144 mmol/L    Potassium 3.4 3.4 - 5.3 mmol/L    Chloride 108 94 - 109 mmol/L    Carbon Dioxide (CO2) 23 20 - 32 mmol/L    Anion Gap 6 3 - 14 mmol/L    Urea Nitrogen 9 7 - 30 mg/dL    Creatinine 0.79 0.52 - 1.04 mg/dL    Calcium 9.4 8.5 - 10.1 mg/dL    Glucose 116 (H) 70 - 99 mg/dL    Alkaline Phosphatase 105 40 - 150 U/L    AST 19 0 - 45 U/L    ALT 22 0 - 50 U/L    Protein Total 6.1 (L) 6.8 - 8.8 g/dL    Albumin 2.2 (L) 3.4 - 5.0 g/dL    Bilirubin Total 0.3 0.2 - 1.3 mg/dL    GFR Estimate >90 >60 mL/min/1.73m2   Symptomatic; Unknown Influenza A/B & SARS-CoV2 (COVID-19) Virus PCR Multiplex Nasopharyngeal    Specimen: Nasopharyngeal; Swab   Result Value Ref Range    Influenza A PCR Negative Negative    Influenza B PCR Negative Negative    SARS CoV2 PCR Positive (A) Negative    Narrative    Testing was performed using the addy SARS-CoV-2 & Influenza A/B Assay on the addy Graciela System. This test should be ordered  for the detection of SARS-CoV-2 and influenza viruses in individuals who meet clinical and/or epidemiological criteria. Test performance is unknown in asymptomatic patients. This test is for in vitro diagnostic use under the FDA EUA for laboratories certified under CLIA to perform moderate and/or high complexity testing. This test has not been FDA cleared or approved. A negative result does not rule out the presence of PCR inhibitors in the specimen or target RNA in concentration below the limit of detection for the assay. If only one viral target is positive but coinfection with multiple targets is suspected, the sample should be re-tested with another FDA cleared, approved or authorized test, if coinfection would change clinical management. [facility] are certified under the Clinical Laboratory Improvement Amendments of 1988 (CLIA-88) as  qualified to perform moderate and/or high complexity laboratory testing.   UA with Microscopic reflex to Culture    Specimen: Urine, Clean Catch   Result Value Ref Range    Color Urine Yellow Colorless, Straw, Light Yellow, Yellow    Appearance Urine Clear Clear    Glucose Urine Negative Negative mg/dL    Bilirubin Urine Negative Negative    Ketones Urine Negative Negative mg/dL    Specific Gravity Urine 1.010 1.003 - 1.035    Blood Urine Trace (A) Negative    pH Urine 7.5 (H) 5.0 - 7.0    Protein Albumin Urine Negative Negative mg/dL    Urobilinogen Urine Normal Normal, 2.0 mg/dL    Nitrite Urine Negative Negative    Leukocyte Esterase Urine Small (A) Negative    Bacteria Urine Moderate (A) None Seen /HPF    RBC Urine <1 <=2 /HPF    WBC Urine 10 (H) <=5 /HPF    Squamous Epithelials Urine 1 <=1 /HPF    Narrative    Urine Culture not indicated       Medications   0.9% sodium chloride BOLUS (1,000 mLs Intravenous New Bag 2/27/22 6620)       Assessments & Plan (with Medical Decision Making)   The patient is a 31 year old female with a benign past medical history  who presents with fever 103  F which began yesterday.  The patient is 2 days postpartum, had a vaginal delivery on 2/25/2022.     Work-up included CMP and CBC today.  WBCs were elevated at 14.6, which I feel is typical for postpartum.  However, I am starting Augmentin which will cover for acute cystitis as well as pyelonephritis.  Augmentin is safe to take while breast-feeding, discussed with the patient.  Urinalysis showed trace blood, small leukocyte esterase, moderate bacteria, and WBCs elevated at 10 per hpf., UC results are pending.     Has diaphoresis, but fever is well controlled on presentation to clinic.  Temp measured in triage was 98.1.    I am concerned about a urinary tract infection with hematuria today.  I do not feel the patient has pyelonephritis today given no CVA tenderness or body aches or UTI symptoms.  Rest/hydrate.  Push fluids, avoid caffeine and alcohol. Follow up in 2-3 days with PCP if symptoms persist.     Avoid having a full bladder. Avoid feminine hygiene sprays and douches. Avoid bubble baths/bath oils, and food and beverages that may irritate the bladder.      Take all of the antibiotic, even if you begin to feel better. Drink cranberry juice.     Void after sexual activity.  Wipe front to back after using the bathroom.  Stay adequately hydrated (8-10, 8-ounce glasses of water daily).     If the treatment isn't working, your symptoms will stay the same, get worse, or you will develop new symptoms. Follow-up in the emergency department if you have a persistently high fever, chills, lower stomach pain, nausea/ vomiting, and blood in the urine. You should also follow-up, after taking medicine for 3 days if you still have a burning feeling when you urinate.     Recommend taking a probiotic at the same time you are on antibiotic. Probiotic supports and maintains digestive health while taking antibiotic.      Discussed the finding of postpartum fever with Dr. Barajas, the OB GYN provider on  call at [facility]..  Although there is no pain in the abdomen or pelvis or in the suprapubic area, Dr. Barajas recommended treating the patient empirically with Augmentin due to concern for presumptive endometritis (due to high fevers).  Dr. Garcia had no concerns with the patient's elevated white blood cell count today.  The patient has no abdominal pain or pain over the uterus with the palpation.  Recommend close follow-up in clinic with your OB/GYN provider, referral provided.    May continue to take ibuprofen and or Tylenol for symptomatic relief of fever.    The patient did test positive for COVID-19 today as well.  However, she is fully vaccinated.  Given her history of fevers at home, I feel that a new infection with COVID-19 is possible even though she had COVID-19 one month ago.  Test result may be an incidental finding due to residual antigen from earlier infection.    I provided her with a referral to the COVID-19 Get Well Loop.  Return to clinic if oxygen saturation is persistently 90% or less with or without activity.     Recommend remaining out of work for 5 days since symptom onset at a minimum, isolate longer if symptoms persist beyond 5 days, then wait for 24 additional hours after your fever and all other symptoms completely resolve (no use of pain, fever, or other OTC medications for acute symptom relief) before returning to work or school.      The patient verbalized understanding and agrees with the above plan.  Felt afebrile at the time of discharge, was stable at the time of discharge.    I have reviewed the nursing notes.    I have reviewed the findings, diagnosis, plan and need for follow up with the patient.    New Prescriptions    AMOXICILLIN-CLAVULANATE (AUGMENTIN) 875-125 MG TABLET    Take 1 tablet by mouth 2 times daily       Final diagnoses:   Acute cystitis with hematuria   Postpartum fever   Infection due to 2019 novel coronavirus       2/27/2022   [facility]  EMERGENCY DEPT     Lev Monique PA-C  02/27/22 2123       Lev Monique PA-C  02/27/22 2128       Lev Monique PA-C  02/27/22 2129

## 2022-02-28 NOTE — DISCHARGE INSTRUCTIONS
Rest/hydrate.  Push fluids, avoid caffeine and alcohol. Follow up in 2-3 days with PCP if symptoms persist.     Avoid having a full bladder. Avoid feminine hygiene sprays and douches. Avoid bubble baths/bath oils, and food and beverages that may irritate the bladder.      Take all of the antibiotic, even if you begin to feel better. Drink cranberry juice.     Void after sexual activity.  Wipe front to back after using the bathroom.  Stay adequately hydrated (8-10, 8-ounce glasses of water daily).     If the treatment isn't working, your symptoms will stay the same, get worse, or you will develop new symptoms. Follow-up in the emergency department if you have a persistently high fever, chills, lower stomach pain, nausea/ vomiting, and blood in the urine. You should also follow-up, after taking medicine for 3 days if you still have a burning feeling when you urinate.     May continue to take ibuprofen and or Tylenol for symptomatic relief of fever.    The patient did test positive for COVID-19 today as well.  However, she is fully vaccinated.  Given her history of fevers at home, I feel that a new infection with COVID-19 is possible even though she had COVID-19 1 month ago.    I provided her with a referral to the COVID-19 Get Well Loop.  Return to clinic if oxygen saturation is persistently 90% or less with or without activity.     Recommend remaining out of work for 5 days since symptom onset at a minimum, isolate longer if symptoms persist beyond 5 days, then wait for 24 additional hours after your fever and all other symptoms completely resolve (no use of pain, fever, or other OTC medications for acute symptom relief) before returning to work or school.     Recommend taking a probiotic at the same time you are on antibiotic. Probiotic supports and maintains digestive health while taking antibiotic.

## 2022-03-10 ENCOUNTER — TELEPHONE (OUTPATIENT)
Dept: FAMILY MEDICINE | Facility: CLINIC | Age: 32
End: 2022-03-10
Payer: COMMERCIAL

## 2022-03-10 NOTE — TELEPHONE ENCOUNTER
Spoke with pt after she scheduled a virtual appt tonight for flank pain after recent delivery. She had a delivery on 2/25 and went to the ED on 2/27 with fever of 103, which is very concerning for an infection such as endometritis and was treated. She has not talked to her OB about this at all so I recommended she call her OB right when we hang up and make an appt for tomorrow. She must be seen by OB/GYN tomorrow no matter what. If any new symptoms develop or pain worsens, she needs to go to the ED right away.     SUSSY Dominique CNP

## 2022-03-11 ENCOUNTER — OFFICE VISIT (OUTPATIENT)
Dept: OBGYN | Facility: CLINIC | Age: 32
End: 2022-03-11
Payer: COMMERCIAL

## 2022-03-11 VITALS
DIASTOLIC BLOOD PRESSURE: 69 MMHG | HEART RATE: 82 BPM | RESPIRATION RATE: 12 BRPM | SYSTOLIC BLOOD PRESSURE: 139 MMHG | WEIGHT: 223.8 LBS | HEIGHT: 66 IN | BODY MASS INDEX: 35.97 KG/M2 | TEMPERATURE: 98.7 F

## 2022-03-11 DIAGNOSIS — Z87.59: Primary | ICD-10-CM

## 2022-03-11 PROCEDURE — 99213 OFFICE O/P EST LOW 20 MIN: CPT | Performed by: OBSTETRICS & GYNECOLOGY

## 2022-03-11 NOTE — PROGRESS NOTES
"[facility]    Post Partum Problem Visit    CC: ER Follow up    HPI: Chhaya Gama is a 31 year old  who presents for ER follow up.  Patient delivered on  at [facility] 38w3d gestation.  Patient delivered via , with no lacerations.  She was discharged on PPD#1. On PPD#2 she developed a fever and presented to the ED. Diagnosed with suspected UTI vs. Endometritis and given course of Augmentin. Also was COVID positive at that time but had had positive COVID test one month prior, though to be more likely a false positive due to recent infection and no further symptoms aside from fever. Patient reports she has been doing well since starting the antibiotics. No further fevers. Lochia is minimal. No perineal pain. Breastfeeding without issue, no breast pain or redness. Had a little bit of left flank pain yesterday which resolved on it's own. No other symptoms.     Information  Sex: female  Weight: 3572g  Complications: none  Feeding Method: breast milk      ROS:  General/Constitutional:  Denies chills or fever  Respiratory: Denies shortness of breath, cough, wheezing   Cardiovascular: Denies chest pain, exertional pain, irregular heartbeat  Gastrointestinal:  Denies abdominal pain, constipation, nausea, or vomiting  Genitourinary: Denies hematuria, difficulty urinating, frequency, or dysuria   Skin: Denies dry skin, itching, rash, new skin lesions  Musculoskeletal: Denies aching muscles or joints, swelling in joints, back pain, shoulder pain, or painful feet, no peripheral edema    Physical Exam:   Vitals:    22 1030   BP: 139/69   BP Location: Left arm   Patient Position: Sitting   Cuff Size: Adult Large   Pulse: 82   Resp: 12   Temp: 98.7  F (37.1  C)   TempSrc: Tympanic   Weight: 101.5 kg (223 lb 12.8 oz)   Height: 1.676 m (5' 6\")      Estimated body mass index is 36.12 kg/m  as calculated from the following:    Height as of this encounter: 1.676 m (5' 6\").    Weight " as of this encounter: 101.5 kg (223 lb 12.8 oz).    General appearance: well-hydrated, A&O x 3, no apparent distress  Lungs: Equal expansion bilaterally, no accessory muscle use  Heart: No heaves or thrills. No peripheral varicosities  Neuro: CN II-XII grossly intact        Assessment and Plan:     Encounter Diagnosis   Name Primary?     History of postpartum fever Yes       Appropriate post partum recovery. Here today for ER follow up, treated for UTI vs. Endometritis, symptoms have resolved. Patient is feeling good. Given clearance to start resuming more physical activity. Discussed return precautions. Plan for follow up for 6 week post partum visit.     Merly Callaway,

## 2022-03-11 NOTE — NURSING NOTE
"Initial /69 (BP Location: Left arm, Patient Position: Sitting, Cuff Size: Adult Large)   Pulse 82   Temp 98.7  F (37.1  C) (Tympanic)   Resp 12   Ht 1.676 m (5' 6\")   Wt 101.5 kg (223 lb 12.8 oz)   LMP 06/01/2021   BMI 36.12 kg/m   Estimated body mass index is 36.12 kg/m  as calculated from the following:    Height as of this encounter: 1.676 m (5' 6\").    Weight as of this encounter: 101.5 kg (223 lb 12.8 oz). .      "

## 2022-04-08 ENCOUNTER — PRENATAL OFFICE VISIT (OUTPATIENT)
Dept: OBGYN | Facility: CLINIC | Age: 32
End: 2022-04-08
Payer: COMMERCIAL

## 2022-04-08 VITALS
DIASTOLIC BLOOD PRESSURE: 77 MMHG | RESPIRATION RATE: 16 BRPM | BODY MASS INDEX: 35.26 KG/M2 | TEMPERATURE: 98.4 F | WEIGHT: 219.4 LBS | SYSTOLIC BLOOD PRESSURE: 118 MMHG | HEIGHT: 66 IN | HEART RATE: 76 BPM

## 2022-04-08 DIAGNOSIS — N89.8 VAGINAL ITCHING: ICD-10-CM

## 2022-04-08 PROBLEM — Z34.80 PRENATAL CARE, SUBSEQUENT PREGNANCY: Status: RESOLVED | Noted: 2021-07-15 | Resolved: 2022-04-08

## 2022-04-08 PROCEDURE — 87210 SMEAR WET MOUNT SALINE/INK: CPT | Performed by: OBSTETRICS & GYNECOLOGY

## 2022-04-08 PROCEDURE — 99207 PR POST PARTUM EXAM: CPT | Performed by: OBSTETRICS & GYNECOLOGY

## 2022-04-08 RX ORDER — METRONIDAZOLE 7.5 MG/G
1 GEL VAGINAL AT BEDTIME
Qty: 25 G | Refills: 0 | Status: SHIPPED | OUTPATIENT
Start: 2022-04-08 | End: 2022-04-13

## 2022-04-08 ASSESSMENT — ANXIETY QUESTIONNAIRES
GAD7 TOTAL SCORE: 0
6. BECOMING EASILY ANNOYED OR IRRITABLE: NOT AT ALL
1. FEELING NERVOUS, ANXIOUS, OR ON EDGE: NOT AT ALL
3. WORRYING TOO MUCH ABOUT DIFFERENT THINGS: NOT AT ALL
2. NOT BEING ABLE TO STOP OR CONTROL WORRYING: NOT AT ALL
7. FEELING AFRAID AS IF SOMETHING AWFUL MIGHT HAPPEN: NOT AT ALL
5. BEING SO RESTLESS THAT IT IS HARD TO SIT STILL: NOT AT ALL

## 2022-04-08 ASSESSMENT — PATIENT HEALTH QUESTIONNAIRE - PHQ9
5. POOR APPETITE OR OVEREATING: NOT AT ALL
SUM OF ALL RESPONSES TO PHQ QUESTIONS 1-9: 0

## 2022-04-08 NOTE — PROGRESS NOTES
Chhaya is here for a 6-week postpartum checkup.    She had a  of a liveborn baby girl, weight 7 pounds 14 oz.  The delivery was uncomplicated.  Since delivery, she has been breast feeding.  She has not had a normal menses.  She has not had intercourse.  Patient screened for postpartum depression and complaints are NEGATIVE. Screening has also been completed for intimate partner violence.     She has noticed some increase in vaginal itching    Her last pap was  and was Normal    EXAM: LMP 2021     HEENT: grossly normal.  NECK: no lymphadenopathy or thyromegaly.  ABDOMEN: soft, non tender, good bowel sounds, without masses, rebound, guarding or tenderness.  EXTREMITIES: Warm to touch, no ankle edema or calf tenderness.    PELVIC:    External genitalia: normal without lesion, perineum well healed   Vagina: normal mucosa and rugae, normal discharge.    Microscopic wet-mount exam shows clue cells.     A/P  Routine Postpartum    1. Contraception: condoms until vasectomy  2. Annual due   3. BV: plan metrogel while breastfeeding.     Joy Ellison M.D.

## 2022-04-08 NOTE — NURSING NOTE
"Initial /77 (BP Location: Left arm, Patient Position: Chair, Cuff Size: Adult Regular)   Pulse 76   Temp 98.4  F (36.9  C) (Tympanic)   Resp 16   Ht 1.676 m (5' 6\")   Wt 99.5 kg (219 lb 6.4 oz)   LMP 06/01/2021   Breastfeeding Yes   BMI 35.41 kg/m   Estimated body mass index is 35.41 kg/m  as calculated from the following:    Height as of this encounter: 1.676 m (5' 6\").    Weight as of this encounter: 99.5 kg (219 lb 6.4 oz). .    Diane Mcclendon, GABBIE    "

## 2022-04-09 ASSESSMENT — ANXIETY QUESTIONNAIRES: GAD7 TOTAL SCORE: 0

## 2022-04-20 NOTE — NURSING NOTE
"Chief Complaint   Patient presents with     ER F/U     Derm Problem       Initial /78 (BP Location: Right arm, Patient Position: Chair, Cuff Size: Adult Regular)  Pulse 67  Temp 97.4  F (36.3  C) (Tympanic)  Ht 5' 7\" (1.702 m)  Wt 184 lb (83.5 kg)  LMP 12/02/2016  BMI 28.82 kg/m2 Estimated body mass index is 28.82 kg/(m^2) as calculated from the following:    Height as of this encounter: 5' 7\" (1.702 m).    Weight as of this encounter: 184 lb (83.5 kg).  Medication Reconciliation: complete    Health Maintenance that is potentially due pending provider review:  NONE    N/a  Dian ROBERTS MA        "
iv lock/yes

## 2022-06-21 ENCOUNTER — E-VISIT (OUTPATIENT)
Dept: OBGYN | Facility: CLINIC | Age: 32
End: 2022-06-21
Payer: COMMERCIAL

## 2022-06-21 DIAGNOSIS — R30.0 DYSURIA: Primary | ICD-10-CM

## 2022-06-21 DIAGNOSIS — B96.89 BACTERIAL VAGINOSIS: ICD-10-CM

## 2022-06-21 DIAGNOSIS — N76.0 BACTERIAL VAGINOSIS: ICD-10-CM

## 2022-06-21 PROCEDURE — 99421 OL DIG E/M SVC 5-10 MIN: CPT | Performed by: OBSTETRICS & GYNECOLOGY

## 2022-06-24 DIAGNOSIS — N76.0 BACTERIAL VAGINOSIS: Primary | ICD-10-CM

## 2022-06-24 DIAGNOSIS — B96.89 BACTERIAL VAGINOSIS: Primary | ICD-10-CM

## 2022-06-24 DIAGNOSIS — R30.0 DYSURIA: ICD-10-CM

## 2022-06-24 RX ORDER — METRONIDAZOLE 7.5 MG/G
1 GEL VAGINAL DAILY
Qty: 70 G | Refills: 0 | Status: SHIPPED | OUTPATIENT
Start: 2022-06-24 | End: 2022-06-29

## 2022-06-24 NOTE — PATIENT INSTRUCTIONS
Andi Stern,     I sent in the metronidazole gel for the bacterial vaginosis to your pharmacy but please let us know if your vaginal symptoms doing improve after completing the medication. I would recommend that you stop by the lab to leave a urine sample so we can determine if you have a UTI. I placed the lab order and you can schedule this appointment on [facility]. You should be seen in an urgent care or ED if the back/flank pain is worsening or you start to develop fevers or nausea or vomiting.      If you re not feeling better, please schedule an appointment.  You can schedule an appointment right here in [email or web address], or call [phone]  If the visit is for the same symptoms as your eVisit, we ll refund the cost of your eVisit if seen within seven days.      Samina Ace MD    Thank you for choosing us for your care. Given your symptoms, I would like you to do a lab-only visit to determine what is causing them.  I have placed the orders.  Please schedule an appointment with the lab right here in [facility] [employer], or call [phone].  I will let you know when the results are back and next steps to take.

## 2022-06-26 ENCOUNTER — LAB (OUTPATIENT)
Dept: LAB | Facility: CLINIC | Age: 32
End: 2022-06-26
Payer: COMMERCIAL

## 2022-06-26 DIAGNOSIS — R30.0 DYSURIA: ICD-10-CM

## 2022-06-26 LAB
ALBUMIN UR-MCNC: NEGATIVE MG/DL
APPEARANCE UR: CLEAR
BILIRUB UR QL STRIP: NEGATIVE
COLOR UR AUTO: YELLOW
GLUCOSE UR STRIP-MCNC: NEGATIVE MG/DL
HGB UR QL STRIP: NEGATIVE
KETONES UR STRIP-MCNC: NEGATIVE MG/DL
LEUKOCYTE ESTERASE UR QL STRIP: ABNORMAL
NITRATE UR QL: NEGATIVE
PH UR STRIP: 6 [PH] (ref 5–7)
RBC #/AREA URNS AUTO: NORMAL /HPF
SP GR UR STRIP: 1.01 (ref 1–1.03)
UROBILINOGEN UR STRIP-ACNC: 0.2 E.U./DL
WBC #/AREA URNS AUTO: NORMAL /HPF

## 2022-06-26 PROCEDURE — 81001 URINALYSIS AUTO W/SCOPE: CPT

## 2022-06-30 ENCOUNTER — HOSPITAL ENCOUNTER (EMERGENCY)
Facility: CLINIC | Age: 32
Discharge: HOME | End: 2022-06-30
Attending: NURSE PRACTITIONER | Admitting: NURSE PRACTITIONER
Payer: COMMERCIAL

## 2022-06-30 ENCOUNTER — APPOINTMENT (OUTPATIENT)
Dept: CT IMAGING | Facility: CLINIC | Age: 32
End: 2022-06-30
Attending: NURSE PRACTITIONER
Payer: COMMERCIAL

## 2022-06-30 ENCOUNTER — APPOINTMENT (OUTPATIENT)
Dept: ULTRASOUND IMAGING | Facility: CLINIC | Age: 32
End: 2022-06-30
Attending: NURSE PRACTITIONER
Payer: COMMERCIAL

## 2022-06-30 VITALS
TEMPERATURE: 96.6 F | HEART RATE: 68 BPM | RESPIRATION RATE: 16 BRPM | BODY MASS INDEX: 31.71 KG/M2 | DIASTOLIC BLOOD PRESSURE: 85 MMHG | WEIGHT: 202 LBS | HEIGHT: 67 IN | SYSTOLIC BLOOD PRESSURE: 161 MMHG | OXYGEN SATURATION: 98 %

## 2022-06-30 DIAGNOSIS — R10.9 RIGHT SIDED ABDOMINAL PAIN: ICD-10-CM

## 2022-06-30 LAB
ALBUMIN SERPL-MCNC: 3.9 G/DL (ref 3.4–5)
ALBUMIN UR-MCNC: NEGATIVE MG/DL
ALP SERPL-CCNC: 69 U/L (ref 40–150)
ALT SERPL W P-5'-P-CCNC: 16 U/L (ref 0–50)
ANION GAP SERPL CALCULATED.3IONS-SCNC: 5 MMOL/L (ref 3–14)
APPEARANCE UR: CLEAR
AST SERPL W P-5'-P-CCNC: 12 U/L (ref 0–45)
BACTERIA #/AREA URNS HPF: ABNORMAL /HPF
BASOPHILS # BLD AUTO: 0.1 10E3/UL (ref 0–0.2)
BASOPHILS NFR BLD AUTO: 1 %
BILIRUB SERPL-MCNC: 0.4 MG/DL (ref 0.2–1.3)
BILIRUB UR QL STRIP: NEGATIVE
BUN SERPL-MCNC: 17 MG/DL (ref 7–30)
CALCIUM SERPL-MCNC: 9.4 MG/DL (ref 8.5–10.1)
CHLORIDE BLD-SCNC: 109 MMOL/L (ref 94–109)
CO2 SERPL-SCNC: 27 MMOL/L (ref 20–32)
COLOR UR AUTO: ABNORMAL
CREAT SERPL-MCNC: 0.87 MG/DL (ref 0.52–1.04)
EOSINOPHIL # BLD AUTO: 0.2 10E3/UL (ref 0–0.7)
EOSINOPHIL NFR BLD AUTO: 3 %
ERYTHROCYTE [DISTWIDTH] IN BLOOD BY AUTOMATED COUNT: 12.9 % (ref 10–15)
GFR SERPL CREATININE-BSD FRML MDRD: 90 ML/MIN/1.73M2
GLUCOSE BLD-MCNC: 77 MG/DL (ref 70–99)
GLUCOSE UR STRIP-MCNC: NEGATIVE MG/DL
HCG UR QL: NEGATIVE
HCT VFR BLD AUTO: 40.6 % (ref 35–47)
HGB BLD-MCNC: 13.6 G/DL (ref 11.7–15.7)
HGB UR QL STRIP: NEGATIVE
HOLD SPECIMEN: NORMAL
HOLD SPECIMEN: NORMAL
IMM GRANULOCYTES # BLD: 0 10E3/UL
IMM GRANULOCYTES NFR BLD: 0 %
KETONES UR STRIP-MCNC: 20 MG/DL
LEUKOCYTE ESTERASE UR QL STRIP: NEGATIVE
LIPASE SERPL-CCNC: 94 U/L (ref 73–393)
LYMPHOCYTES # BLD AUTO: 2.9 10E3/UL (ref 0.8–5.3)
LYMPHOCYTES NFR BLD AUTO: 47 %
MCH RBC QN AUTO: 29.9 PG (ref 26.5–33)
MCHC RBC AUTO-ENTMCNC: 33.5 G/DL (ref 31.5–36.5)
MCV RBC AUTO: 89 FL (ref 78–100)
MONOCYTES # BLD AUTO: 0.4 10E3/UL (ref 0–1.3)
MONOCYTES NFR BLD AUTO: 6 %
NEUTROPHILS # BLD AUTO: 2.6 10E3/UL (ref 1.6–8.3)
NEUTROPHILS NFR BLD AUTO: 43 %
NITRATE UR QL: NEGATIVE
NRBC # BLD AUTO: 0 10E3/UL
NRBC BLD AUTO-RTO: 0 /100
PH UR STRIP: 6 [PH] (ref 5–7)
PLATELET # BLD AUTO: 204 10E3/UL (ref 150–450)
POTASSIUM BLD-SCNC: 4.1 MMOL/L (ref 3.4–5.3)
PROT SERPL-MCNC: 7.2 G/DL (ref 6.8–8.8)
RBC # BLD AUTO: 4.55 10E6/UL (ref 3.8–5.2)
RBC URINE: <1 /HPF
SODIUM SERPL-SCNC: 141 MMOL/L (ref 133–144)
SP GR UR STRIP: 1.01 (ref 1–1.03)
UROBILINOGEN UR STRIP-MCNC: NORMAL MG/DL
WBC # BLD AUTO: 6.1 10E3/UL (ref 4–11)
WBC URINE: 2 /HPF

## 2022-06-30 PROCEDURE — 250N000009 HC RX 250: Performed by: NURSE PRACTITIONER

## 2022-06-30 PROCEDURE — 99284 EMERGENCY DEPT VISIT MOD MDM: CPT | Performed by: NURSE PRACTITIONER

## 2022-06-30 PROCEDURE — 74177 CT ABD & PELVIS W/CONTRAST: CPT

## 2022-06-30 PROCEDURE — 36415 COLL VENOUS BLD VENIPUNCTURE: CPT | Performed by: NURSE PRACTITIONER

## 2022-06-30 PROCEDURE — 76705 ECHO EXAM OF ABDOMEN: CPT

## 2022-06-30 PROCEDURE — 81001 URINALYSIS AUTO W/SCOPE: CPT | Performed by: NURSE PRACTITIONER

## 2022-06-30 PROCEDURE — 81025 URINE PREGNANCY TEST: CPT | Performed by: NURSE PRACTITIONER

## 2022-06-30 PROCEDURE — 99285 EMERGENCY DEPT VISIT HI MDM: CPT | Mod: 25 | Performed by: NURSE PRACTITIONER

## 2022-06-30 PROCEDURE — 85025 COMPLETE CBC W/AUTO DIFF WBC: CPT | Performed by: NURSE PRACTITIONER

## 2022-06-30 PROCEDURE — 83690 ASSAY OF LIPASE: CPT | Performed by: NURSE PRACTITIONER

## 2022-06-30 PROCEDURE — 80053 COMPREHEN METABOLIC PANEL: CPT | Performed by: NURSE PRACTITIONER

## 2022-06-30 PROCEDURE — 250N000011 HC RX IP 250 OP 636: Performed by: NURSE PRACTITIONER

## 2022-06-30 RX ORDER — IOPAMIDOL 755 MG/ML
89 INJECTION, SOLUTION INTRAVASCULAR ONCE
Status: COMPLETED | OUTPATIENT
Start: 2022-06-30 | End: 2022-06-30

## 2022-06-30 RX ADMIN — IOPAMIDOL 89 ML: 755 INJECTION, SOLUTION INTRAVENOUS at 15:11

## 2022-06-30 RX ADMIN — SODIUM CHLORIDE 65 ML: 9 INJECTION, SOLUTION INTRAVENOUS at 15:11

## 2022-06-30 NOTE — DISCHARGE INSTRUCTIONS
No concerning findigns on your labs or urinalysis.  Your Ultrasound is normal, no indication of gallbladder problems.  Your CT scan shows no concerning findings that explain your pain.  You do have multiple nonobstructing stones in both kidneys.  However, this is not likely causing your pain.  They typically cause pain when they move from the kidney to the ureter.  Continue to monitor your symptoms.  Tylenol 650 mg every 4-6 hours as needed for pain.  Ibuprofen 400-600 mg every 6-8 hours as needed for pain  (take with food, stop if causing stomach pains.)  Recheck in clinic if you have persistent pain for more than 3 to 5 days.  Return to the emergency department if your pain is worsened, fevers, vomiting, or any other new symptoms of concern.

## 2022-06-30 NOTE — ED PROVIDER NOTES
History     Chief Complaint   Patient presents with     Abdominal Pain     HPI  Chhaya Gama is a 31 year old female who presents for evaluation of right upper quadrant abdominal pain.  Symptoms started 1 week ago.  Occasionally radiates to her right flank.  Denies nausea or vomiting.  Denies constipation or diarrhea.  Normal bowel movement today.  Denies dysuria, urinary urgency or urinary frequency.  Denies fever or chills.  Pain does not seem to be worsened by eating.  She notes pain seems worse when she is sitting down and a pressure is being put on her right upper quadrant.    She is currently breast feeding. She delivered baby 4 months ago.  Prior abdominal surgeries include hysteroscopy with D&C in 2017.  Nonsmoker.   Allergies:  Allergies   Allergen Reactions     Imitrex [Sumatriptan]        Problem List:    Patient Active Problem List    Diagnosis Date Noted     Family history of Down syndrome 2018     Priority: Medium     - Daughter Ashy-         History of miscarriage 2017     Priority: Medium        Past Medical History:    Past Medical History:   Diagnosis Date     Chickenpox      Fetal cardiac anomaly affecting pregnancy, antepartum 2018     History of recurrent UTI (urinary tract infection)      Hypertension        Past Surgical History:    Past Surgical History:   Procedure Laterality Date     DILATION AND CURETTAGE, OPERATIVE HYSTEROSCOPY, COMBINED N/A 2017    Procedure: COMBINED DILATION AND CURETTAGE, OPERATIVE HYSTEROSCOPY;  Hysteroscopy with dilation and curettage, polypectomy;  Surgeon: Mable Hanson MD;  Location: [facility] OR     ENT SURGERY  20 years ago    tubes in three times     MOUTH SURGERY  2008    wisdom teeth -age 16     TONSILLECTOMY         Family History:    Family History   Problem Relation Age of Onset     Unknown/Adopted Father         unknown history     Hyperlipidemia Mother      Stomach Cancer Maternal Grandmother      Other  "Cancer Maternal Grandmother      Other Cancer Maternal Grandfather         skin     Cancer Maternal Grandfather         skin ca     Down Syndrome Daughter      Heart Defect Daughter          at 4 months     Diabetes No family hx of      Melanoma No family hx of        Social History:  Marital Status:   [2]  Social History     Tobacco Use     Smoking status: Never Smoker     Smokeless tobacco: Never Used   Vaping Use     Vaping Use: Never used   Substance Use Topics     Alcohol use: No     Alcohol/week: 0.0 standard drinks     Comment: occas-quit with pregnancy     Drug use: No        Medications:    Acetaminophen 325 MG CAPS  Misc. Devices (BREAST PUMP) MISC  Prenatal Vit-Fe Fumarate-FA (PRENATAL MULTIVITAMIN W/IRON) 27-0.8 MG tablet          Review of Systems  As mentioned above in the history present illness. All other systems were reviewed and are negative.    Physical Exam   BP: (!) 161/85  Pulse: 68  Temp: (!) 96.6  F (35.9  C)  Resp: 16  Height: 170.2 cm (5' 7\")  Weight: 91.6 kg (202 lb)  SpO2: 98 %      Physical Exam  Constitutional:       General: She is not in acute distress.     Appearance: Normal appearance. She is well-developed. She is not ill-appearing.   HENT:      Head: Normocephalic and atraumatic.      Right Ear: External ear normal.      Left Ear: External ear normal.      Nose: Nose normal.      Mouth/Throat:      Mouth: Mucous membranes are moist.   Eyes:      Conjunctiva/sclera: Conjunctivae normal.   Cardiovascular:      Rate and Rhythm: Normal rate and regular rhythm.      Heart sounds: Normal heart sounds. No murmur heard.  Pulmonary:      Effort: Pulmonary effort is normal. No respiratory distress.      Breath sounds: Normal breath sounds.   Abdominal:      General: Bowel sounds are normal. There is no distension.      Palpations: Abdomen is soft.      Tenderness: There is abdominal tenderness in the right upper quadrant. Negative signs include Louis's sign and McBurney's sign. "       Musculoskeletal:         General: Normal range of motion.   Skin:     General: Skin is warm and dry.      Findings: No rash.   Neurological:      General: No focal deficit present.      Mental Status: She is alert and oriented to person, place, and time.         ED Course                 Procedures         Results for orders placed or performed during the hospital encounter of 06/30/22 (from the past 24 hour(s))   CBC with platelets differential    Narrative    The following orders were created for panel order CBC with platelets differential.  Procedure                               Abnormality         Status                     ---------                               -----------         ------                     CBC with platelets and d...[[id number]]                      Final result                 Please view results for these tests on the individual orders.   Comprehensive metabolic panel   Result Value Ref Range    Sodium 141 133 - 144 mmol/L    Potassium 4.1 3.4 - 5.3 mmol/L    Chloride 109 94 - 109 mmol/L    Carbon Dioxide (CO2) 27 20 - 32 mmol/L    Anion Gap 5 3 - 14 mmol/L    Urea Nitrogen 17 7 - 30 mg/dL    Creatinine 0.87 0.52 - 1.04 mg/dL    Calcium 9.4 8.5 - 10.1 mg/dL    Glucose 77 70 - 99 mg/dL    Alkaline Phosphatase 69 40 - 150 U/L    AST 12 0 - 45 U/L    ALT 16 0 - 50 U/L    Protein Total 7.2 6.8 - 8.8 g/dL    Albumin 3.9 3.4 - 5.0 g/dL    Bilirubin Total 0.4 0.2 - 1.3 mg/dL    GFR Estimate 90 >60 mL/min/1.73m2   Lipase   Result Value Ref Range    Lipase 94 73 - 393 U/L   CBC with platelets and differential   Result Value Ref Range    WBC Count 6.1 4.0 - 11.0 10e3/uL    RBC Count 4.55 3.80 - 5.20 10e6/uL    Hemoglobin 13.6 11.7 - 15.7 g/dL    Hematocrit 40.6 35.0 - 47.0 %    MCV 89 78 - 100 fL    MCH 29.9 26.5 - 33.0 pg    MCHC 33.5 31.5 - 36.5 g/dL    RDW 12.9 10.0 - 15.0 %    Platelet Count 204 150 - 450 10e3/uL    % Neutrophils 43 %    % Lymphocytes 47 %    % Monocytes 6 %    %  Eosinophils 3 %    % Basophils 1 %    % Immature Granulocytes 0 %    NRBCs per 100 WBC 0 <1 /100    Absolute Neutrophils 2.6 1.6 - 8.3 10e3/uL    Absolute Lymphocytes 2.9 0.8 - 5.3 10e3/uL    Absolute Monocytes 0.4 0.0 - 1.3 10e3/uL    Absolute Eosinophils 0.2 0.0 - 0.7 10e3/uL    Absolute Basophils 0.1 0.0 - 0.2 10e3/uL    Absolute Immature Granulocytes 0.0 <=0.4 10e3/uL    Absolute NRBCs 0.0 10e3/uL   [place] Draw    Narrative    The following orders were created for panel order [place] Draw.  Procedure                               Abnormality         Status                     ---------                               -----------         ------                     Extra Blue Top Tube[[id number]]                              Final result               Extra Red Top Tube[[id number]]                               Final result                 Please view results for these tests on the individual orders.   Extra Blue Top Tube   Result Value Ref Range    Hold Specimen JIC    Extra Red Top Tube   Result Value Ref Range    Hold Specimen JIC    US Abdomen Limited (RUQ)    Narrative    US ABDOMEN LIMITED 6/30/2022 2:15 PM    HISTORY: Right upper quadrant abdominal pain.    TECHNIQUE: Limited abdominal ultrasound to the right upper quadrant is  performed.    COMPARISON: None.    FINDINGS: The liver shows no focal lesions and no intra or  extrahepatic biliary dilation. The common duct is within normal limits  at 3 mm. The gallbladder shows no evidence of stones, wall thickening,  or pericholecystic fluid.    Visualized portions of the pancreas and right kidney are unremarkable.         Impression    IMPRESSION:    1. No evidence of cholelithiasis or cholecystitis.  2. No biliary dilation.      EDNA WELDON MD         SYSTEM ID:  [id number]   UA with Microscopic reflex to Culture    Specimen: Urine, Midstream   Result Value Ref Range    Color Urine Straw Colorless, Straw, Light Yellow, Yellow    Appearance Urine Clear Clear     Glucose Urine Negative Negative mg/dL    Bilirubin Urine Negative Negative    Ketones Urine 20  (A) Negative mg/dL    Specific Gravity Urine 1.009 1.003 - 1.035    Blood Urine Negative Negative    pH Urine 6.0 5.0 - 7.0    Protein Albumin Urine Negative Negative mg/dL    Urobilinogen Urine Normal Normal, 2.0 mg/dL    Nitrite Urine Negative Negative    Leukocyte Esterase Urine Negative Negative    Bacteria Urine Few (A) None Seen /HPF    RBC Urine <1 <=2 /HPF    WBC Urine 2 <=5 /HPF    Narrative    Urine Culture not indicated   HCG qualitative urine (UPT)   Result Value Ref Range    hCG Urine Qualitative Negative Negative   CT Abdomen Pelvis w Contrast    Narrative    CT ABDOMEN AND PELVIS WITH CONTRAST 6/30/2022 3:28 PM    CLINICAL HISTORY: Right mid abdominal pain.    TECHNIQUE: CT scan of the abdomen and pelvis was performed following  injection of IV contrast. Multiplanar reformats were obtained. Dose  reduction techniques were used.    CONTRAST: 89 mL Isovue-370     COMPARISON: None.    FINDINGS:   LOWER CHEST: Normal.    HEPATOBILIARY: Normal.    PANCREAS: Normal.    SPLEEN: The spleen is upper normal in size. No focal lesions.    ADRENAL GLANDS: Normal.    KIDNEYS/BLADDER: Four small nonobstructing stones are seen within the  right kidney measuring up to 4 mm in size. No evidence for ureteral  calculi or hydronephrosis on either side. There are two tiny  nonobstructing stones in the upper pole left kidney measuring up to 2  mm in size. A 2 mm nonobstructing stone is seen at the lower pole of  the left kidney. Multiple areas of scarring are seen through the left  kidney. No evidence of enhancing mass on either side. The bladder is  unremarkable.    BOWEL: No bowel obstruction. No colonic wall thickening or  inflammatory change.    PELVIC ORGANS: Normal.    ADDITIONAL FINDINGS: Anterior abdominal wall eventration is noted at  the umbilicus.    MUSCULOSKELETAL: Normal.      Impression    IMPRESSION:   1.  No  evidence for acute pathology.  2.  Multiple small nonobstructing stones in both kidneys. Multiple  areas of scarring on the left kidney.    EDNA WELDON MD         SYSTEM ID:  [id number]       Medications   iopamidol (ISOVUE-370) solution 89 mL (89 mLs Intravenous Given 6/30/22 1511)   sodium chloride 0.9 % bag 500mL for CT scan flush use (65 mLs As instructed Given 6/30/22 1511)       Assessments & Plan (with Medical Decision Making)     31 year old with 1 week of right sided abdominal pain.   Labs are normal.  Including normal lipase.  UA reveals no evidence of infection and negative for blood.  Ultrasound of the right upper quadrant reveals normal gallbladder, no evidence of gallstones.  No bile duct dilation.  Abdominal/pelvis CT reveals multiple nonobstructing renal stones in both kidneys.  No evidence of ureteral stone, no hydronephrosis or hydroureter to suggest a passed kidney stone.  No evidence of acute pathology to explain her pain.  I discussed the lab and imaging findings with patient.  We discussed continuing to monitor her symptoms.  She should have recheck if she develops fevers, vomiting, increased pain, or any other new symptoms of concern.    Discharge Medication List as of 6/30/2022  4:03 PM          Final diagnoses:   Right sided abdominal pain       6/30/2022   [facility] EMERGENCY DEPT     Martha Adam APRN CNP  06/30/22 0035

## 2022-06-30 NOTE — ED TRIAGE NOTES
RUQ pain, nausea for approximately 1 week.  Patient denies pain with urination.  Patient's last bowel movement was today and normal.     Triage Assessment     Row Name 06/30/22 1229       Triage Assessment (Adult)    Airway WDL WDL       Respiratory WDL    Respiratory WDL WDL       Skin Circulation/Temperature WDL    Skin Circulation/Temperature WDL WDL       Cardiac WDL    Cardiac WDL WDL       Peripheral/Neurovascular WDL    Peripheral Neurovascular WDL WDL       Cognitive/Neuro/Behavioral WDL    Cognitive/Neuro/Behavioral WDL WDL

## 2022-07-19 ENCOUNTER — MEDICAL CORRESPONDENCE (OUTPATIENT)
Dept: HEALTH INFORMATION MANAGEMENT | Facility: CLINIC | Age: 32
End: 2022-07-19

## 2022-10-15 ENCOUNTER — HEALTH MAINTENANCE LETTER (OUTPATIENT)
Age: 32
End: 2022-10-15

## 2023-01-04 ENCOUNTER — E-VISIT (OUTPATIENT)
Dept: FAMILY MEDICINE | Facility: CLINIC | Age: 33
End: 2023-01-04
Payer: COMMERCIAL

## 2023-01-04 DIAGNOSIS — R05.9 COUGH, UNSPECIFIED TYPE: ICD-10-CM

## 2023-01-04 DIAGNOSIS — R39.9 URINARY SYMPTOM OR SIGN: ICD-10-CM

## 2023-01-04 DIAGNOSIS — R50.9 FEVER, UNSPECIFIED FEVER CAUSE: Primary | ICD-10-CM

## 2023-01-04 PROCEDURE — 99422 OL DIG E/M SVC 11-20 MIN: CPT | Performed by: PHYSICIAN ASSISTANT

## 2023-01-04 NOTE — PATIENT INSTRUCTIONS
Thank you for choosing us for your care. Given your symptoms, I would like you to do a lab-only visit to determine what is causing them.  I have placed the orders.  Please schedule an appointment with the lab right here in [facility] [place], or call [phone].  I will let you know when the results are back and next steps to take.

## 2023-01-05 ENCOUNTER — LAB (OUTPATIENT)
Dept: LAB | Facility: CLINIC | Age: 33
End: 2023-01-05
Payer: COMMERCIAL

## 2023-01-05 DIAGNOSIS — R39.9 URINARY SYMPTOM OR SIGN: ICD-10-CM

## 2023-01-05 DIAGNOSIS — R50.9 FEVER, UNSPECIFIED FEVER CAUSE: Primary | ICD-10-CM

## 2023-01-05 DIAGNOSIS — R05.9 COUGH, UNSPECIFIED TYPE: ICD-10-CM

## 2023-01-05 LAB
ALBUMIN UR-MCNC: NEGATIVE MG/DL
APPEARANCE UR: CLEAR
BILIRUB UR QL STRIP: NEGATIVE
COLOR UR AUTO: YELLOW
FLUAV AG SPEC QL IA: NEGATIVE
FLUBV AG SPEC QL IA: NEGATIVE
GLUCOSE UR STRIP-MCNC: NEGATIVE MG/DL
HGB UR QL STRIP: NEGATIVE
KETONES UR STRIP-MCNC: NEGATIVE MG/DL
LEUKOCYTE ESTERASE UR QL STRIP: NEGATIVE
NITRATE UR QL: NEGATIVE
PH UR STRIP: 6 [PH] (ref 5–7)
SP GR UR STRIP: 1.01 (ref 1–1.03)
UROBILINOGEN UR STRIP-ACNC: 0.2 E.U./DL

## 2023-01-05 PROCEDURE — 87804 INFLUENZA ASSAY W/OPTIC: CPT

## 2023-01-05 PROCEDURE — U0003 INFECTIOUS AGENT DETECTION BY NUCLEIC ACID (DNA OR RNA); SEVERE ACUTE RESPIRATORY SYNDROME CORONAVIRUS 2 (SARS-COV-2) (CORONAVIRUS DISEASE [COVID-19]), AMPLIFIED PROBE TECHNIQUE, MAKING USE OF HIGH THROUGHPUT TECHNOLOGIES AS DESCRIBED BY CMS-2020-01-R: HCPCS

## 2023-01-05 PROCEDURE — U0005 INFEC AGEN DETEC AMPLI PROBE: HCPCS

## 2023-01-05 PROCEDURE — 81003 URINALYSIS AUTO W/O SCOPE: CPT

## 2023-01-06 LAB — SARS-COV-2 RNA RESP QL NAA+PROBE: NEGATIVE

## 2023-01-06 NOTE — RESULT ENCOUNTER NOTE
Leena  Covid test is negative too.  This is likely just another viral infection.  If fever persists/cough worsens consider being seen in clinic to check for pneumonia.  Let me know if you have questions.  Agnes

## 2023-01-06 NOTE — RESULT ENCOUNTER NOTE
Leena  No influenza or bladder infection.  Covid test still in process.  Let me know if you have questions.  Agnes

## 2023-01-20 NOTE — PROGRESS NOTES
"Doing well.  Reports \"cervical pain\" for the last few days that is mild in intensity; on further elaboration she described it as a shock like pain in her vagina.   Also requesting letter to permit massage therapy; she was told by her masseuse that release of a tense knot in her upper back may lead to release of toxins that can have a detrimental affect on her fetus; was told to provide a letter to permit massage  Denies VB, cramping.   /75 (BP Location: Right arm, Patient Position: Chair, Cuff Size: Adult Large)  Pulse 70  Wt 195 lb (88.5 kg)  LMP 2017  Breastfeeding? No  BMI 31.47 kg/m2  General Appearance: NAD  Abdomen: Gravid, NT  Refer to flow sheet above.   A/P: 26 year old  at 11w6d  -- concerns addressed above, reassurance provided; letter provided to permit massage  -- discussed genetic screening; patient declines  -- SAB precautions reviewed  RTC in 4 weeks    Angela Pascual MD  [facility]            " Refill Decision Note   Butch Mcdaniel  is requesting a refill authorization.  Brief Assessment and Rationale for Refill:  Approve     Medication Therapy Plan:       Medication Reconciliation Completed: No   Comments:     No Care Gaps recommended.     Note composed:12:19 PM 01/20/2023

## 2023-01-31 ASSESSMENT — ENCOUNTER SYMPTOMS
FEVER: 0
ARTHRALGIAS: 0
SHORTNESS OF BREATH: 0
WEAKNESS: 0
DIARRHEA: 0
PALPITATIONS: 0
JOINT SWELLING: 0
COUGH: 0
DYSURIA: 0
DIZZINESS: 0
HEMATOCHEZIA: 0
NAUSEA: 0
PARESTHESIAS: 0
MYALGIAS: 0
NERVOUS/ANXIOUS: 0
CONSTIPATION: 0
HEARTBURN: 0
ABDOMINAL PAIN: 0
FREQUENCY: 0
HEMATURIA: 0
BREAST MASS: 0
SORE THROAT: 0
CHILLS: 0
HEADACHES: 0
EYE PAIN: 0

## 2023-02-01 ENCOUNTER — OFFICE VISIT (OUTPATIENT)
Dept: FAMILY MEDICINE | Facility: CLINIC | Age: 33
End: 2023-02-01
Payer: COMMERCIAL

## 2023-02-01 VITALS
BODY MASS INDEX: 31.34 KG/M2 | OXYGEN SATURATION: 98 % | WEIGHT: 195 LBS | HEIGHT: 66 IN | TEMPERATURE: 97.3 F | DIASTOLIC BLOOD PRESSURE: 82 MMHG | HEART RATE: 79 BPM | RESPIRATION RATE: 14 BRPM | SYSTOLIC BLOOD PRESSURE: 130 MMHG

## 2023-02-01 DIAGNOSIS — Z00.01 ENCOUNTER FOR ROUTINE ADULT PHYSICAL EXAM WITH ABNORMAL FINDINGS: Primary | ICD-10-CM

## 2023-02-01 DIAGNOSIS — D22.9 CHANGE IN SKIN MOLE: ICD-10-CM

## 2023-02-01 DIAGNOSIS — B96.89 BV (BACTERIAL VAGINOSIS): ICD-10-CM

## 2023-02-01 DIAGNOSIS — N76.0 BV (BACTERIAL VAGINOSIS): ICD-10-CM

## 2023-02-01 DIAGNOSIS — R00.2 PALPITATIONS: ICD-10-CM

## 2023-02-01 LAB
BASOPHILS # BLD AUTO: 0.1 10E3/UL (ref 0–0.2)
BASOPHILS NFR BLD AUTO: 1 %
CLUE CELLS: NORMAL
EOSINOPHIL # BLD AUTO: 0.1 10E3/UL (ref 0–0.7)
EOSINOPHIL NFR BLD AUTO: 2 %
ERYTHROCYTE [DISTWIDTH] IN BLOOD BY AUTOMATED COUNT: 12 % (ref 10–15)
FERRITIN SERPL-MCNC: 27 NG/ML (ref 6–175)
HCT VFR BLD AUTO: 40.3 % (ref 35–47)
HGB BLD-MCNC: 13.7 G/DL (ref 11.7–15.7)
IMM GRANULOCYTES # BLD: 0 10E3/UL
IMM GRANULOCYTES NFR BLD: 0 %
LYMPHOCYTES # BLD AUTO: 2.8 10E3/UL (ref 0.8–5.3)
LYMPHOCYTES NFR BLD AUTO: 45 %
MCH RBC QN AUTO: 31.2 PG (ref 26.5–33)
MCHC RBC AUTO-ENTMCNC: 34 G/DL (ref 31.5–36.5)
MCV RBC AUTO: 92 FL (ref 78–100)
MONOCYTES # BLD AUTO: 0.4 10E3/UL (ref 0–1.3)
MONOCYTES NFR BLD AUTO: 7 %
NEUTROPHILS # BLD AUTO: 2.8 10E3/UL (ref 1.6–8.3)
NEUTROPHILS NFR BLD AUTO: 45 %
PLATELET # BLD AUTO: 227 10E3/UL (ref 150–450)
RBC # BLD AUTO: 4.39 10E6/UL (ref 3.8–5.2)
TRICHOMONAS, WET PREP: NORMAL
TSH SERPL DL<=0.005 MIU/L-ACNC: 1.74 UIU/ML (ref 0.3–4.2)
WBC # BLD AUTO: 6.2 10E3/UL (ref 4–11)
WBC'S/HIGH POWER FIELD, WET PREP: NORMAL
YEAST, WET PREP: NORMAL

## 2023-02-01 PROCEDURE — 99213 OFFICE O/P EST LOW 20 MIN: CPT | Mod: 25 | Performed by: NURSE PRACTITIONER

## 2023-02-01 PROCEDURE — 82728 ASSAY OF FERRITIN: CPT | Performed by: NURSE PRACTITIONER

## 2023-02-01 PROCEDURE — 36415 COLL VENOUS BLD VENIPUNCTURE: CPT | Performed by: NURSE PRACTITIONER

## 2023-02-01 PROCEDURE — 85025 COMPLETE CBC W/AUTO DIFF WBC: CPT | Performed by: NURSE PRACTITIONER

## 2023-02-01 PROCEDURE — 99395 PREV VISIT EST AGE 18-39: CPT | Performed by: NURSE PRACTITIONER

## 2023-02-01 PROCEDURE — 84443 ASSAY THYROID STIM HORMONE: CPT | Performed by: NURSE PRACTITIONER

## 2023-02-01 PROCEDURE — 87210 SMEAR WET MOUNT SALINE/INK: CPT | Performed by: NURSE PRACTITIONER

## 2023-02-01 ASSESSMENT — ENCOUNTER SYMPTOMS
EYE PAIN: 0
ABDOMINAL PAIN: 0
FEVER: 0
HEMATOCHEZIA: 0
JOINT SWELLING: 0
COUGH: 0
SHORTNESS OF BREATH: 0
DIZZINESS: 0
HEARTBURN: 0
MYALGIAS: 0
SORE THROAT: 0
CONSTIPATION: 0
WEAKNESS: 0
HEMATURIA: 0
ARTHRALGIAS: 0
FREQUENCY: 0
PALPITATIONS: 0
HEADACHES: 0
DYSURIA: 0
CHILLS: 0
DIARRHEA: 0
NERVOUS/ANXIOUS: 0
PARESTHESIAS: 0
NAUSEA: 0
BREAST MASS: 0

## 2023-02-01 ASSESSMENT — PAIN SCALES - GENERAL: PAINLEVEL: NO PAIN (0)

## 2023-02-01 NOTE — PROGRESS NOTES
SUBJECTIVE:   CC: Leena is an 32 year old who presents for preventive health visit.        Patient has been advised of split billing requirements and indicates understanding: Yes  Healthy Habits:     Getting at least 3 servings of Calcium per day:  NO    Bi-annual eye exam:  Yes    Dental care twice a year:  Yes    Sleep apnea or symptoms of sleep apnea:  None    Diet:  Regular (no restrictions)    Frequency of exercise:  4-5 days/week    Duration of exercise:  15-30 minutes    Taking medications regularly:  Yes    Medication side effects:  Not applicable    PHQ-2 Total Score: 0    Additional concerns today:  Yes    Patient presents with:  Physical  Derm Problem: Skin issues on hip area   Vaginal Discharge: Chronic   Palpitations  Ear Fullness: Chronic issues had ear tubes as a child               Social History     Tobacco Use     Smoking status: Never     Smokeless tobacco: Never   Substance Use Topics     Alcohol use: No     Comment: occas-quit with pregnancy        Today's PHQ-2 Score:   PHQ-2 ( 1999 [employer]) 1/31/2023   Q1: Little interest or pleasure in doing things 0   Q2: Feeling down, depressed or hopeless 0   PHQ-2 Score 0   PHQ-2 Total Score (12-17 Years)- Positive if 3 or more points; Administer PHQ-A if positive -   Q1: Little interest or pleasure in doing things Not at all   Q2: Feeling down, depressed or hopeless Not at all   PHQ-2 Score 0       Have you ever done Advance Care Planning? (For example, a Health Directive, POLST, or a discussion with a medical provider or your loved ones about your wishes):     Social History     Tobacco Use     Smoking status: Never     Smokeless tobacco: Never   Substance Use Topics     Alcohol use: No     Comment: occas-quit with pregnancy         Alcohol Use 1/31/2023   Prescreen: >3 drinks/day or >7 drinks/week? No   Prescreen: >3 drinks/day or >7 drinks/week? -       Reviewed orders with patient.  Reviewed health maintenance and updated orders accordingly -  Yes  BP Readings from Last 3 Encounters:   23 130/82   22 (!) 161/85   22 118/77    Wt Readings from Last 3 Encounters:   23 88.5 kg (195 lb)   22 91.6 kg (202 lb)   22 99.5 kg (219 lb 6.4 oz)                  Patient Active Problem List   Diagnosis     History of miscarriage     Family history of Down syndrome     Past Surgical History:   Procedure Laterality Date     BIOPSY       DILATION AND CURETTAGE, OPERATIVE HYSTEROSCOPY, COMBINED N/A 2017    Procedure: COMBINED DILATION AND CURETTAGE, OPERATIVE HYSTEROSCOPY;  Hysteroscopy with dilation and curettage, polypectomy;  Surgeon: Mable Hanson MD;  Location: [facility]     ENT SURGERY  20 years ago    tubes in three times     MOUTH SURGERY  2008    wisdom teeth -age 16     TONSILLECTOMY  2016       Social History     Tobacco Use     Smoking status: Never     Smokeless tobacco: Never   Substance Use Topics     Alcohol use: No     Comment: occas-quit with pregnancy     Family History   Problem Relation Age of Onset     Unknown/Adopted Father         unknown history     Hyperlipidemia Mother      Stomach Cancer Maternal Grandmother      Other Cancer Maternal Grandmother         GIST tumor     Other Cancer Maternal Grandfather         Some type of skin cancer     Cancer Maternal Grandfather         skin ca     Down Syndrome Daughter      Heart Defect Daughter          at 4 months     Genetic Disorder Daughter      Genetic Disorder Brother         MTHFR     Genetic Disorder Daughter      Diabetes No family hx of      Melanoma No family hx of          Current Outpatient Medications   Medication Sig Dispense Refill     Prenatal Vit-Fe Fumarate-FA (PRENATAL MULTIVITAMIN W/IRON) 27-0.8 MG tablet Take 1 tablet by mouth daily       Acetaminophen 325 MG CAPS Take 325-650 mg by mouth every 4 hours as needed (Patient not taking: Reported on 3/11/2022)       Misc. Devices (BREAST PUMP) MISC 1 each continuous 1 each 0  "    Allergies   Allergen Reactions     Imitrex [Sumatriptan]        Breast Cancer Screening:    FHS-7: No flowsheet data found.      Pertinent mammograms are reviewed under the imaging tab.    History of abnormal Pap smear: NO - age 30-65 PAP every 5 years with negative HPV co-testing recommended  PAP / HPV Latest Ref Rng & Units 8/4/2021 7/17/2018   PAP   Negative for Intraepithelial Lesion or Malignancy (NILM) -   PAP (Historical) - - NIL   HPV16 Negative Negative -   HPV18 Negative Negative -   HRHPV Negative Negative -     Reviewed and updated as needed this visit by clinical staff   Tobacco  Allergies  Meds  Problems  Med Hx  Surg Hx  Fam Hx          Reviewed and updated as needed this visit by Provider   Tobacco  Allergies  Meds  Problems  Med Hx  Surg Hx  Fam Hx             Review of Systems   Constitutional: Negative for chills and fever.   HENT: Positive for ear pain. Negative for congestion, hearing loss and sore throat.    Eyes: Positive for visual disturbance. Negative for pain.   Respiratory: Negative for cough and shortness of breath.    Cardiovascular: Negative for chest pain, palpitations and peripheral edema.   Gastrointestinal: Negative for abdominal pain, constipation, diarrhea, heartburn, hematochezia and nausea.   Breasts:  Negative for tenderness, breast mass and discharge.   Genitourinary: Positive for pelvic pain, vaginal bleeding and vaginal discharge. Negative for dysuria, frequency, genital sores, hematuria and urgency.   Musculoskeletal: Negative for arthralgias, joint swelling and myalgias.   Skin: Negative for rash.   Neurological: Negative for dizziness, weakness, headaches and paresthesias.   Psychiatric/Behavioral: Negative for mood changes. The patient is not nervous/anxious.           OBJECTIVE:   /82   Pulse 79   Temp 97.3  F (36.3  C) (Tympanic)   Resp 14   Ht 1.676 m (5' 6\")   Wt 88.5 kg (195 lb)   LMP 01/18/2023   SpO2 98%   BMI 31.47 kg/m  "   Physical Exam  GENERAL: healthy, alert and no distress  EYES: Eyes grossly normal to inspection, PERRL and conjunctivae and sclerae normal  HENT: ear canals and TM's normal, nose and mouth without ulcers or lesions  NECK: no adenopathy, no asymmetry, masses, or scars and thyroid normal to palpation  RESP: lungs clear to auscultation - no rales, rhonchi or wheezes  BREAST: normal without masses, tenderness or nipple discharge and no palpable axillary masses or adenopathy  CV: regular rate and rhythm, normal S1 S2, no S3 or S4, no murmur, click or rub, no peripheral edema and peripheral pulses strong  ABDOMEN: soft, nontender, no hepatosplenomegaly, no masses and bowel sounds normal   (female): normal female external genitalia, normal urethral meatus, vaginal mucosa pink, moist, well rugated clear discharge  MS: no gross musculoskeletal defects noted, no edema  SKIN: no suspicious lesions or rashes  NEURO: Normal strength and tone, mentation intact and speech normal  PSYCH: mentation appears normal, affect normal/bright    Diagnostic Test Results:  Labs reviewed in Epic  Results for orders placed or performed in visit on 02/01/23   Ferritin     Status: Normal   Result Value Ref Range    Ferritin 27 6 - 175 ng/mL   TSH with free T4 reflex     Status: Normal   Result Value Ref Range    TSH 1.74 0.30 - 4.20 uIU/mL   CBC with platelets and differential     Status: None   Result Value Ref Range    WBC Count 6.2 4.0 - 11.0 10e3/uL    RBC Count 4.39 3.80 - 5.20 10e6/uL    Hemoglobin 13.7 11.7 - 15.7 g/dL    Hematocrit 40.3 35.0 - 47.0 %    MCV 92 78 - 100 fL    MCH 31.2 26.5 - 33.0 pg    MCHC 34.0 31.5 - 36.5 g/dL    RDW 12.0 10.0 - 15.0 %    Platelet Count 227 150 - 450 10e3/uL    % Neutrophils 45 %    % Lymphocytes 45 %    % Monocytes 7 %    % Eosinophils 2 %    % Basophils 1 %    % Immature Granulocytes 0 %    Absolute Neutrophils 2.8 1.6 - 8.3 10e3/uL    Absolute Lymphocytes 2.8 0.8 - 5.3 10e3/uL    Absolute  Monocytes 0.4 0.0 - 1.3 10e3/uL    Absolute Eosinophils 0.1 0.0 - 0.7 10e3/uL    Absolute Basophils 0.1 0.0 - 0.2 10e3/uL    Absolute Immature Granulocytes 0.0 <=0.4 10e3/uL   Wet prep - Clinic Collect     Status: Normal    Specimen: Vagina; Swab   Result Value Ref Range    Trichomonas Absent Absent    Yeast Absent Absent    Clue Cells Absent Absent    WBCs/high power field None None   CBC with platelets and differential     Status: None    Narrative    The following orders were created for panel order CBC with platelets and differential.  Procedure                               Abnormality         Status                     ---------                               -----------         ------                     CBC with platelets and d...[[id number]]                      Final result                 Please view results for these tests on the individual orders.       ASSESSMENT/PLAN:   Chhaya was seen today for physical, derm problem, vaginal discharge, palpitations and ear fullness.    Diagnoses and all orders for this visit:    Encounter for routine adult physical exam with abnormal findings  BV (bacterial vaginosis) vaginal discharge  History of recurrent bacterial vaginosis  Wet prep done today  -     Wet prep - Clinic Collect    Palpitations  Labs done today to look for cause  Consider EKG, echo, Holter monitor with recurrent symptoms  -     TSH with free T4 reflex; Future  -     CBC with platelets and differential; Future  -     Ferritin; Future  -        Change in skin mole  Left hip  Dermatology consult recommended for full body check and biopsy if needed  -     Adult Dermatology Referral; Future          Patient has been advised of split billing requirements and indicates understanding: Yes      COUNSELING:  Reviewed preventive health counseling, as reflected in patient instructions      BMI:   Estimated body mass index is 31.47 kg/m  as calculated from the following:    Height as of this encounter: 1.676 m  "(5' 6\").    Weight as of this encounter: 88.5 kg (195 lb).   Weight management plan: Discussed healthy diet and exercise guidelines      She reports that she has never smoked. She has never used smokeless tobacco.      Call or return to the clinic with any worsening of symptoms or no resolution. Patient/Parent verbalized understanding and is in agreement. Medication side effects reviewed.   Current Outpatient Medications   Medication Sig Dispense Refill     Prenatal Vit-Fe Fumarate-FA (PRENATAL MULTIVITAMIN W/IRON) 27-0.8 MG tablet Take 1 tablet by mouth daily       Acetaminophen 325 MG CAPS Take 325-650 mg by mouth every 4 hours as needed (Patient not taking: Reported on 3/11/2022)       Misc. Devices (BREAST PUMP) MISC 1 each continuous 1 each 0     Chart documentation with Dragon Voice recognition Software. Although reviewed after completion, some words and grammatical errors may remain.  SUSSY Melgar CNP  [facility]  "

## 2023-05-18 ENCOUNTER — OFFICE VISIT (OUTPATIENT)
Dept: DERMATOLOGY | Facility: CLINIC | Age: 33
End: 2023-05-18
Payer: COMMERCIAL

## 2023-05-18 DIAGNOSIS — L91.8 INFLAMED ACROCHORDON: ICD-10-CM

## 2023-05-18 DIAGNOSIS — D23.9 DERMATOFIBROMA: ICD-10-CM

## 2023-05-18 DIAGNOSIS — D18.01 ANGIOMA OF SKIN: ICD-10-CM

## 2023-05-18 DIAGNOSIS — L81.4 LENTIGO: ICD-10-CM

## 2023-05-18 DIAGNOSIS — L82.1 SEBORRHEIC KERATOSIS: ICD-10-CM

## 2023-05-18 DIAGNOSIS — D22.9 NEVUS: ICD-10-CM

## 2023-05-18 DIAGNOSIS — D23.9 DERMAL NEVUS: Primary | ICD-10-CM

## 2023-05-18 PROCEDURE — 99203 OFFICE O/P NEW LOW 30 MIN: CPT | Mod: 25 | Performed by: PHYSICIAN ASSISTANT

## 2023-05-18 PROCEDURE — 11200 RMVL SKIN TAGS UP TO&INC 15: CPT | Performed by: PHYSICIAN ASSISTANT

## 2023-05-18 ASSESSMENT — PAIN SCALES - GENERAL: PAINLEVEL: NO PAIN (0)

## 2023-05-18 NOTE — NURSING NOTE
Chhaya Gama's chief complaint for this visit includes:  Chief Complaint   Patient presents with     Skin Check     Patient is here for skin check. Patient is concerned about a skin tag under right eye and a bump on right nose. Patient has bilateral pea-size bump on hips and a mole on left mid side.      PCP: Agnes Stanton    Referring Provider:  Agustina Duggan, APRN CNP  [address] [place], [place] [id number]    There were no vitals taken for this visit.  No Pain (0)        Allergies   Allergen Reactions     Imitrex [Sumatriptan]          Do you need any medication refills at today's visit? No    Sonali Fisher MA

## 2023-05-18 NOTE — PROGRESS NOTES
HPI:   Chief complaints: Chhaya Gama is a pleasant 32 year old female who presents for Full skin cancer screening to rule out skin cancer   Last Skin Exam: 2019 1st Baseline: no  Personal HX of Skin Cancer: no   Personal HX of Malignant Melanoma: no   Family HX of Skin Cancer / Malignant Melanoma: no  Personal HX of Atypical Moles:   no  Risk factors: history of sun exposure and burns  New / Changing lesions:yes severalspots  Social History:   On review of systems, there are no further skin complaints, patient is feeling otherwise well.   ROS of the following were done and are negative: Constitutional, Eyes, Ears, Nose,   Mouth, Throat, Cardiovascular, Respiratory, GI, Genitourinary, Musculoskeletal,   Psychiatric, Endocrine, Allergic/Immunologic.    PHYSICAL EXAM:   There were no vitals taken for this visit.  Skin exam performed as follows: Type 2 skin. Mood appropriate  Alert and Oriented X 3. Well developed, well nourished in no distress.  General appearance: Normal  Head including face: Normal  Eyes: conjunctiva and lids: Normal  Mouth: Lips, teeth, gums: Normal  Neck: Normal  Chest-breast/axillae: Normal  Back: Normal  Extremities: digits/nails (clubbing): Normal  Eccrine and Apocrine glands: Normal  Right upper extremity: Normal  Left upper extremity: Normal  Right lower extremity: Normal  Left lower extremity: Normal  Skin: Scalp and body hair: See below    Pt deferred exam of breasts, groin, buttocks: No    Other physical findings:  1. Multiple pigmented macules on extremities and trunk  2. Multiple pigmented macules on face, trunk and extremities  3. Multiple vascular papules on trunk, arms and legs  4. Inflamed pedunculated papule on the right lower eyelid x 1      Except as noted above, no other signs of skin cancer or melanoma.     ASSESSMENT/PLAN:   Benign Full skin cancer screening today. . Patient with history of none  Advised on monthly self exams and 1 year  Patient Education: Appropriate  brochures given.    1. Multiple benign appearing melanocytic nevi on arms, legs and trunk. Discussed ABCDEs of melanoma and sunscreen.   2. Multiple lentigos on arms, legs and trunk. Advised benign, no treatment needed.  3. Multiple scattered angiomas. Advised benign, no treatment needed.   4. Inflamed acrochordon on the right lower eyelid x 1 . Irritated per patient. Snip excision performed to all areas. Bleeding stopped. Pt tolerated well with no complications.   5. Numerous dermatofibromas - advised benign no treatment needed             Follow-up: yearly/PRN sooner    1.) Patient was asked about new and changing moles. YES  2.) Patient received a complete physical skin examination: YES  3.) Patient was counseled to perform a monthly self skin examination: YES  Scribed By: Analisa Argueta MS, PAMELISSA

## 2023-05-18 NOTE — LETTER
5/18/2023         RE: Chhaya Gama  [address] [place] [id number]        Dear Colleague,    Thank you for referring your patient, Chhaya Gama, to the [facility]. Please see a copy of my visit note below.    HPI:   Chief complaints: Chhaya Gama is a pleasant 32 year old female who presents for Full skin cancer screening to rule out skin cancer   Last Skin Exam: 2019 1st Baseline: no  Personal HX of Skin Cancer: no   Personal HX of Malignant Melanoma: no   Family HX of Skin Cancer / Malignant Melanoma: no  Personal HX of Atypical Moles:   no  Risk factors: history of sun exposure and burns  New / Changing lesions:yes severalspots  Social History:   On review of systems, there are no further skin complaints, patient is feeling otherwise well.   ROS of the following were done and are negative: Constitutional, Eyes, Ears, Nose,   Mouth, Throat, Cardiovascular, Respiratory, GI, Genitourinary, Musculoskeletal,   Psychiatric, Endocrine, Allergic/Immunologic.    PHYSICAL EXAM:   There were no vitals taken for this visit.  Skin exam performed as follows: Type 2 skin. Mood appropriate  Alert and Oriented X 3. Well developed, well nourished in no distress.  General appearance: Normal  Head including face: Normal  Eyes: conjunctiva and lids: Normal  Mouth: Lips, teeth, gums: Normal  Neck: Normal  Chest-breast/axillae: Normal  Back: Normal  Extremities: digits/nails (clubbing): Normal  Eccrine and Apocrine glands: Normal  Right upper extremity: Normal  Left upper extremity: Normal  Right lower extremity: Normal  Left lower extremity: Normal  Skin: Scalp and body hair: See below    Pt deferred exam of breasts, groin, buttocks: No    Other physical findings:  1. Multiple pigmented macules on extremities and trunk  2. Multiple pigmented macules on face, trunk and extremities  3. Multiple vascular papules on trunk, arms and legs  4. Inflamed pedunculated papule on the right  lower eyelid x 1      Except as noted above, no other signs of skin cancer or melanoma.     ASSESSMENT/PLAN:   Benign Full skin cancer screening today. . Patient with history of none  Advised on monthly self exams and 1 year  Patient Education: Appropriate brochures given.    1. Multiple benign appearing melanocytic nevi on arms, legs and trunk. Discussed ABCDEs of melanoma and sunscreen.   2. Multiple lentigos on arms, legs and trunk. Advised benign, no treatment needed.  3. Multiple scattered angiomas. Advised benign, no treatment needed.   4. Inflamed acrochordon on the right lower eyelid x 1 . Irritated per patient. Snip excision performed to all areas. Bleeding stopped. Pt tolerated well with no complications.   5. Numerous dermatofibromas - advised benign no treatment needed             Follow-up: yearly/PRN sooner    1.) Patient was asked about new and changing moles. YES  2.) Patient received a complete physical skin examination: YES  3.) Patient was counseled to perform a monthly self skin examination: YES  Scribed By: Analisa Argueta, MS, PAMELISSA          Again, thank you for allowing me to participate in the care of your patient.        Sincerely,        Analisa Argueta PA-C

## 2023-06-04 ENCOUNTER — NURSE TRIAGE (OUTPATIENT)
Dept: NURSING | Facility: CLINIC | Age: 33
End: 2023-06-04

## 2023-06-04 NOTE — TELEPHONE ENCOUNTER
Clinic Action Needed:Yes    Reason for Call:  Diane calling from [facility].  Reporting they do not have the medication prescribed today through Dr Matthews in stock (Ciprofloxacin).  Diane is requesting new prescription for Ofloxacin which they do have in.      Spring Shay RN  [facility]      Reason for Disposition    [1] Pharmacy calling with prescription question AND [2] triager unable to answer question    Additional Information    Negative: [1] Intentional drug overdose AND [2] suicidal thoughts or ideas    Negative: Drug overdose and triager unable to answer question    Negative: Caller requesting a renewal or refill of a medicine patient is currently taking    Negative: Caller requesting information unrelated to medicine    Negative: Caller requesting information about COVID-19 Vaccine    Negative: Caller requesting information about Emergency Contraception    Negative: Caller requesting information about Combined Birth Control Pills    Negative: Caller requesting information about Progestin Birth Control Pills    Negative: Caller requesting information about Post-Op pain or medicines    Negative: Caller requesting a prescription antibiotic (such as Penicillin) for Strep throat and has a positive culture result    Negative: Caller requesting a prescription anti-viral med (such as Tamiflu) and has influenza (flu) symptoms    Negative: Immunization reaction suspected    Negative: Rash while taking a medicine or within 3 days of stopping it    Negative: [1] Asthma and [2] having symptoms of asthma (cough, wheezing, etc.)    Negative: [1] Symptom of illness (e.g., headache, abdominal pain, earache, vomiting) AND [2] more than mild    Negative: Breastfeeding questions about mother's medicines and diet    Negative: MORE THAN A DOUBLE DOSE of a prescription or over-the-counter (OTC) drug    Negative: [1] DOUBLE DOSE (an extra dose or lesser amount) of prescription drug  AND [2] any symptoms (e.g., dizziness, nausea, pain, sleepiness)    Negative: [1] DOUBLE DOSE (an extra dose or lesser amount) of over-the-counter (OTC) drug AND [2] any symptoms (e.g., dizziness, nausea, pain, sleepiness)    Negative: Took another person's prescription drug    Negative: [1] DOUBLE DOSE (an extra dose or lesser amount) of prescription drug AND [2] NO symptoms (Exception: a double dose of antibiotics)    Negative: Diabetes drug error or overdose (e.g., took wrong type of insulin or took extra dose)    Negative: [1] Prescription not at pharmacy AND [2] was prescribed by PCP recently (Exception: triager has access to EMR and prescription is recorded there. Go to Home Care and confirm for pharmacy.)    Protocols used: MEDICATION QUESTION CALL-A-

## 2023-06-05 ENCOUNTER — TELEPHONE (OUTPATIENT)
Dept: FAMILY MEDICINE | Facility: CLINIC | Age: 33
End: 2023-06-05
Payer: COMMERCIAL

## 2023-06-05 DIAGNOSIS — H10.33 ACUTE BACTERIAL CONJUNCTIVITIS OF BOTH EYES: Primary | ICD-10-CM

## 2023-06-05 RX ORDER — OFLOXACIN 3 MG/ML
1-2 SOLUTION/ DROPS OPHTHALMIC 4 TIMES DAILY
Qty: 10 ML | Refills: 0 | Status: SHIPPED | OUTPATIENT
Start: 2023-06-05 | End: 2023-06-12

## 2023-06-05 NOTE — TELEPHONE ENCOUNTER
[facility] calling. [phone]      PT had an EVISIT with Dr. Matthews on 6/4/23.  The cipro is not available.  Requesting ofoxacine be rx.    Ok to resend a rx.  Thanks!    Sending to Dr. Matthews and CARRIE early.  JUSTIN Torres

## 2023-06-07 NOTE — TELEPHONE ENCOUNTER
Abbie Matthews MD  You 2 days ago     KK  Oflox is contraindicated with breastfeeding        Dr. Matthews response.  Can  staff get this communication to the pharmacy.  This is from an urgent care evisit.    Thanks!  JUSTIN Torres

## 2024-01-04 ENCOUNTER — PATIENT OUTREACH (OUTPATIENT)
Dept: CARE COORDINATION | Facility: CLINIC | Age: 34
End: 2024-01-04
Payer: COMMERCIAL

## 2024-01-18 ENCOUNTER — PATIENT OUTREACH (OUTPATIENT)
Dept: CARE COORDINATION | Facility: CLINIC | Age: 34
End: 2024-01-18
Payer: COMMERCIAL

## 2024-02-26 ENCOUNTER — OFFICE VISIT (OUTPATIENT)
Dept: FAMILY MEDICINE | Facility: CLINIC | Age: 34
End: 2024-02-26
Payer: COMMERCIAL

## 2024-02-26 VITALS
OXYGEN SATURATION: 99 % | SYSTOLIC BLOOD PRESSURE: 122 MMHG | TEMPERATURE: 98.7 F | HEIGHT: 66 IN | HEART RATE: 73 BPM | WEIGHT: 195 LBS | DIASTOLIC BLOOD PRESSURE: 78 MMHG | BODY MASS INDEX: 31.34 KG/M2 | RESPIRATION RATE: 16 BRPM

## 2024-02-26 DIAGNOSIS — Z00.00 ROUTINE GENERAL MEDICAL EXAMINATION AT A HEALTH CARE FACILITY: Primary | ICD-10-CM

## 2024-02-26 DIAGNOSIS — Z15.89 MTHFR GENE MUTATION: ICD-10-CM

## 2024-02-26 DIAGNOSIS — Z13.1 DIABETES MELLITUS SCREENING: ICD-10-CM

## 2024-02-26 DIAGNOSIS — Z13.220 LIPID SCREENING: ICD-10-CM

## 2024-02-26 LAB
CHOLEST SERPL-MCNC: 207 MG/DL
FASTING STATUS PATIENT QL REPORTED: NO
FASTING STATUS PATIENT QL REPORTED: NO
GLUCOSE SERPL-MCNC: 100 MG/DL (ref 70–99)
HDLC SERPL-MCNC: 62 MG/DL
LDLC SERPL CALC-MCNC: 134 MG/DL
NONHDLC SERPL-MCNC: 145 MG/DL
TRIGL SERPL-MCNC: 56 MG/DL

## 2024-02-26 PROCEDURE — 83090 ASSAY OF HOMOCYSTEINE: CPT | Performed by: PHYSICIAN ASSISTANT

## 2024-02-26 PROCEDURE — 36415 COLL VENOUS BLD VENIPUNCTURE: CPT | Performed by: PHYSICIAN ASSISTANT

## 2024-02-26 PROCEDURE — 82947 ASSAY GLUCOSE BLOOD QUANT: CPT | Performed by: PHYSICIAN ASSISTANT

## 2024-02-26 PROCEDURE — 99395 PREV VISIT EST AGE 18-39: CPT | Performed by: PHYSICIAN ASSISTANT

## 2024-02-26 PROCEDURE — 80061 LIPID PANEL: CPT | Performed by: PHYSICIAN ASSISTANT

## 2024-02-26 RX ORDER — LACTOBACILLUS RHAMNOSUS GG 10B CELL
1 CAPSULE ORAL DAILY
COMMUNITY

## 2024-02-26 SDOH — HEALTH STABILITY: PHYSICAL HEALTH: ON AVERAGE, HOW MANY DAYS PER WEEK DO YOU ENGAGE IN MODERATE TO STRENUOUS EXERCISE (LIKE A BRISK WALK)?: 5 DAYS

## 2024-02-26 SDOH — HEALTH STABILITY: PHYSICAL HEALTH: ON AVERAGE, HOW MANY MINUTES DO YOU ENGAGE IN EXERCISE AT THIS LEVEL?: 40 MIN

## 2024-02-26 ASSESSMENT — SOCIAL DETERMINANTS OF HEALTH (SDOH): HOW OFTEN DO YOU GET TOGETHER WITH FRIENDS OR RELATIVES?: ONCE A WEEK

## 2024-02-26 ASSESSMENT — PAIN SCALES - GENERAL: PAINLEVEL: NO PAIN (0)

## 2024-02-26 NOTE — PATIENT INSTRUCTIONS
[email or web address]    Optional methylfolate    Preventive Care Advice   This is general advice given by our system to help you stay healthy. However, your care team may have specific advice just for you. Please talk to your care team about your preventive care needs.  Nutrition  Eat 5 or more servings of fruits and vegetables each day.  Try wheat bread, brown rice and whole grain pasta (instead of white bread, rice, and pasta).  Get enough calcium and vitamin D. Check the label on foods and aim for 100% of the RDA (recommended daily allowance).  Lifestyle  Exercise at least 150 minutes each week   (30 minutes a day, 5 days a week).  Do muscle strengthening activities 2 days a week. These help control your weight and prevent disease.  No smoking.  Wear sunscreen to prevent skin cancer.  Have a dental exam and cleaning every 6 months.  Yearly exams  See your health care team every year to talk about:  Any changes in your health.  Any medicines your care team has prescribed.  Preventive care, family planning, and ways to prevent chronic diseases.  Shots (vaccines)   HPV shots (up to age 26), if you've never had them before.  Hepatitis B shots (up to age 59), if you've never had them before.  COVID-19 shot: Get this shot when it's due.  Flu shot: Get a flu shot every year.  Tetanus shot: Get a tetanus shot every 10 years.  Pneumococcal, hepatitis A, and RSV shots: Ask your care team if you need these based on your risk.  Shingles shot (for age 50 and up).  General health tests  Diabetes screening:  Starting at age 35, Get screened for diabetes at least every 3 years.  If you are younger than age 35, ask your care team if you should be screened for diabetes.  Cholesterol test: At age 39, start having a cholesterol test every 5 years, or more often if advised.  Bone density scan (DEXA): At age 50, ask your care team if you should have this scan for osteoporosis (brittle bones).  Hepatitis C: Get  tested at least once in your life.  STIs (sexually transmitted infections)  Before age 24: Ask your care team if you should be screened for STIs.  After age 24: Get screened for STIs if you're at risk. You are at risk for STIs (including HIV) if:  You are sexually active with more than one person.  You don't use condoms every time.  You or a partner was diagnosed with a sexually transmitted infection.  If you are at risk for HIV, ask about PrEP medicine to prevent HIV.  Get tested for HIV at least once in your life, whether you are at risk for HIV or not.  Cancer screening tests  Cervical cancer screening: If you have a cervix, begin getting regular cervical cancer screening tests at age 21. Most people who have regular screenings with normal results can stop after age 65. Talk about this with your provider.  Breast cancer scan (mammogram): If you've ever had breasts, begin having regular mammograms starting at age 40. This is a scan to check for breast cancer.  Colon cancer screening: It is important to start screening for colon cancer at age 45.  Have a colonoscopy test every 10 years (or more often if you're at risk) Or, ask your provider about stool tests like a FIT test every year or Cologuard test every 3 years.  To learn more about your testing options, visit: [email or web address].  For help making a decision, visit: https://bit.ly/ea81333.  Prostate cancer screening test: If you have a prostate and are age 55 to 69, ask your provider if you would benefit from a yearly prostate cancer screening test.  Lung cancer screening: If you are a current or former smoker age 50 to 80, ask your care team if ongoing lung cancer screenings are right for you.  For informational purposes only. Not to replace the advice of your health care provider. Copyright   2023 [employer]. All rights reserved. Clinically reviewed by the [facility] Transitions Program. [employer] [id number] - REV 01/24.

## 2024-02-26 NOTE — NURSING NOTE
"Chief Complaint   Patient presents with    Physical       Initial LMP 01/26/2024 (Approximate)  Estimated body mass index is 31.47 kg/m  as calculated from the following:    Height as of 2/1/23: 1.676 m (5' 6\").    Weight as of 2/1/23: 88.5 kg (195 lb).    Patient presents to the clinic using No DME    Is there anyone who you would like to be able to receive your results? No  If yes have patient fill out ABE      "

## 2024-02-26 NOTE — PROGRESS NOTES
"Preventive Care Visit  [facility]  Agnes Stanton PA-C, Family Medicine  Feb 26, 2024    Assessment & Plan     Routine general medical examination at a health care facility    MTHFR gene mutation  - Homocysteine    Lipid screening  - Lipid panel reflex to direct LDL Non-fasting    Diabetes mellitus screening    - Glucose    BMI  Estimated body mass index is 31.49 kg/m  as calculated from the following:    Height as of this encounter: 1.676 m (5' 5.98\").    Weight as of this encounter: 88.5 kg (195 lb).   Weight management plan: Discussed healthy diet and exercise guidelines    Counseling  Appropriate preventive services were discussed with this patient, including applicable screening as appropriate for fall prevention, nutrition, physical activity, Tobacco-use cessation, weight loss and cognition.  Checklist reviewing preventive services available has been given to the patient.  Reviewed patient's diet, addressing concerns and/or questions.       Patient Instructions   [email or web address]    Optional methylfolate    Preventive Care Advice   This is general advice given by our system to help you stay healthy. However, your care team may have specific advice just for you. Please talk to your care team about your preventive care needs.  Nutrition  Eat 5 or more servings of fruits and vegetables each day.  Try wheat bread, brown rice and whole grain pasta (instead of white bread, rice, and pasta).  Get enough calcium and vitamin D. Check the label on foods and aim for 100% of the RDA (recommended daily allowance).  Lifestyle  Exercise at least 150 minutes each week   (30 minutes a day, 5 days a week).  Do muscle strengthening activities 2 days a week. These help control your weight and prevent disease.  No smoking.  Wear sunscreen to prevent skin cancer.  Have a dental exam and cleaning every 6 months.  Yearly exams  See your health care team every year to talk " about:  Any changes in your health.  Any medicines your care team has prescribed.  Preventive care, family planning, and ways to prevent chronic diseases.  Shots (vaccines)   HPV shots (up to age 26), if you've never had them before.  Hepatitis B shots (up to age 59), if you've never had them before.  COVID-19 shot: Get this shot when it's due.  Flu shot: Get a flu shot every year.  Tetanus shot: Get a tetanus shot every 10 years.  Pneumococcal, hepatitis A, and RSV shots: Ask your care team if you need these based on your risk.  Shingles shot (for age 50 and up).  General health tests  Diabetes screening:  Starting at age 35, Get screened for diabetes at least every 3 years.  If you are younger than age 35, ask your care team if you should be screened for diabetes.  Cholesterol test: At age 39, start having a cholesterol test every 5 years, or more often if advised.  Bone density scan (DEXA): At age 50, ask your care team if you should have this scan for osteoporosis (brittle bones).  Hepatitis C: Get tested at least once in your life.  STIs (sexually transmitted infections)  Before age 24: Ask your care team if you should be screened for STIs.  After age 24: Get screened for STIs if you're at risk. You are at risk for STIs (including HIV) if:  You are sexually active with more than one person.  You don't use condoms every time.  You or a partner was diagnosed with a sexually transmitted infection.  If you are at risk for HIV, ask about PrEP medicine to prevent HIV.  Get tested for HIV at least once in your life, whether you are at risk for HIV or not.  Cancer screening tests  Cervical cancer screening: If you have a cervix, begin getting regular cervical cancer screening tests at age 21. Most people who have regular screenings with normal results can stop after age 65. Talk about this with your provider.  Breast cancer scan (mammogram): If you've ever had breasts, begin having regular mammograms starting at age  40. This is a scan to check for breast cancer.  Colon cancer screening: It is important to start screening for colon cancer at age 45.  Have a colonoscopy test every 10 years (or more often if you're at risk) Or, ask your provider about stool tests like a FIT test every year or Cologuard test every 3 years.  To learn more about your testing options, visit: [email or web address].  For help making a decision, visit: https://bit.ly/du82146.  Prostate cancer screening test: If you have a prostate and are age 55 to 69, ask your provider if you would benefit from a yearly prostate cancer screening test.  Lung cancer screening: If you are a current or former smoker age 50 to 80, ask your care team if ongoing lung cancer screenings are right for you.  For informational purposes only. Not to replace the advice of your health care provider. Copyright   2023 [employer]. All rights reserved. Clinically reviewed by the [facility] Transitions Program. [employer] [id number] - REV 01/24.      Vero Stern is a 33 year old, presenting for the following:  Physical        2/26/2024    12:37 PM   Additional Questions   Roomed by mehul lynch cma        Health Care Directive  Patient does not have a Health Care Directive or Living Will: Discussed advance care planning with patient; information given to patient to review.    HPI  Mthfr mutation, wanting homocysteine checked.        2/26/2024   General Health   How would you rate your overall physical health? Good   Feel stress (tense, anxious, or unable to sleep) Not at all         2/26/2024   Nutrition   Three or more servings of calcium each day? Yes   Diet: Other   If other, please elaborate: Mostly paleo.   How many servings of fruit and vegetables per day? (!) 2-3   How many sweetened beverages each day? 0-1         2/26/2024   Exercise   Days per week of moderate/strenous exercise 5 days   Average minutes spent exercising at this level 40 min          2/26/2024   Social Factors   Frequency of gathering with friends or relatives Once a week   Worry food won't last until get money to buy more No   Food not last or not have enough money for food? No   Do you have housing?  Yes   Are you worried about losing your housing? No   Lack of transportation? No   Unable to get utilities (heat,electricity)? No         2/26/2024   Dental   Dentist two times every year? Yes         2/26/2024   TB Screening   Were you born outside of [place]?  No     Today's PHQ-2 Score:       2/26/2024     7:36 AM   PHQ-2 ( 1999 Pf[employer])   Q1: Little interest or pleasure in doing things 0   Q2: Feeling down, depressed or hopeless 0   PHQ-2 Score 0   Q1: Little interest or pleasure in doing things Not at all   Q2: Feeling down, depressed or hopeless Not at all   PHQ-2 Score 0         2/26/2024   Substance Use   Alcohol more than 3/day or more than 7/wk No   Do you use any other substances recreationally? No     Social History     Tobacco Use    Smoking status: Never    Smokeless tobacco: Never   Vaping Use    Vaping Use: Never used   Substance Use Topics    Alcohol use: No     Comment: occas-quit with pregnancy    Drug use: Never             2/26/2024   Breast Cancer Screening   Family history of breast, colon, or ovarian cancer? Yes         2/26/2024   LAST FHS-7 RESULTS   1st degree relative breast or ovarian cancer Unknown   Any relative bilateral breast cancer Unknown   Any male have breast cancer Unknown   Any woman have breast and ovarian cancer Unknown   Any woman with breast cancer before 50yrs Unknown   2 or more relatives with breast and/or ovarian cancer Unknown   2 or more relatives with breast and/or bowel cancer Unknown                2/26/2024   STI Screening   New sexual partner(s) since last STI/HIV test? No     History of abnormal Pap smear: NO - age 30-65 PAP every 5 years with negative HPV co-testing recommended        Latest Ref Rng & Units 8/4/2021     2:15 PM 7/17/2018    10:41  "AM   PAP / HPV   PAP  Negative for Intraepithelial Lesion or Malignancy (NILM)     PAP (Historical)   NIL    HPV 16 DNA Negative Negative     HPV 18 DNA Negative Negative     Other HR HPV Negative Negative             2/26/2024   Contraception/Family Planning   Questions about contraception or family planning No     Reviewed and updated as needed this visit by Provider   Tobacco  Allergies  Meds  Problems  Med Hx  Surg Hx  Fam Hx               Objective    Exam  /78 (BP Location: Right arm, Patient Position: Sitting, Cuff Size: Adult Regular)   Pulse 73   Temp 98.7  F (37.1  C) (Tympanic)   Resp 16   Ht 1.676 m (5' 5.98\")   Wt 88.5 kg (195 lb)   LMP 01/26/2024 (Approximate)   SpO2 99%   BMI 31.49 kg/m     Estimated body mass index is 31.49 kg/m  as calculated from the following:    Height as of this encounter: 1.676 m (5' 5.98\").    Weight as of this encounter: 88.5 kg (195 lb).    Physical Exam  GENERAL: alert and no distress  NECK: no adenopathy, no asymmetry, masses, or scars  RESP: lungs clear to auscultation - no rales, rhonchi or wheezes  CV: regular rate and rhythm, normal S1 S2, no S3 or S4, no murmur, click or rub, no peripheral edema  ABDOMEN: soft, nontender, no hepatosplenomegaly, no masses and bowel sounds normal  MS: no gross musculoskeletal defects noted, no edema      Signed Electronically by: Agnes Stanton PA-C    "

## 2024-02-27 LAB — HCYS SERPL-SCNC: 6.5 UMOL/L (ref 0–15)

## 2024-03-01 NOTE — RESULT ENCOUNTER NOTE
Leena  Homocysteine level is normal.  Blood sugar is normal for having eaten day of labs.  Your cholesterol is mild to moderately high This does not need medication at this time, but does need to be treated with healthy lifestyle.  Several things can help your cholesterol.  Suggestions include losing 5-10% of your current weight, having a physical activity goal for heart health (2.5 hours/week (or 30 min 5 days per week), and healthy eating such as the Mediterranean diet.  This means eating a lot of fruits and vegetables and consuming healthy oils (olive oil, nuts, seeds, avocado, fatty unfried fish such as salmon).  More information of this diet can be found at [email or web address]-71370408.    Let me know if you have questions.  Agnes

## 2024-04-01 ENCOUNTER — APPOINTMENT (OUTPATIENT)
Dept: GENERAL RADIOLOGY | Facility: CLINIC | Age: 34
End: 2024-04-01
Attending: EMERGENCY MEDICINE
Payer: COMMERCIAL

## 2024-04-01 ENCOUNTER — HOSPITAL ENCOUNTER (EMERGENCY)
Facility: CLINIC | Age: 34
Discharge: HOME | End: 2024-04-01
Attending: EMERGENCY MEDICINE | Admitting: EMERGENCY MEDICINE
Payer: COMMERCIAL

## 2024-04-01 VITALS
TEMPERATURE: 99.8 F | RESPIRATION RATE: 18 BRPM | SYSTOLIC BLOOD PRESSURE: 119 MMHG | DIASTOLIC BLOOD PRESSURE: 73 MMHG | HEART RATE: 83 BPM | OXYGEN SATURATION: 97 %

## 2024-04-01 DIAGNOSIS — R55 PRE-SYNCOPE: ICD-10-CM

## 2024-04-01 LAB
ALBUMIN SERPL BCG-MCNC: 4.5 G/DL (ref 3.5–5.2)
ALP SERPL-CCNC: 66 U/L (ref 40–150)
ALT SERPL W P-5'-P-CCNC: 11 U/L (ref 0–50)
ANION GAP SERPL CALCULATED.3IONS-SCNC: 10 MMOL/L (ref 7–15)
AST SERPL W P-5'-P-CCNC: 16 U/L (ref 0–45)
BASOPHILS # BLD AUTO: 0.1 10E3/UL (ref 0–0.2)
BASOPHILS NFR BLD AUTO: 1 %
BILIRUB SERPL-MCNC: 0.3 MG/DL
BUN SERPL-MCNC: 11.3 MG/DL (ref 6–20)
CALCIUM SERPL-MCNC: 9.1 MG/DL (ref 8.6–10)
CHLORIDE SERPL-SCNC: 103 MMOL/L (ref 98–107)
CREAT SERPL-MCNC: 0.77 MG/DL (ref 0.51–0.95)
DEPRECATED HCO3 PLAS-SCNC: 25 MMOL/L (ref 22–29)
EGFRCR SERPLBLD CKD-EPI 2021: >90 ML/MIN/1.73M2
EOSINOPHIL # BLD AUTO: 0.1 10E3/UL (ref 0–0.7)
EOSINOPHIL NFR BLD AUTO: 1 %
ERYTHROCYTE [DISTWIDTH] IN BLOOD BY AUTOMATED COUNT: 12.4 % (ref 10–15)
GLUCOSE SERPL-MCNC: 99 MG/DL (ref 70–99)
HCG SERPL QL: NEGATIVE
HCT VFR BLD AUTO: 40.9 % (ref 35–47)
HGB BLD-MCNC: 13.9 G/DL (ref 11.7–15.7)
IMM GRANULOCYTES # BLD: 0 10E3/UL
IMM GRANULOCYTES NFR BLD: 0 %
LYMPHOCYTES # BLD AUTO: 2.2 10E3/UL (ref 0.8–5.3)
LYMPHOCYTES NFR BLD AUTO: 22 %
MCH RBC QN AUTO: 31.4 PG (ref 26.5–33)
MCHC RBC AUTO-ENTMCNC: 34 G/DL (ref 31.5–36.5)
MCV RBC AUTO: 93 FL (ref 78–100)
MONOCYTES # BLD AUTO: 0.5 10E3/UL (ref 0–1.3)
MONOCYTES NFR BLD AUTO: 5 %
NEUTROPHILS # BLD AUTO: 7.2 10E3/UL (ref 1.6–8.3)
NEUTROPHILS NFR BLD AUTO: 71 %
NRBC # BLD AUTO: 0 10E3/UL
NRBC BLD AUTO-RTO: 0 /100
PLATELET # BLD AUTO: 248 10E3/UL (ref 150–450)
POTASSIUM SERPL-SCNC: 3.6 MMOL/L (ref 3.4–5.3)
PROT SERPL-MCNC: 7.2 G/DL (ref 6.4–8.3)
RBC # BLD AUTO: 4.42 10E6/UL (ref 3.8–5.2)
SODIUM SERPL-SCNC: 138 MMOL/L (ref 135–145)
TROPONIN T SERPL HS-MCNC: <6 NG/L
WBC # BLD AUTO: 10.1 10E3/UL (ref 4–11)

## 2024-04-01 PROCEDURE — 93010 ELECTROCARDIOGRAM REPORT: CPT | Performed by: EMERGENCY MEDICINE

## 2024-04-01 PROCEDURE — 258N000003 HC RX IP 258 OP 636: Performed by: EMERGENCY MEDICINE

## 2024-04-01 PROCEDURE — 80053 COMPREHEN METABOLIC PANEL: CPT | Performed by: EMERGENCY MEDICINE

## 2024-04-01 PROCEDURE — 36415 COLL VENOUS BLD VENIPUNCTURE: CPT | Performed by: EMERGENCY MEDICINE

## 2024-04-01 PROCEDURE — 71046 X-RAY EXAM CHEST 2 VIEWS: CPT

## 2024-04-01 PROCEDURE — 99285 EMERGENCY DEPT VISIT HI MDM: CPT | Mod: 25 | Performed by: EMERGENCY MEDICINE

## 2024-04-01 PROCEDURE — 96361 HYDRATE IV INFUSION ADD-ON: CPT | Performed by: EMERGENCY MEDICINE

## 2024-04-01 PROCEDURE — 93005 ELECTROCARDIOGRAM TRACING: CPT | Performed by: EMERGENCY MEDICINE

## 2024-04-01 PROCEDURE — 84703 CHORIONIC GONADOTROPIN ASSAY: CPT | Performed by: EMERGENCY MEDICINE

## 2024-04-01 PROCEDURE — 85025 COMPLETE CBC W/AUTO DIFF WBC: CPT | Performed by: EMERGENCY MEDICINE

## 2024-04-01 PROCEDURE — 84484 ASSAY OF TROPONIN QUANT: CPT | Performed by: EMERGENCY MEDICINE

## 2024-04-01 PROCEDURE — 96360 HYDRATION IV INFUSION INIT: CPT | Performed by: EMERGENCY MEDICINE

## 2024-04-01 RX ADMIN — SODIUM CHLORIDE, POTASSIUM CHLORIDE, SODIUM LACTATE AND CALCIUM CHLORIDE 1000 ML: 600; 310; 30; 20 INJECTION, SOLUTION INTRAVENOUS at 16:12

## 2024-04-01 ASSESSMENT — COLUMBIA-SUICIDE SEVERITY RATING SCALE - C-SSRS
1. IN THE PAST MONTH, HAVE YOU WISHED YOU WERE DEAD OR WISHED YOU COULD GO TO SLEEP AND NOT WAKE UP?: NO
2. HAVE YOU ACTUALLY HAD ANY THOUGHTS OF KILLING YOURSELF IN THE PAST MONTH?: NO
6. HAVE YOU EVER DONE ANYTHING, STARTED TO DO ANYTHING, OR PREPARED TO DO ANYTHING TO END YOUR LIFE?: NO

## 2024-04-01 ASSESSMENT — ACTIVITIES OF DAILY LIVING (ADL)
ADLS_ACUITY_SCORE: 33
ADLS_ACUITY_SCORE: 35
ADLS_ACUITY_SCORE: 33
ADLS_ACUITY_SCORE: 35

## 2024-04-01 NOTE — ED PROVIDER NOTES
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  She reports feeling her vision closing and tingling in bilateral hands and feet.  No loss of consciousness    Currently, patient feels mild jitteriness    1 child at home with mild illness.  denies nausea, vomiting, URI symptoms currently            Physical Exam   BP: (!) 154/88  Pulse: 82  Temp: 98.4  F (36.9  C)  Resp: 16  SpO2: 100 %  Physical Exam  No acute distress  Lungs clear   heart regular rate and rhythm  Soft nontender nondistended abdomen    ED Course, Procedures, & Data      Procedures              EKG Interpretation:      Interpreted by Alonzo Leon MD  Time reviewed:1438   Symptoms at time of EKG: presyncope   Rhythm: Normal sinus   Rate: Normal  Axis: Normal  ST Segments/ T Waves: No ST-T wave changes, No acute ischemic changes, and T wave inversions in V1 through V4, III, F    Clinical Impression: Normal sinus rhythm with T wave inversions anteriorly         Results for orders placed or performed during the hospital encounter of 04/01/24   Comprehensive metabolic panel     Status: Normal   Result Value Ref Range    Sodium 138 135 - 145 mmol/L    Potassium 3.6 3.4 - 5.3 mmol/L    Carbon Dioxide (CO2) 25 22 - 29 mmol/L    Anion Gap 10 7 - 15 mmol/L    Urea Nitrogen 11.3 6.0 - 20.0 mg/dL    Creatinine 0.77 0.51 - 0.95 mg/dL    GFR Estimate >90 >60 mL/min/1.73m2    Calcium 9.1 8.6 - 10.0 mg/dL    Chloride 103 98 - 107 mmol/L    Glucose 99 70 - 99 mg/dL    Alkaline Phosphatase 66 40 - 150 U/L    AST 16 0 - 45 U/L    ALT 11 0 - 50 U/L    Protein Total 7.2 6.4 - 8.3 g/dL    Albumin 4.5 3.5 - 5.2 g/dL    Bilirubin Total 0.3 <=1.2 mg/dL   Troponin T, High Sensitivity     Status: Normal   Result Value Ref Range    Troponin T,  High Sensitivity <6 <=14 ng/L   HCG qualitative Blood     Status: Normal   Result Value Ref Range    hCG Serum Qualitative Negative Negative   CBC with platelets and differential     Status: None   Result Value Ref Range    WBC Count 10.1 4.0 - 11.0 10e3/uL    RBC Count 4.42 3.80 - 5.20 10e6/uL    Hemoglobin 13.9 11.7 - 15.7 g/dL    Hematocrit 40.9 35.0 - 47.0 %    MCV 93 78 - 100 fL    MCH 31.4 26.5 - 33.0 pg    MCHC 34.0 31.5 - 36.5 g/dL    RDW 12.4 10.0 - 15.0 %    Platelet Count 248 150 - 450 10e3/uL    % Neutrophils 71 %    % Lymphocytes 22 %    % Monocytes 5 %    % Eosinophils 1 %    % Basophils 1 %    % Immature Granulocytes 0 %    NRBCs per 100 WBC 0 <1 /100    Absolute Neutrophils 7.2 1.6 - 8.3 10e3/uL    Absolute Lymphocytes 2.2 0.8 - 5.3 10e3/uL    Absolute Monocytes 0.5 0.0 - 1.3 10e3/uL    Absolute Eosinophils 0.1 0.0 - 0.7 10e3/uL    Absolute Basophils 0.1 0.0 - 0.2 10e3/uL    Absolute Immature Granulocytes 0.0 <=0.4 10e3/uL    Absolute NRBCs 0.0 10e3/uL   EKG 12 lead     Status: None (Preliminary result)   Result Value Ref Range    Systolic Blood Pressure  mmHg    Diastolic Blood Pressure  mmHg    Ventricular Rate 86 BPM    Atrial Rate 86 BPM    ND Interval 164 ms    QRS Duration 96 ms     ms    QTc 454 ms    P Axis 57 degrees    R AXIS 74 degrees    T Axis 10 degrees    Interpretation ECG       Sinus rhythm  T wave abnormality, consider anterior ischemia  Abnormal ECG     CBC with platelets differential     Status: None    Narrative    The following orders were created for panel order CBC with platelets differential.  Procedure                               Abnormality         Status                     ---------                               -----------         ------                     CBC with platelets and d...[[id number]]                      Final result                 Please view results for these tests on the individual orders.     Medications   lactated ringers BOLUS 1,000 mL (1,000  mLs Intravenous $New Bag 4/1/24 2319)     Labs Ordered and Resulted from Time of ED Arrival to Time of ED Departure   COMPREHENSIVE METABOLIC PANEL - Normal       Result Value    Sodium 138      Potassium 3.6      Carbon Dioxide (CO2) 25      Anion Gap 10      Urea Nitrogen 11.3      Creatinine 0.77      GFR Estimate >90      Calcium 9.1      Chloride 103      Glucose 99      Alkaline Phosphatase 66      AST 16      ALT 11      Protein Total 7.2      Albumin 4.5      Bilirubin Total 0.3     TROPONIN T, HIGH SENSITIVITY - Normal    Troponin T, High Sensitivity <6     HCG QUALITATIVE PREGNANCY - Normal    hCG Serum Qualitative Negative     CBC WITH PLATELETS AND DIFFERENTIAL    WBC Count 10.1      RBC Count 4.42      Hemoglobin 13.9      Hematocrit 40.9      MCV 93      MCH 31.4      MCHC 34.0      RDW 12.4      Platelet Count 248      % Neutrophils 71      % Lymphocytes 22      % Monocytes 5      % Eosinophils 1      % Basophils 1      % Immature Granulocytes 0      NRBCs per 100 WBC 0      Absolute Neutrophils 7.2      Absolute Lymphocytes 2.2      Absolute Monocytes 0.5      Absolute Eosinophils 0.1      Absolute Basophils 0.1      Absolute Immature Granulocytes 0.0      Absolute NRBCs 0.0       XR Chest 2 Views    (Results Pending)          Critical care was not performed.     Medical Decision Making  The patient's presentation was of high complexity (an acute health issue posing potential threat to life or bodily function).    The patient's evaluation involved:  ordering and/or review of 3+ test(s) in this encounter (see separate area of note for details)  independent interpretation of testing performed by another health professional (chest x-ray)    The patient's management necessitated moderate risk (prescription drug management including medications given in the ED).    Assessment & Plan    Presyncope with continued symptoms   Rule out electrolyte abnormality, dehydration  Screen for pregnancy    -IV fluids,  labs including troponin, EKG, chest x-ray    4:30 PM: Labs generally unremarkable.  Troponin negative.  Still pending chest x-ray  515pm: I have independently reviewed the chest x-ray imaging showing no signs of pneumonia or pneumothorax.  Will discharge with return precautions and primary care follow-up    I have reviewed the nursing notes. I have reviewed the findings, diagnosis, plan and need for follow up with the patient.    New Prescriptions    No medications on file       Final diagnoses:   Pre-syncope       Alonzo Leon MD  [facility] EMERGENCY DEPARTMENT  4/1/2024     Alonzo Leon MD  04/01/24 1654       Alonzo Leon MD  04/01/24 1656       Alonzo Leon MD  04/01/24 1724

## 2024-04-01 NOTE — DISCHARGE INSTRUCTIONS
Please stay well-hydrated.    Please call today to schedule a follow-up appointment with your primary medical doctor in 3-5 days for reevaluation, which is important after today's emergency department visit.  Please return to emergency department for fever>104F, persistent vomiting, worsening chest pain, shortness of breath

## 2024-04-01 NOTE — ED TRIAGE NOTES
"Patient sitting at desk and \"eyes went weird\" and started feeling dizzy. Went to a colleagues office and HR at 96. Shortly after patient started feeling dizzy and tingling in fingertips.    Symptoms have subsided but feel as if heart is racing and head feels \"off\"        "

## 2024-04-02 LAB
ATRIAL RATE - MUSE: 86 BPM
DIASTOLIC BLOOD PRESSURE - MUSE: NORMAL MMHG
INTERPRETATION ECG - MUSE: NORMAL
P AXIS - MUSE: 57 DEGREES
PR INTERVAL - MUSE: 164 MS
QRS DURATION - MUSE: 96 MS
QT - MUSE: 380 MS
QTC - MUSE: 454 MS
R AXIS - MUSE: 74 DEGREES
SYSTOLIC BLOOD PRESSURE - MUSE: NORMAL MMHG
T AXIS - MUSE: 10 DEGREES
VENTRICULAR RATE- MUSE: 86 BPM

## 2024-04-03 LAB
ATRIAL RATE - MUSE: 81 BPM
DIASTOLIC BLOOD PRESSURE - MUSE: NORMAL MMHG
INTERPRETATION ECG - MUSE: NORMAL
P AXIS - MUSE: 68 DEGREES
PR INTERVAL - MUSE: 164 MS
QRS DURATION - MUSE: 94 MS
QT - MUSE: 402 MS
QTC - MUSE: 466 MS
R AXIS - MUSE: 76 DEGREES
SYSTOLIC BLOOD PRESSURE - MUSE: NORMAL MMHG
T AXIS - MUSE: 25 DEGREES
VENTRICULAR RATE- MUSE: 81 BPM

## 2024-04-05 ENCOUNTER — VIRTUAL VISIT (OUTPATIENT)
Dept: FAMILY MEDICINE | Facility: CLINIC | Age: 34
End: 2024-04-05
Payer: COMMERCIAL

## 2024-04-05 DIAGNOSIS — R55 NEAR SYNCOPE: Primary | ICD-10-CM

## 2024-04-05 DIAGNOSIS — Z82.79 FAMILY HISTORY OF CONGENITAL DEFORMITY: ICD-10-CM

## 2024-04-05 DIAGNOSIS — G43.B0 OPHTHALMOPLEGIC MIGRAINE, NOT INTRACTABLE: ICD-10-CM

## 2024-04-05 DIAGNOSIS — H53.9 VISION CHANGES: ICD-10-CM

## 2024-04-05 PROCEDURE — 99213 OFFICE O/P EST LOW 20 MIN: CPT | Mod: 95 | Performed by: NURSE PRACTITIONER

## 2024-04-05 NOTE — PROGRESS NOTES
"Leena is a 33 year old who is being evaluated via a billable video visit.    {ROOMING STAFF complete during rooming of virtual visit (Optional):767097}  {If patient encounters technical issues they should call [phone] :663383}    {PROVIDER CHARTING PREFERENCE:116255}    Subjective   Leena is a 33 year old, presenting for the following health issues:  ER F/U        2/26/2024    12:37 PM   Additional Questions   Roomed by mehul lynch cma     [facility]     ED/UC Followup:  She did start taking a folate medication that was recommended by her PCP the day before her symptoms started     Facility:  [facility]   Date of visit: 4/1/2024  Reason for visit: dizziness, almost fainted, vision changes  Current Status: symptoms improving, wanting MRI     {additonal problems for provider to add (Optional):273855}    {ROS Picklists (Optional):050821}      Objective           Vitals:  No vitals were obtained today due to virtual visit.    Physical Exam   {video visit exam brief selected:489480}    {Diagnostic Test Results (Optional):798776}      Video-Visit Details    Type of service:  Video Visit   Originating Location (pt. Location): {video visit patient location:936987::\"Home\"}  {PROVIDER LOCATION On-site should be selected for visits conducted from your clinic location or adjoining [facility] hospital, academic office, or other nearby [facility] building. Off-site should be selected for all other provider locations, including home:093323}  Distant Location (provider location):  {virtual location provider:948430}  Platform used for Video Visit: {Virtual Visit Platforms:364710::\"[employer]\"}  Signed Electronically by: SUSSY Melgar CNP  {Email feedback regarding this note to [email or web address]   :175311}  "

## 2024-04-05 NOTE — PROGRESS NOTES
Leena is a 33 year old who is being evaluated via a billable video visit.    How would you like to obtain your AVS? MyChart  If the video visit is dropped, the invitation should be resent by: Text to cell phone: [phone]  Will anyone else be joining your video visit? No      Assessment & Plan     Near syncope    - Echocardiogram Complete  - MR BRAIN W/O CONTRAST    Family history of congenital deformity    - Echocardiogram Complete    Ophthalmoplegic migraine, not intractable    - MR BRAIN W/O CONTRAST    Vision changes    - MR BRAIN W/O CONTRAST  Follow up eye exam       Call or return to the clinic with any worsening of symptoms or no resolution. Patient/Parent verbalized understanding and is in agreement. Medication side effects reviewed.   Current Outpatient Medications   Medication Sig Dispense Refill    lactobacillus rhamnosus, GG, (CULTURELL) capsule Take 1 capsule by mouth daily      Prenatal Vit-Fe Fumarate-FA (PRENATAL MULTIVITAMIN W/IRON) 27-0.8 MG tablet Take 1 tablet by mouth daily       Chart documentation with Dragon Voice recognition Software. Although reviewed after completion, some words and grammatical errors may remain.  As the provider for this telephone/video service, I attest that I introduced myself to the patient, provided my credentials, disclosed my location, and determined that, based on a review of the patient's chart and/or a discussion with members of the patient's treatment team, a telephone/video visit is an appropriate and effective means of providing this service. The patient and I mutually agree that this visit is appropriate for telephone/video visit as well.    Agustina Duggan MSN,FNP-BC  [address] [address]  [phone]          Subjective   Leena is a 33 year old, presenting for the following health issues:  ER F/U      4/5/2024     8:58 AM   Additional Questions   Roomed by alen   Accompanied by self      HPI     ED/UC Followup:  She did start taking a folate medication that was recommended by her PCP the day before her symptoms started for MTHFR gene mutation       Facility:  [facility]   Date of visit: 4/1/2024  Reason for visit: dizziness, almost fainted, vision changes  Current Status: symptoms improving, wanting MRI     ED and Primary Care Provider records from recent visits reviewed.   Occasional skipped beat. No racing. No chest pain. No SOBOE  Has been able to work out with no palpitations  Family history of heart anamolies  History of migraines. More so Hormonal migraines since last pregnancy   Every 6 months or so.   More visual migraines this past 2 yrs- this has been a change for her  Some vision changes pinpoint  Frontal migraine hangover headaches recently  Eye exam 2 yrs ago normal   Due for visit and has upcoming    2 children at home sick  Nasal congestion and cough   Sinus pressure and history of ear issues and  PE tubes. More ear discomfort feeling her TM move             Review of Systems  Constitutional, neuro, ENT, endocrine, pulmonary, cardiac, gastrointestinal, genitourinary, musculoskeletal, integument and psychiatric systems are negative, except as otherwise noted.      Objective           Vitals:  No vitals were obtained today due to virtual visit.    Physical Exam   GENERAL: alert and no distress  EYES: Eyes grossly normal to inspection.  No discharge or erythema, or obvious scleral/conjunctival abnormalities.  RESP: dry cough   SKIN: Visible skin clear. No significant rash, abnormal pigmentation or lesions.  NEURO: Cranial nerves grossly intact.  Mentation and speech appropriate for age.  PSYCH: Appropriate affect, tone, and pace of words    Admission on 04/01/2024, Discharged on 04/01/2024   Component Date Value Ref Range Status    Ventricular Rate 04/01/2024 86  BPM Final    Atrial Rate 04/01/2024 86  BPM Final    WI Interval 04/01/2024 164  ms Final    QRS Duration  04/01/2024 96  ms Final    QT 04/01/2024 380  ms Final    QTc 04/01/2024 454  ms Final    P Axis 04/01/2024 57  degrees Final    R AXIS 04/01/2024 74  degrees Final    T Axis 04/01/2024 10  degrees Final    Interpretation ECG 04/01/2024    Final                    Value:Sinus rhythm  T wave abnormality, consider anterior ischemia  Abnormal ECG    Unconfirmed report - interpretation of this ECG is computer generated - see medical record for final interpretation  Confirmed by - EMERGENCY ROOM, PHYSICIAN ([id number]),  ORLY REEVES ([id number]) on 4/2/2024 10:40:43 AM      Sodium 04/01/2024 138  135 - 145 mmol/L Final    Reference intervals for this test were updated on 09/26/2023 to more accurately reflect our healthy population. There may be differences in the flagging of prior results with similar values performed with this method. Interpretation of those prior results can be made in the context of the updated reference intervals.     Potassium 04/01/2024 3.6  3.4 - 5.3 mmol/L Final    Carbon Dioxide (CO2) 04/01/2024 25  22 - 29 mmol/L Final    Anion Gap 04/01/2024 10  7 - 15 mmol/L Final    Urea Nitrogen 04/01/2024 11.3  6.0 - 20.0 mg/dL Final    Creatinine 04/01/2024 0.77  0.51 - 0.95 mg/dL Final    GFR Estimate 04/01/2024 >90  >60 mL/min/1.73m2 Final    Calcium 04/01/2024 9.1  8.6 - 10.0 mg/dL Final    Chloride 04/01/2024 103  98 - 107 mmol/L Final    Glucose 04/01/2024 99  70 - 99 mg/dL Final    Alkaline Phosphatase 04/01/2024 66  40 - 150 U/L Final    Reference intervals for this test were updated on 11/14/2023 to more accurately reflect our healthy population. There may be differences in the flagging of prior results with similar values performed with this method. Interpretation of those prior results can be made in the context of the updated reference intervals.    AST 04/01/2024 16  0 - 45 U/L Final    Reference intervals for this test were updated on 6/12/2023 to more accurately reflect our healthy  population. There may be differences in the flagging of prior results with similar values performed with this method. Interpretation of those prior results can be made in the context of the updated reference intervals.    ALT 04/01/2024 11  0 - 50 U/L Final    Reference intervals for this test were updated on 6/12/2023 to more accurately reflect our healthy population. There may be differences in the flagging of prior results with similar values performed with this method. Interpretation of those prior results can be made in the context of the updated reference intervals.      Protein Total 04/01/2024 7.2  6.4 - 8.3 g/dL Final    Albumin 04/01/2024 4.5  3.5 - 5.2 g/dL Final    Bilirubin Total 04/01/2024 0.3  <=1.2 mg/dL Final    Troponin T, High Sensitivity 04/01/2024 <6  <=14 ng/L Final    Either a High Sensitivity Troponin T baseline (0 hours) value = 100 ng/L, or an increase in High Sensitivity Troponin T = 7 ng/L at 2 hours compared to 0 hours (2-0 hours), suggests myocardial injury, and urgent clinical attention is required.    If the 2-0 hours increase is <7 ng/L, a High Sensitivity Troponin T result above gender-specific reference ranges warrants further evaluation.   Recommendations for further evaluation include correlation with clinical decision-making tool (e.g., HEART), a 3rd High Sensitivity Troponin T test 2 hours after the 2nd (a 20% change from baseline would represent concern), admission for observation, close PCC/cardiology follow-up, or urgent outpatient provocative testing.    hCG Serum Qualitative 04/01/2024 Negative  Negative Final    This test is for screening purposes.  Results should be interpreted along with the clinical picture.  Confirmation testing is available if warranted by ordering [id number], HCG Quantitative Pregnancy.    WBC Count 04/01/2024 10.1  4.0 - 11.0 10e3/uL Final    RBC Count 04/01/2024 4.42  3.80 - 5.20 10e6/uL Final    Hemoglobin 04/01/2024 13.9  11.7 - 15.7 g/dL Final     Hematocrit 04/01/2024 40.9  35.0 - 47.0 % Final    MCV 04/01/2024 93  78 - 100 fL Final    MCH 04/01/2024 31.4  26.5 - 33.0 pg Final    MCHC 04/01/2024 34.0  31.5 - 36.5 g/dL Final    RDW 04/01/2024 12.4  10.0 - 15.0 % Final    Platelet Count 04/01/2024 248  150 - 450 10e3/uL Final    % Neutrophils 04/01/2024 71  % Final    % Lymphocytes 04/01/2024 22  % Final    % Monocytes 04/01/2024 5  % Final    % Eosinophils 04/01/2024 1  % Final    % Basophils 04/01/2024 1  % Final    % Immature Granulocytes 04/01/2024 0  % Final    NRBCs per 100 WBC 04/01/2024 0  <1 /100 Final    Absolute Neutrophils 04/01/2024 7.2  1.6 - 8.3 10e3/uL Final    Absolute Lymphocytes 04/01/2024 2.2  0.8 - 5.3 10e3/uL Final    Absolute Monocytes 04/01/2024 0.5  0.0 - 1.3 10e3/uL Final    Absolute Eosinophils 04/01/2024 0.1  0.0 - 0.7 10e3/uL Final    Absolute Basophils 04/01/2024 0.1  0.0 - 0.2 10e3/uL Final    Absolute Immature Granulocytes 04/01/2024 0.0  <=0.4 10e3/uL Final    Absolute NRBCs 04/01/2024 0.0  10e3/uL Final    Ventricular Rate 04/01/2024 81  BPM Final    Atrial Rate 04/01/2024 81  BPM Final    CT Interval 04/01/2024 164  ms Final    QRS Duration 04/01/2024 94  ms Final    QT 04/01/2024 402  ms Final    QTc 04/01/2024 466  ms Final    P Axis 04/01/2024 68  degrees Final    R AXIS 04/01/2024 76  degrees Final    T Axis 04/01/2024 25  degrees Final    Interpretation ECG 04/01/2024    Final                    Value:Sinus rhythm  Normal ECG  Unconfirmed report - interpretation of this ECG is computer generated - see medical record for final interpretation  Confirmed by - EMERGENCY ROOM, PHYSICIAN ([id number]),  CHAMP REEVES ([id number]) on 4/3/2024 6:52:50 AM           Video-Visit Details    Type of service:  Video Visit   Originating Location (pt. Location): Work     Distant Location (provider location):  On-site  Platform used for Video Visit: Bianca  Signed Electronically by: SUSSY Melgar CNP

## 2024-04-13 ENCOUNTER — HOSPITAL ENCOUNTER (OUTPATIENT)
Dept: MRI IMAGING | Facility: CLINIC | Age: 34
Discharge: HOME | End: 2024-04-13
Attending: NURSE PRACTITIONER | Admitting: NURSE PRACTITIONER
Payer: COMMERCIAL

## 2024-04-13 DIAGNOSIS — G43.B0 OPHTHALMOPLEGIC MIGRAINE, NOT INTRACTABLE: ICD-10-CM

## 2024-04-13 DIAGNOSIS — R55 NEAR SYNCOPE: ICD-10-CM

## 2024-04-13 DIAGNOSIS — H53.9 VISION CHANGES: ICD-10-CM

## 2024-04-13 PROCEDURE — 70551 MRI BRAIN STEM W/O DYE: CPT

## 2024-04-14 ENCOUNTER — HOSPITAL ENCOUNTER (EMERGENCY)
Facility: CLINIC | Age: 34
Discharge: HOME | End: 2024-04-15
Attending: EMERGENCY MEDICINE | Admitting: EMERGENCY MEDICINE
Payer: COMMERCIAL

## 2024-04-14 DIAGNOSIS — R00.2 PALPITATIONS: ICD-10-CM

## 2024-04-14 LAB
ACANTHOCYTES BLD QL SMEAR: ABNORMAL
ALBUMIN SERPL BCG-MCNC: 4.6 G/DL (ref 3.5–5.2)
ALP SERPL-CCNC: 72 U/L (ref 40–150)
ALT SERPL W P-5'-P-CCNC: 11 U/L (ref 0–50)
ANION GAP SERPL CALCULATED.3IONS-SCNC: 14 MMOL/L (ref 7–15)
AST SERPL W P-5'-P-CCNC: 18 U/L (ref 0–45)
AUER BODIES BLD QL SMEAR: ABNORMAL
BASO STIPL BLD QL SMEAR: ABNORMAL
BASOPHILS # BLD AUTO: 0.1 10E3/UL (ref 0–0.2)
BASOPHILS NFR BLD AUTO: 1 %
BILIRUB SERPL-MCNC: 0.2 MG/DL
BITE CELLS BLD QL SMEAR: ABNORMAL
BLISTER CELLS BLD QL SMEAR: ABNORMAL
BUN SERPL-MCNC: 12.6 MG/DL (ref 6–20)
BURR CELLS BLD QL SMEAR: ABNORMAL
CALCIUM SERPL-MCNC: 9.2 MG/DL (ref 8.6–10)
CHLORIDE SERPL-SCNC: 103 MMOL/L (ref 98–107)
CREAT SERPL-MCNC: 0.86 MG/DL (ref 0.51–0.95)
DACRYOCYTES BLD QL SMEAR: ABNORMAL
DEPRECATED HCO3 PLAS-SCNC: 24 MMOL/L (ref 22–29)
EGFRCR SERPLBLD CKD-EPI 2021: 90 ML/MIN/1.73M2
ELLIPTOCYTES BLD QL SMEAR: ABNORMAL
EOSINOPHIL # BLD AUTO: 0.2 10E3/UL (ref 0–0.7)
EOSINOPHIL NFR BLD AUTO: 2 %
ERYTHROCYTE [DISTWIDTH] IN BLOOD BY AUTOMATED COUNT: 12 % (ref 10–15)
FRAGMENTS BLD QL SMEAR: ABNORMAL
GIANT PLATELETS BLD QL SMEAR: ABNORMAL
GLUCOSE SERPL-MCNC: 101 MG/DL (ref 70–99)
HCT VFR BLD AUTO: 41 % (ref 35–47)
HGB BLD-MCNC: 14.3 G/DL (ref 11.7–15.7)
HGB C CRYSTALS: ABNORMAL
HOWELL-JOLLY BOD BLD QL SMEAR: ABNORMAL
IMM GRANULOCYTES # BLD: 0 10E3/UL
IMM GRANULOCYTES NFR BLD: 0 %
LYMPHOCYTES # BLD AUTO: 5.2 10E3/UL (ref 0.8–5.3)
LYMPHOCYTES NFR BLD AUTO: 51 %
MCH RBC QN AUTO: 32.1 PG (ref 26.5–33)
MCHC RBC AUTO-ENTMCNC: 34.9 G/DL (ref 31.5–36.5)
MCV RBC AUTO: 92 FL (ref 78–100)
MONOCYTES # BLD AUTO: 0.7 10E3/UL (ref 0–1.3)
MONOCYTES NFR BLD AUTO: 7 %
NEUTROPHILS # BLD AUTO: 4 10E3/UL (ref 1.6–8.3)
NEUTROPHILS NFR BLD AUTO: 39 %
NEUTS HYPERSEG BLD QL SMEAR: ABNORMAL
NRBC # BLD AUTO: 0 10E3/UL
NRBC BLD AUTO-RTO: 0 /100
PATH REV: ABNORMAL
PLAT MORPH BLD: ABNORMAL
PLATELET # BLD AUTO: 289 10E3/UL (ref 150–450)
POLYCHROMASIA BLD QL SMEAR: ABNORMAL
POTASSIUM SERPL-SCNC: 3.2 MMOL/L (ref 3.4–5.3)
PROT SERPL-MCNC: 7.2 G/DL (ref 6.4–8.3)
RBC # BLD AUTO: 4.46 10E6/UL (ref 3.8–5.2)
RBC AGGLUT BLD QL: ABNORMAL
RBC MORPH BLD: ABNORMAL
ROULEAUX BLD QL SMEAR: ABNORMAL
SICKLE CELLS BLD QL SMEAR: ABNORMAL
SMUDGE CELLS BLD QL SMEAR: ABNORMAL
SODIUM SERPL-SCNC: 141 MMOL/L (ref 135–145)
SPHEROCYTES BLD QL SMEAR: ABNORMAL
STOMATOCYTES BLD QL SMEAR: ABNORMAL
TARGETS BLD QL SMEAR: ABNORMAL
TOXIC GRANULES BLD QL SMEAR: ABNORMAL
TROPONIN T SERPL HS-MCNC: <6 NG/L
VARIANT LYMPHS BLD QL SMEAR: PRESENT
WBC # BLD AUTO: 10.2 10E3/UL (ref 4–11)

## 2024-04-14 PROCEDURE — 99284 EMERGENCY DEPT VISIT MOD MDM: CPT | Performed by: EMERGENCY MEDICINE

## 2024-04-14 PROCEDURE — 93010 ELECTROCARDIOGRAM REPORT: CPT | Performed by: EMERGENCY MEDICINE

## 2024-04-14 PROCEDURE — 99284 EMERGENCY DEPT VISIT MOD MDM: CPT | Mod: 25 | Performed by: EMERGENCY MEDICINE

## 2024-04-14 PROCEDURE — 93005 ELECTROCARDIOGRAM TRACING: CPT | Performed by: EMERGENCY MEDICINE

## 2024-04-14 PROCEDURE — 84484 ASSAY OF TROPONIN QUANT: CPT | Performed by: EMERGENCY MEDICINE

## 2024-04-14 PROCEDURE — 85025 COMPLETE CBC W/AUTO DIFF WBC: CPT | Performed by: EMERGENCY MEDICINE

## 2024-04-14 PROCEDURE — 36415 COLL VENOUS BLD VENIPUNCTURE: CPT | Performed by: EMERGENCY MEDICINE

## 2024-04-14 PROCEDURE — 80053 COMPREHEN METABOLIC PANEL: CPT | Performed by: EMERGENCY MEDICINE

## 2024-04-14 ASSESSMENT — ACTIVITIES OF DAILY LIVING (ADL): ADLS_ACUITY_SCORE: 35

## 2024-04-14 ASSESSMENT — COLUMBIA-SUICIDE SEVERITY RATING SCALE - C-SSRS
2. HAVE YOU ACTUALLY HAD ANY THOUGHTS OF KILLING YOURSELF IN THE PAST MONTH?: NO
1. IN THE PAST MONTH, HAVE YOU WISHED YOU WERE DEAD OR WISHED YOU COULD GO TO SLEEP AND NOT WAKE UP?: NO
6. HAVE YOU EVER DONE ANYTHING, STARTED TO DO ANYTHING, OR PREPARED TO DO ANYTHING TO END YOUR LIFE?: NO

## 2024-04-15 VITALS
RESPIRATION RATE: 15 BRPM | DIASTOLIC BLOOD PRESSURE: 78 MMHG | SYSTOLIC BLOOD PRESSURE: 120 MMHG | TEMPERATURE: 98.1 F | OXYGEN SATURATION: 97 % | HEART RATE: 71 BPM

## 2024-04-15 LAB
HOLD SPECIMEN: NORMAL
HOLD SPECIMEN: NORMAL

## 2024-04-15 NOTE — ED PROVIDER NOTES
History     Chief Complaint   Patient presents with    Palpitations     HPI  Chhaya Gama is a 33 year old female who presents for palpitations.  The patient says that she was getting ready for bed when she started feeling as if her heart was beating strangely in the chest.  She felt like it was not the same as her normal.  She became very anxious over this when symptoms did not improve spontaneously came to the emergency department for further evaluation.  She denies chest pain, shortness of breath, or cough.  No nausea or vomiting.  She feels very anxious about what is going on.  The patient is not on birth control.  No recent surgeries.  No hemoptysis.    The patient was in the emergency department on 2024, I reviewed those records, she had a near syncopal episode while at work.  She had a workup there that was reassuring and she was discharged home.  I reviewed the patient's virtual visit in family practice clinic to follow-up on the emergency department visit on 2024.  The patient states at that time she requested echocardiogram and MRI of the brain to further evaluate her symptoms and these were ordered.    Allergies:  Allergies   Allergen Reactions    Imitrex [Sumatriptan]     Nitrous Oxide Other (See Comments)     Not recommended due to MTHFR mutation       Problem List:    Patient Active Problem List    Diagnosis Date Noted    MTHFR gene mutation 2024     Priority: Medium    Family history of Down syndrome 2018     Priority: Medium     2018- Daughter Ashy-        History of miscarriage 2017     Priority: Medium        Past Medical History:    Past Medical History:   Diagnosis Date    Chickenpox     Fetal cardiac anomaly affecting pregnancy, antepartum 2018    History of recurrent UTI (urinary tract infection)     Hypertension        Past Surgical History:    Past Surgical History:   Procedure Laterality Date    BIOPSY  2018    DILATION AND CURETTAGE, OPERATIVE  HYSTEROSCOPY, COMBINED N/A 2017    Procedure: COMBINED DILATION AND CURETTAGE, OPERATIVE HYSTEROSCOPY;  Hysteroscopy with dilation and curettage, polypectomy;  Surgeon: Mable Hanson MD;  Location: [facility] OR    ENT SURGERY  20 years ago    tubes in three times    MOUTH SURGERY  2008    wisdom teeth -age 16    TONSILLECTOMY  2016       Family History:    Family History   Problem Relation Age of Onset    Hyperlipidemia Mother     Unknown/Adopted Father         unknown history    Genetic Disorder Brother         MTHFR    Stomach Cancer Maternal Grandmother     Other Cancer Maternal Grandmother         GIST tumor    Other Cancer Maternal Grandfather         Some type of skin cancer    Cancer Maternal Grandfather         skin ca    Skin Cancer Maternal Grandfather     Down Syndrome Daughter     Heart Defect Daughter          at 4 months    Genetic Disorder Daughter     Genetic Disorder Daughter     Other Cancer Other     Diabetes No family hx of     Melanoma No family hx of        Social History:  Marital Status:   [2]  Social History     Tobacco Use    Smoking status: Never    Smokeless tobacco: Never   Vaping Use    Vaping status: Never Used   Substance Use Topics    Alcohol use: No     Comment: occas-quit with pregnancy    Drug use: Never        Medications:    lactobacillus rhamnosus, GG, (CULTURELL) capsule  Prenatal Vit-Fe Fumarate-FA (PRENATAL MULTIVITAMIN W/IRON) 27-0.8 MG tablet          Review of Systems    Physical Exam   BP: (!) 149/94  Pulse: 84  Temp: 98.1  F (36.7  C)  Resp: 10  SpO2: 100 %      Physical Exam  Constitutional:       General: She is not in acute distress.     Appearance: Normal appearance. She is well-developed.   HENT:      Head: Normocephalic and atraumatic.   Eyes:      General: No scleral icterus.     Conjunctiva/sclera: Conjunctivae normal.   Cardiovascular:      Rate and Rhythm: Normal rate.      Pulses:           Radial pulses are 2+ on the right side and 2+ on  the left side.        Dorsalis pedis pulses are 2+ on the right side and 2+ on the left side.      Heart sounds: No murmur heard.  Pulmonary:      Effort: Pulmonary effort is normal. No respiratory distress.   Abdominal:      General: Abdomen is flat.   Musculoskeletal:      Cervical back: Normal range of motion and neck supple.      Right lower leg: No edema.      Left lower leg: No edema.   Skin:     General: Skin is warm and dry.      Findings: No rash.   Neurological:      Mental Status: She is alert and oriented to person, place, and time.         ED Course        Procedures              EKG Interpretation:      Interpreted by Nicolas Kevin MD  Time reviewed: 2250  Symptoms at time of EKG: Palpitations   Rhythm: normal sinus   Rate: normal  Axis: normal  Ectopy: none  Conduction: normal  ST Segments/ T Waves: Diffuse T wave inversions  Q Waves: none  Comparison to prior: Unchanged    Clinical Impression: normal EKG    Critical Care time:  none               Results for orders placed or performed during the hospital encounter of 04/14/24 (from the past 24 hour(s))   [facility] Draw    Narrative    The following orders were created for panel order [facility] Draw.  Procedure                               Abnormality         Status                     ---------                               -----------         ------                     Extra Blue Top Tube[[id number]]                              Final result               Extra Red Top Tube[[id number]]                               Final result               Extra Green Top (Lithium...[[id number]]                                                 Extra Purple Top Tube[[id number]]                                                         Please view results for these tests on the individual orders.   CBC with platelets, differential    Narrative    The following orders were created for panel order CBC with platelets, differential.  Procedure                                Abnormality         Status                     ---------                               -----------         ------                     CBC with platelets and d...[[id number]]                      Final result               RBC and Platelet Morphology[[id number]]  Abnormal            Final result                 Please view results for these tests on the individual orders.   Comprehensive metabolic panel   Result Value Ref Range    Sodium 141 135 - 145 mmol/L    Potassium 3.2 (L) 3.4 - 5.3 mmol/L    Carbon Dioxide (CO2) 24 22 - 29 mmol/L    Anion Gap 14 7 - 15 mmol/L    Urea Nitrogen 12.6 6.0 - 20.0 mg/dL    Creatinine 0.86 0.51 - 0.95 mg/dL    GFR Estimate 90 >60 mL/min/1.73m2    Calcium 9.2 8.6 - 10.0 mg/dL    Chloride 103 98 - 107 mmol/L    Glucose 101 (H) 70 - 99 mg/dL    Alkaline Phosphatase 72 40 - 150 U/L    AST 18 0 - 45 U/L    ALT 11 0 - 50 U/L    Protein Total 7.2 6.4 - 8.3 g/dL    Albumin 4.6 3.5 - 5.2 g/dL    Bilirubin Total 0.2 <=1.2 mg/dL   Troponin T, High Sensitivity   Result Value Ref Range    Troponin T, High Sensitivity <6 <=14 ng/L   Extra Blue Top Tube   Result Value Ref Range    Hold Specimen JIC    Extra Red Top Tube   Result Value Ref Range    Hold Specimen JIC    CBC with platelets and differential   Result Value Ref Range    WBC Count 10.2 4.0 - 11.0 10e3/uL    RBC Count 4.46 3.80 - 5.20 10e6/uL    Hemoglobin 14.3 11.7 - 15.7 g/dL    Hematocrit 41.0 35.0 - 47.0 %    MCV 92 78 - 100 fL    MCH 32.1 26.5 - 33.0 pg    MCHC 34.9 31.5 - 36.5 g/dL    RDW 12.0 10.0 - 15.0 %    Platelet Count 289 150 - 450 10e3/uL    % Neutrophils 39 %    % Lymphocytes 51 %    % Monocytes 7 %    % Eosinophils 2 %    % Basophils 1 %    % Immature Granulocytes 0 %    NRBCs per 100 WBC 0 <1 /100    Absolute Neutrophils 4.0 1.6 - 8.3 10e3/uL    Absolute Lymphocytes 5.2 0.8 - 5.3 10e3/uL    Absolute Monocytes 0.7 0.0 - 1.3 10e3/uL    Absolute Eosinophils 0.2 0.0 - 0.7 10e3/uL    Absolute Basophils 0.1 0.0 - 0.2 10e3/uL     Absolute Immature Granulocytes 0.0 <=0.4 10e3/uL    Absolute NRBCs 0.0 10e3/uL   RBC and Platelet Morphology   Result Value Ref Range    RBC Morphology Confirmed RBC Indices     Platelet Assessment  Automated Count Confirmed. Platelet morphology is normal.     Automated Count Confirmed. Platelet morphology is normal.    Giant Platelets      Acanthocytes      Roseann Rods      Basophilic Stippling      Bite Cells      Blister Cells      Sterling Cells      Elliptocytes      Hgb C Crystals      Carpio-Jolly Bodies      Hypersegmented Neutrophils      Polychromasia      RBC agglutination      RBC Fragments      Reactive Lymphocytes Present (A) None Seen    Rouleaux      Sickle Cells      Smudge Cells      Spherocytes      Stomatocytes      Target Cells      Teardrop Cells      Toxic Neutrophils      Pathologist Review Comments (Blood)         Medications - No data to display    Assessments & Plan (with Medical Decision Making)   33-year-old female presents for palpitations.  EKG is sinus rhythm without signs of acute dysrhythmia such as WPW, Brugada syndrome, arrhythmogenic right ventricular dysplasia, or prolonged QT syndrome.  Troponin is normal making ACS unlikely.  Electrolytes are within normal limits.  White blood cell count is 10.2 which is reassuring and her hemoglobin is 14.3, no signs of anemia.  CT of the chest considered however with the patient not to have any chest pain or shortness of breath I believe this is unlikely to be pulmonary embolism and she is in fact PERC negative, therefore no indication for CT of the chest at this time.  The patient is observed on the monitor here for 1.5 hours.  She continues to be well-appearing here, normal heart rate and blood pressure, breathing comfortably on room air, no dysrhythmia caught while on the monitor here.  At this time hospitalization considered however with her workup being so reassuring at this time I believe she is safe to discharge home with instructions to  return if she has worsening of her symptoms or other concerns.  I have ordered a Zio patch for the patient to have home cardiac monitoring for further workup and she is instructed to follow-up with primary clinic.  The patient is in agreement with this plan.    I have reviewed the nursing notes.    I have reviewed the findings, diagnosis, plan and need for follow up with the patient.         Discharge Medication List as of 4/15/2024 12:22 AM          Final diagnoses:   Palpitations       4/14/2024   [facility] EMERGENCY DEPT       Nicolas Kevin MD  04/15/24 [id number]

## 2024-04-15 NOTE — ED TRIAGE NOTES
"Pt reports laying in bed and feeling her \"heart feel weird\" Reports feeling it beating fast and slow. Pt recently had an episode at work near syncope. Pt was seen in the ED and results were normal. Pt then had a MRI, normal. Denies feeling shortness of breath and chestpain. Reports recently having a upper respiratory cold.       "

## 2024-04-15 NOTE — DISCHARGE INSTRUCTIONS
Wear the heart monitor to see if we can find any signs of a irregular heartbeat.  Return for chest pain, difficulty breathing, repeated episodes that do not resolve after 15 to 30 minutes, or other concerns.  Otherwise follow-up in clinic.

## 2024-04-16 ENCOUNTER — ORDERS ONLY (AUTO-RELEASED) (OUTPATIENT)
Dept: EMERGENCY MEDICINE | Facility: CLINIC | Age: 34
End: 2024-04-16
Payer: COMMERCIAL

## 2024-04-16 DIAGNOSIS — R00.2 PALPITATIONS: ICD-10-CM

## 2024-04-18 ENCOUNTER — HOSPITAL ENCOUNTER (OUTPATIENT)
Dept: CARDIOLOGY | Facility: CLINIC | Age: 34
Discharge: HOME | End: 2024-04-18
Attending: NURSE PRACTITIONER | Admitting: NURSE PRACTITIONER
Payer: COMMERCIAL

## 2024-04-18 DIAGNOSIS — R55 NEAR SYNCOPE: ICD-10-CM

## 2024-04-18 DIAGNOSIS — Z82.79 FAMILY HISTORY OF CONGENITAL DEFORMITY: ICD-10-CM

## 2024-04-18 LAB — LVEF ECHO: NORMAL

## 2024-04-18 PROCEDURE — 93306 TTE W/DOPPLER COMPLETE: CPT

## 2024-04-18 PROCEDURE — 93306 TTE W/DOPPLER COMPLETE: CPT | Mod: 26 | Performed by: INTERNAL MEDICINE

## 2024-05-04 PROCEDURE — 93244 EXT ECG>48HR<7D REV&INTERPJ: CPT | Performed by: INTERNAL MEDICINE

## 2024-05-06 ENCOUNTER — OFFICE VISIT (OUTPATIENT)
Dept: URGENT CARE | Facility: URGENT CARE | Age: 34
End: 2024-05-06
Payer: COMMERCIAL

## 2024-05-06 VITALS
OXYGEN SATURATION: 97 % | DIASTOLIC BLOOD PRESSURE: 77 MMHG | WEIGHT: 196 LBS | RESPIRATION RATE: 14 BRPM | BODY MASS INDEX: 31.65 KG/M2 | TEMPERATURE: 99.2 F | SYSTOLIC BLOOD PRESSURE: 116 MMHG | HEART RATE: 68 BPM

## 2024-05-06 DIAGNOSIS — R07.0 THROAT PAIN: Primary | ICD-10-CM

## 2024-05-06 DIAGNOSIS — Z20.818 STREP THROAT EXPOSURE: ICD-10-CM

## 2024-05-06 LAB
DEPRECATED S PYO AG THROAT QL EIA: NEGATIVE
GROUP A STREP BY PCR: NOT DETECTED

## 2024-05-06 PROCEDURE — 87651 STREP A DNA AMP PROBE: CPT | Performed by: PHYSICIAN ASSISTANT

## 2024-05-06 PROCEDURE — 99213 OFFICE O/P EST LOW 20 MIN: CPT | Performed by: PHYSICIAN ASSISTANT

## 2024-05-06 RX ORDER — AMOXICILLIN 500 MG/1
500 CAPSULE ORAL 2 TIMES DAILY
Qty: 20 CAPSULE | Refills: 0 | Status: SHIPPED | OUTPATIENT
Start: 2024-05-06 | End: 2024-05-16

## 2024-05-06 NOTE — PROGRESS NOTES
Assessment & Plan     Throat pain  Rapid strep is negative. Continue to monitor symptoms. Gave written Rx that can be filled with worsening symptoms or fever. Patient agrees with plan.     - Streptococcus A Rapid Screen w/Reflex to PCR - Clinic Collect  - Group A Streptococcus PCR Throat Swab  - amoxicillin (AMOXIL) 500 MG capsule; Take 1 capsule (500 mg) by mouth 2 times daily for 10 days    Strep throat exposure     - amoxicillin (AMOXIL) 500 MG capsule; Take 1 capsule (500 mg) by mouth 2 times daily for 10 days                  Return in about 1 week (around 5/13/2024), or if symptoms worsen or fail to improve.              Subjective   Chief Complaint   Patient presents with    Pharyngitis     Pt has a sore throat, both her daughters have strep    Ear Problem     Left ear pain for a couple weeks       HPI     URI Adult    Onset of symptoms was 2 day(s) ago.  Course of illness is same.    Severity moderate  Current and Associated symptoms: sore throat   Treatment measures tried include None tried.  Predisposing factors include exposure to strep, both children have had it this past week                    Objective    /77   Pulse 68   Temp 99.2  F (37.3  C) (Tympanic)   Resp 14   Wt 88.9 kg (196 lb)   LMP 03/05/2024 (Approximate)   SpO2 97%   BMI 31.65 kg/m    Body mass index is 31.65 kg/m .    Physical Exam  Constitutional:       Appearance: She is well-developed.   HENT:      Head: Normocephalic.      Right Ear: Tympanic membrane and ear canal normal.      Left Ear: Tympanic membrane and ear canal normal.      Mouth/Throat:      Pharynx: Posterior oropharyngeal erythema present.   Eyes:      Conjunctiva/sclera: Conjunctivae normal.   Cardiovascular:      Rate and Rhythm: Normal rate.      Heart sounds: Normal heart sounds.   Pulmonary:      Effort: Pulmonary effort is normal.      Breath sounds: Normal breath sounds.   Skin:     General: Skin is warm and dry.      Findings: No rash.   Psychiatric:          Behavior: Behavior normal.            Results for orders placed or performed in visit on 05/06/24 (from the past 24 hour(s))   Streptococcus A Rapid Screen w/Reflex to PCR - Clinic Collect    Specimen: Throat; Swab   Result Value Ref Range    Group A Strep antigen Negative Negative           Signed Electronically by: Lilliana Rios PA-C

## 2024-08-14 ENCOUNTER — MYC MEDICAL ADVICE (OUTPATIENT)
Dept: FAMILY MEDICINE | Facility: CLINIC | Age: 34
End: 2024-08-14
Payer: COMMERCIAL

## 2024-08-28 ENCOUNTER — OFFICE VISIT (OUTPATIENT)
Dept: FAMILY MEDICINE | Facility: CLINIC | Age: 34
End: 2024-08-28
Payer: COMMERCIAL

## 2024-08-28 VITALS
TEMPERATURE: 98.1 F | RESPIRATION RATE: 20 BRPM | WEIGHT: 197 LBS | DIASTOLIC BLOOD PRESSURE: 72 MMHG | HEIGHT: 66 IN | HEART RATE: 72 BPM | SYSTOLIC BLOOD PRESSURE: 118 MMHG | OXYGEN SATURATION: 100 % | BODY MASS INDEX: 31.66 KG/M2

## 2024-08-28 DIAGNOSIS — N63.21 BREAST LUMP ON LEFT SIDE AT 2 O'CLOCK POSITION: ICD-10-CM

## 2024-08-28 DIAGNOSIS — E78.5 DYSLIPIDEMIA: ICD-10-CM

## 2024-08-28 DIAGNOSIS — E66.811 OBESITY (BMI 30.0-34.9): ICD-10-CM

## 2024-08-28 DIAGNOSIS — N64.4 BREAST TENDERNESS: Primary | ICD-10-CM

## 2024-08-28 DIAGNOSIS — D22.9 MULTIPLE NEVI: ICD-10-CM

## 2024-08-28 LAB
CHOLEST SERPL-MCNC: 203 MG/DL
FASTING STATUS PATIENT QL REPORTED: YES
HDLC SERPL-MCNC: 63 MG/DL
LDLC SERPL CALC-MCNC: 125 MG/DL
NONHDLC SERPL-MCNC: 140 MG/DL
TRIGL SERPL-MCNC: 73 MG/DL
TSH SERPL DL<=0.005 MIU/L-ACNC: 0.86 UIU/ML (ref 0.3–4.2)

## 2024-08-28 PROCEDURE — 36415 COLL VENOUS BLD VENIPUNCTURE: CPT | Performed by: PHYSICIAN ASSISTANT

## 2024-08-28 PROCEDURE — 80061 LIPID PANEL: CPT | Performed by: PHYSICIAN ASSISTANT

## 2024-08-28 PROCEDURE — 84443 ASSAY THYROID STIM HORMONE: CPT | Performed by: PHYSICIAN ASSISTANT

## 2024-08-28 PROCEDURE — 99214 OFFICE O/P EST MOD 30 MIN: CPT | Performed by: PHYSICIAN ASSISTANT

## 2024-08-28 ASSESSMENT — PAIN SCALES - GENERAL: PAINLEVEL: MILD PAIN (2)

## 2024-08-28 NOTE — PATIENT INSTRUCTIONS
Call [phone] to set up your imaging     Other derm options - [facility] or [place] get people in much faster

## 2024-08-28 NOTE — NURSING NOTE
"Chief Complaint   Patient presents with    Breast Pain       Initial Pulse 72   Temp 98.1  F (36.7  C)   Resp 20   Ht 1.676 m (5' 5.98\")   Wt 89.4 kg (197 lb)   LMP 07/28/2024 (Approximate)   SpO2 100%   Breastfeeding No   BMI 31.81 kg/m   Estimated body mass index is 31.81 kg/m  as calculated from the following:    Height as of this encounter: 1.676 m (5' 5.98\").    Weight as of this encounter: 89.4 kg (197 lb).    Patient presents to the clinic using No DME    Is there anyone who you would like to be able to receive your results? No  If yes have patient fill out ABE      "

## 2024-08-28 NOTE — PROGRESS NOTES
"  Assessment & Plan     Breast tenderness  Breast lump on left side at 2 o'clock position  Work up  - MA Diagnostic Digital Left; Future  - US Breast Left Limited 1-3 Quadrants; Future    Multiple nevi  - Adult Dermatology  Referral; Future    Dyslipidemia  Recheck - has been doing mediterranean diet  - Lipid panel reflex to direct LDL Fasting; Future    Obesity  Having difficulty losing wt after pregnancy  -TSH w T4 reflex    BMI  Estimated body mass index is 31.81 kg/m  as calculated from the following:    Height as of this encounter: 1.676 m (5' 5.98\").    Weight as of this encounter: 89.4 kg (197 lb).   Weight management plan: Discussed healthy diet and exercise guidelines    Patient Instructions   Call [phone] to set up your imaging     Other derm options - [place] or [place] get people in much faster    Subjective   Leena is a 33 year old, presenting for the following health issues:  Breast Pain        8/28/2024     8:56 AM   Additional Questions   Roomed by mehul lynch cma     History of Present Illness       Reason for visit:  Breast pain  Symptom onset:  More than a month  Symptoms include:  Aching in left breast  Symptom intensity:  Mild  Symptom progression:  Staying the same  Had these symptoms before:  No  What makes it worse:  No  What makes it better:  No   She is taking medications regularly.  Pain is consistent, achy and present, x at least a couple months.  Stopped breastfeeding in Jan.  Still can express milk but this is same in both breasts and similar to after her previous breast feeding experience.  Fam history - no first degree relative known to have breast, colon, ovarian cancer - but dad's side unknown.      Pt would like a referral for a skin check at derm     Objective    /72 (BP Location: Right arm, Patient Position: Sitting, Cuff Size: Adult Regular)   Pulse 72   Temp 98.1  F (36.7  C)   Resp 20   Ht 1.676 m (5' 5.98\")   Wt 89.4 kg (197 lb)   LMP 07/28/2024 " (Approximate)   SpO2 100%   Breastfeeding No   BMI 31.81 kg/m    Body mass index is 31.81 kg/m .  Physical Exam   No lymphadenopathy.  No nipple changes.  Several more prominent lumps 1-3 o clock, though her tenderness is a bit more lateral than the lumps.    Signed Electronically by: Agnes Stanton PA-C

## 2024-08-28 NOTE — RESULT ENCOUNTER NOTE
Leena  Cholesterol is a little better than it was in February.  Keep up healthy lifestyle efforts.  I also checked thyroid since you've been having trouble with wt loss.  Thyroid test is normal.  Keep trying to get 30-60 min of exercise daily (chasing kids does count!), maybe target [id number] calories.  Sometimes lower carb can help - keep fruits and veggies but reduce breads/rice/pasta etc if eating much of that.  Suggest tracking calories as it tends to be much more accurate than just trying to watch what you eat.  Agnes

## 2024-08-29 ENCOUNTER — ANCILLARY PROCEDURE (OUTPATIENT)
Dept: MAMMOGRAPHY | Facility: CLINIC | Age: 34
End: 2024-08-29
Attending: PHYSICIAN ASSISTANT
Payer: COMMERCIAL

## 2024-08-29 DIAGNOSIS — N63.21 BREAST LUMP ON LEFT SIDE AT 2 O'CLOCK POSITION: ICD-10-CM

## 2024-08-29 DIAGNOSIS — N64.4 BREAST TENDERNESS: ICD-10-CM

## 2024-08-29 PROCEDURE — 77062 BREAST TOMOSYNTHESIS BI: CPT

## 2024-08-29 PROCEDURE — 76642 ULTRASOUND BREAST LIMITED: CPT | Mod: LT

## 2024-08-29 NOTE — RESULT ENCOUNTER NOTE
Leena  Your breasts have dense tissue and are just irregular in density.  No concerns found on imaging.  You can try reducing caffeine.  If the tenderness is bothersome, make sure to wear a well fitting supportive bra.  Some women will also use compression wraps (more sport bra like).  Tylenol or NSAID can be used as needed.  Some women seem to have luck with reducing caffeine.  If you ever feel anything different or changing with your breasts, please be seen for recheck.  Let me know if you have questions.  Agnes

## 2024-12-15 ENCOUNTER — OFFICE VISIT (OUTPATIENT)
Dept: URGENT CARE | Facility: URGENT CARE | Age: 34
End: 2024-12-15
Payer: COMMERCIAL

## 2024-12-15 VITALS
DIASTOLIC BLOOD PRESSURE: 82 MMHG | TEMPERATURE: 98.2 F | OXYGEN SATURATION: 97 % | WEIGHT: 200 LBS | HEART RATE: 71 BPM | RESPIRATION RATE: 18 BRPM | BODY MASS INDEX: 32.3 KG/M2 | SYSTOLIC BLOOD PRESSURE: 131 MMHG

## 2024-12-15 DIAGNOSIS — H10.33 ACUTE BACTERIAL CONJUNCTIVITIS OF BOTH EYES: Primary | ICD-10-CM

## 2024-12-15 PROCEDURE — 99214 OFFICE O/P EST MOD 30 MIN: CPT | Performed by: FAMILY MEDICINE

## 2024-12-15 RX ORDER — OFLOXACIN 3 MG/ML
SOLUTION/ DROPS OPHTHALMIC
Qty: 10 ML | Refills: 0 | Status: SHIPPED | OUTPATIENT
Start: 2024-12-15 | End: 2024-12-22

## 2024-12-15 NOTE — PATIENT INSTRUCTIONS
Start antibiotic eye drops every 2 hours until bedtime tonight then starting in am 4 times a day for 6 days for a total of 7 days   Artifical tears as needed for irritation  Warm compresses, can try baby shampoo for the drainage/crusting     If symptoms are not improving follow up with your eye doctor in 2-3 days   See your eye doctor immediately if symptoms worsen.

## 2024-12-15 NOTE — PROGRESS NOTES
ASSESSMENT/PLAN:      ICD-10-CM    1. Acute bacterial conjunctivitis of both eyes  H10.33 ofloxacin (OCUFLOX) 0.3 % ophthalmic solution          Patient Instructions     Start antibiotic eye drops every 2 hours until bedtime tonight then starting in am 4 times a day for 6 days for a total of 7 days   Artifical tears as needed for irritation  Warm compresses, can try baby shampoo for the drainage/crusting     If symptoms are not improving follow up with your eye doctor in 2-3 days   See your eye doctor immediately if symptoms worsen.     Reviewed medication instructions and side effects. Follow up if experiences side effects.     I reviewed supportive care, otc meds to use if needed, expected course, and signs of concern.  Follow up as needed or if she does not improve within  1-2 days or if worsens in any way.  Reviewed red flag symptoms and is to go to the ER if experiences any of these.     The use of Dragon/BIME Analyticsation services may have been used to construct the content in this note; any grammatical or spelling errors are non-intentional. Please contact the author of this note directly if you are in need of any clarification.                  Patient presents with:  Eye Problem: Left eye, right eye had pink eye couple of weeks ago, was treated with drops for right eye, [facility] through left eye started up, didn't have enough drops for left eye. Today very irritated.       Subjective     Chhaya Gama is a 33 year old female who presents to clinic today for the following health issues:    HPI     Eye(s) Problem    Duration: onset yesterday,  History of pink eye -used polytrim drops prescribed to her daughter 1/20/2024 -initially started in left eye 11/29/2024 and completed 7 days on 12/5/24, onset of symptoms in the right eye the following day and completed 6 days of eyedrops on 12/5/2024-ran out of eyedrops-she was giving eyedrops in both eyes every 3 hours when awake for 7 days on the left and 6  days on the right  Description:  Location: Initially started on left eye now small amount of redness on the right  Pain: no   Redness: YES  Discharge: YES-left eye greater than right  Accompanying signs and symptoms: Mild nasal congestion, no cough no fever  History (Trauma, foreign body exposure,): None  Precipitating or alleviating factors (contact use): Does not wear contacts does wear glasses no changes in vision  Therapies tried and outcome: As noted      Past Medical History:   Diagnosis Date    Chickenpox     Fetal cardiac anomaly affecting pregnancy, antepartum 4/4/2018    History of recurrent UTI (urinary tract infection)     Hypertension     with first pregnancy     Social History     Tobacco Use    Smoking status: Never    Smokeless tobacco: Never   Substance Use Topics    Alcohol use: No     Comment: occas-quit with pregnancy       Current Outpatient Medications   Medication Sig Dispense Refill    lactobacillus rhamnosus, GG, (CULTURELL) capsule Take 1 capsule by mouth daily      ofloxacin (OCUFLOX) 0.3 % ophthalmic solution Place 1-2 drops into both eyes every 2 hours (while awake) for 1 day, THEN 1-2 drops 4 times daily for 6 days. 10 mL 0     Allergies   Allergen Reactions    Imitrex [Sumatriptan]     Nitrous Oxide Other (See Comments)     Not recommended due to MTHFR mutation             ROS are negative, except as otherwise noted HPI      Objective    /82   Pulse 71   Temp 98.2  F (36.8  C) (Tympanic)   Resp 18   Wt 90.7 kg (200 lb)   SpO2 97%   BMI 32.30 kg/m    Body mass index is 32.3 kg/m .  Physical Exam   GENERAL: alert and no distress  EYES: Eyes PERRL, EOMI, no pain with EOMI, left eye- conjunctivae injected thin white discharge, sclera injected, small amount of mattering on lower lid, lids otherwise clear no erythema or swelling  Right eye-conjunctiva injected minimal scleral injection compared to left, no mattering, lids clear  NEURO: Normal strength and tone, mentation intact  and speech normal, normal gait      Diagnostic Test Results:  Labs reviewed in Epic  none

## 2025-01-27 ENCOUNTER — PATIENT OUTREACH (OUTPATIENT)
Dept: CARE COORDINATION | Facility: CLINIC | Age: 35
End: 2025-01-27
Payer: COMMERCIAL

## 2025-02-10 ENCOUNTER — PATIENT OUTREACH (OUTPATIENT)
Dept: CARE COORDINATION | Facility: CLINIC | Age: 35
End: 2025-02-10
Payer: COMMERCIAL

## 2025-03-31 SDOH — HEALTH STABILITY: PHYSICAL HEALTH: ON AVERAGE, HOW MANY DAYS PER WEEK DO YOU ENGAGE IN MODERATE TO STRENUOUS EXERCISE (LIKE A BRISK WALK)?: 6 DAYS

## 2025-03-31 SDOH — HEALTH STABILITY: PHYSICAL HEALTH: ON AVERAGE, HOW MANY MINUTES DO YOU ENGAGE IN EXERCISE AT THIS LEVEL?: 50 MIN

## 2025-03-31 ASSESSMENT — SOCIAL DETERMINANTS OF HEALTH (SDOH): HOW OFTEN DO YOU GET TOGETHER WITH FRIENDS OR RELATIVES?: ONCE A WEEK

## 2025-04-01 ENCOUNTER — OFFICE VISIT (OUTPATIENT)
Dept: FAMILY MEDICINE | Facility: CLINIC | Age: 35
End: 2025-04-01
Payer: COMMERCIAL

## 2025-04-01 VITALS
SYSTOLIC BLOOD PRESSURE: 132 MMHG | BODY MASS INDEX: 30.37 KG/M2 | TEMPERATURE: 97 F | HEART RATE: 72 BPM | DIASTOLIC BLOOD PRESSURE: 89 MMHG | RESPIRATION RATE: 14 BRPM | OXYGEN SATURATION: 99 % | WEIGHT: 189 LBS | HEIGHT: 66 IN

## 2025-04-01 DIAGNOSIS — Z00.00 ROUTINE GENERAL MEDICAL EXAMINATION AT A HEALTH CARE FACILITY: Primary | ICD-10-CM

## 2025-04-01 PROCEDURE — 99395 PREV VISIT EST AGE 18-39: CPT | Performed by: NURSE PRACTITIONER

## 2025-04-01 PROCEDURE — 3075F SYST BP GE 130 - 139MM HG: CPT | Performed by: NURSE PRACTITIONER

## 2025-04-01 PROCEDURE — 3079F DIAST BP 80-89 MM HG: CPT | Performed by: NURSE PRACTITIONER

## 2025-04-01 NOTE — PROGRESS NOTES
"Preventive Care Visit  [facility]  Agustina Duggan, SUSSY CNP, Family Medicine  Apr 1, 2025      Assessment & Plan     Routine general medical examination at a health care facility              BMI  Estimated body mass index is 30.51 kg/m  as calculated from the following:    Height as of this encounter: 1.676 m (5' 6\").    Weight as of this encounter: 85.7 kg (189 lb).   Weight management plan: Discussed healthy diet and exercise guidelines    Counseling  Appropriate preventive services were addressed with this patient via screening, questionnaire, or discussion as appropriate for fall prevention, nutrition, physical activity, Tobacco-use cessation, social engagement, weight loss and cognition.  Checklist reviewing preventive services available has been given to the patient.  Reviewed patient's diet, addressing concerns and/or questions.       Work on weight loss  Regular exercise  Call or return to the clinic with any worsening of symptoms or no resolution. Patient/Parent verbalized understanding and is in agreement. Medication side effects reviewed.   Current Outpatient Medications   Medication Sig Dispense Refill    lactobacillus rhamnosus, GG, (CULTURELL) capsule Take 1 capsule by mouth daily       Chart documentation with Dragon Voice recognition Software. Although reviewed after completion, some words and grammatical errors may remain.  Agustina Duggan MSN,FNP-BC  [place] [id number] [phone]        See Patient Instructions    Vero Stern is a 34 year old, presenting for the following:  Physical        4/1/2025     3:55 PM   Additional Questions   Roomed by Marla KAN          Advance Care Planning  Patient does not have a Health Care Directive:       3/31/2025   General Health   How would you rate your overall physical health? Good   Feel stress (tense, anxious, or unable to sleep) Only a little "   (!) STRESS CONCERN      3/31/2025   Nutrition   Three or more servings of calcium each day? Yes   Diet: Regular (no restrictions)   How many servings of fruit and vegetables per day? (!) 2-3   How many sweetened beverages each day? 0-1         3/31/2025   Exercise   Days per week of moderate/strenous exercise 6 days   Average minutes spent exercising at this level 50 min         3/31/2025   Social Factors   Frequency of gathering with friends or relatives Once a week   Worry food won't last until get money to buy more No   Food not last or not have enough money for food? No   Do you have housing? (Housing is defined as stable permanent housing and does not include staying ouside in a car, in a tent, in an abandoned building, in an overnight shelter, or couch-surfing.) Yes   Are you worried about losing your housing? No   Lack of transportation? No   Unable to get utilities (heat,electricity)? No         3/31/2025   Dental   Dentist two times every year? Yes           2/26/2024   TB Screening   Were you born outside of the [place]? No           Today's PHQ-2 Score:       3/31/2025     5:28 PM   PHQ-2 ( 1999 [employer])   Q1: Little interest or pleasure in doing things 0   Q2: Feeling down, depressed or hopeless 0   PHQ-2 Score 0    Q1: Little interest or pleasure in doing things Not at all   Q2: Feeling down, depressed or hopeless Not at all   PHQ-2 Score 0       Patient-reported           3/31/2025   Substance Use   Alcohol more than 3/day or more than 7/wk No   Do you use any other substances recreationally? No     Social History     Tobacco Use    Smoking status: Never    Smokeless tobacco: Never   Vaping Use    Vaping status: Never Used   Substance Use Topics    Alcohol use: No     Comment: occas-quit with pregnancy    Drug use: Never           8/29/2024   LAST FHS-7 RESULTS   1st degree relative breast or ovarian cancer No   Any relative bilateral breast cancer No   Any male have breast cancer No   Any ONE woman have  BOTH breast AND ovarian cancer No   Any woman with breast cancer before 50yrs No   2 or more relatives with breast AND/OR ovarian cancer No   2 or more relatives with breast AND/OR bowel cancer No        Mammogram Screening - Patient under 40 years of age: Routine Mammogram Screening not recommended.         3/31/2025   STI Screening   New sexual partner(s) since last STI/HIV test? No     History of abnormal Pap smear: No - age 30-64 HPV with reflex Pap every 5 years recommended        Latest Ref Rng & Units 8/4/2021     2:15 PM 7/17/2018    10:41 AM   PAP / HPV   PAP  Negative for Intraepithelial Lesion or Malignancy (NILM)     PAP (Historical)   NIL    HPV 16 DNA Negative Negative     HPV 18 DNA Negative Negative     Other HR HPV Negative Negative             3/31/2025   Contraception/Family Planning   Questions about contraception or family planning No        Reviewed and updated as needed this visit by Provider   Tobacco  Allergies  Meds  Problems  Med Hx  Surg Hx  Fam Hx            Past Medical History:   Diagnosis Date    Chickenpox     Fetal cardiac anomaly affecting pregnancy, antepartum 4/4/2018    History of recurrent UTI (urinary tract infection)     Hypertension     with first pregnancy     Past Surgical History:   Procedure Laterality Date    BIOPSY  2018    DILATION AND CURETTAGE, OPERATIVE HYSTEROSCOPY, COMBINED N/A 08/30/2017    Procedure: COMBINED DILATION AND CURETTAGE, OPERATIVE HYSTEROSCOPY;  Hysteroscopy with dilation and curettage, polypectomy;  Surgeon: Mable Hanson MD;  Location: [facility] OR    ENT SURGERY  20 years ago    tubes in three times    MOUTH SURGERY  2008    wisdom teeth -age 16    TONSILLECTOMY  02/2016     Labs reviewed in EPIC      Review of Systems  Constitutional, neuro, ENT, endocrine, pulmonary, cardiac, gastrointestinal, genitourinary, musculoskeletal, integument and psychiatric systems are negative, except as otherwise noted.     Objective    Exam  /89    "Pulse 72   Temp 97  F (36.1  C) (Tympanic)   Resp 14   Ht 1.676 m (5' 6\")   Wt 85.7 kg (189 lb)   LMP 03/03/2025   SpO2 99%   BMI 30.51 kg/m     Estimated body mass index is 30.51 kg/m  as calculated from the following:    Height as of this encounter: 1.676 m (5' 6\").    Weight as of this encounter: 85.7 kg (189 lb).    Physical Exam  GENERAL: alert and no distress  EYES: Eyes grossly normal to inspection, PERRL and conjunctivae and sclerae normal  HENT: ear canals and TM's normal, nose and mouth without ulcers or lesions  NECK: no adenopathy, no asymmetry, masses, or scars  RESP: lungs clear to auscultation - no rales, rhonchi or wheezes  CV: regular rate and rhythm, normal S1 S2, no S3 or S4, no murmur, click or rub, no peripheral edema  ABDOMEN: soft, nontender, no hepatosplenomegaly, no masses and bowel sounds normal  MS: no gross musculoskeletal defects noted, no edema  SKIN: no suspicious lesions or rashes  NEURO: Normal strength and tone, mentation intact and speech normal  PSYCH: mentation appears normal, affect normal/bright        Signed Electronically by: USSSY Melgar CNP    "

## 2025-04-01 NOTE — PATIENT INSTRUCTIONS
Thank you for coming to see me today! Here are a couple of pieces of information about messages and lab communication practices.     If lab work was done today as part of your evaluation you will generally be contacted via [facility], mail, or phone with the results within 7-10 days.  If there is an alarming/concerning result we will contact you by phone. Lab results come back at varying times, I generally wait until all labs are resulted before making comments on results. Please note, labs are automatically released to [facility] once available, but it may take a couple of days for my interpretation note to appear.     I try very hard to respond to medical messages with 2 business days of receiving them. Occasionally it takes me longer if I am trying to figure out the best way to respond and need to seek guidance, do some research or dig deeper into your medical history to come up with a helpful response.     If you need refills please contact your pharmacist. They will send a refill request to me to review. Please allow 3-5 business days for us to respond to all refill requests.     Please call or send a medical message with any questions or concerns. Thank you for trusting me to be part of your healthcare team!    Patient Education   Preventive Care Advice   This is general advice given by our system to help you stay healthy. However, your care team may have specific advice just for you. Please talk to your care team about your preventive care needs.  Nutrition  Eat 5 or more servings of fruits and vegetables each day.  Try wheat bread, brown rice and whole grain pasta (instead of white bread, rice, and pasta).  Get enough calcium and vitamin D. Check the label on foods and aim for 100% of the RDA (recommended daily allowance).  Lifestyle  Exercise at least 150 minutes each week  (30 minutes a day, 5 days a week).  Do muscle strengthening activities 2 days a week. These help control your weight and prevent disease.  No  smoking.  Wear sunscreen to prevent skin cancer.  Have a dental exam and cleaning every 6 months.  Yearly exams  See your health care team every year to talk about:  Any changes in your health.  Any medicines your care team has prescribed.  Preventive care, family planning, and ways to prevent chronic diseases.  Shots (vaccines)   HPV shots (up to age 26), if you've never had them before.  Hepatitis B shots (up to age 59), if you've never had them before.  COVID-19 shot: Get this shot when it's due.  Flu shot: Get a flu shot every year.  Tetanus shot: Get a tetanus shot every 10 years.  Pneumococcal, hepatitis A, and RSV shots: Ask your care team if you need these based on your risk.  Shingles shot (for age 50 and up)  General health tests  Diabetes screening:  Starting at age 35, Get screened for diabetes at least every 3 years.  If you are younger than age 35, ask your care team if you should be screened for diabetes.  Cholesterol test: At age 39, start having a cholesterol test every 5 years, or more often if advised.  Bone density scan (DEXA): At age 50, ask your care team if you should have this scan for osteoporosis (brittle bones).  Hepatitis C: Get tested at least once in your life.  STIs (sexually transmitted infections)  Before age 24: Ask your care team if you should be screened for STIs.  After age 24: Get screened for STIs if you're at risk. You are at risk for STIs (including HIV) if:  You are sexually active with more than one person.  You don't use condoms every time.  You or a partner was diagnosed with a sexually transmitted infection.  If you are at risk for HIV, ask about PrEP medicine to prevent HIV.  Get tested for HIV at least once in your life, whether you are at risk for HIV or not.  Cancer screening tests  Cervical cancer screening: If you have a cervix, begin getting regular cervical cancer screening tests starting at age 21.  Breast cancer scan (mammogram): If you've ever had breasts,  begin having regular mammograms starting at age 40. This is a scan to check for breast cancer.  Colon cancer screening: It is important to start screening for colon cancer at age 45.  Have a colonoscopy test every 10 years (or more often if you're at risk) Or, ask your provider about stool tests like a FIT test every year or Cologuard test every 3 years.  To learn more about your testing options, visit:   .  For help making a decision, visit:   https://bit.ly/jg39884.  Prostate cancer screening test: If you have a prostate, ask your care team if a prostate cancer screening test (PSA) at age 55 is right for you.  Lung cancer screening: If you are a current or former smoker ages 50 to 80, ask your care team if ongoing lung cancer screenings are right for you.  For informational purposes only. Not to replace the advice of your health care provider. Copyright   2023 [facility] [employer]. All rights reserved. Clinically reviewed by the [facility] Transitions Program. "[employer]" 551107 - REV 01/24.

## 2025-07-23 ENCOUNTER — E-VISIT (OUTPATIENT)
Dept: FAMILY MEDICINE | Facility: CLINIC | Age: 35
End: 2025-07-23
Payer: COMMERCIAL

## 2025-07-23 DIAGNOSIS — B34.9 VIRAL SYNDROME: Primary | ICD-10-CM

## 2025-07-23 PROCEDURE — 99421 OL DIG E/M SVC 5-10 MIN: CPT | Performed by: PHYSICIAN ASSISTANT

## 2025-07-24 NOTE — TELEPHONE ENCOUNTER
Provider E-Visit time total (minutes): {AMB PROVIDER EVISIT TOTAL MINUTES LIST:379653}    {Please send feedback regarding eVisits to [email or web address]

## (undated) DEVICE — SOL NACL 0.9% IRRIG 1000ML BOTTLE 07138-09

## (undated) DEVICE — SEAL SET MYOSURE ROD LENS SCOPE SINGLE USE 40-902

## (undated) DEVICE — PACK LAPAROSCOPY/PELVISCOPY STD

## (undated) DEVICE — LUBRICATING JELLY 4.25OZ

## (undated) DEVICE — PAD PERI INDIV WRAP 11" 2022

## (undated) DEVICE — NDL SPINAL 22GA 3.5" QUINCKE 405181

## (undated) DEVICE — SOL NACL 0.9% IRRIG 3000ML BAG 07972-08

## (undated) DEVICE — DECANTER VIAL 2006S

## (undated) DEVICE — SOL WATER IRRIG 1000ML BOTTLE 07139-09

## (undated) DEVICE — PAD FLOOR SURGISAFE

## (undated) DEVICE — TUBING SYS AQUILEX BLUE INFLOW AQL-110 YLW OUTFLOW AQL-111

## (undated) DEVICE — CATH INTERMITTENT CLEAN-CATH FEMALE 14FR 6" VINYL LF 420614

## (undated) DEVICE — Device

## (undated) DEVICE — GOWN LG DISP 9515

## (undated) DEVICE — STOCKING SLEEVE COMPRESSION CALF MED

## (undated) DEVICE — DRSG TELFA 3X8" 1238

## (undated) DEVICE — GLOVE PROTEXIS W/NEU-THERA 6.5  2D73TE65

## (undated) DEVICE — GLOVE PROTEXIS BLUE W/NEU-THERA 7.0  2D73EB70

## (undated) RX ORDER — DEXAMETHASONE SODIUM PHOSPHATE 4 MG/ML
INJECTION, SOLUTION INTRA-ARTICULAR; INTRALESIONAL; INTRAMUSCULAR; INTRAVENOUS; SOFT TISSUE
Status: DISPENSED
Start: 2017-08-30

## (undated) RX ORDER — BUPIVACAINE HYDROCHLORIDE 2.5 MG/ML
INJECTION, SOLUTION INFILTRATION; PERINEURAL
Status: DISPENSED
Start: 2017-08-30

## (undated) RX ORDER — KETOROLAC TROMETHAMINE 30 MG/ML
INJECTION, SOLUTION INTRAMUSCULAR; INTRAVENOUS
Status: DISPENSED
Start: 2017-08-30

## (undated) RX ORDER — ONDANSETRON 4 MG/1
TABLET, ORALLY DISINTEGRATING ORAL
Status: DISPENSED
Start: 2017-08-30

## (undated) RX ORDER — LIDOCAINE HYDROCHLORIDE 10 MG/ML
INJECTION, SOLUTION EPIDURAL; INFILTRATION; INTRACAUDAL; PERINEURAL
Status: DISPENSED
Start: 2017-08-30

## (undated) RX ORDER — PROPOFOL 10 MG/ML
INJECTION, EMULSION INTRAVENOUS
Status: DISPENSED
Start: 2017-08-30

## (undated) RX ORDER — ONDANSETRON 2 MG/ML
INJECTION INTRAMUSCULAR; INTRAVENOUS
Status: DISPENSED
Start: 2017-08-30

## (undated) RX ORDER — FENTANYL CITRATE 50 UG/ML
INJECTION, SOLUTION INTRAMUSCULAR; INTRAVENOUS
Status: DISPENSED
Start: 2017-08-30